# Patient Record
Sex: FEMALE | Race: WHITE | Employment: OTHER
[De-identification: names, ages, dates, MRNs, and addresses within clinical notes are randomized per-mention and may not be internally consistent; named-entity substitution may affect disease eponyms.]

---

## 2017-02-22 ENCOUNTER — OFFICE VISIT (OUTPATIENT)
Dept: UROLOGY | Facility: CLINIC | Age: 62
End: 2017-02-22

## 2017-02-22 VITALS
BODY MASS INDEX: 21.79 KG/M2 | WEIGHT: 123 LBS | HEIGHT: 63 IN | SYSTOLIC BLOOD PRESSURE: 128 MMHG | DIASTOLIC BLOOD PRESSURE: 78 MMHG

## 2017-02-22 DIAGNOSIS — K59.00 CONSTIPATION, UNSPECIFIED CONSTIPATION TYPE: ICD-10-CM

## 2017-02-22 DIAGNOSIS — N20.0 RENAL CALCULUS: Primary | ICD-10-CM

## 2017-02-22 PROCEDURE — 99213 OFFICE O/P EST LOW 20 MIN: CPT | Performed by: UROLOGY

## 2017-02-22 ASSESSMENT — ENCOUNTER SYMPTOMS
EYE PAIN: 0
SHORTNESS OF BREATH: 0
WHEEZING: 0
ABDOMINAL PAIN: 0
COLOR CHANGE: 0
COUGH: 0
NAUSEA: 0
BACK PAIN: 0
VOMITING: 0
EYE REDNESS: 0

## 2017-04-08 ENCOUNTER — EMPLOYEE WELLNESS (OUTPATIENT)
Dept: OTHER | Age: 62
End: 2017-04-08

## 2017-04-08 LAB
CHOLESTEROL/HDL RATIO: 3
CHOLESTEROL: 213 MG/DL
GLUCOSE BLD-MCNC: 102 MG/DL (ref 70–99)
HDLC SERPL-MCNC: 71 MG/DL
LDL CHOLESTEROL: 125 MG/DL (ref 0–130)
PATIENT FASTING?: YES
TRIGL SERPL-MCNC: 85 MG/DL
VLDLC SERPL CALC-MCNC: ABNORMAL MG/DL (ref 1–30)

## 2017-05-03 ENCOUNTER — HOSPITAL ENCOUNTER (OUTPATIENT)
Age: 62
Setting detail: SPECIMEN
Discharge: HOME | End: 2017-05-03
Payer: COMMERCIAL

## 2017-05-03 DIAGNOSIS — E03.9 HYPOTHYROIDISM: ICD-10-CM

## 2017-05-03 LAB
T3 FREE: 2.46 PG/ML (ref 2.02–4.43)
THYROXINE, FREE: 1.23 NG/DL (ref 0.93–1.7)
TSH SERPL DL<=0.05 MIU/L-ACNC: 3.93 MIU/L (ref 0.3–5)

## 2017-05-03 PROCEDURE — 36415 COLL VENOUS BLD VENIPUNCTURE: CPT

## 2017-05-03 PROCEDURE — 84443 ASSAY THYROID STIM HORMONE: CPT

## 2017-05-03 PROCEDURE — 84439 ASSAY OF FREE THYROXINE: CPT

## 2017-05-03 PROCEDURE — 84481 FREE ASSAY (FT-3): CPT

## 2017-05-18 ENCOUNTER — HOSPITAL ENCOUNTER (OUTPATIENT)
Age: 62
Setting detail: SPECIMEN
Discharge: HOME | End: 2017-05-18
Payer: COMMERCIAL

## 2017-05-18 LAB
-: ABNORMAL
AMORPHOUS: ABNORMAL
BACTERIA: ABNORMAL
BILIRUBIN URINE: NEGATIVE
CASTS UA: ABNORMAL /LPF
COLOR: YELLOW
COMMENT UA: ABNORMAL
CRYSTALS, UA: ABNORMAL /HPF
EPITHELIAL CELLS UA: ABNORMAL /HPF (ref 0–25)
GLUCOSE URINE: NEGATIVE
KETONES, URINE: NEGATIVE
LEUKOCYTE ESTERASE, URINE: ABNORMAL
MUCUS: ABNORMAL
NITRITE, URINE: POSITIVE
OTHER OBSERVATIONS UA: ABNORMAL
PH UA: 6 (ref 5–9)
PROTEIN UA: NEGATIVE
RBC UA: ABNORMAL /HPF (ref 0–2)
RENAL EPITHELIAL, UA: ABNORMAL /HPF
SPECIFIC GRAVITY UA: 1.02 (ref 1.01–1.02)
TRICHOMONAS: ABNORMAL
TURBIDITY: ABNORMAL
URINE HGB: ABNORMAL
UROBILINOGEN, URINE: NORMAL
WBC UA: ABNORMAL /HPF (ref 0–5)
YEAST: ABNORMAL

## 2017-05-18 PROCEDURE — 87186 SC STD MICRODIL/AGAR DIL: CPT

## 2017-05-18 PROCEDURE — 87088 URINE BACTERIA CULTURE: CPT

## 2017-05-18 PROCEDURE — 81001 URINALYSIS AUTO W/SCOPE: CPT

## 2017-05-18 PROCEDURE — 87086 URINE CULTURE/COLONY COUNT: CPT

## 2017-05-20 LAB
CULTURE: ABNORMAL
CULTURE: ABNORMAL
Lab: ABNORMAL
ORGANISM: ABNORMAL
SPECIMEN DESCRIPTION: ABNORMAL
SPECIMEN DESCRIPTION: ABNORMAL
STATUS: ABNORMAL

## 2017-06-07 ENCOUNTER — OFFICE VISIT (OUTPATIENT)
Dept: OBGYN | Age: 62
End: 2017-06-07
Payer: COMMERCIAL

## 2017-06-07 ENCOUNTER — HOSPITAL ENCOUNTER (OUTPATIENT)
Age: 62
Setting detail: SPECIMEN
Discharge: HOME | End: 2017-06-07
Payer: COMMERCIAL

## 2017-06-07 VITALS
WEIGHT: 120 LBS | BODY MASS INDEX: 20.49 KG/M2 | HEIGHT: 64 IN | SYSTOLIC BLOOD PRESSURE: 118 MMHG | DIASTOLIC BLOOD PRESSURE: 82 MMHG

## 2017-06-07 DIAGNOSIS — Z12.39 SCREENING FOR BREAST CANCER: ICD-10-CM

## 2017-06-07 DIAGNOSIS — Z01.419 ENCOUNTER FOR GYNECOLOGICAL EXAMINATION WITHOUT ABNORMAL FINDING: ICD-10-CM

## 2017-06-07 DIAGNOSIS — Z01.419 ENCOUNTER FOR GYNECOLOGICAL EXAMINATION WITHOUT ABNORMAL FINDING: Primary | ICD-10-CM

## 2017-06-07 PROCEDURE — 99396 PREV VISIT EST AGE 40-64: CPT | Performed by: ADVANCED PRACTICE MIDWIFE

## 2017-06-07 PROCEDURE — G0145 SCR C/V CYTO,THINLAYER,RESCR: HCPCS

## 2017-06-07 PROCEDURE — 87624 HPV HI-RISK TYP POOLED RSLT: CPT

## 2017-06-08 LAB
HPV SAMPLE: NORMAL
HPV SOURCE: NORMAL
HPV, GENOTYPE 16: NOT DETECTED
HPV, GENOTYPE 18: NOT DETECTED
HPV, HIGH RISK OTHER: NOT DETECTED
HPV, INTERPRETATION: NORMAL

## 2017-06-09 LAB — CYTOLOGY REPORT: NORMAL

## 2017-09-20 ENCOUNTER — HOSPITAL ENCOUNTER (OUTPATIENT)
Dept: WOMENS IMAGING | Age: 62
Discharge: HOME | End: 2017-09-20
Payer: COMMERCIAL

## 2017-09-20 DIAGNOSIS — Z12.39 SCREENING FOR BREAST CANCER: ICD-10-CM

## 2017-09-20 PROCEDURE — G0202 SCR MAMMO BI INCL CAD: HCPCS

## 2017-09-27 DIAGNOSIS — M85.80 OSTEOPENIA, UNSPECIFIED LOCATION: Primary | ICD-10-CM

## 2017-09-27 RX ORDER — ALENDRONATE SODIUM 70 MG/1
70 TABLET ORAL
Qty: 12 TABLET | Refills: 3 | Status: SHIPPED | OUTPATIENT
Start: 2017-09-27 | End: 2018-10-08 | Stop reason: SDUPTHER

## 2017-11-17 ENCOUNTER — HOSPITAL ENCOUNTER (OUTPATIENT)
Dept: GENERAL RADIOLOGY | Age: 62
Discharge: HOME | End: 2017-11-17
Payer: COMMERCIAL

## 2017-11-17 ENCOUNTER — HOSPITAL ENCOUNTER (OUTPATIENT)
Age: 62
Discharge: HOME | End: 2017-11-17
Payer: COMMERCIAL

## 2017-11-17 DIAGNOSIS — T14.8XXA SPRAIN: ICD-10-CM

## 2017-11-17 PROCEDURE — 72110 X-RAY EXAM L-2 SPINE 4/>VWS: CPT

## 2017-12-20 ENCOUNTER — TELEPHONE (OUTPATIENT)
Dept: UROLOGY | Age: 62
End: 2017-12-20

## 2017-12-20 ENCOUNTER — HOSPITAL ENCOUNTER (OUTPATIENT)
Age: 62
Discharge: HOME | End: 2017-12-20
Payer: COMMERCIAL

## 2017-12-20 DIAGNOSIS — R31.0 GROSS HEMATURIA: Primary | ICD-10-CM

## 2017-12-20 DIAGNOSIS — N20.0 RENAL STONES: ICD-10-CM

## 2017-12-20 DIAGNOSIS — R31.0 GROSS HEMATURIA: ICD-10-CM

## 2017-12-20 LAB
-: ABNORMAL
AMORPHOUS: ABNORMAL
BACTERIA: ABNORMAL
BILIRUBIN URINE: NEGATIVE
CASTS UA: ABNORMAL /LPF
COLOR: ABNORMAL
COMMENT UA: ABNORMAL
CRYSTALS, UA: ABNORMAL /HPF
EPITHELIAL CELLS UA: ABNORMAL /HPF (ref 0–25)
GLUCOSE URINE: NEGATIVE
KETONES, URINE: NEGATIVE
LEUKOCYTE ESTERASE, URINE: ABNORMAL
MUCUS: ABNORMAL
NITRITE, URINE: NEGATIVE
OTHER OBSERVATIONS UA: ABNORMAL
PH UA: 6.5 (ref 5–9)
PROTEIN UA: ABNORMAL
RBC UA: ABNORMAL /HPF (ref 0–2)
RENAL EPITHELIAL, UA: ABNORMAL /HPF
SPECIFIC GRAVITY UA: 1.01 (ref 1.01–1.02)
TRICHOMONAS: ABNORMAL
TURBIDITY: CLEAR
URINE HGB: ABNORMAL
UROBILINOGEN, URINE: NORMAL
WBC UA: ABNORMAL /HPF (ref 0–5)
YEAST: ABNORMAL

## 2017-12-20 PROCEDURE — 81001 URINALYSIS AUTO W/SCOPE: CPT

## 2017-12-20 PROCEDURE — 87086 URINE CULTURE/COLONY COUNT: CPT

## 2017-12-20 NOTE — TELEPHONE ENCOUNTER
Patient called reporting that she is having gross hematuria that started over the weekend. She reports having a hx of kidney stones. Has mild pain (<1) in her back. Denies fevers/chills. She is requesting to have a UAC/S ordered.

## 2017-12-20 NOTE — TELEPHONE ENCOUNTER
For clarification, patient wanted to speak to her supervisor regarding her work schedule for availability to complete procedures.

## 2017-12-20 NOTE — TELEPHONE ENCOUNTER
Patient informed of response and will drop off urine sample. Patient did not want to schedule CT urogram or cystoscopy while on the phone with writer. Patient education given regarding procedures ordered. She states she wants to think about it and then speak to her supervisor. Says she will give our office a call back.

## 2017-12-21 ENCOUNTER — HOSPITAL ENCOUNTER (OUTPATIENT)
Dept: CT IMAGING | Age: 62
Discharge: HOME | End: 2017-12-21
Payer: COMMERCIAL

## 2017-12-21 ENCOUNTER — HOSPITAL ENCOUNTER (OUTPATIENT)
Dept: LAB | Age: 62
Discharge: HOME | End: 2017-12-21
Payer: COMMERCIAL

## 2017-12-21 DIAGNOSIS — N20.0 RENAL STONES: ICD-10-CM

## 2017-12-21 DIAGNOSIS — R31.0 GROSS HEMATURIA: ICD-10-CM

## 2017-12-21 LAB
ANION GAP SERPL CALCULATED.3IONS-SCNC: 10 MMOL/L (ref 9–17)
BUN BLDV-MCNC: 17 MG/DL (ref 8–23)
BUN/CREAT BLD: 22 (ref 9–20)
CALCIUM SERPL-MCNC: 9.4 MG/DL (ref 8.6–10.4)
CHLORIDE BLD-SCNC: 102 MMOL/L (ref 98–107)
CO2: 29 MMOL/L (ref 20–31)
CREAT SERPL-MCNC: 0.78 MG/DL (ref 0.5–0.9)
CULTURE: NO GROWTH
CULTURE: NORMAL
GFR AFRICAN AMERICAN: >60 ML/MIN
GFR NON-AFRICAN AMERICAN: >60 ML/MIN
GFR SERPL CREATININE-BSD FRML MDRD: ABNORMAL ML/MIN/{1.73_M2}
GFR SERPL CREATININE-BSD FRML MDRD: ABNORMAL ML/MIN/{1.73_M2}
GLUCOSE BLD-MCNC: 96 MG/DL (ref 70–99)
Lab: NORMAL
Lab: NORMAL
POTASSIUM SERPL-SCNC: 4.3 MMOL/L (ref 3.7–5.3)
SODIUM BLD-SCNC: 141 MMOL/L (ref 135–144)
SPECIMEN DESCRIPTION: NORMAL
SPECIMEN DESCRIPTION: NORMAL
STATUS: NORMAL

## 2017-12-21 PROCEDURE — 36415 COLL VENOUS BLD VENIPUNCTURE: CPT

## 2017-12-21 PROCEDURE — 6360000004 HC RX CONTRAST MEDICATION: Performed by: NURSE PRACTITIONER

## 2017-12-21 PROCEDURE — 74178 CT ABD&PLV WO CNTR FLWD CNTR: CPT

## 2017-12-21 PROCEDURE — 80048 BASIC METABOLIC PNL TOTAL CA: CPT

## 2017-12-21 RX ADMIN — IOPAMIDOL 100 ML: 612 INJECTION, SOLUTION INTRAVENOUS at 16:01

## 2017-12-27 ENCOUNTER — HOSPITAL ENCOUNTER (OUTPATIENT)
Dept: GENERAL RADIOLOGY | Age: 62
Discharge: HOME | End: 2017-12-27
Payer: COMMERCIAL

## 2017-12-27 ENCOUNTER — HOSPITAL ENCOUNTER (OUTPATIENT)
Age: 62
Discharge: HOME | End: 2017-12-27
Payer: COMMERCIAL

## 2017-12-27 ENCOUNTER — OFFICE VISIT (OUTPATIENT)
Dept: UROLOGY | Age: 62
End: 2017-12-27
Payer: COMMERCIAL

## 2017-12-27 VITALS
BODY MASS INDEX: 20.38 KG/M2 | SYSTOLIC BLOOD PRESSURE: 160 MMHG | HEIGHT: 63 IN | DIASTOLIC BLOOD PRESSURE: 80 MMHG | WEIGHT: 115 LBS

## 2017-12-27 DIAGNOSIS — N20.0 RENAL LITHIASIS: Primary | ICD-10-CM

## 2017-12-27 DIAGNOSIS — N20.0 RENAL LITHIASIS: ICD-10-CM

## 2017-12-27 PROCEDURE — 99214 OFFICE O/P EST MOD 30 MIN: CPT | Performed by: UROLOGY

## 2017-12-27 PROCEDURE — 74000 XR ABDOMEN LIMITED (KUB): CPT

## 2017-12-27 RX ORDER — ASPIRIN 81 MG/1
81 TABLET, CHEWABLE ORAL DAILY
COMMUNITY
End: 2019-01-18

## 2017-12-27 ASSESSMENT — ENCOUNTER SYMPTOMS
VOMITING: 0
BACK PAIN: 0
EYE REDNESS: 0
WHEEZING: 0
SHORTNESS OF BREATH: 0
COLOR CHANGE: 0
EYE PAIN: 0
COUGH: 0
NAUSEA: 0
ABDOMINAL PAIN: 0

## 2017-12-27 NOTE — PROGRESS NOTES
Subjective:      Patient ID: Aristides Fleming is a 58 y.o. female. HPI  Patient is a 58 y.o. female in no acute distress. She is alert and oriented to person, place, and time. History  hx renal stones. S/p left HLL Oct 2013 and right ESWL Nov 2013.      S/p Left ESWL on 4/28/15 for 2 renal stones (7 mm, 3 mm). At 3 month f/u, KUB 08/04/2015 shows 2 stones dependent within the bladder. Currently  Patient is here today after having gross hematuria. We did order a CT urogram.  This film was independently reviewed today. Patient did have gross hematuria for approximately 2-3 days. This did resolve on its own. Urine culture was negative. Patient has a minimal flank pain. Patient's review of her CT scan does show a 7 mm stone in the left kidney. No pain today. Past Medical History:   Diagnosis Date    History of sepsis 2009    following lithotripsy    Hypothyroidism 2007    secondary to radioactive iodine 2007    Kidney stone     Osteoporosis      Past Surgical History:   Procedure Laterality Date    CHOLECYSTECTOMY, LAPAROSCOPIC  2009    COLONOSCOPY  07/17/2014    Dr. Kristine Hoffman - tubular adenomatous polyp removed    CYSTOSCOPY      stent removal and reinsert     [address] AND CURETTAGE OF UTERUS  1984    LITHOTRIPSY      x 4 - Dr. Karon Mooney did 2 and Dr. Shaila Hall has done 2 so far    LITHOTRIPSY  4/28/2015    left    THYROIDECTOMY  2007    radioactive iodine procedure     Family History   Problem Relation Age of Onset    Stroke Father     Breast Cancer Maternal Grandmother      Current Outpatient Prescriptions on File Prior to Visit   Medication Sig Dispense Refill    alendronate (FOSAMAX) 70 MG tablet Take 1 tablet by mouth every 7 days 12 tablet 3    levothyroxine (SYNTHROID) 75 MCG tablet Take 1 tablet by mouth every other day / alternate dose with 50mcg.  45 tablet 3    levothyroxine (LEVOTHROID) 50 MCG tablet Take 1 tablet by mouth every other day / alternate dose with 75mcg. 45 tablet 3    Polyethylene Glycol 3350 (MIRALAX PO) Take by mouth      Glucosamine-Chondroitin (GLUCOSAMINE CHONDR COMPLEX PO) Take by mouth      calcium carbonate (OSCAL) 500 MG TABS tablet Take 500 mg by mouth daily.  vitamin D (ERGOCALCIFEROL) 400 UNITS CAPS Take 400 Units by mouth daily.  ibuprofen (ADVIL;MOTRIN) 400 MG tablet Take 400 mg by mouth every 6 hours as needed for Pain. No current facility-administered medications on file prior to visit. Outpatient Encounter Prescriptions as of 12/27/2017   Medication Sig Dispense Refill    aspirin 81 MG chewable tablet Take 81 mg by mouth daily      alendronate (FOSAMAX) 70 MG tablet Take 1 tablet by mouth every 7 days 12 tablet 3    levothyroxine (SYNTHROID) 75 MCG tablet Take 1 tablet by mouth every other day / alternate dose with 50mcg. 45 tablet 3    levothyroxine (LEVOTHROID) 50 MCG tablet Take 1 tablet by mouth every other day / alternate dose with 75mcg. 45 tablet 3    Polyethylene Glycol 3350 (MIRALAX PO) Take by mouth      Glucosamine-Chondroitin (GLUCOSAMINE CHONDR COMPLEX PO) Take by mouth      calcium carbonate (OSCAL) 500 MG TABS tablet Take 500 mg by mouth daily.  vitamin D (ERGOCALCIFEROL) 400 UNITS CAPS Take 400 Units by mouth daily.  ibuprofen (ADVIL;MOTRIN) 400 MG tablet Take 400 mg by mouth every 6 hours as needed for Pain. No facility-administered encounter medications on file as of 12/27/2017. Review of patient's allergies indicates no known allergies. History   Smoking Status    Current Some Day Smoker    Packs/day: 0.25    Years: 30.00    Types: Cigarettes    Last attempt to quit: 9/21/2013   Smokeless Tobacco    Never Used     History   Alcohol Use    0.0 oz/week     Comment: socially       Vitals:    12/27/17 0832   BP: (!) 160/80     PHYSICAL EXAM:  Constitutional: Patient resting comfortably, in no acute distress.    Neuro: Alert and oriented to person place and

## 2018-01-03 ENCOUNTER — TELEPHONE (OUTPATIENT)
Dept: UROLOGY | Age: 63
End: 2018-01-03

## 2018-01-03 DIAGNOSIS — N20.0 RENAL STONE: Primary | ICD-10-CM

## 2018-01-03 DIAGNOSIS — Z01.818 PRE-OP TESTING: ICD-10-CM

## 2018-01-03 NOTE — TELEPHONE ENCOUNTER
Called patient and informed her of ESWL/cysto scheduled for 1/30/18 and PAT on 1/23/18 - Need PAT orders

## 2018-01-24 ENCOUNTER — HOSPITAL ENCOUNTER (OUTPATIENT)
Dept: PREADMISSION TESTING | Age: 63
Discharge: HOME | End: 2018-01-24
Attending: UROLOGY
Payer: COMMERCIAL

## 2018-01-24 VITALS
RESPIRATION RATE: 16 BRPM | DIASTOLIC BLOOD PRESSURE: 77 MMHG | TEMPERATURE: 96.6 F | HEART RATE: 69 BPM | SYSTOLIC BLOOD PRESSURE: 142 MMHG | WEIGHT: 115.3 LBS | OXYGEN SATURATION: 98 % | BODY MASS INDEX: 20.43 KG/M2 | HEIGHT: 63 IN

## 2018-01-24 DIAGNOSIS — Z01.818 PRE-OP TESTING: ICD-10-CM

## 2018-01-24 DIAGNOSIS — N20.0 RENAL STONE: ICD-10-CM

## 2018-01-24 LAB
ABSOLUTE EOS #: 0.2 K/UL (ref 0–0.4)
ABSOLUTE IMMATURE GRANULOCYTE: ABNORMAL K/UL (ref 0–0.3)
ABSOLUTE LYMPH #: 1.9 K/UL (ref 1–4.8)
ABSOLUTE MONO #: 0.6 K/UL (ref 0–1)
BASOPHILS # BLD: 0 % (ref 0–2)
BASOPHILS ABSOLUTE: 0 K/UL (ref 0–0.2)
DIFFERENTIAL TYPE: ABNORMAL
EKG ATRIAL RATE: 55 BPM
EKG P AXIS: 59 DEGREES
EKG P-R INTERVAL: 146 MS
EKG Q-T INTERVAL: 442 MS
EKG QRS DURATION: 74 MS
EKG QTC CALCULATION (BAZETT): 422 MS
EKG R AXIS: -12 DEGREES
EKG T AXIS: 51 DEGREES
EKG VENTRICULAR RATE: 55 BPM
EOSINOPHILS RELATIVE PERCENT: 3 % (ref 0–8)
HCT VFR BLD CALC: 41.6 % (ref 36–46)
HEMOGLOBIN: 13.7 G/DL (ref 12–16)
IMMATURE GRANULOCYTES: ABNORMAL %
LYMPHOCYTES # BLD: 23 % (ref 24–44)
MCH RBC QN AUTO: 32.5 PG (ref 26–34)
MCHC RBC AUTO-ENTMCNC: 33 G/DL (ref 31–37)
MCV RBC AUTO: 98.5 FL (ref 80–100)
MONOCYTES # BLD: 7 % (ref 0–12)
NRBC AUTOMATED: ABNORMAL PER 100 WBC
PDW BLD-RTO: 13.6 % (ref 12.1–15.2)
PLATELET # BLD: 286 K/UL (ref 140–450)
PLATELET ESTIMATE: ABNORMAL
PMV BLD AUTO: 8.3 FL (ref 6–12)
RBC # BLD: 4.22 M/UL (ref 4–5.2)
RBC # BLD: ABNORMAL 10*6/UL
SEG NEUTROPHILS: 67 % (ref 36–66)
SEGMENTED NEUTROPHILS ABSOLUTE COUNT: 5.4 K/UL (ref 1.8–7.7)
WBC # BLD: 8.1 K/UL (ref 3.5–11)
WBC # BLD: ABNORMAL 10*3/UL

## 2018-01-24 PROCEDURE — 93005 ELECTROCARDIOGRAM TRACING: CPT

## 2018-01-24 PROCEDURE — 85025 COMPLETE CBC W/AUTO DIFF WBC: CPT

## 2018-01-24 PROCEDURE — 36415 COLL VENOUS BLD VENIPUNCTURE: CPT

## 2018-01-24 NOTE — PROGRESS NOTES
ENGLISH  Medical History Reviewed: Yes  NPO Status Reinforced: Yes  Ride and Caregiver Arranged: Yes  Ride Caregiver Provider: MATT  Patient Knows to Bring Current Medications: Yes    Pre-AdmissionTesting Checklist  Patient has been to this health system before?: Yes  Does patient refuse blood?: No  Healthcare Directive: Yes, patient has an advance directive for healthcare treatment   needed: No  Patient can read and write?: Yes  History given by: Patient  Providing self care at home?: Yes  Discharge transport (for same day patients): Family    Patient instructed on the pre-operative, intra-operative, and post-operative process? Yes  Medication instructions reviewed with patient? Yes  Pre operative instruction sheet reviewed and given to patient in PAT?   Yes

## 2018-01-30 ENCOUNTER — HOSPITAL ENCOUNTER (OUTPATIENT)
Age: 63
Setting detail: OUTPATIENT SURGERY
Discharge: HOME | End: 2018-01-30
Attending: UROLOGY | Admitting: UROLOGY
Payer: COMMERCIAL

## 2018-01-30 ENCOUNTER — ANESTHESIA EVENT (OUTPATIENT)
Dept: OPERATING ROOM | Age: 63
End: 2018-01-30
Payer: COMMERCIAL

## 2018-01-30 ENCOUNTER — ANESTHESIA (OUTPATIENT)
Dept: OPERATING ROOM | Age: 63
End: 2018-01-30
Payer: COMMERCIAL

## 2018-01-30 VITALS
TEMPERATURE: 96.2 F | DIASTOLIC BLOOD PRESSURE: 70 MMHG | RESPIRATION RATE: 22 BRPM | SYSTOLIC BLOOD PRESSURE: 109 MMHG | OXYGEN SATURATION: 100 %

## 2018-01-30 VITALS
RESPIRATION RATE: 18 BRPM | DIASTOLIC BLOOD PRESSURE: 88 MMHG | BODY MASS INDEX: 20.38 KG/M2 | SYSTOLIC BLOOD PRESSURE: 136 MMHG | TEMPERATURE: 98 F | WEIGHT: 115 LBS | HEART RATE: 71 BPM | OXYGEN SATURATION: 98 % | HEIGHT: 63 IN

## 2018-01-30 PROCEDURE — 6360000002 HC RX W HCPCS: Performed by: NURSE ANESTHETIST, CERTIFIED REGISTERED

## 2018-01-30 PROCEDURE — 3700000000 HC ANESTHESIA ATTENDED CARE: Performed by: UROLOGY

## 2018-01-30 PROCEDURE — 7100000000 HC PACU RECOVERY - FIRST 15 MIN: Performed by: UROLOGY

## 2018-01-30 PROCEDURE — 7100000011 HC PHASE II RECOVERY - ADDTL 15 MIN: Performed by: UROLOGY

## 2018-01-30 PROCEDURE — 2580000003 HC RX 258: Performed by: UROLOGY

## 2018-01-30 PROCEDURE — 3600000004 HC SURGERY LEVEL 4 BASE: Performed by: UROLOGY

## 2018-01-30 PROCEDURE — 3600000014 HC SURGERY LEVEL 4 ADDTL 15MIN: Performed by: UROLOGY

## 2018-01-30 PROCEDURE — 6360000002 HC RX W HCPCS: Performed by: UROLOGY

## 2018-01-30 PROCEDURE — 7100000010 HC PHASE II RECOVERY - FIRST 15 MIN: Performed by: UROLOGY

## 2018-01-30 PROCEDURE — A6251 ABSORPT DRG <=16 SQ IN W/O B: HCPCS | Performed by: UROLOGY

## 2018-01-30 PROCEDURE — 2500000003 HC RX 250 WO HCPCS: Performed by: NURSE ANESTHETIST, CERTIFIED REGISTERED

## 2018-01-30 PROCEDURE — 3700000001 HC ADD 15 MINUTES (ANESTHESIA): Performed by: UROLOGY

## 2018-01-30 PROCEDURE — 2500000003 HC RX 250 WO HCPCS

## 2018-01-30 RX ORDER — CIPROFLOXACIN 2 MG/ML
400 INJECTION, SOLUTION INTRAVENOUS
Status: COMPLETED | OUTPATIENT
Start: 2018-01-30 | End: 2018-01-30

## 2018-01-30 RX ORDER — KETOROLAC TROMETHAMINE 30 MG/ML
INJECTION, SOLUTION INTRAMUSCULAR; INTRAVENOUS PRN
Status: DISCONTINUED | OUTPATIENT
Start: 2018-01-30 | End: 2018-01-30 | Stop reason: SDUPTHER

## 2018-01-30 RX ORDER — DEXAMETHASONE SODIUM PHOSPHATE 4 MG/ML
INJECTION, SOLUTION INTRA-ARTICULAR; INTRALESIONAL; INTRAMUSCULAR; INTRAVENOUS; SOFT TISSUE PRN
Status: DISCONTINUED | OUTPATIENT
Start: 2018-01-30 | End: 2018-01-30 | Stop reason: SDUPTHER

## 2018-01-30 RX ORDER — OXYCODONE HYDROCHLORIDE 5 MG/1
5 TABLET ORAL
Status: DISCONTINUED | OUTPATIENT
Start: 2018-01-30 | End: 2018-01-30 | Stop reason: HOSPADM

## 2018-01-30 RX ORDER — FENTANYL CITRATE 50 UG/ML
25 INJECTION, SOLUTION INTRAMUSCULAR; INTRAVENOUS EVERY 5 MIN PRN
Status: DISCONTINUED | OUTPATIENT
Start: 2018-01-30 | End: 2018-01-30 | Stop reason: HOSPADM

## 2018-01-30 RX ORDER — LIDOCAINE HYDROCHLORIDE 20 MG/ML
INJECTION, SOLUTION INFILTRATION; PERINEURAL PRN
Status: DISCONTINUED | OUTPATIENT
Start: 2018-01-30 | End: 2018-01-30 | Stop reason: SDUPTHER

## 2018-01-30 RX ORDER — MEPERIDINE HYDROCHLORIDE 50 MG/ML
12.5 INJECTION INTRAMUSCULAR; INTRAVENOUS; SUBCUTANEOUS EVERY 5 MIN PRN
Status: DISCONTINUED | OUTPATIENT
Start: 2018-01-30 | End: 2018-01-30 | Stop reason: HOSPADM

## 2018-01-30 RX ORDER — ONDANSETRON 2 MG/ML
INJECTION INTRAMUSCULAR; INTRAVENOUS PRN
Status: DISCONTINUED | OUTPATIENT
Start: 2018-01-30 | End: 2018-01-30 | Stop reason: SDUPTHER

## 2018-01-30 RX ORDER — FENTANYL CITRATE 50 UG/ML
INJECTION, SOLUTION INTRAMUSCULAR; INTRAVENOUS PRN
Status: DISCONTINUED | OUTPATIENT
Start: 2018-01-30 | End: 2018-01-30 | Stop reason: SDUPTHER

## 2018-01-30 RX ORDER — PROPOFOL 10 MG/ML
INJECTION, EMULSION INTRAVENOUS PRN
Status: DISCONTINUED | OUTPATIENT
Start: 2018-01-30 | End: 2018-01-30 | Stop reason: SDUPTHER

## 2018-01-30 RX ORDER — ONDANSETRON 2 MG/ML
4 INJECTION INTRAMUSCULAR; INTRAVENOUS
Status: DISCONTINUED | OUTPATIENT
Start: 2018-01-30 | End: 2018-01-30 | Stop reason: HOSPADM

## 2018-01-30 RX ORDER — MIDAZOLAM HYDROCHLORIDE 1 MG/ML
INJECTION INTRAMUSCULAR; INTRAVENOUS PRN
Status: DISCONTINUED | OUTPATIENT
Start: 2018-01-30 | End: 2018-01-30 | Stop reason: SDUPTHER

## 2018-01-30 RX ORDER — GLYCOPYRROLATE 0.2 MG/ML
INJECTION INTRAMUSCULAR; INTRAVENOUS PRN
Status: DISCONTINUED | OUTPATIENT
Start: 2018-01-30 | End: 2018-01-30 | Stop reason: SDUPTHER

## 2018-01-30 RX ORDER — SODIUM CHLORIDE, SODIUM LACTATE, POTASSIUM CHLORIDE, CALCIUM CHLORIDE 600; 310; 30; 20 MG/100ML; MG/100ML; MG/100ML; MG/100ML
INJECTION, SOLUTION INTRAVENOUS CONTINUOUS
Status: DISCONTINUED | OUTPATIENT
Start: 2018-01-30 | End: 2018-01-30 | Stop reason: HOSPADM

## 2018-01-30 RX ADMIN — GLYCOPYRROLATE 0.2 MG: 0.2 INJECTION INTRAMUSCULAR; INTRAVENOUS at 09:57

## 2018-01-30 RX ADMIN — SODIUM CHLORIDE, POTASSIUM CHLORIDE, SODIUM LACTATE AND CALCIUM CHLORIDE: 600; 310; 30; 20 INJECTION, SOLUTION INTRAVENOUS at 08:30

## 2018-01-30 RX ADMIN — CIPROFLOXACIN 400 MG: 2 INJECTION, SOLUTION INTRAVENOUS at 09:36

## 2018-01-30 RX ADMIN — DEXAMETHASONE SODIUM PHOSPHATE 4 MG: 4 INJECTION, SOLUTION INTRAMUSCULAR; INTRAVENOUS at 09:49

## 2018-01-30 RX ADMIN — FENTANYL CITRATE 50 MCG: 50 INJECTION INTRAMUSCULAR; INTRAVENOUS at 09:41

## 2018-01-30 RX ADMIN — KETOROLAC TROMETHAMINE 30 MG: 30 INJECTION, SOLUTION INTRAMUSCULAR; INTRAVENOUS at 10:15

## 2018-01-30 RX ADMIN — MIDAZOLAM HYDROCHLORIDE 2 MG: 1 INJECTION, SOLUTION INTRAMUSCULAR; INTRAVENOUS at 09:40

## 2018-01-30 RX ADMIN — PROPOFOL 200 MG: 10 INJECTION, EMULSION INTRAVENOUS at 09:42

## 2018-01-30 RX ADMIN — SODIUM CHLORIDE, POTASSIUM CHLORIDE, SODIUM LACTATE AND CALCIUM CHLORIDE: 600; 310; 30; 20 INJECTION, SOLUTION INTRAVENOUS at 10:20

## 2018-01-30 RX ADMIN — ONDANSETRON 4 MG: 2 INJECTION INTRAMUSCULAR; INTRAVENOUS at 09:51

## 2018-01-30 RX ADMIN — LIDOCAINE HYDROCHLORIDE 100 MG: 20 INJECTION, SOLUTION INFILTRATION; PERINEURAL at 09:42

## 2018-01-30 ASSESSMENT — PULMONARY FUNCTION TESTS
PIF_VALUE: 12
PIF_VALUE: 24
PIF_VALUE: 14
PIF_VALUE: 10
PIF_VALUE: 1
PIF_VALUE: 10
PIF_VALUE: 10
PIF_VALUE: 11
PIF_VALUE: 12
PIF_VALUE: 1
PIF_VALUE: 8
PIF_VALUE: 12
PIF_VALUE: 12
PIF_VALUE: 10
PIF_VALUE: 8
PIF_VALUE: 12
PIF_VALUE: 10
PIF_VALUE: 12
PIF_VALUE: 11
PIF_VALUE: 12
PIF_VALUE: 12
PIF_VALUE: 10
PIF_VALUE: 12
PIF_VALUE: 10
PIF_VALUE: 12
PIF_VALUE: 12
PIF_VALUE: 1
PIF_VALUE: 3
PIF_VALUE: 12
PIF_VALUE: 10
PIF_VALUE: 12
PIF_VALUE: 10
PIF_VALUE: 12
PIF_VALUE: 6
PIF_VALUE: 12

## 2018-01-30 ASSESSMENT — PAIN SCALES - GENERAL
PAINLEVEL_OUTOF10: 0

## 2018-01-30 ASSESSMENT — PAIN - FUNCTIONAL ASSESSMENT: PAIN_FUNCTIONAL_ASSESSMENT: 0-10

## 2018-01-30 ASSESSMENT — LIFESTYLE VARIABLES: SMOKING_STATUS: 1

## 2018-01-30 NOTE — PROGRESS NOTES
Discharge Criteria    Inpatients must meet Criteria 1 through 7. All other patients are either YES or N/A. If a NO is chosen then Anesthesia or Surgeon must be notified. 1.  Minimum 30 minutes after last dose of sedative medication, minimum 120 minutes after last dose of reversal agent. Yes      2. Systolic BP stable within 20 mmHg for 30 minutes & systolic BP between 90 & 291 or within 10 mmHg of baseline. Yes      3. Pulse between 60 and 100 or within 10 bpm of baseline. Yes      4. Spontaneous respiratory rate >/= 10 per minute. Yes      5. SaO2 >/= 95 or  >/= baseline. Yes      6. Able to cough and swallow or return to baseline function. Yes      7. Alert and oriented or return to baseline mental status. Yes      8. Demonstrates controlled, coordinated movements, ambulates with steady gait, or return to baseline activity function. Yes      9. Minimal or no pain or nausea, or at a level tolerable and acceptable to patient. Yes      10. Takes and retains oral fluids as allowed. Yes      11. Procedural / perioperative site stable. Minimal or no bleeding. Yes          12. If GI endoscopy procedure, minimal or no abdominal distention or passing flatus. N/A      13. Written discharge instructions and emergency telephone number provided. Yes      14. Accompanied by a responsible adult. Yes      Adult patient discharged from facility without responsible person meets above criteria plus the following:   a) remains awake without stimulus for 30 minutes     b) oriented appropriate for age      c) all vital signs stable   d) no significant risk of losing protective reflexes      e) able to maintain pre-procedure mobility without assistance   f) no nausea or dizziness      g) transportation arrangements that do not require patient to operate motor Vehicle.      N/A

## 2018-01-30 NOTE — ANESTHESIA PRE PROCEDURE
01/24/2018    MCV 98.5 01/24/2018    RDW 13.6 01/24/2018     01/24/2018     05/08/2012       CMP:   Lab Results   Component Value Date     12/21/2017    K 4.3 12/21/2017     12/21/2017    CO2 29 12/21/2017    BUN 17 12/21/2017    CREATININE 0.78 12/21/2017    GFRAA >60 12/21/2017    LABGLOM >60 12/21/2017    GLUCOSE 96 12/21/2017    GLUCOSE 102 05/08/2012    CALCIUM 9.4 12/21/2017       POC Tests: No results for input(s): POCGLU, POCNA, POCK, POCCL, POCBUN, POCHEMO, POCHCT in the last 72 hours. Coags: No results found for: PROTIME, INR, APTT    HCG (If Applicable): No results found for: PREGTESTUR, PREGSERUM, HCG, HCGQUANT     ABGs: No results found for: PHART, PO2ART, IJA1YRL, LDG6AJO, BEART, Z9GDKDJM     Type & Screen (If Applicable):  No results found for: LABABO, [address]    Anesthesia Evaluation  Patient summary reviewed and Nursing notes reviewed no history of anesthetic complications:   Airway: Mallampati: II  TM distance: >3 FB   Neck ROM: full  Mouth opening: > = 3 FB Dental:          Pulmonary:Negative Pulmonary ROS and normal exam    (+) current smoker          Patient did not smoke on day of surgery. ROS comment: smokes 6 - 8 cigs/day, none today   Cardiovascular:Negative CV ROS  Exercise tolerance: good (>4 METS),           Rhythm: regular  Rate: normal                    Neuro/Psych:   Negative Neuro/Psych ROS              GI/Hepatic/Renal:   (+) renal disease: kidney stones,           Endo/Other:    (+) hypothyroidism::., .                 Abdominal:           Vascular: negative vascular ROS. Anesthesia Plan      general     ASA 2       Induction: intravenous. Anesthetic plan and risks discussed with patient and spouse.                       Sharmila Iyer CRNA   1/30/2018

## 2018-01-31 NOTE — OP NOTE
[address] [address]                                 OPERATIVE REPORT    PATIENT NAME: Pool Jurado                       :        1955  MED REC NO:   [id number]                              ROOM:  ACCOUNT NO:   [de-identified]                           ADMIT DATE: 2018  PROVIDER:     Aleta Hameed    DATE OF PROCEDURE:  2018    PREOPERATIVE DIAGNOSES:  1. Left renal calculus. 2.  Gross hematuria. POSTOPERATIVE DIAGNOSES:  1. Left renal calculus. 2.  Gross hematuria. PROCEDURES PERFORMED:  1. Left extracorporeal shockwave lithotripsy. 2.  Cystoscopy. SURGEON:  Aleta Hameed MD    ASSISTANT:  None. ANESTHESIA:  General.    COMPLICATIONS:  None. EBL:  Minimal.    PROSTHESIS:  None. DISPOSITION:  Stable. FINDINGS:  A 6-mm left renal calculus. INDICATION:  This patient is a 55-year-old female with an extensive stone  history, here now after CT scan. She did have a CT urogram for gross  hematuria. She is also here for definitive therapy of her stones as well  as a bladder evaluation. OPERATIVE PROCEDURE:  The patient was taken back to operating room after  informed consent including all risks, benefits, and alternatives were  obtained. The patient was transferred from the [facility] on to the operating  table, where she was induced under general anesthesia, given IV Cipro for  preoperative antibiotic prophylaxis. To begin the case, she was prepped  and draped in normal sterile fashion, placed in dorsal lithotomy. She had  a 22-Polish sheath with a 30-degree lens passed through the urethra into  the bladder. Once in the bladder, bladder was examined in a systematic  fashion and there was no gross bladder tumors, areas of inflammation,  trabeculation, or gross bladder stones. Both of the ureteral orifices were  identified, appeared patent in proper anatomic position.   At this

## 2018-03-15 ENCOUNTER — EMPLOYEE WELLNESS (OUTPATIENT)
Dept: OTHER | Age: 63
End: 2018-03-15

## 2018-03-20 VITALS — BODY MASS INDEX: 21.26 KG/M2 | WEIGHT: 120 LBS

## 2018-03-22 LAB
CHOLESTEROL/HDL RATIO: 3.3
CHOLESTEROL: 204 MG/DL
GLUCOSE BLD-MCNC: 96 MG/DL (ref 70–99)
HDLC SERPL-MCNC: 61 MG/DL
LDL CHOLESTEROL: 109 MG/DL (ref 0–130)
PATIENT FASTING?: YES
TRIGL SERPL-MCNC: 168 MG/DL
VLDLC SERPL CALC-MCNC: ABNORMAL MG/DL (ref 1–30)

## 2018-04-02 VITALS — WEIGHT: 115 LBS | BODY MASS INDEX: 20.37 KG/M2

## 2018-04-11 ENCOUNTER — HOSPITAL ENCOUNTER (OUTPATIENT)
Age: 63
Discharge: HOME | End: 2018-04-11
Payer: COMMERCIAL

## 2018-04-11 DIAGNOSIS — E03.9 HYPOTHYROIDISM, UNSPECIFIED TYPE: ICD-10-CM

## 2018-04-11 LAB
T3 FREE: 2.57 PG/ML (ref 2.02–4.43)
THYROXINE, FREE: 1.23 NG/DL (ref 0.93–1.7)
TSH SERPL DL<=0.05 MIU/L-ACNC: 4.6 MIU/L (ref 0.3–5)

## 2018-04-11 PROCEDURE — 84481 FREE ASSAY (FT-3): CPT

## 2018-04-11 PROCEDURE — 36415 COLL VENOUS BLD VENIPUNCTURE: CPT

## 2018-04-11 PROCEDURE — 84439 ASSAY OF FREE THYROXINE: CPT

## 2018-04-11 PROCEDURE — 84443 ASSAY THYROID STIM HORMONE: CPT

## 2018-05-01 ENCOUNTER — HOSPITAL ENCOUNTER (OUTPATIENT)
Age: 63
Discharge: HOME | End: 2018-05-03
Payer: COMMERCIAL

## 2018-05-01 ENCOUNTER — HOSPITAL ENCOUNTER (OUTPATIENT)
Dept: GENERAL RADIOLOGY | Age: 63
Discharge: HOME | End: 2018-05-03
Payer: COMMERCIAL

## 2018-05-01 DIAGNOSIS — N20.0 RENAL CALCULUS: ICD-10-CM

## 2018-05-01 PROCEDURE — 74018 RADEX ABDOMEN 1 VIEW: CPT

## 2018-05-02 ENCOUNTER — OFFICE VISIT (OUTPATIENT)
Dept: UROLOGY | Age: 63
End: 2018-05-02
Payer: COMMERCIAL

## 2018-05-02 VITALS
DIASTOLIC BLOOD PRESSURE: 86 MMHG | HEIGHT: 63 IN | BODY MASS INDEX: 20.91 KG/M2 | WEIGHT: 118 LBS | SYSTOLIC BLOOD PRESSURE: 140 MMHG

## 2018-05-02 DIAGNOSIS — N20.0 RENAL LITHIASIS: Primary | ICD-10-CM

## 2018-05-02 PROCEDURE — 99213 OFFICE O/P EST LOW 20 MIN: CPT | Performed by: UROLOGY

## 2018-05-02 ASSESSMENT — ENCOUNTER SYMPTOMS
BACK PAIN: 0
WHEEZING: 0
COLOR CHANGE: 0
SHORTNESS OF BREATH: 0
ABDOMINAL PAIN: 0
EYE PAIN: 0
COUGH: 0
VOMITING: 0
EYE REDNESS: 0
NAUSEA: 0

## 2018-06-13 ENCOUNTER — OFFICE VISIT (OUTPATIENT)
Dept: OBGYN | Age: 63
End: 2018-06-13
Payer: COMMERCIAL

## 2018-06-13 VITALS
BODY MASS INDEX: 20.59 KG/M2 | WEIGHT: 116.2 LBS | SYSTOLIC BLOOD PRESSURE: 136 MMHG | HEIGHT: 63 IN | DIASTOLIC BLOOD PRESSURE: 82 MMHG

## 2018-06-13 DIAGNOSIS — Z12.39 SCREENING FOR BREAST CANCER: ICD-10-CM

## 2018-06-13 DIAGNOSIS — Z01.419 ENCOUNTER FOR ANNUAL ROUTINE GYNECOLOGICAL EXAMINATION: Primary | ICD-10-CM

## 2018-06-13 PROCEDURE — 99396 PREV VISIT EST AGE 40-64: CPT | Performed by: ADVANCED PRACTICE MIDWIFE

## 2018-06-13 ASSESSMENT — PATIENT HEALTH QUESTIONNAIRE - PHQ9
SUM OF ALL RESPONSES TO PHQ QUESTIONS 1-9: 0
1. LITTLE INTEREST OR PLEASURE IN DOING THINGS: 0
2. FEELING DOWN, DEPRESSED OR HOPELESS: 0
SUM OF ALL RESPONSES TO PHQ9 QUESTIONS 1 & 2: 0

## 2018-07-08 PROBLEM — Z86.010 HISTORY OF COLON POLYPS: Status: ACTIVE | Noted: 2018-07-08

## 2018-07-08 PROBLEM — Z86.0100 HISTORY OF COLON POLYPS: Status: ACTIVE | Noted: 2018-07-08

## 2018-08-01 ENCOUNTER — ANESTHESIA EVENT (OUTPATIENT)
Dept: OPERATING ROOM | Age: 63
End: 2018-08-01
Payer: COMMERCIAL

## 2018-08-01 ENCOUNTER — ANESTHESIA (OUTPATIENT)
Dept: OPERATING ROOM | Age: 63
End: 2018-08-01
Payer: COMMERCIAL

## 2018-08-01 ENCOUNTER — HOSPITAL ENCOUNTER (OUTPATIENT)
Age: 63
Setting detail: OUTPATIENT SURGERY
Discharge: HOME | End: 2018-08-01
Attending: INTERNAL MEDICINE | Admitting: INTERNAL MEDICINE
Payer: COMMERCIAL

## 2018-08-01 VITALS
OXYGEN SATURATION: 99 % | HEIGHT: 63 IN | WEIGHT: 117 LBS | HEART RATE: 56 BPM | RESPIRATION RATE: 16 BRPM | BODY MASS INDEX: 20.73 KG/M2 | SYSTOLIC BLOOD PRESSURE: 119 MMHG | TEMPERATURE: 97.6 F | DIASTOLIC BLOOD PRESSURE: 73 MMHG

## 2018-08-01 VITALS
RESPIRATION RATE: 16 BRPM | DIASTOLIC BLOOD PRESSURE: 57 MMHG | OXYGEN SATURATION: 100 % | SYSTOLIC BLOOD PRESSURE: 104 MMHG

## 2018-08-01 PROCEDURE — 45385 COLONOSCOPY W/LESION REMOVAL: CPT | Performed by: INTERNAL MEDICINE

## 2018-08-01 PROCEDURE — 2500000003 HC RX 250 WO HCPCS: Performed by: NURSE ANESTHETIST, CERTIFIED REGISTERED

## 2018-08-01 PROCEDURE — 3700000000 HC ANESTHESIA ATTENDED CARE: Performed by: INTERNAL MEDICINE

## 2018-08-01 PROCEDURE — 7100000011 HC PHASE II RECOVERY - ADDTL 15 MIN: Performed by: INTERNAL MEDICINE

## 2018-08-01 PROCEDURE — 7100000010 HC PHASE II RECOVERY - FIRST 15 MIN: Performed by: INTERNAL MEDICINE

## 2018-08-01 PROCEDURE — C1773 RET DEV, INSERTABLE: HCPCS | Performed by: INTERNAL MEDICINE

## 2018-08-01 PROCEDURE — 2709999900 HC NON-CHARGEABLE SUPPLY: Performed by: INTERNAL MEDICINE

## 2018-08-01 PROCEDURE — 6360000002 HC RX W HCPCS: Performed by: NURSE ANESTHETIST, CERTIFIED REGISTERED

## 2018-08-01 PROCEDURE — 3700000001 HC ADD 15 MINUTES (ANESTHESIA): Performed by: INTERNAL MEDICINE

## 2018-08-01 PROCEDURE — 88305 TISSUE EXAM BY PATHOLOGIST: CPT

## 2018-08-01 PROCEDURE — 2580000003 HC RX 258: Performed by: INTERNAL MEDICINE

## 2018-08-01 PROCEDURE — 3609010600 HC COLONOSCOPY POLYPECTOMY SNARE/COLD BIOPSY: Performed by: INTERNAL MEDICINE

## 2018-08-01 RX ORDER — PROPOFOL 10 MG/ML
INJECTION, EMULSION INTRAVENOUS CONTINUOUS PRN
Status: DISCONTINUED | OUTPATIENT
Start: 2018-08-01 | End: 2018-08-01 | Stop reason: SDUPTHER

## 2018-08-01 RX ORDER — SODIUM CHLORIDE, SODIUM LACTATE, POTASSIUM CHLORIDE, CALCIUM CHLORIDE 600; 310; 30; 20 MG/100ML; MG/100ML; MG/100ML; MG/100ML
INJECTION, SOLUTION INTRAVENOUS CONTINUOUS
Status: DISCONTINUED | OUTPATIENT
Start: 2018-08-01 | End: 2018-08-01 | Stop reason: HOSPADM

## 2018-08-01 RX ORDER — LIDOCAINE HYDROCHLORIDE 20 MG/ML
INJECTION, SOLUTION EPIDURAL; INFILTRATION; INTRACAUDAL; PERINEURAL PRN
Status: DISCONTINUED | OUTPATIENT
Start: 2018-08-01 | End: 2018-08-01 | Stop reason: SDUPTHER

## 2018-08-01 RX ORDER — PROPOFOL 10 MG/ML
INJECTION, EMULSION INTRAVENOUS PRN
Status: DISCONTINUED | OUTPATIENT
Start: 2018-08-01 | End: 2018-08-01 | Stop reason: SDUPTHER

## 2018-08-01 RX ADMIN — SODIUM CHLORIDE, POTASSIUM CHLORIDE, SODIUM LACTATE AND CALCIUM CHLORIDE: 600; 310; 30; 20 INJECTION, SOLUTION INTRAVENOUS at 08:52

## 2018-08-01 RX ADMIN — LIDOCAINE HYDROCHLORIDE 80 MG: 20 INJECTION, SOLUTION EPIDURAL; INFILTRATION; INTRACAUDAL; PERINEURAL at 09:40

## 2018-08-01 RX ADMIN — PROPOFOL 120 MG: 10 INJECTION, EMULSION INTRAVENOUS at 09:40

## 2018-08-01 RX ADMIN — PROPOFOL 150 MCG/KG/MIN: 10 INJECTION, EMULSION INTRAVENOUS at 09:42

## 2018-08-01 ASSESSMENT — LIFESTYLE VARIABLES: SMOKING_STATUS: 1

## 2018-08-01 ASSESSMENT — PAIN SCALES - GENERAL: PAINLEVEL_OUTOF10: 0

## 2018-08-01 ASSESSMENT — PAIN - FUNCTIONAL ASSESSMENT: PAIN_FUNCTIONAL_ASSESSMENT: 0-10

## 2018-08-01 NOTE — ANESTHESIA POSTPROCEDURE EVALUATION
Department of Anesthesiology  Postprocedure Note    Patient: Shari Al  MRN: [id number]  YOB: 1955  Date of evaluation: 8/1/2018  Time:  10:25 AM     Procedure Summary     Date:  08/01/18 Room / Location:  [facility] AT [place] [address] / [facility] AT [facility]    Anesthesia Start:  0935 Anesthesia Stop:  9480    Procedure:  COLONOSCOPY POLYPECTOMY SNARE/COLD BIOPSY (N/A ) Diagnosis:  (SCREENING)    Surgeon:  Georgina Grande MD Responsible Provider:  SAL Ramirez CRNA    Anesthesia Type:  general, TIVA ASA Status:  2          Anesthesia Type: general, TIVA    Hannah Phase I:      Hannah Phase II:      Last vitals: Reviewed and per EMR flowsheets.        Anesthesia Post Evaluation    Patient location during evaluation: PACU  Patient participation: complete - patient participated  Level of consciousness: awake  Pain score: 0  Airway patency: patent  Nausea & Vomiting: no nausea and no vomiting  Complications: no  Cardiovascular status: blood pressure returned to baseline  Respiratory status: acceptable and room air  Hydration status: stable

## 2018-08-01 NOTE — PROGRESS NOTES
Discharge instructions given to patient and spouse, verbalizes understanding. Denies questions at this time.

## 2018-08-01 NOTE — OP NOTE
PROCEDURE NOTE    DATE OF PROCEDURE: 8/1/2018    ENDOSCOPIST: Karen North. Ayana Rawls MD, [facility]    ASSISTANT: None    PREOPERATIVE DIAGNOSIS: previous adenomatous polyp  screening for colon cancer    POSTOPERATIVE DIAGNOSIS: polyp(s) #1, 2-3 mm in size, located in the rectum removed by cold snare and retrieved for pathology    OPERATION: Colonoscopy with polypectomy (cold snare)    ANESTHESIA: MAC     ESTIMATED BLOOD LOSS: No    COMPLICATIONS: None. SPECIMENS: were obtained    HISTORY: The patient is a 58y.o. year old female with history of above preop diagnosis. Colonoscopy with possible biopsy or polypectomy has been recommended and I explained the risk, benefits, expected outcome, and alternatives to the procedure. Risks include but are not limited to bleeding, infection, respiratory distress, hypotension, and perforation of the colon. The patient understands and is in agreement. PROCEDURE: The patient was given monitored anesthesia care. The patient was given oxygen by nasal cannula. The colonoscope was inserted per rectum and advanced under direct vision to the cecum with difficulty due to tortuosity. Findings:  Cecum/Ascending colon: normal    Transverse colon: normal    Descending/Sigmoid colon: normal    Rectum/Anus: examined in normal and retroflexed positions and was abnormal: Multiple hyperplastic-appearing proximal rectal polyps removed with cold snare and recovered for histology    The colon was decompressed and the scope was removed. The patient tolerated the procedure well.        Electronically signed by Matthew Barber MD  on 8/1/2018 at 10:25 AM

## 2018-08-02 LAB — SURGICAL PATHOLOGY REPORT: NORMAL

## 2018-10-03 ENCOUNTER — HOSPITAL ENCOUNTER (OUTPATIENT)
Dept: WOMENS IMAGING | Age: 63
Discharge: HOME | End: 2018-10-05
Payer: COMMERCIAL

## 2018-10-03 DIAGNOSIS — Z12.39 SCREENING FOR BREAST CANCER: ICD-10-CM

## 2018-10-03 PROCEDURE — 77067 SCR MAMMO BI INCL CAD: CPT

## 2018-10-08 DIAGNOSIS — M85.80 OSTEOPENIA, UNSPECIFIED LOCATION: ICD-10-CM

## 2018-10-09 RX ORDER — ALENDRONATE SODIUM 70 MG/1
TABLET ORAL
Qty: 12 TABLET | Refills: 3 | Status: SHIPPED | OUTPATIENT
Start: 2018-10-09 | End: 2019-10-24 | Stop reason: SDUPTHER

## 2019-01-18 ENCOUNTER — HOSPITAL ENCOUNTER (OUTPATIENT)
Dept: GENERAL RADIOLOGY | Age: 64
Discharge: HOME | End: 2019-01-20
Payer: COMMERCIAL

## 2019-01-18 ENCOUNTER — HOSPITAL ENCOUNTER (OUTPATIENT)
Age: 64
Discharge: HOME | End: 2019-01-20
Payer: COMMERCIAL

## 2019-01-18 DIAGNOSIS — M25.551 RIGHT HIP PAIN: ICD-10-CM

## 2019-01-18 PROCEDURE — 73502 X-RAY EXAM HIP UNI 2-3 VIEWS: CPT

## 2019-02-05 ENCOUNTER — HOSPITAL ENCOUNTER (OUTPATIENT)
Dept: GENERAL RADIOLOGY | Age: 64
Discharge: HOME | End: 2019-02-07
Payer: COMMERCIAL

## 2019-02-05 VITALS
OXYGEN SATURATION: 98 % | SYSTOLIC BLOOD PRESSURE: 171 MMHG | DIASTOLIC BLOOD PRESSURE: 105 MMHG | RESPIRATION RATE: 12 BRPM | HEART RATE: 71 BPM

## 2019-02-05 DIAGNOSIS — M16.11 PRIMARY OSTEOARTHRITIS OF RIGHT HIP: ICD-10-CM

## 2019-02-05 PROCEDURE — 6360000004 HC RX CONTRAST MEDICATION: Performed by: ORTHOPAEDIC SURGERY

## 2019-02-05 PROCEDURE — 2500000003 HC RX 250 WO HCPCS: Performed by: RADIOLOGY

## 2019-02-05 PROCEDURE — 6360000002 HC RX W HCPCS: Performed by: ORTHOPAEDIC SURGERY

## 2019-02-05 PROCEDURE — 2500000003 HC RX 250 WO HCPCS: Performed by: ORTHOPAEDIC SURGERY

## 2019-02-05 PROCEDURE — 77002 NEEDLE LOCALIZATION BY XRAY: CPT

## 2019-02-05 RX ORDER — BUPIVACAINE HYDROCHLORIDE 5 MG/ML
4 INJECTION, SOLUTION EPIDURAL; INTRACAUDAL ONCE
Status: COMPLETED | OUTPATIENT
Start: 2019-02-05 | End: 2019-02-05

## 2019-02-05 RX ORDER — METHYLPREDNISOLONE ACETATE 40 MG/ML
80 INJECTION, SUSPENSION INTRA-ARTICULAR; INTRALESIONAL; INTRAMUSCULAR; SOFT TISSUE ONCE
Status: COMPLETED | OUTPATIENT
Start: 2019-02-05 | End: 2019-02-05

## 2019-02-05 RX ORDER — LIDOCAINE HYDROCHLORIDE 10 MG/ML
INJECTION, SOLUTION EPIDURAL; INFILTRATION; INTRACAUDAL; PERINEURAL
Status: COMPLETED | OUTPATIENT
Start: 2019-02-05 | End: 2019-02-05

## 2019-02-05 RX ADMIN — IOPAMIDOL 2 ML: 755 INJECTION, SOLUTION INTRAVENOUS at 15:53

## 2019-02-05 RX ADMIN — BUPIVACAINE HYDROCHLORIDE 4 ML: 5 INJECTION, SOLUTION EPIDURAL; INTRACAUDAL at 15:44

## 2019-02-05 RX ADMIN — METHYLPREDNISOLONE ACETATE 80 MG: 40 INJECTION, SUSPENSION INTRA-ARTICULAR; INTRALESIONAL; INTRAMUSCULAR; SOFT TISSUE at 15:45

## 2019-02-05 RX ADMIN — LIDOCAINE HYDROCHLORIDE 2 ML: 10 INJECTION, SOLUTION EPIDURAL; INFILTRATION; INTRACAUDAL; PERINEURAL at 15:40

## 2019-02-18 ENCOUNTER — HOSPITAL ENCOUNTER (OUTPATIENT)
Dept: PREADMISSION TESTING | Age: 64
Discharge: HOME | End: 2019-02-22
Attending: ORTHOPAEDIC SURGERY
Payer: COMMERCIAL

## 2019-02-18 ENCOUNTER — HOSPITAL ENCOUNTER (OUTPATIENT)
Age: 64
Discharge: HOME | End: 2019-02-18
Attending: ORTHOPAEDIC SURGERY
Payer: COMMERCIAL

## 2019-02-18 VITALS
HEIGHT: 63 IN | RESPIRATION RATE: 16 BRPM | DIASTOLIC BLOOD PRESSURE: 74 MMHG | TEMPERATURE: 97 F | OXYGEN SATURATION: 99 % | HEART RATE: 61 BPM | WEIGHT: 119 LBS | BODY MASS INDEX: 21.09 KG/M2 | SYSTOLIC BLOOD PRESSURE: 165 MMHG

## 2019-02-18 DIAGNOSIS — E03.9 HYPOTHYROIDISM, UNSPECIFIED TYPE: ICD-10-CM

## 2019-02-18 LAB
ABSOLUTE EOS #: 0.1 K/UL (ref 0–0.44)
ABSOLUTE IMMATURE GRANULOCYTE: 0.03 K/UL (ref 0–0.3)
ABSOLUTE LYMPH #: 2.16 K/UL (ref 1.1–3.7)
ABSOLUTE MONO #: 0.59 K/UL (ref 0.1–1.2)
ANION GAP SERPL CALCULATED.3IONS-SCNC: 11 MMOL/L (ref 9–17)
BASOPHILS # BLD: 1 % (ref 0–2)
BASOPHILS ABSOLUTE: 0.06 K/UL (ref 0–0.2)
BUN BLDV-MCNC: 15 MG/DL (ref 8–23)
BUN/CREAT BLD: 19 (ref 9–20)
CALCIUM SERPL-MCNC: 9.6 MG/DL (ref 8.6–10.4)
CHLORIDE BLD-SCNC: 104 MMOL/L (ref 98–107)
CO2: 26 MMOL/L (ref 20–31)
CREAT SERPL-MCNC: 0.81 MG/DL (ref 0.5–0.9)
DIFFERENTIAL TYPE: ABNORMAL
EKG ATRIAL RATE: 51 BPM
EKG P AXIS: 65 DEGREES
EKG P-R INTERVAL: 146 MS
EKG Q-T INTERVAL: 450 MS
EKG QRS DURATION: 82 MS
EKG QTC CALCULATION (BAZETT): 414 MS
EKG R AXIS: -5 DEGREES
EKG T AXIS: 50 DEGREES
EKG VENTRICULAR RATE: 51 BPM
EOSINOPHILS RELATIVE PERCENT: 1 % (ref 1–4)
GFR AFRICAN AMERICAN: >60 ML/MIN
GFR NON-AFRICAN AMERICAN: >60 ML/MIN
GFR SERPL CREATININE-BSD FRML MDRD: NORMAL ML/MIN/{1.73_M2}
GFR SERPL CREATININE-BSD FRML MDRD: NORMAL ML/MIN/{1.73_M2}
GLUCOSE BLD-MCNC: 86 MG/DL (ref 70–99)
HCT VFR BLD CALC: 41.5 % (ref 36.3–47.1)
HEMOGLOBIN: 13.2 G/DL (ref 11.9–15.1)
IMMATURE GRANULOCYTES: 0 %
LYMPHOCYTES # BLD: 25 % (ref 24–43)
MCH RBC QN AUTO: 32.8 PG (ref 25.2–33.5)
MCHC RBC AUTO-ENTMCNC: 31.8 G/DL (ref 28.4–34.8)
MCV RBC AUTO: 103.2 FL (ref 82.6–102.9)
MONOCYTES # BLD: 7 % (ref 3–12)
NRBC AUTOMATED: 0 PER 100 WBC
PDW BLD-RTO: 13.1 % (ref 11.8–14.4)
PLATELET # BLD: 259 K/UL (ref 138–453)
PLATELET ESTIMATE: ABNORMAL
PMV BLD AUTO: 10 FL (ref 8.1–13.5)
POTASSIUM SERPL-SCNC: 4.4 MMOL/L (ref 3.7–5.3)
RBC # BLD: 4.02 M/UL (ref 3.95–5.11)
RBC # BLD: ABNORMAL 10*6/UL
SEG NEUTROPHILS: 66 % (ref 36–65)
SEGMENTED NEUTROPHILS ABSOLUTE COUNT: 5.79 K/UL (ref 1.5–8.1)
SODIUM BLD-SCNC: 141 MMOL/L (ref 135–144)
T3 FREE: 1.98 PG/ML (ref 2.02–4.43)
THYROXINE, FREE: 1.02 NG/DL (ref 0.93–1.7)
TSH SERPL DL<=0.05 MIU/L-ACNC: 15.55 MIU/L (ref 0.3–5)
WBC # BLD: 8.7 K/UL (ref 3.5–11.3)
WBC # BLD: ABNORMAL 10*3/UL

## 2019-02-18 PROCEDURE — 36415 COLL VENOUS BLD VENIPUNCTURE: CPT

## 2019-02-18 PROCEDURE — 84481 FREE ASSAY (FT-3): CPT

## 2019-02-18 PROCEDURE — 84439 ASSAY OF FREE THYROXINE: CPT

## 2019-02-18 PROCEDURE — 85025 COMPLETE CBC W/AUTO DIFF WBC: CPT

## 2019-02-18 PROCEDURE — 87641 MR-STAPH DNA AMP PROBE: CPT

## 2019-02-18 PROCEDURE — 84443 ASSAY THYROID STIM HORMONE: CPT

## 2019-02-18 PROCEDURE — 80048 BASIC METABOLIC PNL TOTAL CA: CPT

## 2019-02-18 PROCEDURE — 93005 ELECTROCARDIOGRAM TRACING: CPT

## 2019-02-18 RX ORDER — ACETAMINOPHEN 325 MG/1
650 TABLET ORAL ONCE
Status: CANCELLED | OUTPATIENT
Start: 2019-02-18 | End: 2019-02-18

## 2019-02-18 RX ORDER — CEFAZOLIN SODIUM 1 G/50ML
1 SOLUTION INTRAVENOUS ONCE
Status: CANCELLED | OUTPATIENT
Start: 2019-02-18 | End: 2019-02-18

## 2019-02-18 RX ORDER — TRANEXAMIC ACID 650 1/1
1300 TABLET ORAL ONCE
Status: CANCELLED | OUTPATIENT
Start: 2019-02-18 | End: 2019-02-18

## 2019-02-18 RX ORDER — ACETAMINOPHEN 500 MG
500 TABLET ORAL EVERY 6 HOURS PRN
COMMUNITY

## 2019-02-18 RX ORDER — CELECOXIB 200 MG/1
400 CAPSULE ORAL ONCE
Status: CANCELLED | OUTPATIENT
Start: 2019-02-18 | End: 2019-02-18

## 2019-02-18 ASSESSMENT — PAIN SCALES - GENERAL: PAINLEVEL_OUTOF10: 3

## 2019-02-18 ASSESSMENT — PAIN DESCRIPTION - ORIENTATION: ORIENTATION: RIGHT

## 2019-02-18 ASSESSMENT — PAIN DESCRIPTION - LOCATION: LOCATION: HIP

## 2019-02-18 ASSESSMENT — PAIN DESCRIPTION - PAIN TYPE: TYPE: CHRONIC PAIN

## 2019-02-19 LAB
MRSA, DNA, NASAL: NORMAL
SPECIMEN DESCRIPTION: NORMAL

## 2019-02-28 ENCOUNTER — HOSPITAL ENCOUNTER (OUTPATIENT)
Age: 64
Setting detail: SPECIMEN
Discharge: HOME | End: 2019-02-28
Payer: COMMERCIAL

## 2019-02-28 DIAGNOSIS — R82.90 FOUL SMELLING URINE: ICD-10-CM

## 2019-02-28 LAB
-: ABNORMAL
AMORPHOUS: ABNORMAL
BACTERIA: ABNORMAL
BILIRUBIN URINE: NEGATIVE
CASTS UA: ABNORMAL /LPF
COLOR: YELLOW
COMMENT UA: ABNORMAL
CRYSTALS, UA: ABNORMAL /HPF
CRYSTALS, UA: ABNORMAL /HPF
EPITHELIAL CELLS UA: ABNORMAL /HPF (ref 0–25)
GLUCOSE URINE: NEGATIVE
KETONES, URINE: NEGATIVE
LEUKOCYTE ESTERASE, URINE: NEGATIVE
MUCUS: ABNORMAL
NITRITE, URINE: NEGATIVE
OTHER OBSERVATIONS UA: ABNORMAL
PH UA: 6 (ref 5–9)
PROTEIN UA: NEGATIVE
RBC UA: ABNORMAL /HPF (ref 0–2)
RENAL EPITHELIAL, UA: ABNORMAL /HPF
SPECIFIC GRAVITY UA: 1.02 (ref 1.01–1.02)
TRICHOMONAS: ABNORMAL
TURBIDITY: CLEAR
URINE HGB: ABNORMAL
UROBILINOGEN, URINE: NORMAL
WBC UA: ABNORMAL /HPF (ref 0–5)
YEAST: ABNORMAL

## 2019-02-28 PROCEDURE — 81001 URINALYSIS AUTO W/SCOPE: CPT

## 2019-03-06 ENCOUNTER — EMPLOYEE WELLNESS (OUTPATIENT)
Dept: OTHER | Age: 64
End: 2019-03-06

## 2019-03-06 LAB
CHOLESTEROL/HDL RATIO: 3.1
CHOLESTEROL: 233 MG/DL
GLUCOSE BLD-MCNC: 97 MG/DL (ref 70–99)
HDLC SERPL-MCNC: 76 MG/DL
LDL CHOLESTEROL: 129 MG/DL (ref 0–130)
PATIENT FASTING?: YES
TRIGL SERPL-MCNC: 141 MG/DL
VLDLC SERPL CALC-MCNC: ABNORMAL MG/DL (ref 1–30)

## 2019-03-14 ENCOUNTER — HOSPITAL ENCOUNTER (OUTPATIENT)
Dept: PHYSICAL THERAPY | Age: 64
Setting detail: THERAPIES SERIES
Discharge: HOME | End: 2019-03-14
Payer: COMMERCIAL

## 2019-03-14 ENCOUNTER — HOSPITAL ENCOUNTER (OUTPATIENT)
Dept: LAB | Age: 64
Discharge: HOME | End: 2019-03-14
Payer: COMMERCIAL

## 2019-03-14 LAB
ABO/RH: NORMAL
ANTIBODY SCREEN: NEGATIVE
ARM BAND NUMBER: NORMAL
EXPIRATION DATE: NORMAL

## 2019-03-14 PROCEDURE — 97110 THERAPEUTIC EXERCISES: CPT

## 2019-03-14 PROCEDURE — 97116 GAIT TRAINING THERAPY: CPT

## 2019-03-14 PROCEDURE — 36415 COLL VENOUS BLD VENIPUNCTURE: CPT

## 2019-03-14 PROCEDURE — 86901 BLOOD TYPING SEROLOGIC RH(D): CPT

## 2019-03-14 PROCEDURE — 86900 BLOOD TYPING SEROLOGIC ABO: CPT

## 2019-03-14 PROCEDURE — 86850 RBC ANTIBODY SCREEN: CPT

## 2019-03-15 ENCOUNTER — ANESTHESIA EVENT (OUTPATIENT)
Dept: OPERATING ROOM | Age: 64
DRG: 470 | End: 2019-03-15
Payer: COMMERCIAL

## 2019-03-18 ENCOUNTER — HOSPITAL ENCOUNTER (INPATIENT)
Age: 64
LOS: 1 days | Discharge: HOME | DRG: 470 | End: 2019-03-19
Attending: ORTHOPAEDIC SURGERY | Admitting: ORTHOPAEDIC SURGERY
Payer: COMMERCIAL

## 2019-03-18 ENCOUNTER — ANESTHESIA (OUTPATIENT)
Dept: OPERATING ROOM | Age: 64
DRG: 470 | End: 2019-03-18
Payer: COMMERCIAL

## 2019-03-18 ENCOUNTER — APPOINTMENT (OUTPATIENT)
Dept: GENERAL RADIOLOGY | Age: 64
DRG: 470 | End: 2019-03-18
Attending: ORTHOPAEDIC SURGERY
Payer: COMMERCIAL

## 2019-03-18 VITALS
TEMPERATURE: 96.5 F | DIASTOLIC BLOOD PRESSURE: 54 MMHG | OXYGEN SATURATION: 99 % | RESPIRATION RATE: 3 BRPM | SYSTOLIC BLOOD PRESSURE: 98 MMHG

## 2019-03-18 DIAGNOSIS — Z96.641 H/O TOTAL HIP ARTHROPLASTY, RIGHT: Primary | ICD-10-CM

## 2019-03-18 PROBLEM — M16.11 PRIMARY LOCALIZED OSTEOARTHRITIS OF RIGHT HIP: Status: ACTIVE | Noted: 2019-03-18

## 2019-03-18 PROCEDURE — 73501 X-RAY EXAM HIP UNI 1 VIEW: CPT

## 2019-03-18 PROCEDURE — 97116 GAIT TRAINING THERAPY: CPT

## 2019-03-18 PROCEDURE — 6370000000 HC RX 637 (ALT 250 FOR IP): Performed by: ORTHOPAEDIC SURGERY

## 2019-03-18 PROCEDURE — 2580000003 HC RX 258: Performed by: NURSE ANESTHETIST, CERTIFIED REGISTERED

## 2019-03-18 PROCEDURE — 3700000000 HC ANESTHESIA ATTENDED CARE: Performed by: ORTHOPAEDIC SURGERY

## 2019-03-18 PROCEDURE — 97535 SELF CARE MNGMENT TRAINING: CPT

## 2019-03-18 PROCEDURE — 76942 ECHO GUIDE FOR BIOPSY: CPT | Performed by: NURSE ANESTHETIST, CERTIFIED REGISTERED

## 2019-03-18 PROCEDURE — 7100000001 HC PACU RECOVERY - ADDTL 15 MIN: Performed by: ORTHOPAEDIC SURGERY

## 2019-03-18 PROCEDURE — 97166 OT EVAL MOD COMPLEX 45 MIN: CPT

## 2019-03-18 PROCEDURE — 6360000002 HC RX W HCPCS: Performed by: NURSE ANESTHETIST, CERTIFIED REGISTERED

## 2019-03-18 PROCEDURE — 36415 COLL VENOUS BLD VENIPUNCTURE: CPT

## 2019-03-18 PROCEDURE — 0SR90JA REPLACEMENT OF RIGHT HIP JOINT WITH SYNTHETIC SUBSTITUTE, UNCEMENTED, OPEN APPROACH: ICD-10-PCS | Performed by: ORTHOPAEDIC SURGERY

## 2019-03-18 PROCEDURE — 6360000002 HC RX W HCPCS: Performed by: ORTHOPAEDIC SURGERY

## 2019-03-18 PROCEDURE — 3700000001 HC ADD 15 MINUTES (ANESTHESIA): Performed by: ORTHOPAEDIC SURGERY

## 2019-03-18 PROCEDURE — C1776 JOINT DEVICE (IMPLANTABLE): HCPCS | Performed by: ORTHOPAEDIC SURGERY

## 2019-03-18 PROCEDURE — 3600000015 HC SURGERY LEVEL 5 ADDTL 15MIN: Performed by: ORTHOPAEDIC SURGERY

## 2019-03-18 PROCEDURE — 2709999900 HC NON-CHARGEABLE SUPPLY: Performed by: ORTHOPAEDIC SURGERY

## 2019-03-18 PROCEDURE — 2580000003 HC RX 258: Performed by: ORTHOPAEDIC SURGERY

## 2019-03-18 PROCEDURE — 2500000003 HC RX 250 WO HCPCS: Performed by: NURSE ANESTHETIST, CERTIFIED REGISTERED

## 2019-03-18 PROCEDURE — 3600000005 HC SURGERY LEVEL 5 BASE: Performed by: ORTHOPAEDIC SURGERY

## 2019-03-18 PROCEDURE — 1200000000 HC SEMI PRIVATE

## 2019-03-18 PROCEDURE — 3E0T3BZ INTRODUCTION OF ANESTHETIC AGENT INTO PERIPHERAL NERVES AND PLEXI, PERCUTANEOUS APPROACH: ICD-10-PCS | Performed by: NURSE ANESTHETIST, CERTIFIED REGISTERED

## 2019-03-18 PROCEDURE — 97161 PT EVAL LOW COMPLEX 20 MIN: CPT

## 2019-03-18 PROCEDURE — 7100000000 HC PACU RECOVERY - FIRST 15 MIN: Performed by: ORTHOPAEDIC SURGERY

## 2019-03-18 PROCEDURE — 2500000003 HC RX 250 WO HCPCS: Performed by: ORTHOPAEDIC SURGERY

## 2019-03-18 DEVICE — BONE SCREW 6.5X30 SELF-TAP: Type: IMPLANTABLE DEVICE | Site: HIP | Status: FUNCTIONAL

## 2019-03-18 DEVICE — HEAD FEM DIA36MM NK L+0MM 12/14 TAPR ACET HIP BIOLOX DELT: Type: IMPLANTABLE DEVICE | Site: HIP | Status: FUNCTIONAL

## 2019-03-18 DEVICE — IMPL HIP SHLL CONTINUUM 52: Type: IMPLANTABLE DEVICE | Site: HIP | Status: FUNCTIONAL

## 2019-03-18 DEVICE — Z DUP USE 2202539 BONE SCREW 6.5X20 SELF-TAP: Type: IMPLANTABLE DEVICE | Site: HIP | Status: FUNCTIONAL

## 2019-03-18 DEVICE — LINER ACET SZ II OD52MM ID36MM HIP VIVACIT-E NEUT FOR: Type: IMPLANTABLE DEVICE | Site: HIP | Status: FUNCTIONAL

## 2019-03-18 DEVICE — FITMORE HIP STEM B, SIZE 4
Type: IMPLANTABLE DEVICE | Site: HIP | Status: FUNCTIONAL
Brand: FITMORE® HIP STEM

## 2019-03-18 RX ORDER — SODIUM CHLORIDE 0.9 % (FLUSH) 0.9 %
10 SYRINGE (ML) INJECTION PRN
Status: DISCONTINUED | OUTPATIENT
Start: 2019-03-18 | End: 2019-03-19 | Stop reason: HOSPADM

## 2019-03-18 RX ORDER — SODIUM CHLORIDE, SODIUM LACTATE, POTASSIUM CHLORIDE, CALCIUM CHLORIDE 600; 310; 30; 20 MG/100ML; MG/100ML; MG/100ML; MG/100ML
INJECTION, SOLUTION INTRAVENOUS CONTINUOUS
Status: DISCONTINUED | OUTPATIENT
Start: 2019-03-18 | End: 2019-03-19 | Stop reason: HOSPADM

## 2019-03-18 RX ORDER — LIDOCAINE HYDROCHLORIDE 10 MG/ML
INJECTION, SOLUTION INFILTRATION; PERINEURAL PRN
Status: DISCONTINUED | OUTPATIENT
Start: 2019-03-18 | End: 2019-03-18 | Stop reason: SDUPTHER

## 2019-03-18 RX ORDER — SODIUM CHLORIDE 0.9 % (FLUSH) 0.9 %
10 SYRINGE (ML) INJECTION EVERY 12 HOURS SCHEDULED
Status: CANCELLED | OUTPATIENT
Start: 2019-03-18

## 2019-03-18 RX ORDER — SODIUM CHLORIDE 0.9 % (FLUSH) 0.9 %
SYRINGE (ML) INJECTION PRN
Status: DISCONTINUED | OUTPATIENT
Start: 2019-03-18 | End: 2019-03-18 | Stop reason: SDUPTHER

## 2019-03-18 RX ORDER — MELOXICAM 15 MG/1
15 TABLET ORAL DAILY
Status: DISCONTINUED | OUTPATIENT
Start: 2019-03-19 | End: 2019-03-19 | Stop reason: HOSPADM

## 2019-03-18 RX ORDER — CEFAZOLIN SODIUM 1 G/50ML
1 SOLUTION INTRAVENOUS ONCE
Status: DISCONTINUED | OUTPATIENT
Start: 2019-03-18 | End: 2019-03-18 | Stop reason: HOSPADM

## 2019-03-18 RX ORDER — DEXAMETHASONE SODIUM PHOSPHATE 10 MG/ML
INJECTION, SOLUTION INTRAMUSCULAR; INTRAVENOUS PRN
Status: DISCONTINUED | OUTPATIENT
Start: 2019-03-18 | End: 2019-03-18 | Stop reason: SDUPTHER

## 2019-03-18 RX ORDER — METOCLOPRAMIDE HYDROCHLORIDE 5 MG/ML
10 INJECTION INTRAMUSCULAR; INTRAVENOUS
Status: DISCONTINUED | OUTPATIENT
Start: 2019-03-18 | End: 2019-03-18 | Stop reason: HOSPADM

## 2019-03-18 RX ORDER — ONDANSETRON 2 MG/ML
4 INJECTION INTRAMUSCULAR; INTRAVENOUS EVERY 6 HOURS PRN
Status: DISCONTINUED | OUTPATIENT
Start: 2019-03-18 | End: 2019-03-19 | Stop reason: HOSPADM

## 2019-03-18 RX ORDER — CELECOXIB 200 MG/1
400 CAPSULE ORAL ONCE
Status: COMPLETED | OUTPATIENT
Start: 2019-03-18 | End: 2019-03-18

## 2019-03-18 RX ORDER — FENTANYL CITRATE 50 UG/ML
25 INJECTION, SOLUTION INTRAMUSCULAR; INTRAVENOUS EVERY 5 MIN PRN
Status: DISCONTINUED | OUTPATIENT
Start: 2019-03-18 | End: 2019-03-18 | Stop reason: HOSPADM

## 2019-03-18 RX ORDER — ACETAMINOPHEN 325 MG/1
650 TABLET ORAL ONCE
Status: COMPLETED | OUTPATIENT
Start: 2019-03-18 | End: 2019-03-18

## 2019-03-18 RX ORDER — PROPOFOL 10 MG/ML
INJECTION, EMULSION INTRAVENOUS PRN
Status: DISCONTINUED | OUTPATIENT
Start: 2019-03-18 | End: 2019-03-18 | Stop reason: SDUPTHER

## 2019-03-18 RX ORDER — FENTANYL CITRATE 50 UG/ML
50 INJECTION, SOLUTION INTRAMUSCULAR; INTRAVENOUS EVERY 5 MIN PRN
Status: DISCONTINUED | OUTPATIENT
Start: 2019-03-18 | End: 2019-03-18 | Stop reason: HOSPADM

## 2019-03-18 RX ORDER — ACETAMINOPHEN 500 MG
500 TABLET ORAL EVERY 6 HOURS PRN
Status: DISCONTINUED | OUTPATIENT
Start: 2019-03-18 | End: 2019-03-19 | Stop reason: HOSPADM

## 2019-03-18 RX ORDER — SODIUM CHLORIDE 0.9 % (FLUSH) 0.9 %
10 SYRINGE (ML) INJECTION EVERY 12 HOURS SCHEDULED
Status: DISCONTINUED | OUTPATIENT
Start: 2019-03-18 | End: 2019-03-19 | Stop reason: HOSPADM

## 2019-03-18 RX ORDER — SODIUM CHLORIDE, SODIUM LACTATE, POTASSIUM CHLORIDE, CALCIUM CHLORIDE 600; 310; 30; 20 MG/100ML; MG/100ML; MG/100ML; MG/100ML
INJECTION, SOLUTION INTRAVENOUS CONTINUOUS
Status: DISCONTINUED | OUTPATIENT
Start: 2019-03-18 | End: 2019-03-18

## 2019-03-18 RX ORDER — LEVOTHYROXINE SODIUM 0.07 MG/1
75 TABLET ORAL DAILY
Status: DISCONTINUED | OUTPATIENT
Start: 2019-03-19 | End: 2019-03-19 | Stop reason: HOSPADM

## 2019-03-18 RX ORDER — HYDROMORPHONE HCL 110MG/55ML
0.5 PATIENT CONTROLLED ANALGESIA SYRINGE INTRAVENOUS
Status: DISCONTINUED | OUTPATIENT
Start: 2019-03-18 | End: 2019-03-19 | Stop reason: HOSPADM

## 2019-03-18 RX ORDER — POLYETHYLENE GLYCOL 3350 17 G/17G
17 POWDER, FOR SOLUTION ORAL EVERY OTHER DAY
Status: DISCONTINUED | OUTPATIENT
Start: 2019-03-19 | End: 2019-03-19 | Stop reason: HOSPADM

## 2019-03-18 RX ORDER — DOCUSATE SODIUM 100 MG/1
100 CAPSULE, LIQUID FILLED ORAL 2 TIMES DAILY
Status: DISCONTINUED | OUTPATIENT
Start: 2019-03-18 | End: 2019-03-19 | Stop reason: HOSPADM

## 2019-03-18 RX ORDER — TRANEXAMIC ACID 650 1/1
1300 TABLET ORAL ONCE
Status: COMPLETED | OUTPATIENT
Start: 2019-03-18 | End: 2019-03-18

## 2019-03-18 RX ORDER — SODIUM CHLORIDE 0.9 % (FLUSH) 0.9 %
10 SYRINGE (ML) INJECTION PRN
Status: CANCELLED | OUTPATIENT
Start: 2019-03-18

## 2019-03-18 RX ORDER — MIDAZOLAM HYDROCHLORIDE 1 MG/ML
INJECTION INTRAMUSCULAR; INTRAVENOUS PRN
Status: DISCONTINUED | OUTPATIENT
Start: 2019-03-18 | End: 2019-03-18 | Stop reason: SDUPTHER

## 2019-03-18 RX ORDER — ONDANSETRON 2 MG/ML
4 INJECTION INTRAMUSCULAR; INTRAVENOUS
Status: DISCONTINUED | OUTPATIENT
Start: 2019-03-18 | End: 2019-03-18 | Stop reason: HOSPADM

## 2019-03-18 RX ORDER — ROPIVACAINE HYDROCHLORIDE 5 MG/ML
INJECTION, SOLUTION EPIDURAL; INFILTRATION; PERINEURAL PRN
Status: DISCONTINUED | OUTPATIENT
Start: 2019-03-18 | End: 2019-03-18 | Stop reason: SDUPTHER

## 2019-03-18 RX ORDER — DIPHENHYDRAMINE HCL 25 MG
25 CAPSULE ORAL NIGHTLY PRN
Status: DISCONTINUED | OUTPATIENT
Start: 2019-03-18 | End: 2019-03-19 | Stop reason: HOSPADM

## 2019-03-18 RX ORDER — GENTAMICIN SULFATE 40 MG/ML
INJECTION, SOLUTION INTRAMUSCULAR; INTRAVENOUS
Status: DISCONTINUED
Start: 2019-03-18 | End: 2019-03-18

## 2019-03-18 RX ORDER — DIMENHYDRINATE 50 MG/1
50 TABLET ORAL ONCE
Status: COMPLETED | OUTPATIENT
Start: 2019-03-18 | End: 2019-03-18

## 2019-03-18 RX ADMIN — SODIUM CHLORIDE, POTASSIUM CHLORIDE, SODIUM LACTATE AND CALCIUM CHLORIDE: 600; 310; 30; 20 INJECTION, SOLUTION INTRAVENOUS at 12:41

## 2019-03-18 RX ADMIN — DEXTROSE MONOHYDRATE 2 G: 50 INJECTION, SOLUTION INTRAVENOUS at 22:32

## 2019-03-18 RX ADMIN — ACETAMINOPHEN 500 MG: 500 TABLET, FILM COATED ORAL at 20:05

## 2019-03-18 RX ADMIN — HYDROMORPHONE HYDROCHLORIDE 1 MG: 1 INJECTION, SOLUTION INTRAMUSCULAR; INTRAVENOUS; SUBCUTANEOUS at 12:23

## 2019-03-18 RX ADMIN — DEXAMETHASONE SODIUM PHOSPHATE 10 MG: 10 INJECTION, SOLUTION INTRAMUSCULAR; INTRAVENOUS at 11:13

## 2019-03-18 RX ADMIN — MIDAZOLAM HYDROCHLORIDE 2 MG: 1 INJECTION, SOLUTION INTRAMUSCULAR; INTRAVENOUS at 11:08

## 2019-03-18 RX ADMIN — DOCUSATE SODIUM 100 MG: 100 CAPSULE, LIQUID FILLED ORAL at 22:33

## 2019-03-18 RX ADMIN — ROPIVACAINE HYDROCHLORIDE 15 ML: 5 INJECTION, SOLUTION EPIDURAL; INFILTRATION; PERINEURAL at 11:13

## 2019-03-18 RX ADMIN — SODIUM CHLORIDE, PRESERVATIVE FREE 25 ML: 5 INJECTION INTRAVENOUS at 11:13

## 2019-03-18 RX ADMIN — TRANEXAMIC ACID 1300 MG: 650 TABLET ORAL at 10:42

## 2019-03-18 RX ADMIN — DIMENHYDRINATE 50 MG: 50 TABLET ORAL at 10:42

## 2019-03-18 RX ADMIN — ACETAMINOPHEN 650 MG: 325 TABLET, FILM COATED ORAL at 10:42

## 2019-03-18 RX ADMIN — DEXTROSE MONOHYDRATE 2 G: 50 INJECTION, SOLUTION INTRAVENOUS at 11:30

## 2019-03-18 RX ADMIN — PROPOFOL 160 MG: 10 INJECTION, EMULSION INTRAVENOUS at 11:37

## 2019-03-18 RX ADMIN — CELECOXIB 400 MG: 200 CAPSULE ORAL at 10:42

## 2019-03-18 RX ADMIN — LIDOCAINE HYDROCHLORIDE 60 MG: 10 INJECTION, SOLUTION INFILTRATION; PERINEURAL at 11:37

## 2019-03-18 RX ADMIN — SODIUM CHLORIDE, POTASSIUM CHLORIDE, SODIUM LACTATE AND CALCIUM CHLORIDE: 600; 310; 30; 20 INJECTION, SOLUTION INTRAVENOUS at 10:50

## 2019-03-18 RX ADMIN — Medication 1 G: at 13:30

## 2019-03-18 ASSESSMENT — PULMONARY FUNCTION TESTS
PIF_VALUE: 2
PIF_VALUE: 3
PIF_VALUE: 3
PIF_VALUE: 2
PIF_VALUE: 2
PIF_VALUE: 3
PIF_VALUE: 3
PIF_VALUE: 2
PIF_VALUE: 3
PIF_VALUE: 2
PIF_VALUE: 2
PIF_VALUE: 3
PIF_VALUE: 20
PIF_VALUE: 2
PIF_VALUE: 2
PIF_VALUE: 3
PIF_VALUE: 2
PIF_VALUE: 3
PIF_VALUE: 3
PIF_VALUE: 2
PIF_VALUE: 2
PIF_VALUE: 3
PIF_VALUE: 3
PIF_VALUE: 4
PIF_VALUE: 2
PIF_VALUE: 2
PIF_VALUE: 5
PIF_VALUE: 2
PIF_VALUE: 10
PIF_VALUE: 3
PIF_VALUE: 3
PIF_VALUE: 2
PIF_VALUE: 2
PIF_VALUE: 3
PIF_VALUE: 2
PIF_VALUE: 4
PIF_VALUE: 2
PIF_VALUE: 3
PIF_VALUE: 2
PIF_VALUE: 3
PIF_VALUE: 2
PIF_VALUE: 3
PIF_VALUE: 3
PIF_VALUE: 2
PIF_VALUE: 3
PIF_VALUE: 3
PIF_VALUE: 20
PIF_VALUE: 3
PIF_VALUE: 2
PIF_VALUE: 3
PIF_VALUE: 2
PIF_VALUE: 3
PIF_VALUE: 2
PIF_VALUE: 3
PIF_VALUE: 3
PIF_VALUE: 0
PIF_VALUE: 3
PIF_VALUE: 3
PIF_VALUE: 2
PIF_VALUE: 3
PIF_VALUE: 3
PIF_VALUE: 0
PIF_VALUE: 3
PIF_VALUE: 3
PIF_VALUE: 2
PIF_VALUE: 1
PIF_VALUE: 2
PIF_VALUE: 3
PIF_VALUE: 2
PIF_VALUE: 2
PIF_VALUE: 0
PIF_VALUE: 3
PIF_VALUE: 2
PIF_VALUE: 3
PIF_VALUE: 3
PIF_VALUE: 1
PIF_VALUE: 2
PIF_VALUE: 3
PIF_VALUE: 4
PIF_VALUE: 2
PIF_VALUE: 2
PIF_VALUE: 3
PIF_VALUE: 2
PIF_VALUE: 5
PIF_VALUE: 3
PIF_VALUE: 3
PIF_VALUE: 0
PIF_VALUE: 3
PIF_VALUE: 2
PIF_VALUE: 3
PIF_VALUE: 3
PIF_VALUE: 2
PIF_VALUE: 3
PIF_VALUE: 3
PIF_VALUE: 2
PIF_VALUE: 2
PIF_VALUE: 3
PIF_VALUE: 2
PIF_VALUE: 3
PIF_VALUE: 2
PIF_VALUE: 2
PIF_VALUE: 3
PIF_VALUE: 2
PIF_VALUE: 3
PIF_VALUE: 3

## 2019-03-18 ASSESSMENT — PAIN SCALES - GENERAL
PAINLEVEL_OUTOF10: 3
PAINLEVEL_OUTOF10: 0
PAINLEVEL_OUTOF10: 0
PAINLEVEL_OUTOF10: 2
PAINLEVEL_OUTOF10: 0
PAINLEVEL_OUTOF10: 3
PAINLEVEL_OUTOF10: 0
PAINLEVEL_OUTOF10: 2
PAINLEVEL_OUTOF10: 0
PAINLEVEL_OUTOF10: 0

## 2019-03-18 ASSESSMENT — PAIN DESCRIPTION - DESCRIPTORS: DESCRIPTORS: CONSTANT;ACHING

## 2019-03-18 ASSESSMENT — PAIN - FUNCTIONAL ASSESSMENT: PAIN_FUNCTIONAL_ASSESSMENT: 0-10

## 2019-03-18 ASSESSMENT — LIFESTYLE VARIABLES: SMOKING_STATUS: 1

## 2019-03-19 ENCOUNTER — APPOINTMENT (OUTPATIENT)
Dept: GENERAL RADIOLOGY | Age: 64
DRG: 470 | End: 2019-03-19
Attending: ORTHOPAEDIC SURGERY
Payer: COMMERCIAL

## 2019-03-19 VITALS
HEART RATE: 64 BPM | SYSTOLIC BLOOD PRESSURE: 126 MMHG | HEIGHT: 62 IN | DIASTOLIC BLOOD PRESSURE: 79 MMHG | TEMPERATURE: 98.5 F | BODY MASS INDEX: 22.86 KG/M2 | RESPIRATION RATE: 16 BRPM | WEIGHT: 124.2 LBS | OXYGEN SATURATION: 98 %
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
## 2019-03-19 LAB
HCT VFR BLD CALC: 33.1 % (ref 36.3–47.1)
HEMOGLOBIN: 10.9 G/DL (ref 11.9–15.1)

## 2019-03-19 PROCEDURE — 73560 X-RAY EXAM OF KNEE 1 OR 2: CPT

## 2019-03-19 PROCEDURE — 85018 HEMOGLOBIN: CPT

## 2019-03-19 PROCEDURE — 97116 GAIT TRAINING THERAPY: CPT

## 2019-03-19 PROCEDURE — 97110 THERAPEUTIC EXERCISES: CPT

## 2019-03-19 PROCEDURE — 85014 HEMATOCRIT: CPT

## 2019-03-19 PROCEDURE — 6370000000 HC RX 637 (ALT 250 FOR IP): Performed by: ORTHOPAEDIC SURGERY

## 2019-03-19 PROCEDURE — 73502 X-RAY EXAM HIP UNI 2-3 VIEWS: CPT

## 2019-03-19 PROCEDURE — 97535 SELF CARE MNGMENT TRAINING: CPT

## 2019-03-19 PROCEDURE — 6360000002 HC RX W HCPCS: Performed by: ORTHOPAEDIC SURGERY

## 2019-03-19 PROCEDURE — 2580000003 HC RX 258: Performed by: ORTHOPAEDIC SURGERY

## 2019-03-19 PROCEDURE — 36415 COLL VENOUS BLD VENIPUNCTURE: CPT

## 2019-03-19 RX ORDER — HYDROCODONE BITARTRATE AND ACETAMINOPHEN 5; 325 MG/1; MG/1
1-2 TABLET ORAL EVERY 6 HOURS PRN
Qty: 40 TABLET | Refills: 0 | Status: SHIPPED | OUTPATIENT
Start: 2019-03-19 | End: 2019-03-26

## 2019-03-19 RX ADMIN — Medication 10 ML: at 08:30

## 2019-03-19 RX ADMIN — DEXTROSE MONOHYDRATE 2 G: 50 INJECTION, SOLUTION INTRAVENOUS at 06:31

## 2019-03-19 RX ADMIN — MELOXICAM 15 MG: 15 TABLET ORAL at 08:30

## 2019-03-19 RX ADMIN — ACETAMINOPHEN 500 MG: 500 TABLET, FILM COATED ORAL at 02:39

## 2019-03-19 RX ADMIN — DOCUSATE SODIUM 100 MG: 100 CAPSULE, LIQUID FILLED ORAL at 08:29

## 2019-03-19 RX ADMIN — RIVAROXABAN 10 MG: 10 TABLET, FILM COATED ORAL at 08:29

## 2019-03-19 RX ADMIN — LEVOTHYROXINE SODIUM 75 MCG: 75 TABLET ORAL at 07:37

## 2019-03-19 RX ADMIN — ACETAMINOPHEN 500 MG: 500 TABLET, FILM COATED ORAL at 08:29

## 2019-03-19 ASSESSMENT — PAIN SCALES - GENERAL
PAINLEVEL_OUTOF10: 2

## 2019-03-19 ASSESSMENT — PAIN DESCRIPTION - ORIENTATION: ORIENTATION: RIGHT

## 2019-03-19 ASSESSMENT — PAIN DESCRIPTION - PAIN TYPE: TYPE: SURGICAL PAIN

## 2019-03-19 ASSESSMENT — PAIN DESCRIPTION - LOCATION: LOCATION: HIP

## 2019-03-21 ENCOUNTER — HOSPITAL ENCOUNTER (OUTPATIENT)
Dept: PHYSICAL THERAPY | Age: 64
Setting detail: THERAPIES SERIES
Discharge: HOME | End: 2019-03-21
Payer: COMMERCIAL

## 2019-03-21 PROCEDURE — G0283 ELEC STIM OTHER THAN WOUND: HCPCS

## 2019-03-21 PROCEDURE — 97161 PT EVAL LOW COMPLEX 20 MIN: CPT

## 2019-03-21 PROCEDURE — 97110 THERAPEUTIC EXERCISES: CPT

## 2019-03-25 ENCOUNTER — HOSPITAL ENCOUNTER (OUTPATIENT)
Dept: PHYSICAL THERAPY | Age: 64
Setting detail: THERAPIES SERIES
Discharge: HOME | End: 2019-03-25
Payer: COMMERCIAL

## 2019-03-25 PROCEDURE — G0283 ELEC STIM OTHER THAN WOUND: HCPCS

## 2019-03-25 PROCEDURE — 97110 THERAPEUTIC EXERCISES: CPT

## 2019-03-28 ENCOUNTER — HOSPITAL ENCOUNTER (OUTPATIENT)
Dept: PHYSICAL THERAPY | Age: 64
Setting detail: THERAPIES SERIES
Discharge: HOME | End: 2019-03-28
Payer: COMMERCIAL

## 2019-03-28 PROCEDURE — 97110 THERAPEUTIC EXERCISES: CPT

## 2019-03-28 PROCEDURE — G0283 ELEC STIM OTHER THAN WOUND: HCPCS

## 2019-04-01 ENCOUNTER — HOSPITAL ENCOUNTER (OUTPATIENT)
Dept: PHYSICAL THERAPY | Age: 64
Setting detail: THERAPIES SERIES
Discharge: HOME | End: 2019-04-01
Payer: COMMERCIAL

## 2019-04-01 PROCEDURE — 97110 THERAPEUTIC EXERCISES: CPT

## 2019-04-01 PROCEDURE — G0283 ELEC STIM OTHER THAN WOUND: HCPCS

## 2019-04-01 NOTE — PROGRESS NOTES
Phone: [facility]           Fax: [phone]                           Outpatient Physical Therapy                                                                            Daily Note    Patient: Hakan Arreguin :   CSN #: [id number]   Referring Practitioner:  Dr. Pam Berkowitz MD    Referral Date : 19     Date: 2019    Diagnosis: Z96.641, Status post total hip replacement, right  Treatment Diagnosis: R TONYA; Onset Date: 19  PT Insurance Information: Medical [place]  Total # of Visits Approved: 12 Per Physician Order  Total # of Visits to Date: 4  No Show: 0  Canceled Appointment: 0      Pre-Treatment Pain:  0-1/10  Subjective: Feeling better. Pain is minimal. Doing exercises at home. Pain today 0-1/10. Exercises:  Exercise 2: bike - hill level 2.0 x 10 mins  Exercise 3: Open chain R LE x 15 1#, L LE heel raises and SL squats x 15  Exercise 5: bridges - 2x15  Exercise 8: gait - big loop RW x3  Exercise 11: Sit to stands x 15  Exercise 12: total gym squats and heel raise - level 7 - 2x20      Modalities:  Cryotherapy (Minutes\Location): CP with IFC x15 min in supine to decrease pain and edema  E-stim (parameters): IFC x15 minutes to decrease pain in R hip and post exercise soreness       Assessment  Assessment: Progressed exercises this date. Pt able to complete sit to stand transfers without UE assistance. Verbal cues to increase WB on right LE during gait and transfers. Will continue to progress as pt tolerates. Patient Education  Patient Education: additions to exercise program  Pt verbalized/demonstrated good understanding:     [x] Yes         [] No, pt required further clarification.     Post Treatment Pain:  2/10      Plan  Times per week: 2  Plan weeks: 4      Goals  (Total # of Visits to Date: 4)   Short Term Goals - Time Frame for Short term goals: 3 weeks     Short term goal 1: Pt. to intiate HEP for improved hip strengthening - met

## 2019-04-04 ENCOUNTER — HOSPITAL ENCOUNTER (OUTPATIENT)
Dept: PHYSICAL THERAPY | Age: 64
Setting detail: THERAPIES SERIES
Discharge: HOME | End: 2019-04-04
Payer: COMMERCIAL

## 2019-04-04 PROCEDURE — 97110 THERAPEUTIC EXERCISES: CPT

## 2019-04-04 PROCEDURE — G0283 ELEC STIM OTHER THAN WOUND: HCPCS

## 2019-04-04 NOTE — PROGRESS NOTES
Phone: [facility]           Fax: [phone]                           Outpatient Physical Therapy                                                                            Daily Note    Patient: Love Duane :   CSN #: [id number]   Referring Practitioner:  Dr. Juan Graham MD    Referral Date : 19     Date: 2019    Diagnosis: Z96.641, Status post total hip replacement, right  Treatment Diagnosis: R TONYA; Onset Date: 19  PT Insurance Information: Medical [place]  Total # of Visits Approved: 12 Per Physician Order  Total # of Visits to Date: 5  No Show: 0  Canceled Appointment: 0      Pre-Treatment Pain:  0/10  Subjective: Pt. reports achiness but not any pain today and reports she returned to the doctor yesterday who told her she could put as much weight on the hip as she could tolerate and progress to walking with no AD as tolerated. Exercises:  Exercise 1: HEP: sink exercises moving R LE only; supine SLR's <90*, bridges, quad sets, ankle pumps, gluteal sets  Exercise 2: bike - hill level 2.5 x 10 mins  Exercise 3: Sink exercises B LE's x20 ea  Exercise 5: bridges - 2x15  Exercise 9: FSU/LSU HRx2 leading with R LE x20  Exercise 11: Sit to stands x 20  Exercise 12: total gym R single leg squats and heel raise - level 7 - 2x15    Modalities:  Cryotherapy (Minutes\Location): CP with IFC x15 min in supine to decrease pain and edema  E-stim (parameters): IFC x15 minutes to decrease pain in R hip and post exercise soreness       Assessment  Assessment: Pt. now ambulates throughout clinic with quad cane and WBAT R LE and no hip precautions. Pt. demo's mild antalgic gait pattern with mild decrease in [address] on R LE. Progressed WB'ing exercises on R LE this date with no increase in pain; patient reports muscle soreness as end of session. IFC and CP to decrease post exercise soreness. Will cont. to progress as jimenez.      Patient Education  Exercise progression Pt verbalized/demonstrated good understanding:     [x] Yes         [] No, pt required further clarification. Post Treatment Pain:  0/10      Plan  Times per week: 2  Plan weeks: 4      Goals  (Total # of Visits to Date: 5)   Short Term Goals - Time Frame for Short term goals: 3 weeks     Short term goal 1: Pt. to intiate HEP for improved hip strengthening - met                                        []Met   []Partially met  []Not met   Short term goal 2: Pt. to report decrease in pain to 4/10 at worst for improved QOL-met  []Met   []Partially met  []Not met   Short term goal 3: Pt. to be able to complete 10 sit/stands in </=21 seconds to improve functional mobility. -MET  []Met   []Partially met  []Not met      []Met   []Partially met  []Not met     Long Term Goals - Time Frame for Long term goals : 6 weeks  Long term goal 1: Pt. to be independent with HEP []Met  []Partially met  []Not met   Long term goal 2: Pt. to demo improved R LE strength to >/=4/5 all planes for improved functional strength. []Met  []Partially met  []Not met   Long term goal 3: Pt. to be able to walk community distances with LRAD and equal WB'ing B LE's with no pain to improve functional mobility.  progressing []Met  []Partially met  []Not met     []Met  []Partially met  []Not met     []Met  []Partially met  []Not met       Minutes Tracking:  Time In: 1415  Time Out: [address]  Minutes: Leticia Smith PT, DPT     Date: 4/4/2019

## 2019-04-08 ENCOUNTER — HOSPITAL ENCOUNTER (OUTPATIENT)
Dept: PHYSICAL THERAPY | Age: 64
Setting detail: THERAPIES SERIES
Discharge: HOME | End: 2019-04-08
Payer: COMMERCIAL

## 2019-04-08 PROCEDURE — 97110 THERAPEUTIC EXERCISES: CPT

## 2019-04-08 PROCEDURE — G0283 ELEC STIM OTHER THAN WOUND: HCPCS

## 2019-04-08 NOTE — PROGRESS NOTES
Patient Education  Patient Education: progression of exercises; continued HEP with WB on right LE  Pt verbalized/demonstrated good understanding:     [x] Yes         [] No, pt required further clarification. Post Treatment Pain:  2/10      Plan  Times per week: 2  Plan weeks: 4      Goals  (Total # of Visits to Date: 6)   Short Term Goals - Time Frame for Short term goals: 3 weeks     Short term goal 1: Pt. to intiate HEP for improved hip strengthening - met                                        []Met   []Partially met  []Not met   Short term goal 2: Pt. to report decrease in pain to 4/10 at worst for improved QOL-met  []Met   []Partially met  []Not met   Short term goal 3: Pt. to be able to complete 10 sit/stands in </=21 seconds to improve functional mobility. -MET  []Met   []Partially met  []Not met      []Met   []Partially met  []Not met     Long Term Goals - Time Frame for Long term goals : 6 weeks  Long term goal 1: Pt. to be independent with HEP []Met  []Partially met  []Not met   Long term goal 2: Pt. to demo improved R LE strength to >/=4/5 all planes for improved functional strength. []Met  []Partially met  []Not met   Long term goal 3: Pt. to be able to walk community distances with LRAD and equal WB'ing B LE's with no pain to improve functional mobility.  progressing []Met  []Partially met  []Not met     []Met  []Partially met  []Not met     []Met  []Partially met  []Not met       Minutes Tracking:  Time In: 1130  Time Out: [address] [facility]  Minutes: 70    Grant Vaughn , PT, DPT, CMPT      Date: 4/8/2019

## 2019-04-11 ENCOUNTER — HOSPITAL ENCOUNTER (OUTPATIENT)
Dept: PHYSICAL THERAPY | Age: 64
Setting detail: THERAPIES SERIES
Discharge: HOME | End: 2019-04-11
Payer: COMMERCIAL

## 2019-04-11 PROCEDURE — 97112 NEUROMUSCULAR REEDUCATION: CPT

## 2019-04-11 PROCEDURE — 97110 THERAPEUTIC EXERCISES: CPT

## 2019-04-11 PROCEDURE — G0283 ELEC STIM OTHER THAN WOUND: HCPCS

## 2019-04-11 NOTE — PROGRESS NOTES
Phone: Charles           Fax: [phone]                           Outpatient Physical Therapy                                                                            Daily Note    Patient: Tameka King :   [facility] #: [id number]   Referring Practitioner:  Dr. Simone Marrero MD    Referral Date : 19     Date: 2019    Diagnosis: Z96.641, Status post total hip replacement, right  Treatment Diagnosis: R TONYA; Onset Date: 19  PT Insurance Information: Medical [place]  Total # of Visits Approved: 12 Per Physician Order  Total # of Visits to Date: 7  No Show: 0  Canceled Appointment: 0      Pre-Treatment Pain:  1/10  Subjective: Pt. reports R hip pain /10 and R knee pain worse in the morning that decreases with stretching. Exercises:  Exercise 1: HEP: sink exercises moving R LE only; supine SLR's <90*, bridges, quad sets, ankle pumps, gluteal sets  Exercise 2: bike - hill level 3.0 x 10 mins  Exercise 4: supine ER blue tband - 2x15 - alternating  Exercise 5: bridges - 2x20  Exercise 9: FSU/LSU no handrail leading with R LE x20  Exercise 11: Sit to stands x 20  Exercise 12: total gym R single leg squats and heel raise - level 8 - 2x15  Exercise 13: side stepping at counter - orange band above knees - 5x   Exercise 14: HS curl - single leg - 2x20 - 35#    Modalities:  Cryotherapy (Minutes\Location): CP with IFC x15 min in supine to decrease pain and edema  E-stim (parameters): IFC x15 minutes to decrease pain in R hip and post exercise soreness       Assessment  Assessment: Increased reps of exercises this date with good tolerance from patient. IFC and heat pack at end of session to decrease post exercise soreness. Pt. educated on how to perform scar tissue massage once scar heals a little more in order to improve tissue mobility.      Patient Education  Exercise technique    Pt verbalized/demonstrated good understanding:     [x] Yes         [] No, pt required further clarification. Post Treatment Pain:  0/10      Plan  Times per week: 2  Plan weeks: 4      Goals  (Total # of Visits to Date: 7)   Short Term Goals - Time Frame for Short term goals: 3 weeks     Short term goal 1: Pt. to intiate HEP for improved hip strengthening - met                                        []Met   []Partially met  []Not met   Short term goal 2: Pt. to report decrease in pain to 4/10 at worst for improved QOL-met  []Met   []Partially met  []Not met   Short term goal 3: Pt. to be able to complete 10 sit/stands in </=21 seconds to improve functional mobility. -MET  []Met   []Partially met  []Not met      []Met   []Partially met  []Not met     Long Term Goals - Time Frame for Long term goals : 6 weeks  Long term goal 1: Pt. to be independent with HEP []Met  []Partially met  []Not met   Long term goal 2: Pt. to demo improved R LE strength to >/=4/5 all planes for improved functional strength. []Met  []Partially met  []Not met   Long term goal 3: Pt. to be able to walk community distances with LRAD and equal WB'ing B LE's with no pain to improve functional mobility.  progressing []Met  []Partially met  []Not met     []Met  []Partially met  []Not met     []Met  []Partially met  []Not met       Minutes Tracking:  Time In: 1401  Time Out: [address]  Minutes: [address], PT, DPT     Date: 4/11/2019

## 2019-04-15 ENCOUNTER — HOSPITAL ENCOUNTER (OUTPATIENT)
Dept: PHYSICAL THERAPY | Age: 64
Setting detail: THERAPIES SERIES
Discharge: HOME | End: 2019-04-15
Payer: COMMERCIAL

## 2019-04-15 PROCEDURE — 97110 THERAPEUTIC EXERCISES: CPT

## 2019-04-15 PROCEDURE — G0283 ELEC STIM OTHER THAN WOUND: HCPCS

## 2019-04-15 NOTE — PROGRESS NOTES
strengthening - met                                        []Met   []Partially met  []Not met   Short term goal 2: Pt. to report decrease in pain to 4/10 at worst for improved QOL-met  []Met   []Partially met  []Not met   Short term goal 3: Pt. to be able to complete 10 sit/stands in </=21 seconds to improve functional mobility. -MET  []Met   []Partially met  []Not met      []Met   []Partially met  []Not met     Long Term Goals - Time Frame for Long term goals : 6 weeks  Long term goal 1: Pt. to be independent with HEP []Met  []Partially met  []Not met   Long term goal 2: Pt. to demo improved R LE strength to >/=4/5 all planes for improved functional strength. []Met  []Partially met  []Not met   Long term goal 3: Pt. to be able to walk community distances with LRAD and equal WB'ing B LE's with no pain to improve functional mobility.  progressing []Met  []Partially met  []Not met     []Met  []Partially met  []Not met     []Met  []Partially met  []Not met       Minutes Tracking:  Time In: 1 Christoph Melendez  Time Out: 200  Minutes: Marguerite Dias 97  PTA [id number]     Date: 4/15/2019

## 2019-04-18 ENCOUNTER — HOSPITAL ENCOUNTER (OUTPATIENT)
Age: 64
Discharge: HOME | End: 2019-04-18
Payer: COMMERCIAL

## 2019-04-18 ENCOUNTER — HOSPITAL ENCOUNTER (OUTPATIENT)
Dept: PHYSICAL THERAPY | Age: 64
Setting detail: THERAPIES SERIES
Discharge: HOME | End: 2019-04-18
Payer: COMMERCIAL

## 2019-04-18 DIAGNOSIS — E03.9 HYPOTHYROIDISM, UNSPECIFIED TYPE: ICD-10-CM
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
## 2019-04-18 LAB
T3 FREE: 2.12 PG/ML (ref 2.02–4.43)
THYROXINE, FREE: 1.34 NG/DL (ref 0.93–1.7)
TSH SERPL DL<=0.05 MIU/L-ACNC: 4.08 MIU/L (ref 0.3–5)

## 2019-04-18 PROCEDURE — 36415 COLL VENOUS BLD VENIPUNCTURE: CPT

## 2019-04-18 PROCEDURE — 84443 ASSAY THYROID STIM HORMONE: CPT

## 2019-04-18 PROCEDURE — 97110 THERAPEUTIC EXERCISES: CPT

## 2019-04-18 PROCEDURE — 84481 FREE ASSAY (FT-3): CPT

## 2019-04-18 PROCEDURE — 84439 ASSAY OF FREE THYROXINE: CPT

## 2019-04-18 NOTE — PROGRESS NOTES
Phone: Charles           Fax: [phone]                           Outpatient Physical Therapy                                                                            Daily Note    Patient: Jeison Allen :   CSN #: [id number]   Referring Practitioner:  Dr. Kiran Mcneill MD    Referral Date : 19     Date: 2019    Diagnosis: Z96.641, Status post total hip replacement, right  Treatment Diagnosis: R TONYA; Onset Date: 19  PT Insurance Information: Medical [place]  Total # of Visits Approved: 12 Per Physician Order  Total # of Visits to Date: 9  No Show: 0  Canceled Appointment: 0      Pre-Treatment Pain:  0/10  Subjective: Pt. reports achiness in the hip but no real pain and the knee is feeling better now too. Pt. is ready to be done with therapy since she is back to all of her normal activities. Exercises:  Exercise 1: HEP: sink exercises moving R LE only; supine SLR's <90*, bridges, quad sets, ankle pumps, gluteal sets  Exercise 2: bike - hill level 3.5 x 10 mins  Exercise 5: bridges - 2x20  Exercise 9: FSU/LSU no handrail leading with R LE x20  Exercise 12: total gym R single leg squats and heel raise - level 8 - 2x15  Exercise 14: HS curl - single leg - 2x20 - 35#  Exercise 16: Rebounder B, L, RLE x 10 each with 2# ball    Assessment  Assessment: Pt. completed all B LE exercises with no pain this date and demo's good form and quad control with total gym single leg squats. Pt. demo's improved R hip strength all planes 4/5 equal to L hip strength meeting long term goal and walks with no AD and equal WB'ing. Pt. is independent with HEP and was encouraged to continue HEP and scar tissue massage at home to improve functional mobility and strength. Pt. betzaida's good understanding. Will d/c patient at this time. Patient Education  Exercise technique; cont.  HEP at home    Pt verbalized/demonstrated good understanding:     [x] Yes         [] No, pt required further clarification. Post Treatment Pain:  0/10      Plan  Times per week: 2  Plan weeks: 4      Goals  (Total # of Visits to Date: 5)   Short Term Goals - Time Frame for Short term goals: 3 weeks     Short term goal 1: Pt. to intiate HEP for improved hip strengthening - met                                        []Met   []Partially met  []Not met   Short term goal 2: Pt. to report decrease in pain to 4/10 at worst for improved QOL-met  []Met   []Partially met  []Not met   Short term goal 3: Pt. to be able to complete 10 sit/stands in </=21 seconds to improve functional mobility. -MET  []Met   []Partially met  []Not met      []Met   []Partially met  []Not met     Long Term Goals - Time Frame for Long term goals : 6 weeks  Long term goal 1: Pt. to be independent with HEP-MET []Met  []Partially met  []Not met   Long term goal 2: Pt. to demo improved R LE strength to >/=4/5 all planes for improved functional strength. -MET []Met  []Partially met  []Not met   Long term goal 3: Pt. to be able to walk community distances with LRAD and equal WB'ing B LE's with no pain to improve functional mobility.  -MET []Met  []Partially met  []Not met     []Met  []Partially met  []Not met     []Met  []Partially met  []Not met       Minutes Tracking:  Time In: 6902  Time Out: [address]  Minutes: Johann Davis PT, DPT     Date: 4/18/2019

## 2019-04-18 NOTE — DISCHARGE SUMMARY
Phone: Charles          Fax: [phone]                            Outpatient Physical Therapy                                                                    Discharge Summary    Patient: Whitney Carr  : [age]  [facility] #: [id number]   Referring physician: No admitting provider for patient encounter. Referring Practitioner: Dr. Regulo Mcwilliams MD      Diagnosis: T27.608, Status post total hip replacement, right      Date Treatment Initiated: 3/21/19  Date of Last Treatment: 19      PT Visit Information  Onset Date: 19  PT Insurance Information: Medical [place]  Total # of Visits Approved: 12  Total # of Visits to Date: 9  Plan of Care/Certification Expiration Date: 19  No Show: 0  Canceled Appointment: 0      Frequency/Duration  2 times per week  4 weeks      Treatment Received  [x] HP/CP      [x] Electrical Stim   [x] Therapeutic Exercise      [x] Gait Training  [] Aquatics   [] Ultrasound         [x] Patient Education/HEP   [x] Manual Therapy  [] Traction    [x] Neuro-bia        [] Soft Tissue Mobs            [] Home TENS  [] Iontophoresis    [] Orthotic casting/fitting      [] Dry Needling    Assessment  Assessment: Pt. completed all B LE exercises with no pain this date and betzaida's good form and quad control with total gym single leg squats. Pt. betzaida's improved R hip strength all planes 4/5 equal to L hip strength meeting long term goal and walks with no AD and equal WB'ing. Pt. is independent with HEP and was encouraged to continue HEP and scar tissue massage at home to improve functional mobility and strength. Pt. betzaida's good understanding. Will d/c patient at this time.         Goals  Short term goals  Time Frame for Short term goals: 3 weeks  Short term goal 1: Pt. to intiate HEP for improved hip strengthening - met  Short term goal 2: Pt. to report decrease in pain to 4/10 at worst for improved QOL-met  Short term goal 3: Pt. to be able to complete 10 sit/stands in </=21 seconds to improve functional mobility. -MET    Long term goals  Time Frame for Long term goals : 6 weeks  Long term goal 1: Pt. to be independent with HEP-MET  Long term goal 2: Pt. to demo improved R LE strength to >/=4/5 all planes for improved functional strength. -MET  Long term goal 3: Pt. to be able to walk community distances with LRAD and equal WB'ing B LE's with no pain to improve functional mobility.  -MET      Reason for Discharge  [x] Goals Achieved                        []  Poor Follow Through/Attendance                  [x]  Optimal Function Achieved     []  Patient Discharged Self    []  Hospitalization                         []  Physician discharge      Thank you for this referral      Lanney Claude, PT, DPT               Date: 4/18/2019

## 2019-04-21 NOTE — RESULT ENCOUNTER NOTE
Abnormal results noted. Patient needs scheduled a f/u appointment to discuss results if one is not already scheduled.

## 2019-05-02 ENCOUNTER — TELEPHONE (OUTPATIENT)
Dept: UROLOGY | Age: 64
End: 2019-05-02

## 2019-05-02 DIAGNOSIS — N20.0 RENAL STONES: Primary | ICD-10-CM

## 2019-05-03 ENCOUNTER — HOSPITAL ENCOUNTER (OUTPATIENT)
Dept: GENERAL RADIOLOGY | Age: 64
Discharge: HOME | End: 2019-05-05
Payer: COMMERCIAL

## 2019-05-03 ENCOUNTER — HOSPITAL ENCOUNTER (OUTPATIENT)
Age: 64
Discharge: HOME | End: 2019-05-05
Payer: COMMERCIAL

## 2019-05-03 DIAGNOSIS — N20.0 RENAL STONES: ICD-10-CM

## 2019-05-03 PROCEDURE — 74018 RADEX ABDOMEN 1 VIEW: CPT

## 2019-05-06 ENCOUNTER — OFFICE VISIT (OUTPATIENT)
Dept: UROLOGY | Age: 64
End: 2019-05-06
Payer: COMMERCIAL

## 2019-05-06 VITALS
WEIGHT: 124 LBS | BODY MASS INDEX: 21.97 KG/M2 | DIASTOLIC BLOOD PRESSURE: 80 MMHG | HEIGHT: 63 IN | SYSTOLIC BLOOD PRESSURE: 146 MMHG

## 2019-05-06 VITALS — BODY MASS INDEX: 20.37 KG/M2 | WEIGHT: 115 LBS

## 2019-05-06 DIAGNOSIS — N20.0 RENAL STONES: Primary | ICD-10-CM

## 2019-05-06 PROCEDURE — 99213 OFFICE O/P EST LOW 20 MIN: CPT | Performed by: NURSE PRACTITIONER

## 2019-05-06 ASSESSMENT — ENCOUNTER SYMPTOMS
ABDOMINAL PAIN: 0
BACK PAIN: 0
NAUSEA: 0
EYE PAIN: 0
WHEEZING: 0
COLOR CHANGE: 0
CONSTIPATION: 0
COUGH: 0
EYE REDNESS: 0
VOMITING: 0
SHORTNESS OF BREATH: 0

## 2019-05-06 NOTE — PROGRESS NOTES
(TYLENOL) 500 MG tablet Take 500 mg by mouth every 6 hours as needed for Pain      diphenhydrAMINE (BENADRYL) 25 MG capsule Take 25 mg by mouth nightly as needed for Itching      alendronate (FOSAMAX) 70 MG tablet TAKE 1 TABLET BY MOUTH ONCE WEEKLY BEFORE BREAKFAST WITH A FULL GLASS OF WATER, ON AN EMPTY STOMACH: REMAIN UPRIGHT FOR 30 MINUTES 12 tablet 3    Polyethylene Glycol 3350 (MIRALAX PO) Take by mouth every other day Indications: PRN       Glucosamine-Chondroitin (GLUCOSAMINE CHONDR COMPLEX PO) Take by mouth      calcium carbonate (OSCAL) 500 MG TABS tablet Take 500 mg by mouth daily.  vitamin D (ERGOCALCIFEROL) 400 UNITS CAPS Take 400 Units by mouth daily.  ibuprofen (ADVIL;MOTRIN) 400 MG tablet Take 400 mg by mouth every 6 hours as needed for Pain.  [DISCONTINUED] rivaroxaban (XARELTO) 10 MG TABS tablet Take 1 tablet by mouth daily (with breakfast) 12 tablet 0    meloxicam (MOBIC) 7.5 MG tablet Take 1 tablet by mouth daily (Patient taking differently: Take 15 mg by mouth daily Indications: PRN ) 30 tablet 3     No facility-administered encounter medications on file as of 5/6/2019. Current Outpatient Medications on File Prior to Visit   Medication Sig Dispense Refill    levothyroxine (SYNTHROID) 75 MCG tablet Take 1 tablet by mouth Daily 90 tablet 3    acetaminophen (TYLENOL) 500 MG tablet Take 500 mg by mouth every 6 hours as needed for Pain      diphenhydrAMINE (BENADRYL) 25 MG capsule Take 25 mg by mouth nightly as needed for Itching      alendronate (FOSAMAX) 70 MG tablet TAKE 1 TABLET BY MOUTH ONCE WEEKLY BEFORE BREAKFAST WITH A FULL GLASS OF WATER, ON AN EMPTY STOMACH: REMAIN UPRIGHT FOR 30 MINUTES 12 tablet 3    Polyethylene Glycol 3350 (MIRALAX PO) Take by mouth every other day Indications: PRN       Glucosamine-Chondroitin (GLUCOSAMINE CHONDR COMPLEX PO) Take by mouth      calcium carbonate (OSCAL) 500 MG TABS tablet Take 500 mg by mouth daily.       vitamin D (ERGOCALCIFEROL) 400 UNITS CAPS Take 400 Units by mouth daily.  ibuprofen (ADVIL;MOTRIN) 400 MG tablet Take 400 mg by mouth every 6 hours as needed for Pain.  meloxicam (MOBIC) 7.5 MG tablet Take 1 tablet by mouth daily (Patient taking differently: Take 15 mg by mouth daily Indications: PRN ) 30 tablet 3     No current facility-administered medications on file prior to visit. Patient has no known allergies. Family History   Problem Relation Age of Onset    Stroke Father     Breast Cancer Maternal Grandmother      Social History     Tobacco Use   Smoking Status Current Some Day Smoker    Packs/day: 0.25    Years: 30.00    Pack years: 7.50    Types: Cigarettes   Smokeless Tobacco Never Used       Social History     Substance and Sexual Activity   Alcohol Use Yes    Alcohol/week: 0.0 oz    Comment: socially       Review of Systems   Constitutional: Negative for appetite change, chills and fever. Eyes: Negative for pain, redness and visual disturbance. Respiratory: Negative for cough, shortness of breath and wheezing. Cardiovascular: Negative for chest pain and leg swelling. Gastrointestinal: Negative for abdominal pain, constipation, nausea and vomiting. Genitourinary: Negative for difficulty urinating, dysuria, flank pain, frequency, hematuria, pelvic pain, urgency, vaginal bleeding and vaginal discharge. Musculoskeletal: Negative for back pain, joint swelling and myalgias. Skin: Negative for color change, rash and wound. Neurological: Negative for dizziness, tremors and numbness. Hematological: Negative for adenopathy. Does not bruise/bleed easily. BP (!) 146/80   Ht 5' 3\" (1.6 m)   Wt 124 lb (56.2 kg)   LMP  (LMP Unknown)   Breastfeeding? No   BMI 21.97 kg/m²       PHYSICAL EXAM:  Constitutional: Patient resting comfortably, in no acute distress. Neuro: Alert and oriented to person place and time. Cranial nerves grossly intact.     Psych: Mood and affect normal.  Skin: Warm, dry  HEENT: normocephalic, atraumatic  Lymphatics: No palpable lymphadenopathy  Lungs: Respiratory effort normal, unlabored  Cardiovascular:  Normal peripheral pulses  Abdomen: Soft, non-tender, non-distended with no organomegaly or palpable masses. : No CVA tenderness. Bladder non-tender and not distended. Pelvic: Deferred    Lab Results   Component Value Date    BUN 15 02/18/2019     Lab Results   Component Value Date    CREATININE 0.81 02/18/2019       ASSESSMENT:   Diagnosis Orders   1. Renal stones  XR ABDOMEN (KUB) (SINGLE AP VIEW)            PLAN:  We did discuss definitive stone therapy vs observation. She would like to continue to monitor stones. Reminded of dietary stone prevention:  1) drink around 80 ounces of water per day  2) Avoid/minimize intake of \"bad fluids\" (soda pop, coffee, tea, alcohol, energy drinks)  3) reduce consumption of sodium/salt  4) reduce consumption of fatty animal protein (full-fat cheese/milk/dairy, red meats, pork). Limit protein intake to low-fat/lean options (low-fat dairy, fish, chicken, turkey)    F/U in one year with a KUB prior.

## 2019-09-19 ENCOUNTER — HOSPITAL ENCOUNTER (OUTPATIENT)
Age: 64
Discharge: HOME | End: 2019-09-19
Payer: COMMERCIAL

## 2019-09-19 DIAGNOSIS — Z00.00 WELLNESS EXAMINATION: ICD-10-CM

## 2019-09-19 DIAGNOSIS — E03.9 HYPOTHYROIDISM, UNSPECIFIED TYPE: ICD-10-CM

## 2019-09-19 LAB
T3 FREE: 2.62 PG/ML (ref 2.02–4.43)
THYROXINE, FREE: 1.4 NG/DL (ref 0.93–1.7)

## 2019-09-19 PROCEDURE — 84439 ASSAY OF FREE THYROXINE: CPT

## 2019-09-19 PROCEDURE — 84481 FREE ASSAY (FT-3): CPT

## 2019-09-19 PROCEDURE — 36415 COLL VENOUS BLD VENIPUNCTURE: CPT

## 2019-10-17 ENCOUNTER — OFFICE VISIT (OUTPATIENT)
Dept: OBGYN | Age: 64
End: 2019-10-17
Payer: COMMERCIAL

## 2019-10-17 VITALS
BODY MASS INDEX: 21.97 KG/M2 | DIASTOLIC BLOOD PRESSURE: 88 MMHG | HEIGHT: 63 IN | WEIGHT: 124 LBS | SYSTOLIC BLOOD PRESSURE: 138 MMHG

## 2019-10-17 DIAGNOSIS — Z01.419 ENCOUNTER FOR GYNECOLOGICAL EXAMINATION WITHOUT ABNORMAL FINDING: Primary | ICD-10-CM

## 2019-10-17 DIAGNOSIS — Z12.39 ENCOUNTER FOR SCREENING FOR MALIGNANT NEOPLASM OF BREAST: ICD-10-CM

## 2019-10-17 PROCEDURE — 99396 PREV VISIT EST AGE 40-64: CPT | Performed by: ADVANCED PRACTICE MIDWIFE

## 2019-10-24 PROBLEM — M16.11 PRIMARY LOCALIZED OSTEOARTHRITIS OF RIGHT HIP: Status: RESOLVED | Noted: 2019-03-18 | Resolved: 2019-10-24

## 2019-10-24 PROBLEM — E78.2 MIXED HYPERLIPIDEMIA: Chronic | Status: ACTIVE | Noted: 2019-10-24

## 2019-10-24 PROBLEM — E78.2 MIXED HYPERLIPIDEMIA: Status: ACTIVE | Noted: 2019-10-24

## 2019-10-24 PROBLEM — Z96.641 H/O TOTAL HIP ARTHROPLASTY, RIGHT: Status: RESOLVED | Noted: 2019-03-18 | Resolved: 2019-10-24

## 2019-11-19 ENCOUNTER — HOSPITAL ENCOUNTER (OUTPATIENT)
Dept: WOMENS IMAGING | Age: 64
Discharge: HOME | End: 2019-11-21
Payer: COMMERCIAL

## 2019-11-19 DIAGNOSIS — Z12.39 ENCOUNTER FOR SCREENING FOR MALIGNANT NEOPLASM OF BREAST: ICD-10-CM

## 2019-11-19 PROCEDURE — 77063 BREAST TOMOSYNTHESIS BI: CPT

## 2020-04-15 ENCOUNTER — TELEPHONE (OUTPATIENT)
Dept: UROLOGY | Age: 65
End: 2020-04-15

## 2020-04-15 NOTE — TELEPHONE ENCOUNTER
Patient called and rescheduled her 5/5/2020 appointment until 9/15/2020. She stated she will go on Medicare in August and would prefer to wait until after she is on that to be seen and have KUB.   Need new KUB order

## 2020-09-14 ENCOUNTER — HOSPITAL ENCOUNTER (OUTPATIENT)
Dept: LAB | Age: 65
Discharge: HOME | End: 2020-09-14
Payer: MEDICARE

## 2020-09-14 ENCOUNTER — HOSPITAL ENCOUNTER (OUTPATIENT)
Dept: GENERAL RADIOLOGY | Age: 65
Discharge: HOME | End: 2020-09-16
Payer: MEDICARE

## 2020-09-14 LAB
ALT SERPL-CCNC: 15 U/L (ref 5–33)
ANION GAP SERPL CALCULATED.3IONS-SCNC: 9 MMOL/L (ref 9–17)
AST SERPL-CCNC: 21 U/L
BUN BLDV-MCNC: 13 MG/DL (ref 8–23)
BUN/CREAT BLD: 17 (ref 9–20)
CALCIUM SERPL-MCNC: 10 MG/DL (ref 8.6–10.4)
CHLORIDE BLD-SCNC: 105 MMOL/L (ref 98–107)
CHOLESTEROL/HDL RATIO: 5.9
CHOLESTEROL: 252 MG/DL
CO2: 26 MMOL/L (ref 20–31)
CREAT SERPL-MCNC: 0.78 MG/DL (ref 0.5–0.9)
GFR AFRICAN AMERICAN: >60 ML/MIN
GFR NON-AFRICAN AMERICAN: >60 ML/MIN
GFR SERPL CREATININE-BSD FRML MDRD: ABNORMAL ML/MIN/{1.73_M2}
GFR SERPL CREATININE-BSD FRML MDRD: ABNORMAL ML/MIN/{1.73_M2}
GLUCOSE BLD-MCNC: 101 MG/DL (ref 70–99)
HCT VFR BLD CALC: 39.5 % (ref 36.3–47.1)
HDLC SERPL-MCNC: 43 MG/DL
HEMOGLOBIN: 12.1 G/DL (ref 11.9–15.1)
LDL CHOLESTEROL: 166 MG/DL (ref 0–130)
MCH RBC QN AUTO: 27.3 PG (ref 25.2–33.5)
MCHC RBC AUTO-ENTMCNC: 30.6 G/DL (ref 28.4–34.8)
MCV RBC AUTO: 89.2 FL (ref 82.6–102.9)
NRBC AUTOMATED: 0 PER 100 WBC
PDW BLD-RTO: 17.6 % (ref 11.8–14.4)
PLATELET # BLD: 327 K/UL (ref 138–453)
PMV BLD AUTO: 10 FL (ref 8.1–13.5)
POTASSIUM SERPL-SCNC: 4.5 MMOL/L (ref 3.7–5.3)
RBC # BLD: 4.43 M/UL (ref 3.95–5.11)
SODIUM BLD-SCNC: 140 MMOL/L (ref 135–144)
T3 FREE: 2.21 PG/ML (ref 2.02–4.43)
THYROXINE, FREE: 1.27 NG/DL (ref 0.93–1.7)
TRIGL SERPL-MCNC: 216 MG/DL
TSH SERPL DL<=0.05 MIU/L-ACNC: 3.37 MIU/L (ref 0.3–5)
VLDLC SERPL CALC-MCNC: ABNORMAL MG/DL (ref 1–30)
WBC # BLD: 8.6 K/UL (ref 3.5–11.3)

## 2020-09-14 PROCEDURE — 36415 COLL VENOUS BLD VENIPUNCTURE: CPT

## 2020-09-14 PROCEDURE — 84450 TRANSFERASE (AST) (SGOT): CPT

## 2020-09-14 PROCEDURE — 80048 BASIC METABOLIC PNL TOTAL CA: CPT

## 2020-09-14 PROCEDURE — 84443 ASSAY THYROID STIM HORMONE: CPT

## 2020-09-14 PROCEDURE — 74018 RADEX ABDOMEN 1 VIEW: CPT

## 2020-09-14 PROCEDURE — 85027 COMPLETE CBC AUTOMATED: CPT

## 2020-09-14 PROCEDURE — 80061 LIPID PANEL: CPT

## 2020-09-14 PROCEDURE — 84439 ASSAY OF FREE THYROXINE: CPT

## 2020-09-14 PROCEDURE — 84481 FREE ASSAY (FT-3): CPT

## 2020-09-14 PROCEDURE — 84460 ALANINE AMINO (ALT) (SGPT): CPT

## 2020-09-15 ENCOUNTER — VIRTUAL VISIT (OUTPATIENT)
Dept: UROLOGY | Age: 65
End: 2020-09-15
Payer: MEDICARE

## 2020-09-15 PROCEDURE — 3017F COLORECTAL CA SCREEN DOC REV: CPT | Performed by: NURSE PRACTITIONER

## 2020-09-15 PROCEDURE — G8399 PT W/DXA RESULTS DOCUMENT: HCPCS | Performed by: NURSE PRACTITIONER

## 2020-09-15 PROCEDURE — 1123F ACP DISCUSS/DSCN MKR DOCD: CPT | Performed by: NURSE PRACTITIONER

## 2020-09-15 PROCEDURE — 99213 OFFICE O/P EST LOW 20 MIN: CPT | Performed by: NURSE PRACTITIONER

## 2020-09-15 PROCEDURE — 1090F PRES/ABSN URINE INCON ASSESS: CPT | Performed by: NURSE PRACTITIONER

## 2020-09-15 PROCEDURE — G8427 DOCREV CUR MEDS BY ELIG CLIN: HCPCS | Performed by: NURSE PRACTITIONER

## 2020-09-15 PROCEDURE — 4040F PNEUMOC VAC/ADMIN/RCVD: CPT | Performed by: NURSE PRACTITIONER

## 2020-09-15 ASSESSMENT — ENCOUNTER SYMPTOMS
EYE REDNESS: 0
VOMITING: 0
NAUSEA: 0
COLOR CHANGE: 0
ABDOMINAL PAIN: 0
BACK PAIN: 0
CONSTIPATION: 0
EYE PAIN: 0
WHEEZING: 0
SHORTNESS OF BREATH: 0
COUGH: 0

## 2020-09-15 NOTE — PROGRESS NOTES
9/15/2020    TELEHEALTH EVALUATION -- Audio/Visual (During [facility]-83 public health emergency)    HPI:    Giovana Segovia (:  1955) has requested an audio/video evaluation for the following concern(s):    History  10/2013 Left HLL     2013 Right ESWL      2015 Left ESWL     2018 Left ESWL     Today  Here today for an annual follow-up for renal stones. She denies any episodes of gross hematuria, flank or abdominal pain. She denies any episodes of spontaneous stone passage. She did have a KUB completed prior to today's visit. This film was independently reviewed and shows bilateral renal stones. Review of Systems   Constitutional: Negative for appetite change, chills and fever. Eyes: Negative for pain, redness and visual disturbance. Respiratory: Negative for cough, shortness of breath and wheezing. Cardiovascular: Negative for chest pain and leg swelling. Gastrointestinal: Negative for abdominal pain, constipation, nausea and vomiting. Genitourinary: Negative for difficulty urinating, dysuria, flank pain, frequency, hematuria, pelvic pain, urgency, vaginal bleeding and vaginal discharge. Musculoskeletal: Negative for back pain, joint swelling and myalgias. Skin: Negative for color change, rash and wound. Neurological: Negative for dizziness, tremors and numbness. Hematological: Negative for adenopathy. Does not bruise/bleed easily. No Known Allergies    Prior to Visit Medications    Medication Sig Taking?  Authorizing Provider   meloxicam (MOBIC) 15 MG tablet Take 15 mg by mouth daily  Historical Provider, MD   levothyroxine (SYNTHROID) 75 MCG tablet Take 1 tablet by mouth Daily  Imer Briscoe MD   alendronate (FOSAMAX) 70 MG tablet Take 1 tablet by mouth every 7 days  Imer Briscoe MD   losartan (COZAAR) 50 MG tablet Take 1 tablet by mouth daily  Imer Briscoe MD   acetaminophen (TYLENOL) 500 MG tablet Take 500 mg by mouth every 6 hours as needed for Pain Historical Provider, MD   diphenhydrAMINE (BENADRYL) 25 MG capsule Take 25 mg by mouth nightly as needed for Itching  Historical Provider, MD   Polyethylene Glycol 3350 (MIRALAX PO) Take by mouth every other day Indications: PRN   Historical Provider, MD   Glucosamine-Chondroitin (GLUCOSAMINE CHONDR COMPLEX PO) Take by mouth  Historical Provider, MD   calcium carbonate (OSCAL) 500 MG TABS tablet Take 500 mg by mouth daily. Historical Provider, MD   vitamin D (ERGOCALCIFEROL) 400 UNITS CAPS Take 400 Units by mouth daily. Historical Provider, MD   ibuprofen (ADVIL;MOTRIN) 400 MG tablet Take 400 mg by mouth every 6 hours as needed for Pain. Historical Provider, MD       Social History     Tobacco Use    Smoking status: Current Some Day Smoker     Packs/day: 0.25     Years: 30.00     Pack years: 7.50     Types: Cigarettes    Smokeless tobacco: Never Used   Substance Use Topics    Alcohol use:  Yes     Alcohol/week: 0.0 standard drinks     Comment: socially    Drug use: No        Past Medical History:   Diagnosis Date    Fracture of wrist, unspecified laterality, closed, initial encounter     right    History of sepsis 2009    following lithotripsy    Hyperlipidemia     Hypertension     Hypothyroidism 2007    secondary to radioactive iodine 2007    Kidney stone     Osteoporosis     Primary localized osteoarthritis of right hip 3/18/2019       Past Surgical History:   Procedure Laterality Date    CHOLECYSTECTOMY, LAPAROSCOPIC  2009    COLONOSCOPY  07/17/2014    Dr. Laina Oreilly - tubular adenomatous polyp removed    COLONOSCOPY N/A 8/1/2018    Dr. Rico Guard architectural distortion, suggestive of mucosal prolapse    CYSTOSCOPY  2015    stent removal and reinsert     [address] [address] LITHOTRIPSY  4/28/2015    left    LITHOTRIPSY Left 01/30/2018    cystoscopy- Dr. Kavita Guzman  2007    radioactive iodine consumption of sodium/salt  4) reduce consumption of fatty animal protein (full-fat cheese/milk/dairy, red meats, pork). Limit protein intake to low-fat/lean options (low-fat dairy, fish, chicken, turkey)    F/U in one year with a KUB prior or sooner if needed. Jessica Alvarez is a 72 y.o. female being evaluated by a Virtual Visit (video visit) encounter to address concerns as mentioned above. A caregiver was present when appropriate. Due to this being a TeleHealth encounter (During [facility] public health emergency), evaluation of the following organ systems was limited: Vitals/Constitutional/EENT/Resp/CV/GI//MS/Neuro/Skin/Heme-Lymph-Imm. Pursuant to the emergency declaration under the [address] [place] [address], [address] [place] [address] authority and the [employer] Act, this Virtual Visit was conducted with patient's (and/or legal guardian's) consent, to reduce the patient's risk of exposure to COVID-19 and provide necessary medical care. The patient (and/or legal guardian) has also been advised to contact this office for worsening conditions or problems, and seek emergency medical treatment and/or call 911 if deemed necessary. Services were provided through a video synchronous discussion virtually to substitute for in-person clinic visit. The patient was located in their home. The provider was located in the office. --SAL Washington - CNP on 9/15/2020 at 9:16 AM    An electronic signature was used to authenticate this note.

## 2020-10-20 ENCOUNTER — HOSPITAL ENCOUNTER (OUTPATIENT)
Age: 65
Setting detail: SPECIMEN
Discharge: HOME | End: 2020-10-20
Payer: MEDICARE

## 2020-10-20 ENCOUNTER — OFFICE VISIT (OUTPATIENT)
Dept: OBGYN | Age: 65
End: 2020-10-20
Payer: MEDICARE

## 2020-10-20 VITALS
DIASTOLIC BLOOD PRESSURE: 76 MMHG | BODY MASS INDEX: 22.32 KG/M2 | WEIGHT: 126 LBS | HEIGHT: 63 IN | SYSTOLIC BLOOD PRESSURE: 138 MMHG

## 2020-10-20 PROCEDURE — G0145 SCR C/V CYTO,THINLAYER,RESCR: HCPCS

## 2020-10-20 PROCEDURE — G8420 CALC BMI NORM PARAMETERS: HCPCS | Performed by: ADVANCED PRACTICE MIDWIFE

## 2020-10-20 PROCEDURE — G8427 DOCREV CUR MEDS BY ELIG CLIN: HCPCS | Performed by: ADVANCED PRACTICE MIDWIFE

## 2020-10-20 PROCEDURE — 99211 OFF/OP EST MAY X REQ PHY/QHP: CPT | Performed by: ADVANCED PRACTICE MIDWIFE

## 2020-10-20 PROCEDURE — G0101 CA SCREEN;PELVIC/BREAST EXAM: HCPCS | Performed by: ADVANCED PRACTICE MIDWIFE

## 2020-10-20 NOTE — PROGRESS NOTES
(ADVIL;MOTRIN) 400 MG tablet, Take 400 mg by mouth every 6 hours as needed for Pain., Disp: , Rfl:     vitamin E 1000 units capsule, Take 1,000 Units by mouth daily, Disp: , Rfl:     meloxicam (MOBIC) 15 MG tablet, Take 15 mg by mouth daily, Disp: , Rfl:     acetaminophen (TYLENOL) 500 MG tablet, Take 500 mg by mouth every 6 hours as needed for Pain, Disp: , Rfl:     diphenhydrAMINE (BENADRYL) 25 MG capsule, Take 25 mg by mouth nightly as needed for Itching, Disp: , Rfl:     Polyethylene Glycol 3350 (MIRALAX PO), Take by mouth every other day Indications: PRN , Disp: , Rfl:     Social History     Substance and Sexual Activity   Sexual Activity Yes    Partners: Male       Any bleeding or pain with intercourse: No    Last Yearly:  2019    Last Mammogram: 11/19/19    Last Dexascan pt unsure     Last colorectal screen- type:colonoscopy *  date  About 2 years ago     Do you do self breast exams: Yes    Past Medical History:   Diagnosis Date    Fracture of wrist, unspecified laterality, closed, initial encounter     right    History of sepsis 2009    following lithotripsy    Hyperlipidemia     Hypertension     Hypothyroidism 2007    secondary to radioactive iodine 2007    Kidney stone     Osteoporosis     Primary localized osteoarthritis of right hip 3/18/2019       Past Surgical History:   Procedure Laterality Date    CHOLECYSTECTOMY, LAPAROSCOPIC  2009    COLONOSCOPY  07/17/2014    Dr. Adam Dunham - tubular adenomatous polyp removed    COLONOSCOPY N/A 8/1/2018    Dr. Drew Gloevr architectural distortion, suggestive of mucosal prolapse    CYSTOSCOPY  2015    stent removal and reinsert     [address] [address] LITHOTRIPSY  4/28/2015    left    LITHOTRIPSY Left 01/30/2018    cystoscopy- Dr. Sheyla Hooks  2007    radioactive iodine procedure    [place] CYSTOURETHROSCOPY Left 1/30/2018    CYSTOSCOPY performed by Davina Gross Dami Dumas MD at [facility] 50 ESWL Left 1/30/2018    ESWL [address] LITHOTRIPSY performed by Naina Paris MD at [address] [facility] Right 3/18/2019    Dr. Margie Lennon       Family History   Problem Relation Age of Onset    Stroke Father     Hypertension Father     Breast Cancer Maternal Grandmother     Alzheimer's Disease Mother        Any family history of breast or ovarian cancer: Yes, breast cancer     Any family history of blood clots: No      Chief Complaint   Patient presents with   [facility] Sicard Gynecologic Exam     Medicare yearly. Last pap 06/07/17 HPV neg. last mamm 11/19/19. pt states no issues or concerns         PE:  Vital Signs  Blood pressure 138/76, height 5' 3\" (1.6 m), weight 126 lb (57.2 kg), not currently breastfeeding. Estimated body mass index is 22.32 kg/m² as calculated from the following:    Height as of this encounter: 5' 3\" (1.6 m). Weight as of this encounter: 126 lb (57.2 kg). Labs:    No results found for this visit on 10/20/20. NURSE: REINIER    HPI: Patient here today for routine well woman exam.  Patient states she is doing well. Patient also states that she had a new grandbaby born last week. Review of Systems   All other systems reviewed and are negative. Objective  Lymphatic:   no lymphadenopathy  Heent:   negative   Cor: regular rate and rhythm, no murmurs              Pul:clear to auscultation bilaterally- no wheezes, rales or rhonchi, normal air movement, no respiratory distress      GI: Abdomen soft, non-tender. BS normal. No masses,  No organomegaly           Extremities: normal strength, tone, and muscle mass   Breasts: Breast:normal appearance, no masses or tenderness   Pelvic Exam: GENITAL/URINARY:  External Genitalia:  General appearance; normal, Hair distribution; normal, Lesions absent  Urethra:   Fullness absent, Masses absent  Bladder:  Fullness absent, Masses absent, Tenderness absent, Cystocele absent  Vagina:  General appearance normal, Estrogen effect normal, Discharge absent, Lesions absent, Pelvic support normal  Cervix:  General appearance normal, Lesions absent, Discharge absent, Tenderness absent, Enlargement absent, Nodularity absent  Uterus:  Size normal, Contour normal, Position normal  [place]: Masses absent, Tenderness absent, Enlargement absent                                    Vaginal discharge: no vaginal discharge                              Assessment and Plan          Diagnosis Orders   1. Encounter for routine gynecologic examination in Medicare patient  PAP SMEAR    SC CA SCREEN;PELVIC/BREAST EXAM    SC OBTAINING SCREEN PAP SMEAR   2. Encounter for screening mammogram for malignant neoplasm of breast  MIRNA DIGITAL SCREEN W OR WO CAD BILATERAL    SC CA SCREEN;PELVIC/BREAST EXAM             I am having [address] maintain her vitamin D, ibuprofen, Glucosamine-Chondroitin (GLUCOSAMINE CHONDR COMPLEX PO), Polyethylene Glycol 3350 (MIRALAX PO), diphenhydrAMINE, acetaminophen, meloxicam, vitamin E, CALCIUM PO, atorvastatin, levothyroxine, alendronate, and losartan. Return in about 1 year (around 10/20/2021) for yearly. She was also counseled on her preventative health maintenance recommendations and follow-up. There are no Patient Instructions on file for this visit.     Silvia Sesay,10/20/2020 2:16 PM

## 2020-10-28 LAB — CYTOLOGY REPORT: NORMAL

## 2021-01-21 ENCOUNTER — HOSPITAL ENCOUNTER (OUTPATIENT)
Dept: WOMENS IMAGING | Age: 66
Discharge: HOME | End: 2021-01-23
Payer: MEDICARE

## 2021-01-21 DIAGNOSIS — Z12.31 ENCOUNTER FOR SCREENING MAMMOGRAM FOR MALIGNANT NEOPLASM OF BREAST: ICD-10-CM

## 2021-01-21 PROCEDURE — 77063 BREAST TOMOSYNTHESIS BI: CPT

## 2021-02-03 ENCOUNTER — HOSPITAL ENCOUNTER (OUTPATIENT)
Age: 66
Discharge: HOME | End: 2021-02-03
Payer: MEDICARE

## 2021-02-03 ENCOUNTER — HOSPITAL ENCOUNTER (OUTPATIENT)
Dept: CT IMAGING | Age: 66
Discharge: HOME | End: 2021-02-05
Payer: MEDICARE

## 2021-02-03 ENCOUNTER — TELEPHONE (OUTPATIENT)
Dept: UROLOGY | Age: 66
End: 2021-02-03

## 2021-02-03 ENCOUNTER — OFFICE VISIT (OUTPATIENT)
Dept: UROLOGY | Age: 66
End: 2021-02-03
Payer: MEDICARE

## 2021-02-03 ENCOUNTER — HOSPITAL ENCOUNTER (OUTPATIENT)
Dept: PREADMISSION TESTING | Age: 66
Setting detail: SPECIMEN
Discharge: HOME | End: 2021-02-07
Payer: MEDICARE

## 2021-02-03 VITALS
BODY MASS INDEX: 22.29 KG/M2 | HEIGHT: 63 IN | SYSTOLIC BLOOD PRESSURE: 125 MMHG | TEMPERATURE: 98.7 F | HEART RATE: 99 BPM | WEIGHT: 125.8 LBS | DIASTOLIC BLOOD PRESSURE: 76 MMHG

## 2021-02-03 DIAGNOSIS — N13.2 URETERAL STONE WITH HYDRONEPHROSIS: ICD-10-CM

## 2021-02-03 DIAGNOSIS — Z01.818 PRE-OP TESTING: ICD-10-CM

## 2021-02-03 DIAGNOSIS — N23 RENAL COLIC: ICD-10-CM

## 2021-02-03 DIAGNOSIS — Z01.818 PREOPERATIVE TESTING: Primary | ICD-10-CM

## 2021-02-03 DIAGNOSIS — N23 RENAL COLIC: Primary | ICD-10-CM

## 2021-02-03 DIAGNOSIS — Z01.818 PREOPERATIVE TESTING: ICD-10-CM

## 2021-02-03 DIAGNOSIS — N13.2 URETERAL STONE WITH HYDRONEPHROSIS: Primary | ICD-10-CM

## 2021-02-03 LAB
-: ABNORMAL
ABSOLUTE EOS #: 0.06 K/UL (ref 0–0.44)
ABSOLUTE IMMATURE GRANULOCYTE: 0.09 K/UL (ref 0–0.3)
ABSOLUTE LYMPH #: 1.26 K/UL (ref 1.1–3.7)
ABSOLUTE MONO #: 1.44 K/UL (ref 0.1–1.2)
AMORPHOUS: ABNORMAL
ANION GAP SERPL CALCULATED.3IONS-SCNC: 8 MMOL/L (ref 9–17)
BACTERIA: ABNORMAL
BASOPHILS # BLD: 0 % (ref 0–2)
BASOPHILS ABSOLUTE: 0.04 K/UL (ref 0–0.2)
BILIRUBIN URINE: NEGATIVE
BUN BLDV-MCNC: 19 MG/DL (ref 8–23)
BUN/CREAT BLD: 24 (ref 9–20)
CALCIUM SERPL-MCNC: 10 MG/DL (ref 8.6–10.4)
CASTS UA: ABNORMAL /LPF
CHLORIDE BLD-SCNC: 104 MMOL/L (ref 98–107)
CO2: 27 MMOL/L (ref 20–31)
COLOR: YELLOW
COMMENT UA: ABNORMAL
CREAT SERPL-MCNC: 0.78 MG/DL (ref 0.5–0.9)
CRYSTALS, UA: ABNORMAL /HPF
DIFFERENTIAL TYPE: ABNORMAL
EKG ATRIAL RATE: 87 BPM
EKG P AXIS: 24 DEGREES
EKG P-R INTERVAL: 140 MS
EKG Q-T INTERVAL: 358 MS
EKG QRS DURATION: 80 MS
EKG QTC CALCULATION (BAZETT): 430 MS
EKG R AXIS: -10 DEGREES
EKG T AXIS: 46 DEGREES
EKG VENTRICULAR RATE: 87 BPM
EOSINOPHILS RELATIVE PERCENT: 0 % (ref 1–4)
EPITHELIAL CELLS UA: ABNORMAL /HPF (ref 0–25)
GFR AFRICAN AMERICAN: >60 ML/MIN
GFR NON-AFRICAN AMERICAN: >60 ML/MIN
GFR SERPL CREATININE-BSD FRML MDRD: ABNORMAL ML/MIN/{1.73_M2}
GFR SERPL CREATININE-BSD FRML MDRD: ABNORMAL ML/MIN/{1.73_M2}
GLUCOSE BLD-MCNC: 118 MG/DL (ref 70–99)
GLUCOSE URINE: NEGATIVE
HCT VFR BLD CALC: 43.1 % (ref 36.3–47.1)
HEMOGLOBIN: 13.7 G/DL (ref 11.9–15.1)
IMMATURE GRANULOCYTES: 0 %
KETONES, URINE: ABNORMAL
LEUKOCYTE ESTERASE, URINE: ABNORMAL
LYMPHOCYTES # BLD: 6 % (ref 24–43)
MCH RBC QN AUTO: 30.7 PG (ref 25.2–33.5)
MCHC RBC AUTO-ENTMCNC: 31.8 G/DL (ref 28.4–34.8)
MCV RBC AUTO: 96.6 FL (ref 82.6–102.9)
MONOCYTES # BLD: 7 % (ref 3–12)
MUCUS: ABNORMAL
NITRITE, URINE: POSITIVE
NRBC AUTOMATED: 0 PER 100 WBC
OTHER OBSERVATIONS UA: ABNORMAL
PDW BLD-RTO: 14 % (ref 11.8–14.4)
PH UA: 6 (ref 5–9)
PLATELET # BLD: 280 K/UL (ref 138–453)
PLATELET ESTIMATE: ABNORMAL
PMV BLD AUTO: 9.6 FL (ref 8.1–13.5)
POTASSIUM SERPL-SCNC: 4.8 MMOL/L (ref 3.7–5.3)
PROTEIN UA: ABNORMAL
RBC # BLD: 4.46 M/UL (ref 3.95–5.11)
RBC # BLD: ABNORMAL 10*6/UL
RBC UA: ABNORMAL /HPF (ref 0–2)
RENAL EPITHELIAL, UA: ABNORMAL /HPF
SEG NEUTROPHILS: 87 % (ref 36–65)
SEGMENTED NEUTROPHILS ABSOLUTE COUNT: 17.5 K/UL (ref 1.5–8.1)
SODIUM BLD-SCNC: 139 MMOL/L (ref 135–144)
SPECIFIC GRAVITY UA: 1.02 (ref 1.01–1.02)
TRICHOMONAS: ABNORMAL
TURBIDITY: ABNORMAL
URINE HGB: ABNORMAL
UROBILINOGEN, URINE: NORMAL
WBC # BLD: 20.4 K/UL (ref 3.5–11.3)
WBC # BLD: ABNORMAL 10*3/UL
WBC UA: ABNORMAL /HPF (ref 0–5)
YEAST: ABNORMAL

## 2021-02-03 PROCEDURE — 4040F PNEUMOC VAC/ADMIN/RCVD: CPT | Performed by: NURSE PRACTITIONER

## 2021-02-03 PROCEDURE — 1090F PRES/ABSN URINE INCON ASSESS: CPT | Performed by: NURSE PRACTITIONER

## 2021-02-03 PROCEDURE — 74176 CT ABD & PELVIS W/O CONTRAST: CPT

## 2021-02-03 PROCEDURE — 36415 COLL VENOUS BLD VENIPUNCTURE: CPT

## 2021-02-03 PROCEDURE — 99215 OFFICE O/P EST HI 40 MIN: CPT | Performed by: NURSE PRACTITIONER

## 2021-02-03 PROCEDURE — 4004F PT TOBACCO SCREEN RCVD TLK: CPT | Performed by: NURSE PRACTITIONER

## 2021-02-03 PROCEDURE — 1123F ACP DISCUSS/DSCN MKR DOCD: CPT | Performed by: NURSE PRACTITIONER

## 2021-02-03 PROCEDURE — U0005 INFEC AGEN DETEC AMPLI PROBE: HCPCS

## 2021-02-03 PROCEDURE — 87086 URINE CULTURE/COLONY COUNT: CPT

## 2021-02-03 PROCEDURE — 87186 SC STD MICRODIL/AGAR DIL: CPT

## 2021-02-03 PROCEDURE — 3017F COLORECTAL CA SCREEN DOC REV: CPT | Performed by: NURSE PRACTITIONER

## 2021-02-03 PROCEDURE — G8484 FLU IMMUNIZE NO ADMIN: HCPCS | Performed by: NURSE PRACTITIONER

## 2021-02-03 PROCEDURE — 81001 URINALYSIS AUTO W/SCOPE: CPT

## 2021-02-03 PROCEDURE — C9803 HOPD COVID-19 SPEC COLLECT: HCPCS

## 2021-02-03 PROCEDURE — 93010 ELECTROCARDIOGRAM REPORT: CPT | Performed by: INTERNAL MEDICINE

## 2021-02-03 PROCEDURE — 87088 URINE BACTERIA CULTURE: CPT

## 2021-02-03 PROCEDURE — G8420 CALC BMI NORM PARAMETERS: HCPCS | Performed by: NURSE PRACTITIONER

## 2021-02-03 PROCEDURE — G8427 DOCREV CUR MEDS BY ELIG CLIN: HCPCS | Performed by: NURSE PRACTITIONER

## 2021-02-03 PROCEDURE — U0003 INFECTIOUS AGENT DETECTION BY NUCLEIC ACID (DNA OR RNA); SEVERE ACUTE RESPIRATORY SYNDROME CORONAVIRUS 2 (SARS-COV-2) (CORONAVIRUS DISEASE [COVID-19]), AMPLIFIED PROBE TECHNIQUE, MAKING USE OF HIGH THROUGHPUT TECHNOLOGIES AS DESCRIBED BY CMS-2020-01-R: HCPCS

## 2021-02-03 PROCEDURE — 85025 COMPLETE CBC W/AUTO DIFF WBC: CPT

## 2021-02-03 PROCEDURE — G8399 PT W/DXA RESULTS DOCUMENT: HCPCS | Performed by: NURSE PRACTITIONER

## 2021-02-03 PROCEDURE — 80048 BASIC METABOLIC PNL TOTAL CA: CPT

## 2021-02-03 PROCEDURE — 51798 US URINE CAPACITY MEASURE: CPT

## 2021-02-03 PROCEDURE — 93005 ELECTROCARDIOGRAM TRACING: CPT

## 2021-02-03 RX ORDER — HYDROCODONE BITARTRATE AND ACETAMINOPHEN 5; 325 MG/1; MG/1
1 TABLET ORAL EVERY 4 HOURS PRN
Qty: 18 TABLET | Refills: 0 | Status: SHIPPED | OUTPATIENT
Start: 2021-02-03 | End: 2021-02-06

## 2021-02-03 RX ORDER — CIPROFLOXACIN 500 MG/1
500 TABLET, FILM COATED ORAL 2 TIMES DAILY
Qty: 20 TABLET | Refills: 0 | Status: SHIPPED | OUTPATIENT
Start: 2021-02-03 | End: 2021-02-13

## 2021-02-03 RX ORDER — MULTIVIT WITH MINERALS/LUTEIN
500 TABLET ORAL DAILY
COMMUNITY

## 2021-02-03 RX ORDER — KETOROLAC TROMETHAMINE 10 MG/1
10 TABLET, FILM COATED ORAL EVERY 6 HOURS PRN
Qty: 20 TABLET | Refills: 0 | Status: SHIPPED | OUTPATIENT
Start: 2021-02-03 | End: 2021-04-07 | Stop reason: ALTCHOICE

## 2021-02-03 RX ORDER — TAMSULOSIN HYDROCHLORIDE 0.4 MG/1
0.4 CAPSULE ORAL EVERY EVENING
Qty: 30 CAPSULE | Refills: 0 | Status: SHIPPED | OUTPATIENT
Start: 2021-02-03 | End: 2021-04-07 | Stop reason: ALTCHOICE

## 2021-02-03 RX ORDER — ONDANSETRON 4 MG/1
4 TABLET, FILM COATED ORAL EVERY 6 HOURS PRN
Qty: 30 TABLET | Refills: 0 | Status: SHIPPED | OUTPATIENT
Start: 2021-02-03 | End: 2021-07-23

## 2021-02-03 RX ORDER — PROMETHAZINE HYDROCHLORIDE 25 MG/1
25 TABLET ORAL 3 TIMES DAILY PRN
Qty: 12 TABLET | Refills: 0 | Status: SHIPPED | OUTPATIENT
Start: 2021-02-03 | End: 2021-02-10

## 2021-02-03 RX ORDER — CLINDAMYCIN HYDROCHLORIDE 150 MG/1
150 CAPSULE ORAL PRN
COMMUNITY
Start: 2021-01-25 | End: 2021-07-23

## 2021-02-03 ASSESSMENT — ENCOUNTER SYMPTOMS
NAUSEA: 1
WHEEZING: 0
VOMITING: 0
ABDOMINAL PAIN: 0
EYE REDNESS: 0
BACK PAIN: 0
SHORTNESS OF BREATH: 0
CONSTIPATION: 0
COLOR CHANGE: 0
COUGH: 0

## 2021-02-03 NOTE — PATIENT INSTRUCTIONS
You may experience waves of pain and/or nausea. You may also experience burning with urination, frequency, urgency, bladder spasms, and blood in the urine. 1) take ibuprofen (motrin) 600 mg (3 of the 200mg tabs) every 6 hours WITH FOOD   2) take Flomax daily  3) drink at least 80 oz fluid (water, juice, Gatorade - NOT tea, coffee, soda pop) daily    For pain not controlled by ibuprofen use norco as directed as needed. For nausea use zofran and or phenergan as prescribed    Call our office [phone] or go to ER (if after normal office hours) if you develop fever, intractable vomiting, severe/intolerable pain. SURVEY:    You may be receiving a survey from [employer] regarding your visit today. Please complete the survey to enable us to provide the highest quality of care to you and your family. If you cannot score us a very good on any question, please call the office to discuss how we could have made your experience a very good one. Thank you.

## 2021-02-03 NOTE — PROGRESS NOTES
HPI:        Patient is a 72 y.o. female in no acute distress. She is alert and oriented to person, place, and time. History  10/2013 Left HLL     11/2013 Right ESWL      4/2015 Left ESWL     1/2018 Left ESWL     Today  Here today with complaints of left flank pain that radiates to her left lower quadrant. Her pain started yesterday and has been accompanied by nausea. She denies fevers or vomiting. She has had dysuria for several weeks. She denies gross hematuria. We did send her for stat labs and a stat CT. The CT was independently reviewed and does show a 5 mm obstructing left ureteral stone at the left UPJ with hydronephrosis. There are multiple large nonobstructing stones bilaterally. Renal function is stable: BUN 19, creatinine 0.78, GFR >60. WBC 20.4. She is afebrile in the office.     Past Medical History:   Diagnosis Date    Fracture of wrist, unspecified laterality, closed, initial encounter     right    History of sepsis 2009    following lithotripsy    Hyperlipidemia     Hypertension     Hypothyroidism 2007    secondary to radioactive iodine 2007    Kidney stone     Osteoporosis     Primary localized osteoarthritis of right hip 3/18/2019     Past Surgical History:   Procedure Laterality Date    CHOLECYSTECTOMY, LAPAROSCOPIC  2009    COLONOSCOPY  07/17/2014    Dr. Tracy Reddy - tubular adenomatous polyp removed    COLONOSCOPY N/A 8/1/2018    Dr. Rocio Alexander architectural distortion, suggestive of mucosal prolapse    CYSTOSCOPY  2015    stent removal and reinsert     [address] [facility] [address] LITHOTRIPSY  4/28/2015    left    LITHOTRIPSY Left 01/30/2018    cystoscopy- Dr. Bianca Joe  2007    radioactive iodine procedure    [place] CYSTOURETHROSCOPY Left 1/30/2018    CYSTOSCOPY performed by Jessica Stahl MD at [facility] 50 ESWL Left 1/30/2018    ESWL EXTRACORPEAL SHOCK WAVE LITHOTRIPSY performed by Matthias Medina MD at [address] [facility] Right 3/18/2019    Dr. Maria L Shah Medications as of 2/3/2021   Medication Sig Dispense Refill    Red Yeast Rice Extract (RED YEAST RICE PO) Take by mouth      CINNAMON PO Take by mouth      Ascorbic Acid (VITAMIN C) 1000 MG tablet Take 1,000 mg by mouth daily      ciprofloxacin (CIPRO) 500 MG tablet Take 1 tablet by mouth 2 times daily for 10 days 20 tablet 0    ketorolac (TORADOL) 10 MG tablet Take 1 tablet by mouth every 6 hours as needed for Pain 20 tablet 0    tamsulosin (FLOMAX) 0.4 MG capsule Take 1 capsule by mouth every evening 30 capsule 0    ondansetron (ZOFRAN) 4 MG tablet Take 1 tablet by mouth every 6 hours as needed for Nausea 30 tablet 0    promethazine (PHENERGAN) 25 MG tablet Take 1 tablet by mouth 3 times daily as needed for Nausea 12 tablet 0    HYDROcodone-acetaminophen (NORCO) 5-325 MG per tablet Take 1 tablet by mouth every 4 hours as needed for Pain for up to 3 days. Intended supply: 3 days.  Take lowest dose possible to manage pain 18 tablet 0    atorvastatin (LIPITOR) 20 MG tablet Take 1 tablet by mouth daily 90 tablet 3    levothyroxine (SYNTHROID) 75 MCG tablet Take 1 tablet by mouth Daily 90 tablet 3    alendronate (FOSAMAX) 70 MG tablet Take 1 tablet by mouth every 7 days 12 tablet 3    losartan (COZAAR) 50 MG tablet Take 1 tablet by mouth daily 90 tablet 3    acetaminophen (TYLENOL) 500 MG tablet Take 500 mg by mouth every 6 hours as needed for Pain      diphenhydrAMINE (BENADRYL) 25 MG capsule Take 25 mg by mouth nightly as needed for Itching      Polyethylene Glycol 3350 (MIRALAX PO) Take by mouth every other day Indications: PRN       Glucosamine-Chondroitin (GLUCOSAMINE CHONDR COMPLEX PO) Take by mouth      vitamin D (ERGOCALCIFEROL) 400 UNITS CAPS Take 2,000 Units by mouth daily       ibuprofen (ADVIL;MOTRIN) 400 MG tablet Take 400 mg by mouth every 6 hours as needed for Pain.  clindamycin (CLEOCIN) 150 MG capsule Take 150 mg by mouth as needed for Other For dental work      vitamin E 1000 units capsule Take 1,000 Units by mouth daily      CALCIUM PO Take by mouth      [DISCONTINUED] meloxicam (MOBIC) 15 MG tablet Take 15 mg by mouth daily       No facility-administered encounter medications on file as of 2/3/2021. Current Outpatient Medications on File Prior to Visit   Medication Sig Dispense Refill    Red Yeast Rice Extract (RED YEAST RICE PO) Take by mouth      CINNAMON PO Take by mouth      Ascorbic Acid (VITAMIN C) 1000 MG tablet Take 1,000 mg by mouth daily      atorvastatin (LIPITOR) 20 MG tablet Take 1 tablet by mouth daily 90 tablet 3    levothyroxine (SYNTHROID) 75 MCG tablet Take 1 tablet by mouth Daily 90 tablet 3    alendronate (FOSAMAX) 70 MG tablet Take 1 tablet by mouth every 7 days 12 tablet 3    losartan (COZAAR) 50 MG tablet Take 1 tablet by mouth daily 90 tablet 3    acetaminophen (TYLENOL) 500 MG tablet Take 500 mg by mouth every 6 hours as needed for Pain      diphenhydrAMINE (BENADRYL) 25 MG capsule Take 25 mg by mouth nightly as needed for Itching      Polyethylene Glycol 3350 (MIRALAX PO) Take by mouth every other day Indications: PRN       Glucosamine-Chondroitin (GLUCOSAMINE CHONDR COMPLEX PO) Take by mouth      vitamin D (ERGOCALCIFEROL) 400 UNITS CAPS Take 2,000 Units by mouth daily       ibuprofen (ADVIL;MOTRIN) 400 MG tablet Take 400 mg by mouth every 6 hours as needed for Pain.  clindamycin (CLEOCIN) 150 MG capsule Take 150 mg by mouth as needed for Other For dental work      vitamin E 1000 units capsule Take 1,000 Units by mouth daily      CALCIUM PO Take by mouth       No current facility-administered medications on file prior to visit. Patient has no known allergies.   Family History   Problem Relation Age of Onset    Stroke Father     Hypertension Father     Breast Cancer Maternal Grandmother     Alzheimer's Disease Mother      Social History     Tobacco Use   Smoking Status Current Some Day Smoker    Packs/day: 0.25    Years: 30.00    Pack years: 7.50    Types: Cigarettes   Smokeless Tobacco Never Used       Social History     Substance and Sexual Activity   Alcohol Use Yes    Alcohol/week: 0.0 standard drinks    Comment: socially       Review of Systems   Constitutional: Negative for appetite change, chills and fever. Eyes: Negative for redness and visual disturbance. Respiratory: Negative for cough, shortness of breath and wheezing. Cardiovascular: Negative for chest pain and leg swelling. Gastrointestinal: Positive for nausea. Negative for abdominal pain, constipation and vomiting. Genitourinary: Positive for flank pain. Negative for difficulty urinating, dysuria, frequency, hematuria, pelvic pain, urgency, vaginal bleeding and vaginal discharge. Musculoskeletal: Negative for back pain, joint swelling and myalgias. Skin: Negative for color change, rash and wound. Neurological: Negative for dizziness, tremors and numbness. Hematological: Negative for adenopathy. Does not bruise/bleed easily. /76 (Site: Left Upper Arm, Position: Sitting, Cuff Size: Large Adult)   Pulse 99   Temp 98.7 °F (37.1 °C) (Infrared)   Ht 5' 3\" (1.6 m)   Wt 125 lb 12.8 oz (57.1 kg)   LMP  (LMP Unknown)   BMI 22.28 kg/m²       PHYSICAL EXAM:  Constitutional: Patient resting comfortably, in no acute distress. Neuro: Alert and oriented to person place and time. Cranial nerves grossly intact. Psych: Mood and affect normal.  Skin: Warm, dry  HEENT: normocephalic, atraumatic  Lymphatics: No palpable lymphadenopathy  Lungs: Respiratory effort normal, unlabored  Cardiovascular:  Normal peripheral pulses  Abdomen: Soft, non-tender, non-distended with no organomegaly or palpable masses. : LEFT CVA tenderness. Bladder non-tender and not distended.   Pelvic: Deferred    Lab Results   Component Value Date    BUN 19 02/03/2021     Lab Results   Component Value Date    CREATININE 0.78 02/03/2021       ASSESSMENT:   Diagnosis Orders   1. Ureteral stone with hydronephrosis  Urinalysis with Microscopic    Culture, Urine    HYDROcodone-acetaminophen (NORCO) 5-325 MG per tablet    EKG 12 Lead   2. Pre-op testing  Urinalysis with Microscopic    Culture, Urine    EKG 12 Lead           PLAN:  I offered patient stent placement today due to pain and nausea, but she declined. I offered her surgery tomorrow with holmium laser lithotripsy, but she declined and wants to wait until Friday. Discussed risks and benefits of HLL and stent placement (including anesthesia, bleeding, infection, injury to  tract, need for multiple procedures, and the risk of major complications such as ureteral perforation). We discussed the details of the procedure. We did discuss what to expect post-operatively to include: hematuria for up to two weeks, stent pain, and pain after stent removal. Patient amenable to schedule LEFT HLL. You may experience waves of pain and/or nausea. You may also experience burning with urination, frequency, urgency, bladder spasms, and blood in the urine. 1) take ibuprofen (motrin) 600 mg (3 of the 200mg tabs) every 6 hours WITH FOOD   2) take Flomax daily  3) drink at least 80 oz fluid (water, juice, Gatorade - NOT tea, coffee, soda pop) daily  4) take cipro twice per day    For pain not controlled by ibuprofen use norco as directed as needed. For nausea use zofran and or phenergan as prescribed    Call our office [phone] or go to ER (if after normal office hours) if you develop fever, intractable vomiting, severe/intolerable pain.

## 2021-02-03 NOTE — TELEPHONE ENCOUNTER
Called patient and informed her of response. Patient transferred to central scheduling to be scheduled for STAT CT and lab.  Patient informed to come to office after testing complete

## 2021-02-03 NOTE — TELEPHONE ENCOUNTER
We cannot prescribe pain medication without seeing patient. Patient is also already on daily anti-inflammatory so this limits what we can use. Due to flank pain and nausea please have her come in for a stat CT and labs, then come to the office to review results and evaluate symptoms further.

## 2021-02-03 NOTE — TELEPHONE ENCOUNTER
Patient called reporting that yesterday evening she started having left back pain. Feels she is passing a stone. Said she had nausea last night. Denies fever or hematuria. Asking for pain medication. Last seen in office 9/2020.

## 2021-02-04 ENCOUNTER — ANESTHESIA EVENT (OUTPATIENT)
Dept: OPERATING ROOM | Age: 66
End: 2021-02-04
Payer: MEDICARE

## 2021-02-04 ENCOUNTER — TELEPHONE (OUTPATIENT)
Dept: UROLOGY | Age: 66
End: 2021-02-04

## 2021-02-04 LAB
SARS-COV-2, RAPID: NORMAL
SARS-COV-2: NORMAL
SARS-COV-2: NOT DETECTED
SOURCE: NORMAL

## 2021-02-04 NOTE — TELEPHONE ENCOUNTER
Patient called stating she has not had any pain since 4:00 yesterday. She states she has not passed any stones that she is aware of.   She is asking if they stones could have moved

## 2021-02-04 NOTE — H&P
History and Physical    Patient:  Rosaura Tripp  MRN: [id number]    CHIEF COMPLAINT:  Left flank pain    HISTORY OF PRESENT ILLNESS:   The patient is a 72 y.o. female who presents with left flank pain. History  10/2013 Left HLL      11/2013 Right ESWL      4/2015 Left ESWL      1/2018 Left ESWL      Today  Here today with complaints of left flank pain that radiates to her left lower quadrant. Her pain started yesterday and has been accompanied by nausea. She denies fevers or vomiting. She has had dysuria for several weeks. She denies gross hematuria. We did send her for stat labs and a stat CT. The CT was independently reviewed and does show a 5 mm obstructing left ureteral stone at the left UPJ with hydronephrosis. There are multiple large nonobstructing stones bilaterally. Renal function is stable: BUN 19, creatinine 0.78, GFR >60. WBC 20.4. She is afebrile in the office.     Past Medical History:    Past Medical History:   Diagnosis Date    Fracture of wrist, unspecified laterality, closed, initial encounter     right    History of sepsis 2009    following lithotripsy    Hyperlipidemia     Hypertension     Hypothyroidism 2007    secondary to radioactive iodine 2007    Kidney stone     Osteoporosis     Primary localized osteoarthritis of right hip 3/18/2019       Past Surgical History:    Past Surgical History:   Procedure Laterality Date    CHOLECYSTECTOMY, LAPAROSCOPIC  2009    COLONOSCOPY  07/17/2014    Dr. Brigitte Padilla - tubular adenomatous polyp removed    COLONOSCOPY N/A 8/1/2018    Dr. Julieta Collazo architectural distortion, suggestive of mucosal prolapse    CYSTOSCOPY  2015    stent removal and reinsert     [address] [facility] [address] LITHOTRIPSY  4/28/2015    left    LITHOTRIPSY Left 01/30/2018    cystoscopy- Dr. Dilip Marks  2007    radioactive iodine procedure    OK CYSTOURETHROSCOPY Left 1/30/2018 CYSTOSCOPY performed by Refugio Hussein MD at [facility] 50 ESWL Left 1/30/2018    ESWL EXTRACORPEAL SHOCK WAVE LITHOTRIPSY performed by Refugio Hussein MD at [place] [address] Right 3/18/2019    Dr. Britt Lay       Medications Prior to Admission:    Prior to Admission medications    Medication Sig Start Date End Date Taking? Authorizing Provider   clindamycin (CLEOCIN) 150 MG capsule Take 150 mg by mouth as needed for Other For dental work 1/25/21   Historical Provider, MD   Red Yeast Rice Extract (RED YEAST RICE PO) Take by mouth    Historical Provider, MD   CINNAMON PO Take by mouth    Historical Provider, MD   Ascorbic Acid (VITAMIN C) 1000 MG tablet Take 1,000 mg by mouth daily    Historical Provider, MD   ciprofloxacin (CIPRO) 500 MG tablet Take 1 tablet by mouth 2 times daily for 10 days 2/3/21 2/13/21  Naik Drivers, APRN - CNP   ketorolac (TORADOL) 10 MG tablet Take 1 tablet by mouth every 6 hours as needed for Pain 2/3/21 2/3/22  Naik Drivers, APRN - CNP   tamsulosin (FLOMAX) 0.4 MG capsule Take 1 capsule by mouth every evening 2/3/21   Naik Drivers, APRN - CNP   ondansetron (ZOFRAN) 4 MG tablet Take 1 tablet by mouth every 6 hours as needed for Nausea 2/3/21   Naik Drivers, APRN - CNP   promethazine (PHENERGAN) 25 MG tablet Take 1 tablet by mouth 3 times daily as needed for Nausea 2/3/21 2/10/21  Naik Drivers, APRN - CNP   HYDROcodone-acetaminophen (NORCO) 5-325 MG per tablet Take 1 tablet by mouth every 4 hours as needed for Pain for up to 3 days. Intended supply: 3 days.  Take lowest dose possible to manage pain 2/3/21 2/6/21  Naik Drivers, APRN - CNP   vitamin E 1000 units capsule Take 1,000 Units by mouth daily    Historical Provider, MD   CALCIUM PO Take by mouth    Historical Provider, MD   atorvastatin (LIPITOR) 20 MG tablet Take 1 tablet by mouth daily 9/22/20   Alex Rivera MD   levothyroxine (SYNTHROID) 75 MCG tablet Take 1 tablet by mouth Daily 9/22/20   Haydee Ray MD   alendronate (FOSAMAX) 70 MG tablet Take 1 tablet by mouth every 7 days 9/22/20   Haydee Ray MD   losartan (COZAAR) 50 MG tablet Take 1 tablet by mouth daily 9/22/20   Haydee Ray MD   acetaminophen (TYLENOL) 500 MG tablet Take 500 mg by mouth every 6 hours as needed for Pain    Historical Provider, MD   diphenhydrAMINE (BENADRYL) 25 MG capsule Take 25 mg by mouth nightly as needed for Itching    Historical Provider, MD   Polyethylene Glycol 3350 (MIRALAX PO) Take by mouth every other day Indications: PRN     Historical Provider, MD   Glucosamine-Chondroitin (GLUCOSAMINE CHONDR COMPLEX PO) Take by mouth    Historical Provider, MD   vitamin D (ERGOCALCIFEROL) 400 UNITS CAPS Take 2,000 Units by mouth daily     Historical Provider, MD   ibuprofen (ADVIL;MOTRIN) 400 MG tablet Take 400 mg by mouth every 6 hours as needed for Pain. Historical Provider, MD       Allergies:  Patient has no known allergies. Social History:    Social History     Socioeconomic History    Marital status:      Spouse name: Not on file    Number of children: Not on file    Years of education: Not on file    Highest education level: Not on file   Occupational History    Not on file   Social Needs    Financial resource strain: Not on file    Food insecurity     Worry: Not on file     Inability: Not on file   [employer] needs     Medical: Not on file     Non-medical: Not on file   Tobacco Use    Smoking status: Current Some Day Smoker     Packs/day: 0.25     Years: 30.00     Pack years: 7.50     Types: Cigarettes    Smokeless tobacco: Never Used   Substance and Sexual Activity    Alcohol use:  Yes     Alcohol/week: 0.0 standard drinks     Comment: socially    Drug use: No    Sexual activity: Yes     Partners: Male   Lifestyle    Physical activity     Days per week: Not on file     Minutes per session: Not on file    Stress: Not on file Relationships    Social connections     Talks on phone: Not on file     Gets together: Not on file     Attends Mosque service: Not on file     Active member of club or organization: Not on file     Attends meetings of clubs or organizations: Not on file     Relationship status: Not on file    Intimate partner violence     Fear of current or ex partner: Not on file     Emotionally abused: Not on file     Physically abused: Not on file     Forced sexual activity: Not on file   Other Topics Concern    Not on file   Social History Narrative    Not on file       Family History:    Family History   Problem Relation Age of Onset    Stroke Father     Hypertension Father     Breast Cancer Maternal Grandmother     Alzheimer's Disease Mother        REVIEW OF SYSTEMS:  All systems reviewed and negative except for that already noted in the HPI. Physical Exam:      This a 72 y.o. female   Patient Vitals for the past 24 hrs:   Height Weight   02/04/21 0839 5' 3\" (1.6 m) 127 lb (57.6 kg)     Constitutional: Patient in no acute distress. Neuro: Alert and oriented to person, place and time. Psych: mood and affect normal  HEENT negative  Lungs: Respiratory effort is normal  Cardiovascular: Normal peripheral pulses  Abdomen: Soft, non-tender, non-distended with no CVA, flank pain or hepatosplenomegaly. No hernias. Kidneys normal.  Lymphatics: No palpable lymphadenopathy. Bladder non-tender and not distended.   Pelvic exam:  External genitalia normal  Urethral and urethral meatus normal  Vagina normal with no evidence of pelvic prolapse  Uterus normal  Adnexa normal  Anus and perineum normal  Rectal exam not indicated    LABS:   Recent Labs     02/03/21  1406   WBC 20.4*   HGB 13.7   HCT 43.1   MCV 96.6        Recent Labs     02/03/21  1406      K 4.8      CO2 27   BUN 19   CREATININE 0.78       Additional Lab/culture results:    Urinalysis:   Recent Labs     02/03/21  [facility] 6. 0   WBCUA 50    RBCUA 5 TO 10   MUCUS NOT REPORTED   TRICHOMONAS NOT REPORTED   YEAST NOT REPORTED   BACTERIA 2+*   SPECGRAV 1.020   LEUKOCYTESUR LARGE*   UROBILINOGEN Normal   BILIRUBINUR NEGATIVE        -----------------------------------------------------------------  Imaging Results:      Assessment and Plan   Impression:    Patient Active Problem List   Diagnosis    Renal lithiasis lythotripsy 4/28/15    Hypothyroidism    Osteoporosis    Melanocytic nevus    History of colon polyps    Mixed hyperlipidemia    Hypertension       Plan:   Left HLL    Eduardo Harperise  4:56 PM 2/4/2021

## 2021-02-04 NOTE — TELEPHONE ENCOUNTER
The stone could have become less obstructing, meaning urine is able to pass more effectively, which would improve your pain. However, the stone is too large to pass.

## 2021-02-05 ENCOUNTER — HOSPITAL ENCOUNTER (OUTPATIENT)
Age: 66
Setting detail: OUTPATIENT SURGERY
Discharge: HOME | End: 2021-02-05
Attending: UROLOGY | Admitting: UROLOGY
Payer: MEDICARE

## 2021-02-05 ENCOUNTER — TELEPHONE (OUTPATIENT)
Dept: PRIMARY CARE CLINIC | Age: 66
End: 2021-02-05

## 2021-02-05 ENCOUNTER — APPOINTMENT (OUTPATIENT)
Dept: GENERAL RADIOLOGY | Age: 66
End: 2021-02-05
Attending: UROLOGY
Payer: MEDICARE

## 2021-02-05 ENCOUNTER — ANESTHESIA (OUTPATIENT)
Dept: OPERATING ROOM | Age: 66
End: 2021-02-05
Payer: MEDICARE

## 2021-02-05 VITALS — DIASTOLIC BLOOD PRESSURE: 67 MMHG | SYSTOLIC BLOOD PRESSURE: 131 MMHG | OXYGEN SATURATION: 99 %

## 2021-02-05 VITALS
SYSTOLIC BLOOD PRESSURE: 120 MMHG | BODY MASS INDEX: 22.39 KG/M2 | RESPIRATION RATE: 16 BRPM | HEIGHT: 63 IN | DIASTOLIC BLOOD PRESSURE: 65 MMHG | OXYGEN SATURATION: 97 % | HEART RATE: 61 BPM | WEIGHT: 126.4 LBS | TEMPERATURE: 98.1 F

## 2021-02-05 LAB
CULTURE: ABNORMAL
Lab: ABNORMAL
SPECIMEN DESCRIPTION: ABNORMAL

## 2021-02-05 PROCEDURE — 7100000010 HC PHASE II RECOVERY - FIRST 15 MIN: Performed by: UROLOGY

## 2021-02-05 PROCEDURE — 6370000000 HC RX 637 (ALT 250 FOR IP): Performed by: UROLOGY

## 2021-02-05 PROCEDURE — 3700000000 HC ANESTHESIA ATTENDED CARE: Performed by: UROLOGY

## 2021-02-05 PROCEDURE — 2709999900 HC NON-CHARGEABLE SUPPLY: Performed by: UROLOGY

## 2021-02-05 PROCEDURE — 2500000003 HC RX 250 WO HCPCS: Performed by: NURSE ANESTHETIST, CERTIFIED REGISTERED

## 2021-02-05 PROCEDURE — C2617 STENT, NON-COR, TEM W/O DEL: HCPCS | Performed by: UROLOGY

## 2021-02-05 PROCEDURE — 2580000003 HC RX 258: Performed by: UROLOGY

## 2021-02-05 PROCEDURE — 7100000000 HC PACU RECOVERY - FIRST 15 MIN: Performed by: UROLOGY

## 2021-02-05 PROCEDURE — 7100000011 HC PHASE II RECOVERY - ADDTL 15 MIN: Performed by: UROLOGY

## 2021-02-05 PROCEDURE — 6360000002 HC RX W HCPCS: Performed by: UROLOGY

## 2021-02-05 PROCEDURE — 3700000001 HC ADD 15 MINUTES (ANESTHESIA): Performed by: UROLOGY

## 2021-02-05 PROCEDURE — 6360000002 HC RX W HCPCS: Performed by: NURSE ANESTHETIST, CERTIFIED REGISTERED

## 2021-02-05 PROCEDURE — 3600000014 HC SURGERY LEVEL 4 ADDTL 15MIN: Performed by: UROLOGY

## 2021-02-05 PROCEDURE — 3209999900 FLUORO FOR SURGICAL PROCEDURES

## 2021-02-05 PROCEDURE — C1758 CATHETER, URETERAL: HCPCS | Performed by: UROLOGY

## 2021-02-05 PROCEDURE — C1769 GUIDE WIRE: HCPCS | Performed by: UROLOGY

## 2021-02-05 PROCEDURE — 7100000001 HC PACU RECOVERY - ADDTL 15 MIN: Performed by: UROLOGY

## 2021-02-05 PROCEDURE — 2720000010 HC SURG SUPPLY STERILE: Performed by: UROLOGY

## 2021-02-05 PROCEDURE — 3600000004 HC SURGERY LEVEL 4 BASE: Performed by: UROLOGY

## 2021-02-05 DEVICE — URETERAL STENT
Type: IMPLANTABLE DEVICE | Site: URETER | Status: FUNCTIONAL
Brand: PERCUFLEX™ PLUS

## 2021-02-05 RX ORDER — LIDOCAINE HYDROCHLORIDE 20 MG/ML
JELLY TOPICAL PRN
Status: DISCONTINUED | OUTPATIENT
Start: 2021-02-05 | End: 2021-02-05 | Stop reason: ALTCHOICE

## 2021-02-05 RX ORDER — PROPOFOL 10 MG/ML
INJECTION, EMULSION INTRAVENOUS PRN
Status: DISCONTINUED | OUTPATIENT
Start: 2021-02-05 | End: 2021-02-05 | Stop reason: SDUPTHER

## 2021-02-05 RX ORDER — DEXAMETHASONE SODIUM PHOSPHATE 4 MG/ML
INJECTION, SOLUTION INTRA-ARTICULAR; INTRALESIONAL; INTRAMUSCULAR; INTRAVENOUS; SOFT TISSUE PRN
Status: DISCONTINUED | OUTPATIENT
Start: 2021-02-05 | End: 2021-02-05 | Stop reason: SDUPTHER

## 2021-02-05 RX ORDER — SODIUM CHLORIDE 0.9 % (FLUSH) 0.9 %
10 SYRINGE (ML) INJECTION EVERY 12 HOURS SCHEDULED
Status: DISCONTINUED | OUTPATIENT
Start: 2021-02-05 | End: 2021-02-05 | Stop reason: HOSPADM

## 2021-02-05 RX ORDER — DIMENHYDRINATE 50 MG/1
50 TABLET ORAL ONCE
Status: COMPLETED | OUTPATIENT
Start: 2021-02-05 | End: 2021-02-05

## 2021-02-05 RX ORDER — SODIUM CHLORIDE 0.9 % (FLUSH) 0.9 %
10 SYRINGE (ML) INJECTION PRN
Status: DISCONTINUED | OUTPATIENT
Start: 2021-02-05 | End: 2021-02-05 | Stop reason: HOSPADM

## 2021-02-05 RX ORDER — FENTANYL CITRATE 50 UG/ML
50 INJECTION, SOLUTION INTRAMUSCULAR; INTRAVENOUS EVERY 5 MIN PRN
Status: DISCONTINUED | OUTPATIENT
Start: 2021-02-05 | End: 2021-02-05 | Stop reason: HOSPADM

## 2021-02-05 RX ORDER — SODIUM CHLORIDE, SODIUM LACTATE, POTASSIUM CHLORIDE, CALCIUM CHLORIDE 600; 310; 30; 20 MG/100ML; MG/100ML; MG/100ML; MG/100ML
INJECTION, SOLUTION INTRAVENOUS CONTINUOUS
Status: DISCONTINUED | OUTPATIENT
Start: 2021-02-05 | End: 2021-02-05 | Stop reason: HOSPADM

## 2021-02-05 RX ORDER — OXYCODONE HYDROCHLORIDE 5 MG/1
5 TABLET ORAL
Status: DISCONTINUED | OUTPATIENT
Start: 2021-02-05 | End: 2021-02-05 | Stop reason: HOSPADM

## 2021-02-05 RX ORDER — FENTANYL CITRATE 50 UG/ML
25 INJECTION, SOLUTION INTRAMUSCULAR; INTRAVENOUS EVERY 5 MIN PRN
Status: DISCONTINUED | OUTPATIENT
Start: 2021-02-05 | End: 2021-02-05 | Stop reason: HOSPADM

## 2021-02-05 RX ORDER — ONDANSETRON 2 MG/ML
4 INJECTION INTRAMUSCULAR; INTRAVENOUS
Status: DISCONTINUED | OUTPATIENT
Start: 2021-02-05 | End: 2021-02-05 | Stop reason: HOSPADM

## 2021-02-05 RX ORDER — CIPROFLOXACIN 2 MG/ML
400 INJECTION, SOLUTION INTRAVENOUS
Status: COMPLETED | OUTPATIENT
Start: 2021-02-05 | End: 2021-02-05

## 2021-02-05 RX ORDER — LIDOCAINE HYDROCHLORIDE 20 MG/ML
INJECTION, SOLUTION EPIDURAL; INFILTRATION; INTRACAUDAL; PERINEURAL PRN
Status: DISCONTINUED | OUTPATIENT
Start: 2021-02-05 | End: 2021-02-05 | Stop reason: SDUPTHER

## 2021-02-05 RX ORDER — KETOROLAC TROMETHAMINE 30 MG/ML
INJECTION, SOLUTION INTRAMUSCULAR; INTRAVENOUS PRN
Status: DISCONTINUED | OUTPATIENT
Start: 2021-02-05 | End: 2021-02-05 | Stop reason: SDUPTHER

## 2021-02-05 RX ORDER — ACETAMINOPHEN 325 MG/1
650 TABLET ORAL ONCE
Status: COMPLETED | OUTPATIENT
Start: 2021-02-05 | End: 2021-02-05

## 2021-02-05 RX ORDER — ONDANSETRON 2 MG/ML
INJECTION INTRAMUSCULAR; INTRAVENOUS PRN
Status: DISCONTINUED | OUTPATIENT
Start: 2021-02-05 | End: 2021-02-05 | Stop reason: SDUPTHER

## 2021-02-05 RX ADMIN — ONDANSETRON 4 MG: 2 INJECTION INTRAMUSCULAR; INTRAVENOUS at 08:05

## 2021-02-05 RX ADMIN — PROPOFOL 180 MG: 10 INJECTION, EMULSION INTRAVENOUS at 08:09

## 2021-02-05 RX ADMIN — KETOROLAC TROMETHAMINE 30 MG: 30 INJECTION, SOLUTION INTRAMUSCULAR; INTRAVENOUS at 08:26

## 2021-02-05 RX ADMIN — ACETAMINOPHEN 650 MG: 325 TABLET, FILM COATED ORAL at 06:49

## 2021-02-05 RX ADMIN — SODIUM CHLORIDE, POTASSIUM CHLORIDE, SODIUM LACTATE AND CALCIUM CHLORIDE: 600; 310; 30; 20 INJECTION, SOLUTION INTRAVENOUS at 06:46

## 2021-02-05 RX ADMIN — SODIUM CHLORIDE, POTASSIUM CHLORIDE, SODIUM LACTATE AND CALCIUM CHLORIDE: 600; 310; 30; 20 INJECTION, SOLUTION INTRAVENOUS at 08:00

## 2021-02-05 RX ADMIN — LIDOCAINE HYDROCHLORIDE 100 MG: 20 INJECTION, SOLUTION EPIDURAL; INFILTRATION; INTRACAUDAL; PERINEURAL at 08:07

## 2021-02-05 RX ADMIN — DEXAMETHASONE SODIUM PHOSPHATE 4 MG: 4 INJECTION, SOLUTION INTRAMUSCULAR; INTRAVENOUS at 08:03

## 2021-02-05 RX ADMIN — DIMENHYDRINATE 50 MG: 50 TABLET ORAL at 06:49

## 2021-02-05 RX ADMIN — CIPROFLOXACIN 400 MG: 2 INJECTION, SOLUTION INTRAVENOUS at 07:48

## 2021-02-05 ASSESSMENT — PAIN SCALES - GENERAL: PAINLEVEL_OUTOF10: 0

## 2021-02-05 ASSESSMENT — LIFESTYLE VARIABLES: SMOKING_STATUS: 1

## 2021-02-05 ASSESSMENT — PAIN - FUNCTIONAL ASSESSMENT: PAIN_FUNCTIONAL_ASSESSMENT: 0-10

## 2021-02-05 NOTE — ANESTHESIA POSTPROCEDURE EVALUATION
Department of Anesthesiology  Postprocedure Note    Patient: Reymundo Zambrano  MRN: [id number]  YOB: 1955  Date of evaluation: 2/5/2021  Time:  11:21 AM     Procedure Summary     Date: 02/05/21 Room / Location: [address] [place] [address]    Anesthesia Start: 0800 Anesthesia Stop: 5365    Procedures:       CYSTOSCOPY URETEROSCOPY LASER-HLL (Left )      CYSTOSCOPY RETROGRADE PYELOGRAM STENT INSERTION (Left ) Diagnosis: (LEFT URETERAL CALCULUS)    Surgeons: Ronn Sloan MD Responsible Provider: Meme Hinton. SAL Agee CRNA    Anesthesia Type: general ASA Status: 2          Anesthesia Type: general    Hannah Phase I: Hannah Score: 9    Hannah Phase II: Hannah Score: 10    Last vitals: Reviewed and per EMR flowsheets.        Anesthesia Post Evaluation    Patient location during evaluation: PACU  Patient participation: complete - patient participated  Level of consciousness: awake and alert  Pain score: 0  Airway patency: patent  Nausea & Vomiting: no nausea and no vomiting  Complications: no  Cardiovascular status: blood pressure returned to baseline  Respiratory status: acceptable  Hydration status: euvolemic

## 2021-02-05 NOTE — PROGRESS NOTES
Discharge instructions given to pt and . Discharge Criteria    Inpatients must meet Criteria 1 through 7. All other patients are either YES or N/A. If a NO is chosen then Anesthesia or Surgeon must be notified. 1.  Minimum 30 minutes after last dose of sedative medication, minimum 120 minutes after last dose of reversal agent. Yes      2. Systolic BP stable within 20 mmHg for 30 minutes & systolic BP between 90 & 223 or within 10 mmHg of baseline. Yes      3. Pulse between 60 and 100 or within 10 bpm of baseline. Yes      4. Spontaneous respiratory rate >/= 10 per minute. Yes      5. SaO2 >/= 95 or  >/= baseline. Yes      6. Able to cough and swallow or return to baseline function. Yes      7. Alert and oriented or return to baseline mental status. Yes      8. Demonstrates controlled, coordinated movements, ambulates with steady gait, or return to baseline activity function. Yes      9. Minimal or no pain or nausea, or at a level tolerable and acceptable to patient. Yes      10. Takes and retains oral fluids as allowed. Yes      11. Procedural / perioperative site stable. Minimal or no bleeding. Yes          12. If GI endoscopy procedure, minimal or no abdominal distention or passing flatus. N/A      13. Written discharge instructions and emergency telephone number provided. Yes      14. Accompanied by a responsible adult.     Yes

## 2021-02-05 NOTE — ANESTHESIA PRE PROCEDURE
Department of Anesthesiology  Preprocedure Note       Name:  Tiara Ibarra   Age:  72 y.o.  :  1955                                          MRN:  [id number]         Date:  2021      Surgeon: Russell Conteh):  Elizabeth Herzog MD    Procedure: Procedure(s):  CYSTOSCOPY URETEROSCOPY LASER-HLL  CYSTOSCOPY RETROGRADE PYELOGRAM STENT INSERTION    Medications prior to admission:   Prior to Admission medications    Medication Sig Start Date End Date Taking? Authorizing Provider   clindamycin (CLEOCIN) 150 MG capsule Take 150 mg by mouth as needed for Other For dental work 21  Yes Historical Provider, MD   ciprofloxacin (CIPRO) 500 MG tablet Take 1 tablet by mouth 2 times daily for 10 days 2/3/21 2/13/21 Yes SAL Santana CNP   tamsulosin [facility]) 0.4 MG capsule Take 1 capsule by mouth every evening 2/3/21  Yes SAL Santana CNP   promethazine (PHENERGAN) 25 MG tablet Take 1 tablet by mouth 3 times daily as needed for Nausea 2/3/21 2/10/21 Yes SAL Santana CNP   HYDROcodone-acetaminophen (NORCO) 5-325 MG per tablet Take 1 tablet by mouth every 4 hours as needed for Pain for up to 3 days. Intended supply: 3 days. Take lowest dose possible to manage pain 2/3/21 2/6/21 Yes SAL Santana CNP   atorvastatin (LIPITOR) 20 MG tablet Take 1 tablet by mouth daily 20  Yes Phillip Ahuja MD   levothyroxine (SYNTHROID) 75 MCG tablet Take 1 tablet by mouth Daily 20  Yes Phillip Ahuja MD   losartan (COZAAR) 50 MG tablet Take 1 tablet by mouth daily 20  Yes Phillip Ahuja MD   acetaminophen (TYLENOL) 500 MG tablet Take 500 mg by mouth every 6 hours as needed for Pain   Yes Historical Provider, MD   diphenhydrAMINE (BENADRYL) 25 MG capsule Take 25 mg by mouth nightly as needed for Itching   Yes Historical Provider, MD   ibuprofen (ADVIL;MOTRIN) 400 MG tablet Take 400 mg by mouth every 6 hours as needed for Pain.    Yes Historical Provider, MD Past Medical History:        Diagnosis Date    Fracture of wrist, unspecified laterality, closed, initial encounter     right    History of sepsis 2009    following lithotripsy    Hyperlipidemia     Hypertension     Hypothyroidism 2007    secondary to radioactive iodine 2007    Kidney stone     Osteoporosis     Primary localized osteoarthritis of right hip 3/18/2019       Past Surgical History:        Procedure Laterality Date    CHOLECYSTECTOMY, LAPAROSCOPIC  2009    COLONOSCOPY  07/17/2014    Dr. Liza Mendoza - tubular adenomatous polyp removed    COLONOSCOPY N/A 8/1/2018    Dr. Pradeep Tyler architectural distortion, suggestive of mucosal prolapse    CYSTOSCOPY  2015    stent removal and reinsert     [address] [place] [address] LITHOTRIPSY  4/28/2015    left    LITHOTRIPSY Left 01/30/2018    cystoscopy- Dr. Jose Pickard  2007    radioactive iodine procedure    OR CYSTOURETHROSCOPY Left 1/30/2018    [facility] [address] [facility] Left 1/30/2018    ESWL [address] LITHOTRIPSY performed by Katia Patel MD at [address] [place] [address] 2 Right 3/18/2019    Dr. [facility] History:    Social History     Tobacco Use    Smoking status: Current Some Day Smoker     Packs/day: 0.25     Years: 30.00     Pack years: 7.50     Types: Cigarettes    Smokeless tobacco: Never Used   Substance Use Topics    Alcohol use:  Yes     Alcohol/week: 0.0 standard drinks     Comment: socially                                Ready to quit: Not Answered  Counseling given: Not Answered      Vital Signs (Current):   Vitals:    02/04/21 0839 02/05/21 0617   BP:  111/63   Pulse:  72   Resp:  18   Temp:  36.3 °C (97.3 °F)   TempSrc:  Temporal   SpO2:  95%   Weight: 127 lb (57.6 kg) 126 lb 6.4 oz (57.3 kg)   Height: 5' 3\" (1.6 m) 5' 3\" (1.6 m) BP Readings from Last 3 Encounters:   02/05/21 111/63   02/03/21 125/76   10/20/20 138/76       NPO Status: Time of last liquid consumption: 2300(sips with meds at 0500)                        Time of last solid consumption: 2000                        Date of last liquid consumption: 02/04/21                        Date of last solid food consumption: 02/04/21    BMI:   Wt Readings from Last 3 Encounters:   02/05/21 126 lb 6.4 oz (57.3 kg)   02/03/21 125 lb 12.8 oz (57.1 kg)   10/20/20 126 lb (57.2 kg)     Body mass index is 22.39 kg/m². CBC:   Lab Results   Component Value Date    WBC 20.4 02/03/2021    RBC 4.46 02/03/2021    RBC 4.08 05/08/2012    HGB 13.7 02/03/2021    HCT 43.1 02/03/2021    MCV 96.6 02/03/2021    RDW 14.0 02/03/2021     02/03/2021     05/08/2012       CMP:   Lab Results   Component Value Date     02/03/2021    K 4.8 02/03/2021     02/03/2021    CO2 27 02/03/2021    BUN 19 02/03/2021    CREATININE 0.78 02/03/2021    GFRAA >60 02/03/2021    LABGLOM >60 02/03/2021    GLUCOSE 118 02/03/2021    GLUCOSE 102 05/08/2012    CALCIUM 10.0 02/03/2021    AST 21 09/14/2020    ALT 15 09/14/2020       POC Tests: No results for input(s): POCGLU, POCNA, POCK, POCCL, POCBUN, POCHEMO, POCHCT in the last 72 hours.     Coags: No results found for: PROTIME, INR, APTT    HCG (If Applicable): No results found for: PREGTESTUR, PREGSERUM, HCG, HCGQUANT     ABGs: No results found for: PHART, PO2ART, BFA0YNI, ZFV6LNE, BEART, T1EEVBCM     Type & Screen (If Applicable):  No results found for: LABABO, LABRH    Drug/Infectious Status (If Applicable):  No results found for: HIV, HEPCAB    COVID-19 Screening (If Applicable):   Lab Results   Component Value Date    COVID19 Not Detected 02/03/2021         Anesthesia Evaluation   no history of anesthetic complications:   Airway: Mallampati: I  TM distance: >3 FB   Neck ROM: full  Mouth opening: > = 3 FB Dental:

## 2021-02-06 NOTE — OP NOTE
[facility]                                OPERATIVE REPORT    PATIENT NAME: Azucena Jules                       :        1955  MED REC NO:   [id number]                              ROOM:  ACCOUNT NO:   [de-identified]                           ADMIT DATE: 2021  PROVIDER:     Melchor Penn    DATE OF PROCEDURE:  2021    SURGEON:  Dr. Melchor Penn. ASSISTANT:  None. PREOPERATIVE DIAGNOSES:  1. Left ureteral calculus. 2.  Left renal calculus. POSTOPERATIVE DIAGNOSES:  1. Left ureteral calculus. 2.  Left renal calculus. PROCEDURES PERFORMED:  Cystoscopy, left ureteroscopy, left holmium laser  lithotripsy, left ureteral stent placement. ANESTHESIA:  General.    COMPLICATIONS:  None. ESTIMATED BLOOD LOSS:  Minimal.    SPECIMENS:  None. PROSTHESIS:  A 6-Syriac 24-cm double J ureteral stent. DISPOSITION:  Stable. FINDINGS:  1.  A 5-mm left ureteral stones. 2.  A 5-mm left renal stone. INDICATIONS:  The patient is a 45-year-old female with extensive stone  history, here now for definitive therapy. DESCRIPTION OF PROCEDURE:  The patient was taken back to the operating  room. After informed consent including all risks, benefits, and  alternatives were obtained, the patient was transferred from the [facility]  onto the operating table. She was induced under general anesthesia and  given IV Cipro for preoperative antibiotic prophylaxis. To begin the  case, she was prepped and draped in the normal sterile fashion, placed  in dorsal lithotomy. She had a 22-Syriac sheath with a 30-degree lens  passed through the urethra into the bladder. Once in the bladder, we  identified the left ureteral orifice. A 0.035 inch wire was passed up. Dual lumen catheter was used. We placed additional Glidewire up.   We  were then able to pass the flexible ureteroscope over the [facility] where we identified the stone in the proximal ureter. We were able to  use a 200 micron laser fiber and ablate the stone adequately. We then  went into the kidney where we found additional stone in the lower pole. We were able to use the same 200 micron laser fiber and ablate the stone  adequately. Once this was done, we removed the scope leaving the  Glidewire in place, and we placed a cystoscope over the Glidewire and  placed a 6-Pashto x 24-cm double J ureter stent over the Glidewire up  into the kidney. Glidewire was removed. Proximal curl was confirmed  via fluoroscopy. Distal curl was confirmed by visualization. At this  point in time, the stent string was attached to the right thigh with  Steris and benzoin. She was then awoken from general anesthesia,  transferred to the [facility], and taken to the PACU in satisfactory  condition by Nursing and Anesthesia teams. PLAN:  The patient will be discharged home per PACU criterion, and  follow up with us in early next week for stent removal via string. If  she does pull this by herself, we will see her back in six weeks with a  repeat KUB.         Benjamín Ley    D: 02/05/2021 13:57:40       T: 02/05/2021 14:41:53     [id number]  Job#: [id number]     Doc#: [id number]    CC:

## 2021-02-08 ENCOUNTER — TELEPHONE (OUTPATIENT)
Dept: UROLOGY | Age: 66
End: 2021-02-08

## 2021-02-08 DIAGNOSIS — N20.0 RENAL STONE: Primary | ICD-10-CM

## 2021-02-08 NOTE — TELEPHONE ENCOUNTER
----- Message from [facility], SAL Morley CNP sent at 2/8/2021 10:52 AM EST -----  Urine culture is positive for infection. Continue culture specific Cipro as prescribed.

## 2021-02-08 NOTE — TELEPHONE ENCOUNTER
----- Message from SAL Estevez CNP sent at 2/8/2021 10:52 AM EST -----  Urine culture is positive for infection. Continue culture specific Cipro as prescribed.

## 2021-03-19 ENCOUNTER — HOSPITAL ENCOUNTER (OUTPATIENT)
Age: 66
Discharge: HOME | End: 2021-03-21
Payer: MEDICARE

## 2021-03-19 ENCOUNTER — HOSPITAL ENCOUNTER (OUTPATIENT)
Dept: GENERAL RADIOLOGY | Age: 66
Discharge: HOME | End: 2021-03-21
Payer: MEDICARE

## 2021-03-19 DIAGNOSIS — N20.0 RENAL STONE: ICD-10-CM

## 2021-03-19 PROCEDURE — 74018 RADEX ABDOMEN 1 VIEW: CPT

## 2021-03-22 ENCOUNTER — VIRTUAL VISIT (OUTPATIENT)
Dept: UROLOGY | Age: 66
End: 2021-03-22
Payer: MEDICARE

## 2021-03-22 DIAGNOSIS — N20.0 RENAL STONE: Primary | ICD-10-CM

## 2021-03-22 PROCEDURE — 99442 PR PHYS/QHP TELEPHONE EVALUATION 11-20 MIN: CPT | Performed by: PHYSICIAN ASSISTANT

## 2021-03-22 ASSESSMENT — ENCOUNTER SYMPTOMS
SHORTNESS OF BREATH: 0
COLOR CHANGE: 0
ABDOMINAL PAIN: 0
BACK PAIN: 0
NAUSEA: 0
VOMITING: 0
CONSTIPATION: 0
COUGH: 0
EYE REDNESS: 0
WHEEZING: 0

## 2021-03-22 NOTE — PROGRESS NOTES
3/22/2021    TELEHEALTH EVALUATION -- Audio Only (During TPWBM-08 public health emergency)    Patient could not get the virtual visit video to work. It was an audio only call. HPI:    Matt Lozada (:  1955) has requested an audio/video evaluation for the following concern(s):    History  10/2013 Left HLL     2013 Right ESWL      2015 Left ESWL     2018 Left ESWL     2021 - Left HLL    Today:  Patient is here today for stone follow-up. She is approximately 6 weeks status post left HL L. She has not passed any stone spontaneously since her last visit. Patient did have a KUB prior to visit today which is independently reviewed. This did show multiple renal calculi bilaterally. On the left there is at least 2 stones measuring 4 mm or less. On the right side there is a 8 mm left lower pole stone as well as a 3 mm stone adjacent. Her last visit she denies any spontaneous stone passage. She denies any fever, chills, gross hematuria, flank pain, dysuria. Review of Systems   Constitutional: Negative for appetite change, chills and fever. Eyes: Negative for redness and visual disturbance. Respiratory: Negative for cough, shortness of breath and wheezing. Cardiovascular: Negative for chest pain and leg swelling. Gastrointestinal: Negative for abdominal pain, constipation, nausea and vomiting. Genitourinary: Negative for difficulty urinating, dysuria, flank pain, frequency, hematuria, pelvic pain, urgency, vaginal bleeding and vaginal discharge. Musculoskeletal: Negative for back pain, joint swelling and myalgias. Skin: Negative for color change, rash and wound. Neurological: Negative for dizziness, tremors and numbness. Hematological: Negative for adenopathy. Does not bruise/bleed easily. No Known Allergies    Prior to Visit Medications    Medication Sig Taking?  Authorizing Provider   clindamycin (CLEOCIN) 150 MG capsule Take 150 mg by mouth as needed for Other For dental work  Historical Provider, MD Mustafa Yeast Rice Extract (RED YEAST RICE PO) Take by mouth  Historical Provider, MD   CINNAMON PO Take by mouth  Historical Provider, MD   Ascorbic Acid (VITAMIN C) 1000 MG tablet Take 1,000 mg by mouth daily  Historical Provider, MD   ketorolac (TORADOL) 10 MG tablet Take 1 tablet by mouth every 6 hours as needed for Pain  SAL Freedman CNP   tamsulosin (FLOMAX) 0.4 MG capsule Take 1 capsule by mouth every evening  SAL Panchal CNP   ondansetron (ZOFRAN) 4 MG tablet Take 1 tablet by mouth every 6 hours as needed for Nausea  SAL Panchal CNP   atorvastatin (LIPITOR) 20 MG tablet Take 1 tablet by mouth daily  Davina Carbone MD   levothyroxine (SYNTHROID) 75 MCG tablet Take 1 tablet by mouth Daily  Davina Carbone MD   alendronate (FOSAMAX) 70 MG tablet Take 1 tablet by mouth every 7 days  Davina Carbone MD   losartan (COZAAR) 50 MG tablet Take 1 tablet by mouth daily  Davina Carbone MD   acetaminophen (TYLENOL) 500 MG tablet Take 500 mg by mouth every 6 hours as needed for Pain  Historical Provider, MD   diphenhydrAMINE (BENADRYL) 25 MG capsule Take 25 mg by mouth nightly as needed for Itching  Historical Provider, MD   Polyethylene Glycol 3350 (MIRALAX PO) Take by mouth every other day Indications: PRN   Historical Provider, MD   Glucosamine-Chondroitin (GLUCOSAMINE CHONDR COMPLEX PO) Take by mouth  Historical Provider, MD   vitamin D (ERGOCALCIFEROL) 400 UNITS CAPS Take 2,000 Units by mouth daily   Historical Provider, MD   ibuprofen (ADVIL;MOTRIN) 400 MG tablet Take 400 mg by mouth every 6 hours as needed for Pain. Historical Provider, MD       Social History     Tobacco Use    Smoking status: Current Some Day Smoker     Packs/day: 0.25     Years: 30.00     Pack years: 7.50     Types: Cigarettes    Smokeless tobacco: Never Used   Substance Use Topics    Alcohol use:  Yes     Alcohol/week: 0.0 standard drinks Comment: socially    Drug use: No        Past Medical History:   Diagnosis Date    Fracture of wrist, unspecified laterality, closed, initial encounter     right    History of sepsis 2009    following lithotripsy    Hyperlipidemia     Hypertension     Hypothyroidism 2007    secondary to radioactive iodine 2007    Kidney stone     Osteoporosis     Primary localized osteoarthritis of right hip 3/18/2019       Past Surgical History:   Procedure Laterality Date    CHOLECYSTECTOMY, LAPAROSCOPIC  2009    COLONOSCOPY  07/17/2014    Dr. Liza Mendoza - tubular adenomatous polyp removed    COLONOSCOPY N/A 8/1/2018    Dr. Pradeep Tyler architectural distortion, suggestive of mucosal prolapse    CYSTOSCOPY  2015    stent removal and reinsert     CYSTOSCOPY Left     HLL with stent    CYSTOSCOPY Left 2/5/2021    CYSTOSCOPY URETEROSCOPY LASER-HLL performed by Katia Patel MD at [address] / REMOVAL [place] / STONE Left 2/5/2021    CYSTOSCOPY RETROGRADE PYELOGRAM STENT INSERTION performed by [facility]ia Patel MD at [facility] LITHOTRIPSY  4/28/2015    left    LITHOTRIPSY Left 01/30/2018    cystoscopy- Dr. Jose Pickard  2007    radioactive iodine procedure    [place] CYSTOURETHROSCOPY Left 1/30/2018    CYSTOSCOPY performed by Katia Patel MD at [address] ESWL Left 1/30/2018    ES[facility] [address] LITHOTRIPSY performed by Katia Patel MD at [address] Right 3/18/2019    Dr. Jamesetta Aase       Family History   Problem Relation Age of Onset    Stroke Father     Hypertension Father     Breast Cancer Maternal Grandmother     Alzheimer's Disease Mother        PHYSICAL EXAMINATION:   Unable to do physical exam as it was an audio only call. ASSESSMENT/PLAN:  1. Renal stone    Stone was visible on KUB.  We discussed risks and guardian) has also been advised to contact this office for worsening conditions or problems, and seek emergency medical treatment and/or call 911 if deemed necessary. Services were provided through a video synchronous discussion virtually to substitute for in-person clinic visit. The patient was located in their home. The provider was located in the office.     --Maggy Davis PA-C on 3/22/2021 at 10:31 AM    An electronic signature was used to authenticate this note.

## 2021-03-26 ENCOUNTER — HOSPITAL ENCOUNTER (OUTPATIENT)
Age: 66
Discharge: HOME | End: 2021-03-26
Payer: MEDICARE

## 2021-03-26 DIAGNOSIS — E78.2 MIXED HYPERLIPIDEMIA: ICD-10-CM

## 2021-03-26 DIAGNOSIS — R73.01 IMPAIRED FASTING GLUCOSE: ICD-10-CM

## 2021-03-26 DIAGNOSIS — E03.9 HYPOTHYROIDISM, UNSPECIFIED TYPE: ICD-10-CM

## 2021-03-26 LAB
ALT SERPL-CCNC: 22 U/L (ref 5–33)
ANION GAP SERPL CALCULATED.3IONS-SCNC: 9 MMOL/L (ref 9–17)
AST SERPL-CCNC: 29 U/L
BUN BLDV-MCNC: 14 MG/DL (ref 8–23)
BUN/CREAT BLD: 18 (ref 9–20)
CALCIUM SERPL-MCNC: 10.4 MG/DL (ref 8.6–10.4)
CHLORIDE BLD-SCNC: 102 MMOL/L (ref 98–107)
CHOLESTEROL/HDL RATIO: 2.8
CHOLESTEROL: 146 MG/DL
CO2: 28 MMOL/L (ref 20–31)
CREAT SERPL-MCNC: 0.8 MG/DL (ref 0.5–0.9)
GFR AFRICAN AMERICAN: >60 ML/MIN
GFR NON-AFRICAN AMERICAN: >60 ML/MIN
GFR SERPL CREATININE-BSD FRML MDRD: ABNORMAL ML/MIN/{1.73_M2}
GFR SERPL CREATININE-BSD FRML MDRD: ABNORMAL ML/MIN/{1.73_M2}
GLUCOSE BLD-MCNC: 93 MG/DL (ref 70–99)
HCT VFR BLD CALC: 44.7 % (ref 36.3–47.1)
HDLC SERPL-MCNC: 52 MG/DL
HEMOGLOBIN: 14.3 G/DL (ref 11.9–15.1)
LDL CHOLESTEROL: 66 MG/DL (ref 0–130)
MCH RBC QN AUTO: 30.8 PG (ref 25.2–33.5)
MCHC RBC AUTO-ENTMCNC: 32 G/DL (ref 28.4–34.8)
MCV RBC AUTO: 96.3 FL (ref 82.6–102.9)
NRBC AUTOMATED: 0 PER 100 WBC
PDW BLD-RTO: 13.1 % (ref 11.8–14.4)
PLATELET # BLD: 270 K/UL (ref 138–453)
PMV BLD AUTO: 9.7 FL (ref 8.1–13.5)
POTASSIUM SERPL-SCNC: 5.4 MMOL/L (ref 3.7–5.3)
RBC # BLD: 4.64 M/UL (ref 3.95–5.11)
SODIUM BLD-SCNC: 139 MMOL/L (ref 135–144)
T3 FREE: 2.71 PG/ML (ref 2.02–4.43)
THYROXINE, FREE: 1.5 NG/DL (ref 0.93–1.7)
TRIGL SERPL-MCNC: 139 MG/DL
TSH SERPL DL<=0.05 MIU/L-ACNC: 0.96 MIU/L (ref 0.3–5)
VLDLC SERPL CALC-MCNC: NORMAL MG/DL (ref 1–30)
WBC # BLD: 8.3 K/UL (ref 3.5–11.3)

## 2021-03-26 PROCEDURE — 80061 LIPID PANEL: CPT

## 2021-03-26 PROCEDURE — 84443 ASSAY THYROID STIM HORMONE: CPT

## 2021-03-26 PROCEDURE — 84450 TRANSFERASE (AST) (SGOT): CPT

## 2021-03-26 PROCEDURE — 36415 COLL VENOUS BLD VENIPUNCTURE: CPT

## 2021-03-26 PROCEDURE — 80048 BASIC METABOLIC PNL TOTAL CA: CPT

## 2021-03-26 PROCEDURE — 85027 COMPLETE CBC AUTOMATED: CPT

## 2021-03-26 PROCEDURE — 84481 FREE ASSAY (FT-3): CPT

## 2021-03-26 PROCEDURE — 83036 HEMOGLOBIN GLYCOSYLATED A1C: CPT

## 2021-03-26 PROCEDURE — 84460 ALANINE AMINO (ALT) (SGPT): CPT

## 2021-03-26 PROCEDURE — 84439 ASSAY OF FREE THYROXINE: CPT

## 2021-03-27 LAB
ESTIMATED AVERAGE GLUCOSE: 114 MG/DL
HBA1C MFR BLD: 5.6 % (ref 4–6)

## 2021-04-07 RX ORDER — KETOROLAC TROMETHAMINE 10 MG/1
10 TABLET, FILM COATED ORAL EVERY 6 HOURS PRN
COMMUNITY
End: 2021-07-23

## 2021-04-07 NOTE — PROGRESS NOTES
Patient instructed on the pre-operative, intra-operative, and post-operative process. Patient's surgical procedure and day of surgery confirmed. Patient instructed on NPO status. Medication instructions reviewed with patient. Pre operative instruction sheet reviewed with patient per PAT phone interview. Appointment time and instructions for pre-op COVID testing given to patient. Instructed to stop all OTC vitamins and supplements and all forms of NSAIDS one week prior to surgery; instructed to only take Synthroid with small sip of water the morning of surgery.

## 2021-04-13 ENCOUNTER — HOSPITAL ENCOUNTER (OUTPATIENT)
Dept: PREADMISSION TESTING | Age: 66
Setting detail: SPECIMEN
Discharge: HOME | End: 2021-04-17
Payer: MEDICARE

## 2021-04-13 DIAGNOSIS — Z01.818 PREOPERATIVE TESTING: Primary | ICD-10-CM

## 2021-04-13 DIAGNOSIS — Z01.818 PREOPERATIVE TESTING: ICD-10-CM

## 2021-04-13 PROCEDURE — U0005 INFEC AGEN DETEC AMPLI PROBE: HCPCS

## 2021-04-13 PROCEDURE — C9803 HOPD COVID-19 SPEC COLLECT: HCPCS

## 2021-04-13 PROCEDURE — U0003 INFECTIOUS AGENT DETECTION BY NUCLEIC ACID (DNA OR RNA); SEVERE ACUTE RESPIRATORY SYNDROME CORONAVIRUS 2 (SARS-COV-2) (CORONAVIRUS DISEASE [COVID-19]), AMPLIFIED PROBE TECHNIQUE, MAKING USE OF HIGH THROUGHPUT TECHNOLOGIES AS DESCRIBED BY CMS-2020-01-R: HCPCS

## 2021-04-14 LAB
SARS-COV-2: NORMAL
SARS-COV-2: NOT DETECTED
SOURCE: NORMAL

## 2021-04-15 ENCOUNTER — TELEPHONE (OUTPATIENT)
Dept: PRIMARY CARE CLINIC | Age: 66
End: 2021-04-15

## 2021-04-19 ENCOUNTER — ANESTHESIA EVENT (OUTPATIENT)
Dept: OPERATING ROOM | Age: 66
End: 2021-04-19
Payer: MEDICARE

## 2021-04-20 ENCOUNTER — ANESTHESIA (OUTPATIENT)
Dept: OPERATING ROOM | Age: 66
End: 2021-04-20
Payer: MEDICARE

## 2021-04-20 ENCOUNTER — HOSPITAL ENCOUNTER (OUTPATIENT)
Age: 66
Setting detail: OUTPATIENT SURGERY
Discharge: HOME | End: 2021-04-20
Attending: UROLOGY | Admitting: UROLOGY
Payer: MEDICARE

## 2021-04-20 VITALS — DIASTOLIC BLOOD PRESSURE: 100 MMHG | OXYGEN SATURATION: 100 % | SYSTOLIC BLOOD PRESSURE: 172 MMHG

## 2021-04-20 VITALS
DIASTOLIC BLOOD PRESSURE: 66 MMHG | SYSTOLIC BLOOD PRESSURE: 127 MMHG | WEIGHT: 122.2 LBS | HEART RATE: 52 BPM | RESPIRATION RATE: 16 BRPM | OXYGEN SATURATION: 97 % | BODY MASS INDEX: 21.65 KG/M2 | TEMPERATURE: 97.2 F | HEIGHT: 63 IN

## 2021-04-20 PROCEDURE — 7100000000 HC PACU RECOVERY - FIRST 15 MIN: Performed by: UROLOGY

## 2021-04-20 PROCEDURE — 3700000000 HC ANESTHESIA ATTENDED CARE: Performed by: UROLOGY

## 2021-04-20 PROCEDURE — 2500000003 HC RX 250 WO HCPCS: Performed by: NURSE ANESTHETIST, CERTIFIED REGISTERED

## 2021-04-20 PROCEDURE — 7100000001 HC PACU RECOVERY - ADDTL 15 MIN: Performed by: UROLOGY

## 2021-04-20 PROCEDURE — 2580000003 HC RX 258: Performed by: UROLOGY

## 2021-04-20 PROCEDURE — 6370000000 HC RX 637 (ALT 250 FOR IP): Performed by: UROLOGY

## 2021-04-20 PROCEDURE — 3600000013 HC SURGERY LEVEL 3 ADDTL 15MIN: Performed by: UROLOGY

## 2021-04-20 PROCEDURE — 2709999900 HC NON-CHARGEABLE SUPPLY: Performed by: UROLOGY

## 2021-04-20 PROCEDURE — 3600000003 HC SURGERY LEVEL 3 BASE: Performed by: UROLOGY

## 2021-04-20 PROCEDURE — 7100000010 HC PHASE II RECOVERY - FIRST 15 MIN: Performed by: UROLOGY

## 2021-04-20 PROCEDURE — 7100000011 HC PHASE II RECOVERY - ADDTL 15 MIN: Performed by: UROLOGY

## 2021-04-20 PROCEDURE — 3700000001 HC ADD 15 MINUTES (ANESTHESIA): Performed by: UROLOGY

## 2021-04-20 PROCEDURE — 6360000002 HC RX W HCPCS: Performed by: UROLOGY

## 2021-04-20 PROCEDURE — 6360000002 HC RX W HCPCS: Performed by: NURSE ANESTHETIST, CERTIFIED REGISTERED

## 2021-04-20 RX ORDER — DIMENHYDRINATE 50 MG/1
50 TABLET ORAL ONCE
Status: COMPLETED | OUTPATIENT
Start: 2021-04-20 | End: 2021-04-20

## 2021-04-20 RX ORDER — CIPROFLOXACIN 2 MG/ML
400 INJECTION, SOLUTION INTRAVENOUS
Status: COMPLETED | OUTPATIENT
Start: 2021-04-20 | End: 2021-04-20

## 2021-04-20 RX ORDER — ACETAMINOPHEN 325 MG/1
650 TABLET ORAL ONCE
Status: COMPLETED | OUTPATIENT
Start: 2021-04-20 | End: 2021-04-20

## 2021-04-20 RX ORDER — LIDOCAINE HYDROCHLORIDE 20 MG/ML
INJECTION, SOLUTION EPIDURAL; INFILTRATION; INTRACAUDAL; PERINEURAL PRN
Status: DISCONTINUED | OUTPATIENT
Start: 2021-04-20 | End: 2021-04-20 | Stop reason: SDUPTHER

## 2021-04-20 RX ORDER — DEXAMETHASONE SODIUM PHOSPHATE 4 MG/ML
INJECTION, SOLUTION INTRA-ARTICULAR; INTRALESIONAL; INTRAMUSCULAR; INTRAVENOUS; SOFT TISSUE PRN
Status: DISCONTINUED | OUTPATIENT
Start: 2021-04-20 | End: 2021-04-20 | Stop reason: SDUPTHER

## 2021-04-20 RX ORDER — SODIUM CHLORIDE 0.9 % (FLUSH) 0.9 %
5-40 SYRINGE (ML) INJECTION EVERY 12 HOURS SCHEDULED
Status: DISCONTINUED | OUTPATIENT
Start: 2021-04-20 | End: 2021-04-20 | Stop reason: HOSPADM

## 2021-04-20 RX ORDER — SODIUM CHLORIDE, SODIUM LACTATE, POTASSIUM CHLORIDE, CALCIUM CHLORIDE 600; 310; 30; 20 MG/100ML; MG/100ML; MG/100ML; MG/100ML
INJECTION, SOLUTION INTRAVENOUS CONTINUOUS
Status: DISCONTINUED | OUTPATIENT
Start: 2021-04-20 | End: 2021-04-20 | Stop reason: HOSPADM

## 2021-04-20 RX ORDER — KETOROLAC TROMETHAMINE 30 MG/ML
INJECTION, SOLUTION INTRAMUSCULAR; INTRAVENOUS PRN
Status: DISCONTINUED | OUTPATIENT
Start: 2021-04-20 | End: 2021-04-20 | Stop reason: SDUPTHER

## 2021-04-20 RX ORDER — PROPOFOL 10 MG/ML
INJECTION, EMULSION INTRAVENOUS PRN
Status: DISCONTINUED | OUTPATIENT
Start: 2021-04-20 | End: 2021-04-20 | Stop reason: SDUPTHER

## 2021-04-20 RX ORDER — ONDANSETRON 2 MG/ML
INJECTION INTRAMUSCULAR; INTRAVENOUS PRN
Status: DISCONTINUED | OUTPATIENT
Start: 2021-04-20 | End: 2021-04-20 | Stop reason: SDUPTHER

## 2021-04-20 RX ORDER — SODIUM CHLORIDE 9 MG/ML
25 INJECTION, SOLUTION INTRAVENOUS PRN
Status: DISCONTINUED | OUTPATIENT
Start: 2021-04-20 | End: 2021-04-20 | Stop reason: HOSPADM

## 2021-04-20 RX ORDER — PHENYLEPHRINE HYDROCHLORIDE 10 MG/ML
INJECTION INTRAVENOUS PRN
Status: DISCONTINUED | OUTPATIENT
Start: 2021-04-20 | End: 2021-04-20 | Stop reason: SDUPTHER

## 2021-04-20 RX ORDER — SODIUM CHLORIDE 0.9 % (FLUSH) 0.9 %
5-40 SYRINGE (ML) INJECTION PRN
Status: DISCONTINUED | OUTPATIENT
Start: 2021-04-20 | End: 2021-04-20 | Stop reason: HOSPADM

## 2021-04-20 RX ADMIN — DEXAMETHASONE SODIUM PHOSPHATE 4 MG: 4 INJECTION, SOLUTION INTRAMUSCULAR; INTRAVENOUS at 07:45

## 2021-04-20 RX ADMIN — ACETAMINOPHEN 650 MG: 325 TABLET ORAL at 06:55

## 2021-04-20 RX ADMIN — PHENYLEPHRINE HYDROCHLORIDE 50 MCG: 10 INJECTION INTRAVENOUS at 08:18

## 2021-04-20 RX ADMIN — LIDOCAINE HYDROCHLORIDE 80 MG: 20 INJECTION, SOLUTION EPIDURAL; INFILTRATION; INTRACAUDAL; PERINEURAL at 07:40

## 2021-04-20 RX ADMIN — ONDANSETRON 4 MG: 2 INJECTION INTRAMUSCULAR; INTRAVENOUS at 07:40

## 2021-04-20 RX ADMIN — DIMENHYDRINATE 50 MG: 50 TABLET ORAL at 06:55

## 2021-04-20 RX ADMIN — SODIUM CHLORIDE, POTASSIUM CHLORIDE, SODIUM LACTATE AND CALCIUM CHLORIDE: 600; 310; 30; 20 INJECTION, SOLUTION INTRAVENOUS at 07:35

## 2021-04-20 RX ADMIN — KETOROLAC TROMETHAMINE 30 MG: 30 INJECTION, SOLUTION INTRAMUSCULAR; INTRAVENOUS at 08:09

## 2021-04-20 RX ADMIN — PROPOFOL 130 MG: 10 INJECTION, EMULSION INTRAVENOUS at 07:42

## 2021-04-20 RX ADMIN — PHENYLEPHRINE HYDROCHLORIDE 100 MCG: 10 INJECTION INTRAVENOUS at 08:06

## 2021-04-20 RX ADMIN — CIPROFLOXACIN 400 MG: 2 INJECTION, SOLUTION INTRAVENOUS at 07:32

## 2021-04-20 RX ADMIN — SODIUM CHLORIDE, POTASSIUM CHLORIDE, SODIUM LACTATE AND CALCIUM CHLORIDE: 600; 310; 30; 20 INJECTION, SOLUTION INTRAVENOUS at 07:03

## 2021-04-20 ASSESSMENT — PAIN SCALES - GENERAL: PAINLEVEL_OUTOF10: 0

## 2021-04-20 ASSESSMENT — LIFESTYLE VARIABLES: SMOKING_STATUS: 1

## 2021-04-20 NOTE — PROGRESS NOTES
Patient verbalizes readiness for discharge. All criteria met. Discharge Criteria    Inpatients must meet Criteria 1 through 7. All other patients are either YES or N/A. If a NO is chosen then Anesthesia or Surgeon must be notified. 1.  Minimum 30 minutes after last dose of sedative medication, minimum 120 minutes after last dose of reversal agent. Yes      2. Systolic BP stable within 20 mmHg for 30 minutes & systolic BP between 90 & 781 or within 10 mmHg of baseline. Yes      3. Pulse between 60 and 100 or within 10 bpm of baseline. Yes      4. Spontaneous respiratory rate >/= 10 per minute. Yes      5. SaO2 >/= 95 or  >/= baseline. Yes      6. Able to cough and swallow or return to baseline function. Yes      7. Alert and oriented or return to baseline mental status. Yes      8. Demonstrates controlled, coordinated movements, ambulates with steady gait, or return to baseline activity function. Yes      9. Minimal or no pain or nausea, or at a level tolerable and acceptable to patient. Yes      10. Takes and retains oral fluids as allowed. Yes      11. Procedural / perioperative site stable. Minimal or no bleeding. Yes          12. If GI endoscopy procedure, minimal or no abdominal distention or passing flatus. N/A      13. Written discharge instructions and emergency telephone number provided. Yes      14. Accompanied by a responsible adult.     Yes

## 2021-04-20 NOTE — OP NOTE
follow  up with us in three months with a repeat KUB.         Isa Watkins    D: 04/20/2021 12:32:07       T: 04/20/2021 13:33:07     [email or web address]  Job#: [id number]     Doc#: [id number]    CC:

## 2021-04-20 NOTE — BRIEF OP NOTE
Brief Postoperative Note      Patient: Camilla Mcdonough  YOB: 1955  MRN: [id number]    Date of Procedure: 4/20/2021    Pre-Op Diagnosis: RIGHT RENAL CALCULUS    Post-Op Diagnosis: Same       Procedure(s):  ESWL EXTRACORPOREAL SHOCK WAVE LITHOTRIPSY    Surgeon(s):  Aaron Aranda MD    Assistant:  * No surgical staff found *    Anesthesia: General    Estimated Blood Loss (mL): Minimal    Complications: None    Specimens:   * No specimens in log *    Implants:  * No implants in log *      Drains: * No LDAs found *    Findings: 8mm/ 3mm right renal calculi    Electronically signed by Aaron Aranda MD on 4/20/2021 at 9:03 AM

## 2021-04-20 NOTE — ANESTHESIA PRE PROCEDURE
Department of Anesthesiology  Preprocedure Note       Name:  Manjeet Ludwig   Age:  72 y.o.  :  1955                                          MRN:  [id number]         Date:  2021      Surgeon: Clarita Alex):  Laquita Granado MD    Procedure: Procedure(s):  ESWL EXTRACORPOREAL SHOCK WAVE LITHOTRIPSY    Medications prior to admission:   Prior to Admission medications    Medication Sig Start Date End Date Taking? Authorizing Provider   B Complex-Biotin-FA (VITAMIN B50 COMPLEX PO) Take 50 mg by mouth daily   Yes Historical Provider, MD   Zinc Sulfate (ZINC 15 PO) Take by mouth   Yes Historical Provider, MD   Red Yeast Rice Extract (RED YEAST RICE PO) Take by mouth   Yes Historical Provider, MD   CINNAMON PO Take by mouth   Yes Historical Provider, MD   Ascorbic Acid (VITAMIN C) 1000 MG tablet Take 1,000 mg by mouth daily   Yes Historical Provider, MD   atorvastatin (LIPITOR) 20 MG tablet Take 1 tablet by mouth daily 20  Yes Desiree Le MD   levothyroxine (SYNTHROID) 75 MCG tablet Take 1 tablet by mouth Daily 20  Yes Desiree Le MD   losartan (COZAAR) 50 MG tablet Take 1 tablet by mouth daily 20  Yes Desiree Le MD   acetaminophen (TYLENOL) 500 MG tablet Take 500 mg by mouth every 6 hours as needed for Pain   Yes Historical Provider, MD   diphenhydrAMINE (BENADRYL) 25 MG capsule Take 25 mg by mouth nightly as needed for Itching   Yes Historical Provider, MD   Polyethylene Glycol 3350 (MIRALAX PO) Take by mouth every other day Indications: PRN    Yes Historical Provider, MD   Glucosamine-Chondroitin (GLUCOSAMINE CHONDR COMPLEX PO) Take by mouth   Yes Historical Provider, MD   vitamin D (ERGOCALCIFEROL) 400 UNITS CAPS Take 2,000 Units by mouth daily    Yes Historical Provider, MD   ibuprofen (ADVIL;MOTRIN) 400 MG tablet Take 400 mg by mouth every 6 hours as needed for Pain.    Yes Historical Provider, MD   ketorolac (TORADOL) 10 MG tablet Take 10 mg by mouth every 6 hours as needed for Pain    Historical Provider, MD   clindamycin (CLEOCIN) 150 MG capsule Take 150 mg by mouth as needed for Other For dental work 1/25/21   Historical Provider, MD   ondansetron (ZOFRAN) 4 MG tablet Take 1 tablet by mouth every 6 hours as needed for Nausea 2/3/21   SAL Soni - CNP   alendronate (FOSAMAX) 70 MG tablet Take 1 tablet by mouth every 7 days 9/22/20   Kerwin Carson MD       Current medications:    Current Facility-Administered Medications   Medication Dose Route Frequency Provider Last Rate Last Admin    lactated ringers infusion   Intravenous Continuous Tosha Rowley MD        lactated ringers infusion   Intravenous Continuous Tosha Rowley  mL/hr at 04/20/21 0703 New Bag at 04/20/21 0703    sodium chloride flush 0.9 % injection 5-40 mL  5-40 mL Intravenous 2 times per day Tosha Rowley MD        sodium chloride flush 0.9 % injection 5-40 mL  5-40 mL Intravenous PRN Tosha Rowley MD        0.9 % sodium chloride infusion  25 mL Intravenous PRN Tosha Rowley MD        ciprofloxacin (CIPRO) IVPB 400 mg  400 mg Intravenous On Call to Phuc Ayala MD           Allergies:  No Known Allergies    Problem List:    Patient Active Problem List   Diagnosis Code    Left ureteral calculus N20.1    Renal lithiasis lythotripsy 4/28/15 N20.0    Renal calculus N20.0    Hypothyroidism E03.9    Osteoporosis M81.0    Melanocytic nevus D22.9    History of colon polyps Z86.010    Mixed hyperlipidemia E78.2    Hypertension I10       Past Medical History:        Diagnosis Date    Fracture of wrist, unspecified laterality, closed, initial encounter     right    History of sepsis 2009    following lithotripsy    Hyperlipidemia     Hypertension     Hypothyroidism 2007    secondary to radioactive iodine 2007    Kidney stone     Osteoporosis     Primary localized osteoarthritis of right hip 3/18/2019       Past Surgical History:        Procedure Laterality Date    CHOLECYSTECTOMY, LAPAROSCOPIC  2009    COLONOSCOPY  07/17/2014    Dr. Mark Guevara - tubular adenomatous polyp removed    COLONOSCOPY N/A 8/1/2018    Dr. Ottoniel Singh architectural distortion, suggestive of mucosal prolapse    CYSTOSCOPY  2015    stent removal and reinsert     CYSTOSCOPY Left     HLL with stent    CYSTOSCOPY Left 2/5/2021    CYSTOSCOPY URETEROSCOPY LASER-HLL performed by Aaron Aranda MD at [address] [place] [address] / [facility] [place] Left 2/5/2021    CYSTOSCOPY [facility]    LITHOTRIPSY Left 01/30/2018    cystoscopy- Dr. Mike Mcbride  2007    radioactive iodine procedure    [place] CYSTOURETHROSCOPY Left 1/30/2018    CYSTOSCOPY performed by Aaron [facility]anda MD at [facility] ESWL Left 1/30/2018    ESWL [address] LITHOTRIPSY performed by Aaron Aranda MD at [place] [address] Right 3/18/2019    Dr. [facility] History:    Social History     Tobacco Use    Smoking status: Current Some Day Smoker     Packs/day: 0.25     Years: 30.00     Pack years: 7.50     Types: Cigarettes    Smokeless tobacco: Never Used   Substance Use Topics    Alcohol use:  Yes     Alcohol/week: 0.0 standard drinks     Comment: socially                                Ready to quit: Not Answered  Counseling given: Not Answered      Vital Signs (Current):   Vitals:    04/07/21 1200 04/20/21 0648   BP:  112/70   Pulse:  56   Resp:  16   Temp:  36.7 °C (98 °F)   TempSrc:  Temporal   SpO2:  97%   Weight: 126 lb (57.2 kg) 122 lb 3.2 oz (55.4 kg)   Height: 5' 3\" (1.6 m) 5' 3\" (1.6 m)                                              BP Readings from Last 3 Encounters:   04/20/21 112/70   04/08/21 131/84   02/05/21 131/67 NPO Status: Time of last liquid consumption: 2200                        Time of last solid consumption: 1800                        Date of last liquid consumption: 04/19/21                        Date of last solid food consumption: 04/19/21    BMI:   Wt Readings from Last 3 Encounters:   04/20/21 122 lb 3.2 oz (55.4 kg)   04/08/21 122 lb (55.3 kg)   02/05/21 126 lb 6.4 oz (57.3 kg)     Body mass index is 21.65 kg/m². CBC:   Lab Results   Component Value Date    WBC 8.3 03/26/2021    RBC 4.64 03/26/2021    RBC 4.08 05/08/2012    HGB 14.3 03/26/2021    HCT 44.7 03/26/2021    MCV 96.3 03/26/2021    RDW 13.1 03/26/2021     03/26/2021     05/08/2012       CMP:   Lab Results   Component Value Date     03/26/2021    K 5.4 03/26/2021     03/26/2021    CO2 28 03/26/2021    BUN 14 03/26/2021    CREATININE 0.80 03/26/2021    GFRAA >60 03/26/2021    LABGLOM >60 03/26/2021    GLUCOSE 93 03/26/2021    GLUCOSE 102 05/08/2012    CALCIUM 10.4 03/26/2021    AST 29 03/26/2021    ALT 22 03/26/2021       POC Tests: No results for input(s): POCGLU, POCNA, POCK, POCCL, POCBUN, POCHEMO, POCHCT in the last 72 hours.     Coags: No results found for: PROTIME, INR, APTT    HCG (If Applicable): No results found for: PREGTESTUR, PREGSERUM, HCG, HCGQUANT     ABGs: No results found for: PHART, PO2ART, VSO8TBV, DWQ5VNI, BEART, H7AARDHK     Type & Screen (If Applicable):  No results found for: LABABO, LABRH    Drug/Infectious Status (If Applicable):  No results found for: HIV, HEPCAB    COVID-19 Screening (If Applicable):   Lab Results   Component Value Date    COVID19 Not Detected 04/13/2021           Anesthesia Evaluation  Patient summary reviewed and Nursing notes reviewed no history of anesthetic complications:   Airway: Mallampati: II  TM distance: >3 FB   Neck ROM: full  Mouth opening: > = 3 FB Dental:      Comment: Pt has crowns, denies anything loose     Pulmonary: breath sounds clear to auscultation  (+) current smoker          Patient did not smoke on day of surgery. Cardiovascular:    (+) hypertension:, murmur, hyperlipidemia        Rhythm: regular  Rate: normal                    Neuro/Psych:   (+) headaches (occassional ):,             GI/Hepatic/Renal:   (+) renal disease: kidney stones,           Endo/Other:    (+) hypothyroidism::., .                 Abdominal:       Abdomen: soft. Vascular:                                        Anesthesia Plan      general     ASA 2       Induction: intravenous. Anesthetic plan and risks discussed with patient. Plan discussed with CRNA.                   Eve Webb   4/20/2021

## 2021-04-20 NOTE — ANESTHESIA POSTPROCEDURE EVALUATION
Department of Anesthesiology  Postprocedure Note    Patient: Crow Gee  MRN: [id number]  YOB: 1955  Date of evaluation: 4/20/2021  Time:  11:14 AM     Procedure Summary     Date: 04/20/21 Room / Location: [address]    Anesthesia Start: 6115 Anesthesia Stop: 9275    Procedure: ESWL EXTRACORPOREAL SHOCK WAVE LITHOTRIPSY (Right ) Diagnosis: (RIGHT RENAL CALCULUS)    Surgeons: Keri Rodriguez MD Responsible Provider: Mee Serrano    Anesthesia Type: general ASA Status: 2          Anesthesia Type: general    Hannah Phase I: Hannah Score: 10    Hannah Phase II: Hannah Score: 10    Last vitals: Reviewed and per EMR flowsheets.        Anesthesia Post Evaluation    Patient location during evaluation: PACU  Patient participation: complete - patient participated  Level of consciousness: awake  Pain score: 0  Airway patency: patent  Nausea & Vomiting: no nausea and no vomiting  Complications: no  Cardiovascular status: blood pressure returned to baseline  Respiratory status: acceptable  Hydration status: euvolemic

## 2021-04-22 ENCOUNTER — TELEPHONE (OUTPATIENT)
Dept: UROLOGY | Age: 66
End: 2021-04-22

## 2021-04-22 DIAGNOSIS — N20.0 RENAL STONE: Primary | ICD-10-CM

## 2021-04-29 ENCOUNTER — TELEPHONE (OUTPATIENT)
Dept: UROLOGY | Age: 66
End: 2021-04-29

## 2021-05-11 ENCOUNTER — HOSPITAL ENCOUNTER (OUTPATIENT)
Age: 66
Setting detail: SPECIMEN
Discharge: HOME | End: 2021-05-11
Payer: MEDICARE

## 2021-05-11 DIAGNOSIS — R19.7 DIARRHEA, UNSPECIFIED TYPE: ICD-10-CM

## 2021-05-11 PROCEDURE — 87015 SPECIMEN INFECT AGNT CONCNTJ: CPT

## 2021-05-11 PROCEDURE — 87210 SMEAR WET MOUNT SALINE/INK: CPT

## 2021-05-11 PROCEDURE — 87506 IADNA-DNA/RNA PROBE TQ 6-11: CPT

## 2021-05-11 PROCEDURE — 87209 SMEAR COMPLEX STAIN: CPT

## 2021-05-12 LAB
CAMPYLOBACTER PCR: NORMAL
E COLI ENTEROTOXIGENIC PCR: NORMAL
PLESIOMONAS SHIGELLOIDES PCR: NORMAL
SALMONELLA PCR: NORMAL
SHIGATOXIN GENE PCR: NORMAL
SHIGELLA SP PCR: NORMAL
SPECIMEN DESCRIPTION: NORMAL
VIBRIO PCR: NORMAL
YERSINIA ENTEROCOLITICA PCR: NORMAL

## 2021-05-13 LAB
Lab: NORMAL
MICRO OVA & PARASITES: NORMAL
MICRO OVA & PARASITES: NORMAL
SPECIMEN DESCRIPTION: NORMAL

## 2021-07-20 ENCOUNTER — HOSPITAL ENCOUNTER (OUTPATIENT)
Dept: GENERAL RADIOLOGY | Age: 66
Discharge: HOME | End: 2021-07-22
Payer: MEDICARE

## 2021-07-20 ENCOUNTER — HOSPITAL ENCOUNTER (OUTPATIENT)
Age: 66
Discharge: HOME | End: 2021-07-22
Payer: MEDICARE

## 2021-07-20 DIAGNOSIS — N20.0 RENAL STONE: ICD-10-CM

## 2021-07-20 PROCEDURE — 74018 RADEX ABDOMEN 1 VIEW: CPT

## 2021-07-23 ENCOUNTER — OFFICE VISIT (OUTPATIENT)
Dept: UROLOGY | Age: 66
End: 2021-07-23
Payer: MEDICARE

## 2021-07-23 VITALS
SYSTOLIC BLOOD PRESSURE: 124 MMHG | DIASTOLIC BLOOD PRESSURE: 78 MMHG | WEIGHT: 124 LBS | BODY MASS INDEX: 21.97 KG/M2 | TEMPERATURE: 97.5 F

## 2021-07-23 DIAGNOSIS — N20.0 RENAL STONE: Primary | ICD-10-CM

## 2021-07-23 PROCEDURE — 99213 OFFICE O/P EST LOW 20 MIN: CPT | Performed by: NURSE PRACTITIONER

## 2021-07-23 PROCEDURE — 3017F COLORECTAL CA SCREEN DOC REV: CPT | Performed by: NURSE PRACTITIONER

## 2021-07-23 PROCEDURE — G8420 CALC BMI NORM PARAMETERS: HCPCS | Performed by: NURSE PRACTITIONER

## 2021-07-23 PROCEDURE — 4040F PNEUMOC VAC/ADMIN/RCVD: CPT | Performed by: NURSE PRACTITIONER

## 2021-07-23 PROCEDURE — 1123F ACP DISCUSS/DSCN MKR DOCD: CPT | Performed by: NURSE PRACTITIONER

## 2021-07-23 PROCEDURE — G8399 PT W/DXA RESULTS DOCUMENT: HCPCS | Performed by: NURSE PRACTITIONER

## 2021-07-23 PROCEDURE — G8427 DOCREV CUR MEDS BY ELIG CLIN: HCPCS | Performed by: NURSE PRACTITIONER

## 2021-07-23 PROCEDURE — 4004F PT TOBACCO SCREEN RCVD TLK: CPT | Performed by: NURSE PRACTITIONER

## 2021-07-23 PROCEDURE — 99211 OFF/OP EST MAY X REQ PHY/QHP: CPT

## 2021-07-23 PROCEDURE — 1090F PRES/ABSN URINE INCON ASSESS: CPT | Performed by: NURSE PRACTITIONER

## 2021-07-23 RX ORDER — TAMSULOSIN HYDROCHLORIDE 0.4 MG/1
0.4 CAPSULE ORAL DAILY
Qty: 30 CAPSULE | Refills: 0 | Status: SHIPPED | OUTPATIENT
Start: 2021-07-23

## 2021-07-23 ASSESSMENT — ENCOUNTER SYMPTOMS
NAUSEA: 0
CONSTIPATION: 0
COUGH: 0
APNEA: 0
SHORTNESS OF BREATH: 0
ABDOMINAL PAIN: 0
BACK PAIN: 0
WHEEZING: 0
EYE REDNESS: 0
VOMITING: 0
COLOR CHANGE: 0

## 2021-07-23 NOTE — PROGRESS NOTES
HPI:        Patient is a 72 y.o. female in no acute distress. She is alert and oriented to person, place, and time. History  10/2013 Left HLL     11/2013 Right ESWL      4/2015 Left ESWL     1/2018 Left ESWL     4/2021 Right ESWL    Today  Here today to follow-up status post right ESWL. She denies any flank or abdominal pain, dysuria or gross hematuria. She denies any episodes of spontaneous stone passage. She did have a KUB completed prior to today's visit. This film was independently reviewed and shows bilateral nonobstructing renal stones. Improved stone burden on the right compared to prior.     Past Medical History:   Diagnosis Date    Fracture of wrist, unspecified laterality, closed, initial encounter     right    History of sepsis 2009    following lithotripsy    Hyperlipidemia     Hypertension     Hypothyroidism 2007    secondary to radioactive iodine 2007    Kidney stone     Osteoporosis     Primary localized osteoarthritis of right hip 3/18/2019     Past Surgical History:   Procedure Laterality Date    CHOLECYSTECTOMY, LAPAROSCOPIC  2009    COLONOSCOPY  07/17/2014    Dr. [facility]io [facility] [place] - tubular adenomatous polyp removed    COLONOSCOPY N/A 8/1/2018    Dr. Luna Hall architectural distortion, suggestive of mucosal prolapse    CYSTOSCOPY  2015    stent removal and reinsert     CYSTOSCOPY Left     HLL with stent    CYSTOSCOPY Left 2/5/2021    CYSTOSCOPY URETEROSCOPY LASER-HLL performed by Kayleigh Harvey MD at [address] / REMOVAL Juan Mon Radha / STONE Left 2/5/2021    CYSTOSCOPY RETROGRADE PYELOGRAM STENT INSERTION performed by Kayleigh Harvey MD at [facility] LITHOTRIPSY  4/28/2015    left    LITHOTRIPSY Left 01/30/2018    cystoscopy- Dr. Johnson Mccoy LITHOTRIPSY Right 04/20/2021    LITHOTRIPSY Right 4/20/2021    [facility] [address] LITHOTRIPSY performed by Hang Elkins MD at [facility]  2007    radioactive iodine procedure    [place] CYSTOURETHROSCOPY Left 1/30/2018    CYSTOSCOPY performed by Hang Elkins MD at [address] ESWL Left 1/30/2018    [facility] EXTRACORPEAL SHOCK WAVE LITHOTRIPSY performed by Hang Elkins MD at [address] Right 3/18/2019    Dr. Paulina Salehbs Medications as of 7/23/2021   Medication Sig Dispense Refill    CALCIUM PO Take by mouth 3 times a week      Zinc Sulfate (ZINC 15 PO) Take by mouth      Ascorbic Acid (VITAMIN C) 1000 MG tablet Take 1,000 mg by mouth daily      atorvastatin (LIPITOR) 20 MG tablet Take 1 tablet by mouth daily 90 tablet 3    levothyroxine (SYNTHROID) 75 MCG tablet Take 1 tablet by mouth Daily 90 tablet 3    alendronate (FOSAMAX) 70 MG tablet Take 1 tablet by mouth every 7 days 12 tablet 3    losartan (COZAAR) 50 MG tablet Take 1 tablet by mouth daily 90 tablet 3    acetaminophen (TYLENOL) 500 MG tablet Take 500 mg by mouth every 6 hours as needed for Pain      Glucosamine-Chondroitin (GLUCOSAMINE CHONDR COMPLEX PO) Take by mouth      vitamin D (ERGOCALCIFEROL) 400 UNITS CAPS Take 2,000 Units by mouth daily       ibuprofen (ADVIL;MOTRIN) 400 MG tablet Take 400 mg by mouth every 6 hours as needed for Pain.       [DISCONTINUED] B Complex-Biotin-FA (VITAMIN B50 COMPLEX PO) Take 50 mg by mouth daily (Patient not taking: Reported on 7/23/2021)      [DISCONTINUED] ketorolac (TORADOL) 10 MG tablet Take 10 mg by mouth every 6 hours as needed for Pain (Patient not taking: Reported on 7/23/2021)      [DISCONTINUED] clindamycin (CLEOCIN) 150 MG capsule Take 150 mg by mouth as needed for Other For dental work (Patient not taking: Reported on 7/23/2021)      [DISCONTINUED] Red Yeast Rice Extract (RED YEAST RICE PO) Take by mouth (Patient not taking: Reported on 7/23/2021)      [DISCONTINUED] CINNAMON PO Take by mouth (Patient not taking: Reported on 7/23/2021)      [DISCONTINUED] ondansetron (ZOFRAN) 4 MG tablet Take 1 tablet by mouth every 6 hours as needed for Nausea (Patient not taking: Reported on 7/23/2021) 30 tablet 0    [DISCONTINUED] diphenhydrAMINE (BENADRYL) 25 MG capsule Take 25 mg by mouth nightly as needed for Itching (Patient not taking: Reported on 7/23/2021)      [DISCONTINUED] Polyethylene Glycol 3350 (MIRALAX PO) Take by mouth every other day Indications: PRN  (Patient not taking: Reported on 7/23/2021)       No facility-administered encounter medications on file as of 7/23/2021. Current Outpatient Medications on File Prior to Visit   Medication Sig Dispense Refill    CALCIUM PO Take by mouth 3 times a week      Zinc Sulfate (ZINC 15 PO) Take by mouth      Ascorbic Acid (VITAMIN C) 1000 MG tablet Take 1,000 mg by mouth daily      atorvastatin (LIPITOR) 20 MG tablet Take 1 tablet by mouth daily 90 tablet 3    levothyroxine (SYNTHROID) 75 MCG tablet Take 1 tablet by mouth Daily 90 tablet 3    alendronate (FOSAMAX) 70 MG tablet Take 1 tablet by mouth every 7 days 12 tablet 3    losartan (COZAAR) 50 MG tablet Take 1 tablet by mouth daily 90 tablet 3    acetaminophen (TYLENOL) 500 MG tablet Take 500 mg by mouth every 6 hours as needed for Pain      Glucosamine-Chondroitin (GLUCOSAMINE CHONDR COMPLEX PO) Take by mouth      vitamin D (ERGOCALCIFEROL) 400 UNITS CAPS Take 2,000 Units by mouth daily       ibuprofen (ADVIL;MOTRIN) 400 MG tablet Take 400 mg by mouth every 6 hours as needed for Pain. No current facility-administered medications on file prior to visit. Patient has no known allergies.   Family History   Problem Relation Age of Onset    Stroke Father     Hypertension Father     Breast Cancer Maternal Grandmother     Alzheimer's Disease Mother      Social History     Tobacco Use   Smoking Status Current Some Day Smoker    Packs/day: 0.25    Years: 30.00    Pack years: 7.50    Types: Cigarettes   Smokeless Tobacco Never Used       Social History     Substance and Sexual Activity   Alcohol Use Yes    Alcohol/week: 0.0 standard drinks    Comment: socially       Review of Systems   Constitutional: Negative for appetite change, chills and fever. Eyes: Negative for redness and visual disturbance. Respiratory: Negative for apnea, cough, shortness of breath and wheezing. Cardiovascular: Negative for chest pain and leg swelling. Gastrointestinal: Negative for abdominal pain, constipation, nausea and vomiting. Genitourinary: Negative for difficulty urinating, dyspareunia, dysuria, enuresis, flank pain, frequency, hematuria, pelvic pain, urgency, vaginal bleeding and vaginal discharge. Musculoskeletal: Negative for back pain, joint swelling and myalgias. Skin: Negative for color change, rash and wound. Neurological: Negative for dizziness, tremors and numbness. Hematological: Negative for adenopathy. Does not bruise/bleed easily. Psychiatric/Behavioral: Negative for sleep disturbance. /78 (Site: Right Upper Arm, Position: Sitting, Cuff Size: Medium Adult) Comment: manual  Temp 97.5 °F (36.4 °C) (Temporal)   Wt 124 lb (56.2 kg)   LMP  (LMP Unknown)   BMI 21.97 kg/m²       PHYSICAL EXAM:  Constitutional: Patient resting comfortably, in no acute distress. Neuro: Alert and oriented to person place and time. Cranial nerves grossly intact. Psych: Mood and affect normal.  Skin: Warm, dry  HEENT: normocephalic, atraumatic  Lymphatics: No palpable lymphadenopathy  Lungs: Respiratory effort normal, unlabored  Cardiovascular:  Normal peripheral pulses  Abdomen: Soft, non-tender, non-distended with no organomegaly or palpable masses. : No CVA tenderness. Bladder non-tender and not distended.   Pelvic: Deferred    Lab Results   Component Value Date    BUN 14 03/26/2021     Lab Results   Component Value Date    CREATININE 0.80 03/26/2021       ASSESSMENT:   Diagnosis Orders   1. Renal stone  XR ABDOMEN (KUB) (SINGLE AP VIEW)           PLAN:  We discussed further stone therapy versus continued observation. Patient would like to continue to observe her stones. To prevent future stone formation:  1) drink around 80 ounces of water per day  2) Avoid/minimize intake of \"bad fluids\" (soda pop, coffee, tea, alcohol, energy drinks)  3) reduce consumption of sodium/salt  4) reduce consumption of fatty animal protein (full-fat cheese/milk/dairy, red meats, pork). Limit protein intake to low-fat/lean options (low-fat dairy, fish, chicken, turkey)    We will see her in 1 year with a KUB prior or sooner if needed for new or worsening symptoms.

## 2021-07-23 NOTE — PATIENT INSTRUCTIONS
To prevent future stone formation:  1) drink around 80 ounces of water per day  2) Avoid/minimize intake of \"bad fluids\" (soda pop, coffee, tea, alcohol, energy drinks)  3) reduce consumption of sodium/salt  4) reduce consumption of fatty animal protein (full-fat cheese/milk/dairy, red meats, pork). Limit protein intake to low-fat/lean options (low-fat dairy, fish, chicken, turkey)      SURVEY:    You may be receiving a survey from [employer] regarding your visit today. Please complete the survey to enable us to provide the highest quality of care to you and your family. If you cannot score us a very good on any question, please call the office to discuss how we could have made your experience a very good one. Thank you.     Your MA today: Malcolm Arora

## 2021-09-21 ENCOUNTER — HOSPITAL ENCOUNTER (OUTPATIENT)
Age: 66
Discharge: HOME | End: 2021-09-21
Payer: MEDICARE

## 2021-09-21 DIAGNOSIS — R73.01 IMPAIRED FASTING GLUCOSE: ICD-10-CM

## 2021-09-21 DIAGNOSIS — E78.2 MIXED HYPERLIPIDEMIA: ICD-10-CM

## 2021-09-21 DIAGNOSIS — I10 ESSENTIAL HYPERTENSION, BENIGN: ICD-10-CM

## 2021-09-21 DIAGNOSIS — E03.9 HYPOTHYROIDISM, UNSPECIFIED TYPE: ICD-10-CM

## 2021-09-21 LAB
ALT SERPL-CCNC: 19 U/L (ref 5–33)
ANION GAP SERPL CALCULATED.3IONS-SCNC: 12 MMOL/L (ref 9–17)
AST SERPL-CCNC: 21 U/L
BUN BLDV-MCNC: 12 MG/DL (ref 8–23)
BUN/CREAT BLD: 16 (ref 9–20)
CALCIUM SERPL-MCNC: 9.7 MG/DL (ref 8.6–10.4)
CHLORIDE BLD-SCNC: 100 MMOL/L (ref 98–107)
CHOLESTEROL/HDL RATIO: 2.6
CHOLESTEROL: 121 MG/DL
CO2: 26 MMOL/L (ref 20–31)
CREAT SERPL-MCNC: 0.74 MG/DL (ref 0.5–0.9)
ESTIMATED AVERAGE GLUCOSE: 94 MG/DL
GFR AFRICAN AMERICAN: >60 ML/MIN
GFR NON-AFRICAN AMERICAN: >60 ML/MIN
GFR SERPL CREATININE-BSD FRML MDRD: NORMAL ML/MIN/{1.73_M2}
GFR SERPL CREATININE-BSD FRML MDRD: NORMAL ML/MIN/{1.73_M2}
GLUCOSE BLD-MCNC: 90 MG/DL (ref 70–99)
HBA1C MFR BLD: 4.9 % (ref 4–6)
HCT VFR BLD CALC: 43.4 % (ref 36.3–47.1)
HDLC SERPL-MCNC: 46 MG/DL
HEMOGLOBIN: 13.9 G/DL (ref 11.9–15.1)
LDL CHOLESTEROL: 54 MG/DL (ref 0–130)
MCH RBC QN AUTO: 32.3 PG (ref 25.2–33.5)
MCHC RBC AUTO-ENTMCNC: 32 G/DL (ref 28.4–34.8)
MCV RBC AUTO: 100.7 FL (ref 82.6–102.9)
NRBC AUTOMATED: 0 PER 100 WBC
PDW BLD-RTO: 12.7 % (ref 11.8–14.4)
PLATELET # BLD: 287 K/UL (ref 138–453)
PMV BLD AUTO: 10.1 FL (ref 8.1–13.5)
POTASSIUM SERPL-SCNC: 4.1 MMOL/L (ref 3.7–5.3)
RBC # BLD: 4.31 M/UL (ref 3.95–5.11)
SODIUM BLD-SCNC: 138 MMOL/L (ref 135–144)
T3 FREE: 2.73 PG/ML (ref 2.02–4.43)
THYROXINE, FREE: 1.49 NG/DL (ref 0.93–1.7)
TRIGL SERPL-MCNC: 107 MG/DL
TSH SERPL DL<=0.05 MIU/L-ACNC: 0.54 MIU/L (ref 0.3–5)
VLDLC SERPL CALC-MCNC: NORMAL MG/DL (ref 1–30)
WBC # BLD: 8.4 K/UL (ref 3.5–11.3)

## 2021-09-21 PROCEDURE — 84443 ASSAY THYROID STIM HORMONE: CPT

## 2021-09-21 PROCEDURE — 83036 HEMOGLOBIN GLYCOSYLATED A1C: CPT

## 2021-09-21 PROCEDURE — 84439 ASSAY OF FREE THYROXINE: CPT

## 2021-09-21 PROCEDURE — 85027 COMPLETE CBC AUTOMATED: CPT

## 2021-09-21 PROCEDURE — 36415 COLL VENOUS BLD VENIPUNCTURE: CPT

## 2021-09-21 PROCEDURE — 84460 ALANINE AMINO (ALT) (SGPT): CPT

## 2021-09-21 PROCEDURE — 80061 LIPID PANEL: CPT

## 2021-09-21 PROCEDURE — 84481 FREE ASSAY (FT-3): CPT

## 2021-09-21 PROCEDURE — 84450 TRANSFERASE (AST) (SGOT): CPT

## 2021-09-21 PROCEDURE — 80048 BASIC METABOLIC PNL TOTAL CA: CPT

## 2022-04-01 ENCOUNTER — HOSPITAL ENCOUNTER (OUTPATIENT)
Age: 67
Discharge: HOME | End: 2022-04-01
Payer: MEDICARE

## 2022-04-01 DIAGNOSIS — E03.9 HYPOTHYROIDISM, UNSPECIFIED TYPE: ICD-10-CM

## 2022-04-01 DIAGNOSIS — R73.01 IMPAIRED FASTING GLUCOSE: ICD-10-CM

## 2022-04-01 DIAGNOSIS — I10 ESSENTIAL HYPERTENSION, BENIGN: ICD-10-CM

## 2022-04-01 DIAGNOSIS — E78.2 MIXED HYPERLIPIDEMIA: ICD-10-CM

## 2022-04-01 LAB
ALT SERPL-CCNC: 16 U/L (ref 5–33)
ANION GAP SERPL CALCULATED.3IONS-SCNC: 6 MMOL/L (ref 9–17)
AST SERPL-CCNC: 19 U/L
BUN BLDV-MCNC: 18 MG/DL (ref 8–23)
BUN/CREAT BLD: 23 (ref 9–20)
CALCIUM SERPL-MCNC: 9.8 MG/DL (ref 8.6–10.4)
CHLORIDE BLD-SCNC: 104 MMOL/L (ref 98–107)
CHOLESTEROL/HDL RATIO: 2.5
CHOLESTEROL: 144 MG/DL
CO2: 28 MMOL/L (ref 20–31)
CREAT SERPL-MCNC: 0.78 MG/DL (ref 0.5–0.9)
ESTIMATED AVERAGE GLUCOSE: 117 MG/DL
GFR AFRICAN AMERICAN: >60 ML/MIN
GFR NON-AFRICAN AMERICAN: >60 ML/MIN
GFR SERPL CREATININE-BSD FRML MDRD: ABNORMAL ML/MIN/{1.73_M2}
GFR SERPL CREATININE-BSD FRML MDRD: ABNORMAL ML/MIN/{1.73_M2}
GLUCOSE BLD-MCNC: 92 MG/DL (ref 70–99)
HBA1C MFR BLD: 5.7 % (ref 4–6)
HCT VFR BLD CALC: 40.6 % (ref 36.3–47.1)
HDLC SERPL-MCNC: 58 MG/DL
HEMOGLOBIN: 12.7 G/DL (ref 11.9–15.1)
LDL CHOLESTEROL: 68 MG/DL (ref 0–130)
MCH RBC QN AUTO: 31.3 PG (ref 25.2–33.5)
MCHC RBC AUTO-ENTMCNC: 31.3 G/DL (ref 28.4–34.8)
MCV RBC AUTO: 100 FL (ref 82.6–102.9)
NRBC AUTOMATED: 0 PER 100 WBC
PDW BLD-RTO: 13 % (ref 11.8–14.4)
PLATELET # BLD: 273 K/UL (ref 138–453)
PMV BLD AUTO: 9.8 FL (ref 8.1–13.5)
POTASSIUM SERPL-SCNC: 4.1 MMOL/L (ref 3.7–5.3)
RBC # BLD: 4.06 M/UL (ref 3.95–5.11)
SODIUM BLD-SCNC: 138 MMOL/L (ref 135–144)
THYROXINE, FREE: 1.43 NG/DL (ref 0.93–1.7)
TRIGL SERPL-MCNC: 89 MG/DL
TSH SERPL DL<=0.05 MIU/L-ACNC: 0.41 UIU/ML (ref 0.3–5)
WBC # BLD: 8.3 K/UL (ref 3.5–11.3)

## 2022-04-01 PROCEDURE — 84460 ALANINE AMINO (ALT) (SGPT): CPT

## 2022-04-01 PROCEDURE — 85027 COMPLETE CBC AUTOMATED: CPT

## 2022-04-01 PROCEDURE — 84439 ASSAY OF FREE THYROXINE: CPT

## 2022-04-01 PROCEDURE — 80048 BASIC METABOLIC PNL TOTAL CA: CPT

## 2022-04-01 PROCEDURE — 36415 COLL VENOUS BLD VENIPUNCTURE: CPT

## 2022-04-01 PROCEDURE — 83036 HEMOGLOBIN GLYCOSYLATED A1C: CPT

## 2022-04-01 PROCEDURE — 84481 FREE ASSAY (FT-3): CPT

## 2022-04-01 PROCEDURE — 84443 ASSAY THYROID STIM HORMONE: CPT

## 2022-04-01 PROCEDURE — 80061 LIPID PANEL: CPT

## 2022-04-01 PROCEDURE — 84450 TRANSFERASE (AST) (SGOT): CPT

## 2022-04-02 LAB — T3 FREE: 2.93 PG/ML (ref 2.02–4.43)

## 2022-07-19 ENCOUNTER — HOSPITAL ENCOUNTER (OUTPATIENT)
Dept: GENERAL RADIOLOGY | Age: 67
Discharge: HOME | End: 2022-07-21
Payer: MEDICARE

## 2022-07-19 ENCOUNTER — HOSPITAL ENCOUNTER (OUTPATIENT)
Age: 67
Discharge: HOME | End: 2022-07-21
Payer: MEDICARE

## 2022-07-19 DIAGNOSIS — N20.0 RENAL STONE: ICD-10-CM

## 2022-07-19 PROCEDURE — 74018 RADEX ABDOMEN 1 VIEW: CPT

## 2022-07-21 ENCOUNTER — OFFICE VISIT (OUTPATIENT)
Dept: UROLOGY | Age: 67
End: 2022-07-21
Payer: MEDICARE

## 2022-07-21 VITALS
WEIGHT: 117 LBS | DIASTOLIC BLOOD PRESSURE: 78 MMHG | SYSTOLIC BLOOD PRESSURE: 130 MMHG | HEIGHT: 63 IN | BODY MASS INDEX: 20.73 KG/M2 | TEMPERATURE: 98 F

## 2022-07-21 DIAGNOSIS — N20.0 RENAL CALCULUS: Primary | ICD-10-CM

## 2022-07-21 PROCEDURE — 3017F COLORECTAL CA SCREEN DOC REV: CPT | Performed by: NURSE PRACTITIONER

## 2022-07-21 PROCEDURE — G8399 PT W/DXA RESULTS DOCUMENT: HCPCS | Performed by: NURSE PRACTITIONER

## 2022-07-21 PROCEDURE — 4004F PT TOBACCO SCREEN RCVD TLK: CPT | Performed by: NURSE PRACTITIONER

## 2022-07-21 PROCEDURE — 1123F ACP DISCUSS/DSCN MKR DOCD: CPT | Performed by: NURSE PRACTITIONER

## 2022-07-21 PROCEDURE — G8420 CALC BMI NORM PARAMETERS: HCPCS | Performed by: NURSE PRACTITIONER

## 2022-07-21 PROCEDURE — 99213 OFFICE O/P EST LOW 20 MIN: CPT | Performed by: NURSE PRACTITIONER

## 2022-07-21 PROCEDURE — G8427 DOCREV CUR MEDS BY ELIG CLIN: HCPCS | Performed by: NURSE PRACTITIONER

## 2022-07-21 PROCEDURE — 99211 OFF/OP EST MAY X REQ PHY/QHP: CPT | Performed by: NURSE PRACTITIONER

## 2022-07-21 PROCEDURE — 1090F PRES/ABSN URINE INCON ASSESS: CPT | Performed by: NURSE PRACTITIONER

## 2022-07-21 ASSESSMENT — ENCOUNTER SYMPTOMS
SHORTNESS OF BREATH: 0
COUGH: 0
BACK PAIN: 0
WHEEZING: 0
COLOR CHANGE: 0
CONSTIPATION: 0
VOMITING: 0
NAUSEA: 0
APNEA: 0
EYE REDNESS: 0
ABDOMINAL PAIN: 0

## 2022-07-21 NOTE — PROGRESS NOTES
HPI:        Patient is a 77 y.o. female in no acute distress. She is alert and oriented to person, place, and time. History  10/2013 Left HLL     11/2013 Right ESWL      4/2015 Left ESWL     1/2018 Left ESWL     4/2021 Right ESWL    Today  Here today to follow-up for stones. She denies any flank or abdominal pain, dysuria or gross hematuria. She denies any episodes of spontaneous stone passage. She did have a KUB completed prior to today's visit. This film was independently reviewed and shows bilateral nonobstructing renal stones.       Past Medical History:   Diagnosis Date    Fracture of wrist, unspecified laterality, closed, initial encounter     right    History of sepsis 2009    following lithotripsy    Hyperlipidemia     Hypertension     Hypothyroidism 2007    secondary to radioactive iodine 2007    Kidney stone     Osteoporosis     Primary localized osteoarthritis of right hip 3/18/2019     Past Surgical History:   Procedure Laterality Date    CHOLECYSTECTOMY, LAPAROSCOPIC  2009    COLONOSCOPY  07/17/2014    Dr. Rocio Taylor - tubular adenomatous polyp removed    COLONOSCOPY N/A 8/1/2018    Dr. Evan Aguilar architectural distortion, suggestive of mucosal prolapse    CYSTOSCOPY  2015    stent removal and reinsert     CYSTOSCOPY Left     HLL with stent    CYSTOSCOPY Left 2/5/2021    CYSTOSCOPY URETEROSCOPY LASER-HLL performed by Maximilian Glez MD at [address] [place] [address] / REMOVAL Yousif Tracie / Morgan Collier Left 2/5/2021    CYSTOSCOPY RETROGRADE PYELOGRAM STENT INSERTION performed by Maximilian Glez MD at [address] [facility]    LITHOTRIPSY  4/28/2015    left    LITHOTRIPSY Left 01/30/2018    cystoscopy- Dr. Estuardo Au     LITHOTRIPSY Right 04/20/2021    LITHOTRIPSY Right 4/20/2021    [facility] [address] LITHOTRIPSY performed by Maximilian Glez MD at [facility]  2007    radioactive iodine procedure    [place] CYSTOURETHROSCOPY Left 1/30/2018    CYSTOSCOPY performed by Duane Cruz MD at [address] [facility] ESWL Left 1/30/2018    [facility] [address]  LITHOTRIPSY performed by Duane Cruz MD at [address] [facility] Right 3/18/2019    Dr. Calos Rosa Encounter Medications as of 7/21/2022   Medication Sig Dispense Refill    alendronate (FOSAMAX) 70 MG tablet Take 1 tablet by mouth every 7 days 12 tablet 3    atorvastatin (LIPITOR) 20 MG tablet Take 1 tablet by mouth daily 90 tablet 3    levothyroxine (SYNTHROID) 75 MCG tablet Take 1 tablet by mouth Daily 90 tablet 3    losartan (COZAAR) 50 MG tablet Take 1 tablet by mouth daily 90 tablet 3    CALCIUM PO Take by mouth 3 times a week      Zinc Sulfate (ZINC 15 PO) Take 30 mg by mouth daily       Ascorbic Acid (VITAMIN C) 1000 MG tablet Take 500 mg by mouth daily       Glucosamine-Chondroitin (GLUCOSAMINE CHONDR COMPLEX PO) Take by mouth      vitamin D (ERGOCALCIFEROL) 400 UNITS CAPS Take 2,000 Units by mouth daily       ibuprofen (ADVIL;MOTRIN) 400 MG tablet Take 400 mg by mouth every 6 hours as needed for Pain.      tamsulosin (FLOMAX) 0.4 MG capsule Take 1 capsule by mouth daily (Patient not taking: Reported on 7/21/2022) 30 capsule 0    acetaminophen (TYLENOL) 500 MG tablet Take 500 mg by mouth every 6 hours as needed for Pain (Patient not taking: Reported on 7/21/2022)       No facility-administered encounter medications on file as of 7/21/2022.       Current Outpatient Medications on File Prior to Visit   Medication Sig Dispense Refill    alendronate (FOSAMAX) 70 MG tablet Take 1 tablet by mouth every 7 days 12 tablet 3    atorvastatin (LIPITOR) 20 MG tablet Take 1 tablet by mouth daily 90 tablet 3    levothyroxine (SYNTHROID) 75 MCG tablet Take 1 tablet by mouth Daily 90 tablet 3    losartan (COZAAR) 50 MG tablet Take 1 tablet by mouth daily 90 tablet 3    CALCIUM PO Take by mouth 3 times a week Zinc Sulfate (ZINC 15 PO) Take 30 mg by mouth daily       Ascorbic Acid (VITAMIN C) 1000 MG tablet Take 500 mg by mouth daily       Glucosamine-Chondroitin (GLUCOSAMINE CHONDR COMPLEX PO) Take by mouth      vitamin D (ERGOCALCIFEROL) 400 UNITS CAPS Take 2,000 Units by mouth daily       ibuprofen (ADVIL;MOTRIN) 400 MG tablet Take 400 mg by mouth every 6 hours as needed for Pain.      tamsulosin (FLOMAX) 0.4 MG capsule Take 1 capsule by mouth daily (Patient not taking: Reported on 7/21/2022) 30 capsule 0    acetaminophen (TYLENOL) 500 MG tablet Take 500 mg by mouth every 6 hours as needed for Pain (Patient not taking: Reported on 7/21/2022)       No current facility-administered medications on file prior to visit. Patient has no known allergies. Family History   Problem Relation Age of Onset    Stroke Father     Hypertension Father     Breast Cancer Maternal Grandmother     Alzheimer's Disease Mother      Social History     Tobacco Use   Smoking Status Some Days    Packs/day: 0.25    Years: 30.00    Pack years: 7.50    Types: Cigarettes   Smokeless Tobacco Never       Social History     Substance and Sexual Activity   Alcohol Use Yes    Alcohol/week: 0.0 standard drinks    Comment: socially       Review of Systems   Constitutional:  Negative for appetite change, chills and fever. Eyes:  Negative for redness and visual disturbance. Respiratory:  Negative for apnea, cough, shortness of breath and wheezing. Cardiovascular:  Negative for chest pain and leg swelling. Gastrointestinal:  Negative for abdominal pain, constipation, nausea and vomiting. Genitourinary:  Negative for difficulty urinating, dyspareunia, dysuria, enuresis, flank pain, frequency, hematuria, pelvic pain, urgency, vaginal bleeding and vaginal discharge. Musculoskeletal:  Negative for back pain, joint swelling and myalgias. Skin:  Negative for color change, rash and wound.    Neurological:  Negative for dizziness, tremors and numbness. Hematological:  Negative for adenopathy. Does not bruise/bleed easily. Psychiatric/Behavioral:  Negative for sleep disturbance. /78 (Site: Left Upper Arm, Position: Sitting, Cuff Size: Large Adult)   Temp 98 °F (36.7 °C) (Temporal)   Ht 5' 3\" (1.6 m)   Wt 117 lb (53.1 kg)   LMP  (LMP Unknown)   BMI 20.73 kg/m²       PHYSICAL EXAM:  Constitutional: Patient resting comfortably, in no acute distress. Neuro: Alert and oriented to person place and time. Cranial nerves grossly intact. Psych: Mood and affect normal.  Skin: Warm, dry  HEENT: normocephalic, atraumatic  Lymphatics: No palpable lymphadenopathy  Lungs: Respiratory effort normal, unlabored  Cardiovascular:  Normal peripheral pulses  Abdomen: Soft, non-tender, non-distended with no organomegaly or palpable masses. : No CVA tenderness. Bladder non-tender and not distended. Pelvic: Deferred    Lab Results   Component Value Date    BUN 18 04/01/2022     Lab Results   Component Value Date    CREATININE 0.78 04/01/2022       ASSESSMENT:   Diagnosis Orders   1. Renal calculus  XR ABDOMEN (KUB) (SINGLE AP VIEW)              PLAN:  To prevent future stone formation:  1) drink around 80 ounces of water per day  2) Avoid/minimize intake of \"bad fluids\" (soda pop, coffee, tea, alcohol, energy drinks)  3) reduce consumption of sodium/salt  4) reduce consumption of fatty animal protein (full-fat cheese/milk/dairy, red meats, pork).  Limit protein intake to low-fat/lean options (low-fat dairy, fish, chicken, turkey)    F/U in one year with a KUB prior or sooner if needed

## 2022-10-04 ENCOUNTER — HOSPITAL ENCOUNTER (OUTPATIENT)
Age: 67
Discharge: HOME | End: 2022-10-04
Payer: MEDICARE

## 2022-10-04 DIAGNOSIS — I10 ESSENTIAL HYPERTENSION, BENIGN: ICD-10-CM

## 2022-10-04 DIAGNOSIS — R73.01 IMPAIRED FASTING GLUCOSE: ICD-10-CM

## 2022-10-04 DIAGNOSIS — E03.9 HYPOTHYROIDISM, UNSPECIFIED TYPE: ICD-10-CM

## 2022-10-04 DIAGNOSIS — E78.2 MIXED HYPERLIPIDEMIA: ICD-10-CM

## 2022-10-04 LAB
ALT SERPL-CCNC: 20 U/L (ref 5–33)
ANION GAP SERPL CALCULATED.3IONS-SCNC: 8 MMOL/L (ref 9–17)
AST SERPL-CCNC: 21 U/L
BUN BLDV-MCNC: 17 MG/DL (ref 8–23)
BUN/CREAT BLD: 26 (ref 9–20)
CALCIUM SERPL-MCNC: 10.2 MG/DL (ref 8.6–10.4)
CHLORIDE BLD-SCNC: 103 MMOL/L (ref 98–107)
CO2: 28 MMOL/L (ref 20–31)
CREAT SERPL-MCNC: 0.65 MG/DL (ref 0.5–0.9)
GFR SERPL CREATININE-BSD FRML MDRD: >60 ML/MIN/1.73M2
GLUCOSE BLD-MCNC: 96 MG/DL (ref 70–99)
HCT VFR BLD CALC: 39.2 % (ref 36.3–47.1)
HEMOGLOBIN: 11.9 G/DL (ref 11.9–15.1)
MCH RBC QN AUTO: 27.4 PG (ref 25.2–33.5)
MCHC RBC AUTO-ENTMCNC: 30.4 G/DL (ref 28.4–34.8)
MCV RBC AUTO: 90.3 FL (ref 82.6–102.9)
NRBC AUTOMATED: 0 PER 100 WBC
PDW BLD-RTO: 14.6 % (ref 11.8–14.4)
PLATELET # BLD: 343 K/UL (ref 138–453)
PMV BLD AUTO: 9.6 FL (ref 8.1–13.5)
POTASSIUM SERPL-SCNC: 4.1 MMOL/L (ref 3.7–5.3)
RBC # BLD: 4.34 M/UL (ref 3.95–5.11)
SODIUM BLD-SCNC: 139 MMOL/L (ref 135–144)
T3 FREE: 2.55 PG/ML (ref 2.02–4.43)
THYROXINE, FREE: 1.3 NG/DL (ref 0.93–1.7)
TSH SERPL DL<=0.05 MIU/L-ACNC: 1.72 UIU/ML (ref 0.3–5)
WBC # BLD: 8.5 K/UL (ref 3.5–11.3)

## 2022-10-04 PROCEDURE — 84450 TRANSFERASE (AST) (SGOT): CPT

## 2022-10-04 PROCEDURE — 84460 ALANINE AMINO (ALT) (SGPT): CPT

## 2022-10-04 PROCEDURE — 84481 FREE ASSAY (FT-3): CPT

## 2022-10-04 PROCEDURE — 36415 COLL VENOUS BLD VENIPUNCTURE: CPT

## 2022-10-04 PROCEDURE — 85027 COMPLETE CBC AUTOMATED: CPT

## 2022-10-04 PROCEDURE — 84439 ASSAY OF FREE THYROXINE: CPT

## 2022-10-04 PROCEDURE — 83036 HEMOGLOBIN GLYCOSYLATED A1C: CPT

## 2022-10-04 PROCEDURE — 80048 BASIC METABOLIC PNL TOTAL CA: CPT

## 2022-10-04 PROCEDURE — 84443 ASSAY THYROID STIM HORMONE: CPT

## 2022-10-06 LAB
ESTIMATED AVERAGE GLUCOSE: 123 MG/DL
HBA1C MFR BLD: 5.9 % (ref 4–6)

## 2023-02-02 ENCOUNTER — HOSPITAL ENCOUNTER (OUTPATIENT)
Age: 68
Setting detail: SPECIMEN
Discharge: HOME | End: 2023-02-02
Payer: MEDICARE

## 2023-02-02 ENCOUNTER — OFFICE VISIT (OUTPATIENT)
Dept: OBGYN | Age: 68
End: 2023-02-02
Payer: MEDICARE

## 2023-02-02 VITALS
HEIGHT: 63 IN | BODY MASS INDEX: 20.02 KG/M2 | WEIGHT: 113 LBS | DIASTOLIC BLOOD PRESSURE: 80 MMHG | SYSTOLIC BLOOD PRESSURE: 134 MMHG

## 2023-02-02 DIAGNOSIS — Z11.51 SCREENING FOR HPV (HUMAN PAPILLOMAVIRUS): ICD-10-CM

## 2023-02-02 DIAGNOSIS — Z01.419 ENCOUNTER FOR ROUTINE GYNECOLOGIC EXAMINATION IN MEDICARE PATIENT: ICD-10-CM

## 2023-02-02 DIAGNOSIS — Z01.419 ENCOUNTER FOR ROUTINE GYNECOLOGIC EXAMINATION IN MEDICARE PATIENT: Primary | ICD-10-CM

## 2023-02-02 DIAGNOSIS — Z12.31 ENCOUNTER FOR SCREENING MAMMOGRAM FOR MALIGNANT NEOPLASM OF BREAST: ICD-10-CM

## 2023-02-02 PROCEDURE — 87624 HPV HI-RISK TYP POOLED RSLT: CPT

## 2023-02-02 PROCEDURE — 3075F SYST BP GE 130 - 139MM HG: CPT | Performed by: ADVANCED PRACTICE MIDWIFE

## 2023-02-02 PROCEDURE — 99397 PER PM REEVAL EST PAT 65+ YR: CPT | Performed by: ADVANCED PRACTICE MIDWIFE

## 2023-02-02 PROCEDURE — G0145 SCR C/V CYTO,THINLAYER,RESCR: HCPCS

## 2023-02-02 PROCEDURE — 3079F DIAST BP 80-89 MM HG: CPT | Performed by: ADVANCED PRACTICE MIDWIFE

## 2023-02-02 RX ORDER — CLINDAMYCIN HYDROCHLORIDE 150 MG/1
CAPSULE ORAL
COMMUNITY
Start: 2023-01-12

## 2023-02-02 ASSESSMENT — PATIENT HEALTH QUESTIONNAIRE - PHQ9
SUM OF ALL RESPONSES TO PHQ QUESTIONS 1-9: 0
SUM OF ALL RESPONSES TO PHQ QUESTIONS 1-9: 0
2. FEELING DOWN, DEPRESSED OR HOPELESS: 0
SUM OF ALL RESPONSES TO PHQ QUESTIONS 1-9: 0
SUM OF ALL RESPONSES TO PHQ QUESTIONS 1-9: 0
SUM OF ALL RESPONSES TO PHQ9 QUESTIONS 1 & 2: 0
1. LITTLE INTEREST OR PLEASURE IN DOING THINGS: 0

## 2023-02-02 NOTE — PROGRESS NOTES
MEDICARE PHYSICAL    Date of service: 2023    Grace Cody  Is a 79 y.o.  female    PT's PCP is: Pam Robertson MD     : 1955     HIGH RISK ASSESSMENT    Maternal AARON Exposure (in utero): No    Sexual activity < 16 yrs of age: No    Greater than 5 sexual partners: No    3 abnormal paps in the last 7 years: No    History of any STD (including HIV): No                                        Subjective:       No LMP recorded (lmp unknown). Patient is postmenopausal.     Are your menses regular: not applicable    OB History    Para Term  AB Living   6 4     2 4   SAB IAB Ectopic Molar Multiple Live Births             4      # Outcome Date GA Lbr Venkat/2nd Weight Sex Delivery Anes PTL Lv   6 Para     F Vag-Spont   BRENNEN   5 Para     M Vag-Spont   BRENNEN   4 Para     M Vag-Spont   BRENNEN   3 AB            2 Para     F Vag-Spont   BRENNEN   1 AB                 Social History     Tobacco Use   Smoking Status Some Days    Packs/day: 0.25    Years: 30.00    Pack years: 7.50    Types: Cigarettes   Smokeless Tobacco Never        Social History     Substance and Sexual Activity   Alcohol Use Yes    Alcohol/week: 0.0 standard drinks    Comment: socially       Allergies: Patient has no known allergies.       Current Outpatient Medications:     clindamycin (CLEOCIN) 150 MG capsule, , Disp: , Rfl:     levothyroxine (SYNTHROID) 75 MCG tablet, Take 1 tablet by mouth Daily, Disp: 90 tablet, Rfl: 3    atorvastatin (LIPITOR) 20 MG tablet, Take 1 tablet by mouth daily, Disp: 90 tablet, Rfl: 3    alendronate (FOSAMAX) 70 MG tablet, Take 1 tablet by mouth every 7 days, Disp: 12 tablet, Rfl: 3    losartan (COZAAR) 50 MG tablet, Take 1 tablet by mouth daily, Disp: 90 tablet, Rfl: 3    CALCIUM PO, Take by mouth 3 times a week, Disp: , Rfl:     Zinc Sulfate (ZINC 15 PO), Take 30 mg by mouth daily , Disp: , Rfl:     Ascorbic Acid (VITAMIN C) 1000 MG tablet, Take 500 mg by mouth daily , Disp: , Rfl:     acetaminophen (TYLENOL) 500 MG tablet, Take 500 mg by mouth every 6 hours as needed for Pain, Disp: , Rfl:     Glucosamine-Chondroitin (GLUCOSAMINE CHONDR COMPLEX PO), Take by mouth, Disp: , Rfl:     vitamin D (ERGOCALCIFEROL) 400 UNITS CAPS, Take 2,000 Units by mouth daily , Disp: , Rfl:     ibuprofen (ADVIL;MOTRIN) 400 MG tablet, Take 400 mg by mouth every 6 hours as needed for Pain., Disp: , Rfl:     tamsulosin (FLOMAX) 0.4 MG capsule, Take 1 capsule by mouth daily (Patient not taking: No sig reported), Disp: 30 capsule, Rfl: 0    Social History     Substance and Sexual Activity   Sexual Activity Yes    Partners: Male    Comment: postmeno       Any bleeding or pain with intercourse: No    Last Yearly date:  2020    Last pap date and results: 10/20/20 UNSAT     Last HPV date and results: 06/07/17 neg     Last Mammogram date and results 01/21/21    Last Dexa scan date and results 05/26/15    Last colorectal screen- type:colonoscopy date  08/01/18       Do you do self breast exams: Yes    Past Medical History:   Diagnosis Date    Fracture of wrist, unspecified laterality, closed, initial encounter     right    History of sepsis 2009    following lithotripsy    Hyperlipidemia     Hypertension     Hypothyroidism 2007    secondary to radioactive iodine 2007    Kidney stone     Osteoporosis     Primary localized osteoarthritis of right hip 3/18/2019       Past Surgical History:   Procedure Laterality Date    CHOLECYSTECTOMY, LAPAROSCOPIC  2009    COLONOSCOPY  07/17/2014    Dr. Shira Read - tubular adenomatous polyp removed    COLONOSCOPY N/A 8/1/2018    Dr. To Like architectural distortion, suggestive of mucosal prolapse    CYSTOSCOPY  2015    stent removal and reinsert     CYSTOSCOPY Left     HLL with stent    CYSTOSCOPY Left 2/5/2021    CYSTOSCOPY URETEROSCOPY LASER-HLL performed by Geovanna Leone MD at [facility] / Kasandra Marshall / Wes Hurtado Left 2/5/2021    CYSTOSCOPY RETROGRADE PYELOGRAM STENT INSERTION performed by Debbie Rider MD at [address] [facility]    LITHOTRIPSY  4/28/2015    left    LITHOTRIPSY Left 01/30/2018    cystoscopy- Dr. Pietro Anthony     LITHOTRIPSY Right 04/20/2021    LITHOTRIPSY Right 4/20/2021    ESWL EXTRACORPOREAL SHOCK WAVE LITHOTRIPSY performed by Debbie Rider MD at [address]  2007    radioactive iodine procedure    [place] CYSTOURETHROSCOPY Left 1/30/2018    CYSTOSCOPY performed by Debbie Rider MD at [address] Left 1/30/2018    [facility] [address] LITHOTRIPSY performed by Debbie Rider MD at [address] Right 3/18/2019    Dr. Vishal Dennison       Family History   Problem Relation Age of Onset    Breast Cancer Maternal Grandmother     Stroke Father     Hypertension Father     Alzheimer's Disease Mother        Any family history of breast or ovarian cancer: Yes, breast cancer     Any family history of blood clots: No      Chief Complaint   Patient presents with    Gynecologic Exam     Medicare Yearly-PAP, last pap 10/20/20 UNSAT, last HPV 06/07/17 neg. Last mamm 01/21/21. Pt states no issues or concerns         PE:  Vital Signs  Blood pressure 134/80, height 5' 3\" (1.6 m), weight 113 lb (51.3 kg), not currently breastfeeding. Estimated body mass index is 20.02 kg/m² as calculated from the following:    Height as of this encounter: 5' 3\" (1.6 m). Weight as of this encounter: 113 lb (51.3 kg). Labs:    No results found for this visit on 02/02/23. PHQ-9 Total Score: 0 (2/2/2023  9:00 AM)        NURSE: REINIER    HPI: Patient here today for routine well woman exam.  Patient denies needs or concerns at this time. We did review with her Pap and HPV that if everything is negative she does not need another Pap    Review of Systems   All other systems reviewed and are negative.       Objective  Lymphatic: no lymphadenopathy  Heent:   negative   Cor: regular rate and rhythm, no murmurs              Pul:clear to auscultation bilaterally- no wheezes, rales or rhonchi, normal air movement, no respiratory distress      GI: Abdomen soft, non-tender. BS normal. No masses,  No organomegaly           Extremities: normal strength, tone, and muscle mass   Breasts: Breast:normal appearance, no masses or tenderness   Pelvic Exam: GENITAL/URINARY:  External Genitalia:  General appearance; normal, Hair distribution; normal, Lesions absent  Urethra: Fullness absent, Masses absent  Bladder:  Fullness absent, Masses absent, Tenderness absent, Cystocele absent  Vagina:  General appearance normal, Estrogen effect normal, Discharge absent, Lesions absent, Pelvic support normal  Cervix:  General appearance normal, Lesions absent, Discharge absent, Tenderness absent, Enlargement absent, Nodularity absent  Uterus:  Size normal, Contour normal  [place]: Masses absent                                    Vaginal discharge: no vaginal discharge                               Assessment and Plan          Diagnosis Orders   1. Encounter for routine gynecologic examination in Medicare patient  PAP SMEAR      2. Encounter for screening mammogram for malignant neoplasm of breast  MIRNA LENNIE DIGITAL SCREEN BILATERAL      3. Screening for HPV (human papillomavirus)  PAP SMEAR                I am having Liza Street maintain her vitamin D, ibuprofen, Glucosamine-Chondroitin (GLUCOSAMINE CHONDR COMPLEX PO), acetaminophen, vitamin C, Zinc Sulfate (ZINC 15 PO), CALCIUM PO, tamsulosin, levothyroxine, atorvastatin, alendronate, losartan, and clindamycin. Return in about 1 year (around 2/2/2024) for yearly, set up routine mammo. She was also counseled on her preventative health maintenance recommendations and follow-up. There are no Patient Instructions on file for this visit.     SAL Mojica CNM,2/2/2023 9:29 AM

## 2023-02-03 LAB
HPV SAMPLE: NORMAL
HPV, GENOTYPE 16: NOT DETECTED
HPV, GENOTYPE 18: NOT DETECTED
HPV, HIGH RISK OTHER: NOT DETECTED
HPV, INTERPRETATION: NORMAL
SPECIMEN DESCRIPTION: NORMAL

## 2023-02-13 ENCOUNTER — HOSPITAL ENCOUNTER (OUTPATIENT)
Age: 68
Setting detail: SPECIMEN
Discharge: HOME | End: 2023-02-13
Attending: INTERNAL MEDICINE
Payer: MEDICARE

## 2023-02-13 ENCOUNTER — ANESTHESIA (OUTPATIENT)
Dept: OPERATING ROOM | Age: 68
End: 2023-02-13
Payer: MEDICARE

## 2023-02-13 ENCOUNTER — HOSPITAL ENCOUNTER (OUTPATIENT)
Age: 68
Discharge: HOME | End: 2023-02-13
Payer: MEDICARE

## 2023-02-13 ENCOUNTER — HOSPITAL ENCOUNTER (OUTPATIENT)
Dept: CT IMAGING | Age: 68
Discharge: HOME | End: 2023-02-15
Payer: MEDICARE

## 2023-02-13 ENCOUNTER — OFFICE VISIT (OUTPATIENT)
Dept: UROLOGY | Age: 68
End: 2023-02-13
Payer: MEDICARE

## 2023-02-13 ENCOUNTER — TELEPHONE (OUTPATIENT)
Dept: UROLOGY | Age: 68
End: 2023-02-13

## 2023-02-13 ENCOUNTER — HOSPITAL ENCOUNTER (INPATIENT)
Age: 68
LOS: 10 days | Discharge: HOME | DRG: 853 | End: 2023-02-23
Attending: INTERNAL MEDICINE | Admitting: UROLOGY
Payer: MEDICARE

## 2023-02-13 ENCOUNTER — APPOINTMENT (OUTPATIENT)
Dept: GENERAL RADIOLOGY | Age: 68
DRG: 853 | End: 2023-02-13
Attending: INTERNAL MEDICINE
Payer: MEDICARE

## 2023-02-13 ENCOUNTER — ANESTHESIA EVENT (OUTPATIENT)
Dept: OPERATING ROOM | Age: 68
End: 2023-02-13
Payer: MEDICARE

## 2023-02-13 VITALS
HEIGHT: 63 IN | TEMPERATURE: 98.4 F | DIASTOLIC BLOOD PRESSURE: 48 MMHG | BODY MASS INDEX: 19.84 KG/M2 | SYSTOLIC BLOOD PRESSURE: 82 MMHG | WEIGHT: 112 LBS

## 2023-02-13 DIAGNOSIS — R30.0 DYSURIA: ICD-10-CM

## 2023-02-13 DIAGNOSIS — N23 RENAL COLIC: ICD-10-CM

## 2023-02-13 DIAGNOSIS — N23 RENAL COLIC: Primary | ICD-10-CM

## 2023-02-13 DIAGNOSIS — N20.1 URETERAL CALCULUS: Primary | ICD-10-CM

## 2023-02-13 DIAGNOSIS — R78.81 BACTEREMIA DUE TO ESCHERICHIA COLI: ICD-10-CM

## 2023-02-13 DIAGNOSIS — B96.20 BACTEREMIA DUE TO ESCHERICHIA COLI: ICD-10-CM

## 2023-02-13 DIAGNOSIS — N23 RENAL COLIC ON RIGHT SIDE: ICD-10-CM

## 2023-02-13 PROBLEM — R65.21 SEPTIC SHOCK DUE TO URINARY TRACT INFECTION (HCC): Status: ACTIVE | Noted: 2023-02-13

## 2023-02-13 PROBLEM — N39.0 COMPLICATED UTI (URINARY TRACT INFECTION): Status: ACTIVE | Noted: 2023-02-13

## 2023-02-13 PROBLEM — R65.20 SEVERE SEPSIS (HCC): Status: ACTIVE | Noted: 2023-02-13

## 2023-02-13 PROBLEM — A41.9 SEVERE SEPSIS (HCC): Status: ACTIVE | Noted: 2023-02-13

## 2023-02-13 PROBLEM — N39.0 SEPTIC SHOCK DUE TO URINARY TRACT INFECTION (HCC): Status: ACTIVE | Noted: 2023-02-13

## 2023-02-13 LAB
ABSOLUTE EOS #: 0 K/UL (ref 0–0.44)
ABSOLUTE EOS #: 0 K/UL (ref 0–0.44)
ABSOLUTE IMMATURE GRANULOCYTE: 0.73 K/UL (ref 0–0.3)
ABSOLUTE IMMATURE GRANULOCYTE: 3.77 K/UL (ref 0–0.3)
ABSOLUTE LYMPH #: 0.73 K/UL (ref 1.1–3.7)
ABSOLUTE LYMPH #: 2.4 K/UL (ref 1.1–3.7)
ABSOLUTE MONO #: 0.69 K/UL (ref 0.1–1.2)
ABSOLUTE MONO #: 0.92 K/UL (ref 0.1–1.2)
ALBUMIN SERPL-MCNC: 2.6 G/DL (ref 3.5–5.2)
ALBUMIN SERPL-MCNC: 3.4 G/DL (ref 3.5–5.2)
ALBUMIN/GLOBULIN RATIO: 1 (ref 1–2.5)
ALBUMIN/GLOBULIN RATIO: 1 (ref 1–2.5)
ALP SERPL-CCNC: 158 U/L (ref 35–104)
ALP SERPL-CCNC: 183 U/L (ref 35–104)
ALT SERPL-CCNC: 136 U/L (ref 5–33)
ALT SERPL-CCNC: 98 U/L (ref 5–33)
ANION GAP SERPL CALCULATED.3IONS-SCNC: 13 MMOL/L (ref 9–17)
ANION GAP SERPL CALCULATED.3IONS-SCNC: 14 MMOL/L (ref 9–17)
ANION GAP SERPL CALCULATED.3IONS-SCNC: 16 MMOL/L (ref 9–17)
AST SERPL-CCNC: 120 U/L
AST SERPL-CCNC: 78 U/L
BACTERIA: ABNORMAL
BASOPHILS # BLD: 0 % (ref 0–2)
BASOPHILS # BLD: 0 % (ref 0–2)
BASOPHILS ABSOLUTE: 0 K/UL (ref 0–0.2)
BASOPHILS ABSOLUTE: 0 K/UL (ref 0–0.2)
BILIRUB SERPL-MCNC: 0.5 MG/DL (ref 0.3–1.2)
BILIRUB SERPL-MCNC: 0.6 MG/DL (ref 0.3–1.2)
BILIRUBIN URINE: NEGATIVE
BUN SERPL-MCNC: 45 MG/DL (ref 8–23)
BUN SERPL-MCNC: 46 MG/DL (ref 8–23)
BUN SERPL-MCNC: 49 MG/DL (ref 8–23)
BUN/CREAT BLD: 12 (ref 9–20)
BUN/CREAT BLD: 13 (ref 9–20)
BUN/CREAT BLD: 15 (ref 9–20)
CALCIUM SERPL-MCNC: 7.3 MG/DL (ref 8.6–10.4)
CALCIUM SERPL-MCNC: 8.1 MG/DL (ref 8.6–10.4)
CALCIUM SERPL-MCNC: 9.5 MG/DL (ref 8.6–10.4)
CHLORIDE SERPL-SCNC: 104 MMOL/L (ref 98–107)
CHLORIDE SERPL-SCNC: 107 MMOL/L (ref 98–107)
CHLORIDE SERPL-SCNC: 99 MMOL/L (ref 98–107)
CO2 SERPL-SCNC: 15 MMOL/L (ref 20–31)
CO2 SERPL-SCNC: 19 MMOL/L (ref 20–31)
CO2 SERPL-SCNC: 22 MMOL/L (ref 20–31)
COLOR: YELLOW
CREAT SERPL-MCNC: 3.29 MG/DL (ref 0.5–0.9)
CREAT SERPL-MCNC: 3.44 MG/DL (ref 0.5–0.9)
CREAT SERPL-MCNC: 3.83 MG/DL (ref 0.5–0.9)
EKG ATRIAL RATE: 96 BPM
EKG P AXIS: 47 DEGREES
EKG P-R INTERVAL: 132 MS
EKG Q-T INTERVAL: 352 MS
EKG QRS DURATION: 84 MS
EKG QTC CALCULATION (BAZETT): 444 MS
EKG R AXIS: 15 DEGREES
EKG T AXIS: 43 DEGREES
EKG VENTRICULAR RATE: 96 BPM
EOSINOPHILS RELATIVE PERCENT: 0 % (ref 1–4)
EOSINOPHILS RELATIVE PERCENT: 0 % (ref 1–4)
EPITHELIAL CELLS UA: ABNORMAL /HPF (ref 0–25)
GFR SERPL CREATININE-BSD FRML MDRD: 12 ML/MIN/1.73M2
GFR SERPL CREATININE-BSD FRML MDRD: 14 ML/MIN/1.73M2
GFR SERPL CREATININE-BSD FRML MDRD: 15 ML/MIN/1.73M2
GLUCOSE SERPL-MCNC: 103 MG/DL (ref 70–99)
GLUCOSE SERPL-MCNC: 66 MG/DL (ref 70–99)
GLUCOSE SERPL-MCNC: 76 MG/DL (ref 70–99)
GLUCOSE UR STRIP.AUTO-MCNC: NEGATIVE MG/DL
HCT VFR BLD AUTO: 29.2 % (ref 36.3–47.1)
HCT VFR BLD AUTO: 35.2 % (ref 36.3–47.1)
HGB BLD-MCNC: 11.5 G/DL (ref 11.9–15.1)
HGB BLD-MCNC: 9.7 G/DL (ref 11.9–15.1)
IMMATURE GRANULOCYTES: 11 %
IMMATURE GRANULOCYTES: 4 %
INR PPP: 1.8
KETONES UR STRIP.AUTO-MCNC: ABNORMAL MG/DL
LACTATE PLASV-SCNC: 3.1 MMOL/L (ref 0.5–2.2)
LACTATE PLASV-SCNC: 3.5 MMOL/L (ref 0.5–2.2)
LACTIC ACID, SEPSIS: 3.8 MMOL/L (ref 0.5–1.9)
LEUKOCYTE ESTERASE UR QL STRIP.AUTO: ABNORMAL
LYMPHOCYTES # BLD: 4 % (ref 24–43)
LYMPHOCYTES # BLD: 7 % (ref 24–43)
MCH RBC QN AUTO: 31.3 PG (ref 25.2–33.5)
MCH RBC QN AUTO: 31.7 PG (ref 25.2–33.5)
MCHC RBC AUTO-ENTMCNC: 32.7 G/DL (ref 28.4–34.8)
MCHC RBC AUTO-ENTMCNC: 33.2 G/DL (ref 28.4–34.8)
MCV RBC AUTO: 95.4 FL (ref 82.6–102.9)
MCV RBC AUTO: 95.9 FL (ref 82.6–102.9)
MONOCYTES # BLD: 2 % (ref 3–12)
MONOCYTES # BLD: 5 % (ref 3–12)
MORPHOLOGY: ABNORMAL
MORPHOLOGY: ABNORMAL
MORPHOLOGY: NORMAL
MUCUS: ABNORMAL
NITRITE UR QL STRIP.AUTO: NEGATIVE
NRBC AUTOMATED: 0 PER 100 WBC
NRBC AUTOMATED: 0 PER 100 WBC
NUCLEATED RED BLOOD CELLS: 1 PER 100 WBC (ref 0–5)
PARTIAL THROMBOPLASTIN TIME: 47.5 SEC (ref 26.8–34.8)
PDW BLD-RTO: 13.6 % (ref 11.8–14.4)
PDW BLD-RTO: 13.8 % (ref 11.8–14.4)
PLATELET # BLD AUTO: ABNORMAL K/UL (ref 138–453)
PLATELET # BLD AUTO: ABNORMAL K/UL (ref 138–453)
PLATELET, FLUORESCENCE: 142 K/UL (ref 138–453)
PLATELET, FLUORESCENCE: 95 K/UL (ref 138–453)
PLATELET, IMMATURE FRACTION: 5.4 % (ref 1.1–10.3)
PLATELET, IMMATURE FRACTION: 5.4 % (ref 1.1–10.3)
POTASSIUM SERPL-SCNC: 4.6 MMOL/L (ref 3.7–5.3)
POTASSIUM SERPL-SCNC: 4.7 MMOL/L (ref 3.7–5.3)
POTASSIUM SERPL-SCNC: 4.8 MMOL/L (ref 3.7–5.3)
PROT SERPL-MCNC: 5.1 G/DL (ref 6.4–8.3)
PROT SERPL-MCNC: 6.7 G/DL (ref 6.4–8.3)
PROT UR STRIP.AUTO-MCNC: 5.5 MG/DL (ref 5–9)
PROT UR STRIP.AUTO-MCNC: ABNORMAL MG/DL
PROTHROMBIN TIME: 20.5 SEC (ref 11.5–14.2)
RBC # BLD: 3.06 M/UL (ref 3.95–5.11)
RBC # BLD: 3.67 M/UL (ref 3.95–5.11)
RBC CLUMPS #/AREA URNS AUTO: ABNORMAL /HPF (ref 0–2)
RENAL EPITHELIAL, UA: ABNORMAL /HPF
SEG NEUTROPHILS: 80 % (ref 36–65)
SEG NEUTROPHILS: 87 % (ref 36–65)
SEGMENTED NEUTROPHILS ABSOLUTE COUNT: 15.92 K/UL (ref 1.5–8.1)
SEGMENTED NEUTROPHILS ABSOLUTE COUNT: 27.4 K/UL (ref 1.5–8.1)
SODIUM SERPL-SCNC: 136 MMOL/L (ref 135–144)
SODIUM SERPL-SCNC: 136 MMOL/L (ref 135–144)
SODIUM SERPL-SCNC: 137 MMOL/L (ref 135–144)
SPECIFIC GRAVITY UA: 1.02 (ref 1.01–1.02)
TROPONIN I SERPL DL<=0.01 NG/ML-MCNC: 27 NG/L (ref 0–14)
TROPONIN I SERPL DL<=0.01 NG/ML-MCNC: 31 NG/L (ref 0–14)
TURBIDITY: ABNORMAL
URINE HGB: ABNORMAL
UROBILINOGEN, URINE: NORMAL
WBC # BLD AUTO: 18.3 K/UL (ref 3.5–11.3)
WBC # BLD AUTO: 34.3 K/UL (ref 3.5–11.3)
WBC UA: ABNORMAL /HPF (ref 0–5)

## 2023-02-13 PROCEDURE — 87077 CULTURE AEROBIC IDENTIFY: CPT

## 2023-02-13 PROCEDURE — 87040 BLOOD CULTURE FOR BACTERIA: CPT

## 2023-02-13 PROCEDURE — 6370000000 HC RX 637 (ALT 250 FOR IP): Performed by: INTERNAL MEDICINE

## 2023-02-13 PROCEDURE — 6370000000 HC RX 637 (ALT 250 FOR IP): Performed by: UROLOGY

## 2023-02-13 PROCEDURE — 3209999900 FLUORO FOR SURGICAL PROCEDURES

## 2023-02-13 PROCEDURE — 36415 COLL VENOUS BLD VENIPUNCTURE: CPT

## 2023-02-13 PROCEDURE — 74176 CT ABD & PELVIS W/O CONTRAST: CPT

## 2023-02-13 PROCEDURE — 87186 SC STD MICRODIL/AGAR DIL: CPT

## 2023-02-13 PROCEDURE — 2580000003 HC RX 258: Performed by: NURSE ANESTHETIST, CERTIFIED REGISTERED

## 2023-02-13 PROCEDURE — 3600000013 HC SURGERY LEVEL 3 ADDTL 15MIN: Performed by: UROLOGY

## 2023-02-13 PROCEDURE — 1123F ACP DISCUSS/DSCN MKR DOCD: CPT | Performed by: UROLOGY

## 2023-02-13 PROCEDURE — 6360000002 HC RX W HCPCS: Performed by: UROLOGY

## 2023-02-13 PROCEDURE — 0T768DZ DILATION OF RIGHT URETER WITH INTRALUMINAL DEVICE, VIA NATURAL OR ARTIFICIAL OPENING ENDOSCOPIC: ICD-10-PCS | Performed by: UROLOGY

## 2023-02-13 PROCEDURE — 2500000003 HC RX 250 WO HCPCS: Performed by: NURSE ANESTHETIST, CERTIFIED REGISTERED

## 2023-02-13 PROCEDURE — 2580000003 HC RX 258: Performed by: NURSE PRACTITIONER

## 2023-02-13 PROCEDURE — 93005 ELECTROCARDIOGRAM TRACING: CPT | Performed by: NURSE PRACTITIONER

## 2023-02-13 PROCEDURE — 99222 1ST HOSP IP/OBS MODERATE 55: CPT | Performed by: INTERNAL MEDICINE

## 2023-02-13 PROCEDURE — C1769 GUIDE WIRE: HCPCS | Performed by: UROLOGY

## 2023-02-13 PROCEDURE — 93010 ELECTROCARDIOGRAM REPORT: CPT | Performed by: INTERNAL MEDICINE

## 2023-02-13 PROCEDURE — 94761 N-INVAS EAR/PLS OXIMETRY MLT: CPT

## 2023-02-13 PROCEDURE — 71045 X-RAY EXAM CHEST 1 VIEW: CPT

## 2023-02-13 PROCEDURE — 3700000001 HC ADD 15 MINUTES (ANESTHESIA): Performed by: UROLOGY

## 2023-02-13 PROCEDURE — 3078F DIAST BP <80 MM HG: CPT | Performed by: UROLOGY

## 2023-02-13 PROCEDURE — 84484 ASSAY OF TROPONIN QUANT: CPT

## 2023-02-13 PROCEDURE — 80048 BASIC METABOLIC PNL TOTAL CA: CPT

## 2023-02-13 PROCEDURE — 99215 OFFICE O/P EST HI 40 MIN: CPT | Performed by: UROLOGY

## 2023-02-13 PROCEDURE — 7100000000 HC PACU RECOVERY - FIRST 15 MIN: Performed by: UROLOGY

## 2023-02-13 PROCEDURE — 7100000001 HC PACU RECOVERY - ADDTL 15 MIN: Performed by: UROLOGY

## 2023-02-13 PROCEDURE — 6360000002 HC RX W HCPCS: Performed by: NURSE ANESTHETIST, CERTIFIED REGISTERED

## 2023-02-13 PROCEDURE — 2580000003 HC RX 258: Performed by: UROLOGY

## 2023-02-13 PROCEDURE — 80053 COMPREHEN METABOLIC PANEL: CPT

## 2023-02-13 PROCEDURE — 85730 THROMBOPLASTIN TIME PARTIAL: CPT

## 2023-02-13 PROCEDURE — 85025 COMPLETE CBC W/AUTO DIFF WBC: CPT

## 2023-02-13 PROCEDURE — 83605 ASSAY OF LACTIC ACID: CPT

## 2023-02-13 PROCEDURE — 87086 URINE CULTURE/COLONY COUNT: CPT

## 2023-02-13 PROCEDURE — 2709999900 HC NON-CHARGEABLE SUPPLY: Performed by: UROLOGY

## 2023-02-13 PROCEDURE — C2617 STENT, NON-COR, TEM W/O DEL: HCPCS | Performed by: UROLOGY

## 2023-02-13 PROCEDURE — 85610 PROTHROMBIN TIME: CPT

## 2023-02-13 PROCEDURE — 87088 URINE BACTERIA CULTURE: CPT

## 2023-02-13 PROCEDURE — 2000000000 HC ICU R&B

## 2023-02-13 PROCEDURE — 81001 URINALYSIS AUTO W/SCOPE: CPT

## 2023-02-13 PROCEDURE — 85055 RETICULATED PLATELET ASSAY: CPT

## 2023-02-13 PROCEDURE — 3074F SYST BP LT 130 MM HG: CPT | Performed by: UROLOGY

## 2023-02-13 PROCEDURE — 05HY33Z INSERTION OF INFUSION DEVICE INTO UPPER VEIN, PERCUTANEOUS APPROACH: ICD-10-PCS | Performed by: UROLOGY

## 2023-02-13 PROCEDURE — 3600000003 HC SURGERY LEVEL 3 BASE: Performed by: UROLOGY

## 2023-02-13 PROCEDURE — 87205 SMEAR GRAM STAIN: CPT

## 2023-02-13 PROCEDURE — 3700000000 HC ANESTHESIA ATTENDED CARE: Performed by: UROLOGY

## 2023-02-13 DEVICE — URETERAL STENT WITH SIDE HOLES 6FX24CM
Type: IMPLANTABLE DEVICE | Site: URETER | Status: FUNCTIONAL
Brand: TRIA™ FIRM

## 2023-02-13 RX ORDER — SODIUM CHLORIDE 9 MG/ML
INJECTION, SOLUTION INTRAVENOUS CONTINUOUS
Status: DISCONTINUED | OUTPATIENT
Start: 2023-02-13 | End: 2023-02-13

## 2023-02-13 RX ORDER — MORPHINE SULFATE 2 MG/ML
2 INJECTION, SOLUTION INTRAMUSCULAR; INTRAVENOUS
Status: DISCONTINUED | OUTPATIENT
Start: 2023-02-13 | End: 2023-02-15

## 2023-02-13 RX ORDER — ACETAMINOPHEN 325 MG/1
650 TABLET ORAL EVERY 4 HOURS PRN
Status: DISCONTINUED | OUTPATIENT
Start: 2023-02-13 | End: 2023-02-23 | Stop reason: HOSPADM

## 2023-02-13 RX ORDER — POLYETHYLENE GLYCOL 3350 17 G/17G
17 POWDER, FOR SOLUTION ORAL DAILY PRN
Status: DISCONTINUED | OUTPATIENT
Start: 2023-02-13 | End: 2023-02-23 | Stop reason: HOSPADM

## 2023-02-13 RX ORDER — HEPARIN SODIUM 5000 [USP'U]/ML
5000 INJECTION, SOLUTION INTRAVENOUS; SUBCUTANEOUS 2 TIMES DAILY
Status: DISCONTINUED | OUTPATIENT
Start: 2023-02-13 | End: 2023-02-13

## 2023-02-13 RX ORDER — ONDANSETRON 4 MG/1
4 TABLET, ORALLY DISINTEGRATING ORAL EVERY 8 HOURS PRN
Status: DISCONTINUED | OUTPATIENT
Start: 2023-02-13 | End: 2023-02-23 | Stop reason: HOSPADM

## 2023-02-13 RX ORDER — POLYETHYLENE GLYCOL 3350 17 G/17G
17 POWDER, FOR SOLUTION ORAL DAILY PRN
Status: DISCONTINUED | OUTPATIENT
Start: 2023-02-13 | End: 2023-02-13

## 2023-02-13 RX ORDER — SODIUM CHLORIDE 9 MG/ML
INJECTION, SOLUTION INTRAVENOUS PRN
Status: DISCONTINUED | OUTPATIENT
Start: 2023-02-13 | End: 2023-02-23 | Stop reason: HOSPADM

## 2023-02-13 RX ORDER — SODIUM CHLORIDE 9 MG/ML
INJECTION, SOLUTION INTRAVENOUS PRN
Status: DISCONTINUED | OUTPATIENT
Start: 2023-02-13 | End: 2023-02-13

## 2023-02-13 RX ORDER — ONDANSETRON 2 MG/ML
INJECTION INTRAMUSCULAR; INTRAVENOUS PRN
Status: DISCONTINUED | OUTPATIENT
Start: 2023-02-13 | End: 2023-02-13 | Stop reason: SDUPTHER

## 2023-02-13 RX ORDER — 0.9 % SODIUM CHLORIDE 0.9 %
1000 INTRAVENOUS SOLUTION INTRAVENOUS ONCE
Status: COMPLETED | OUTPATIENT
Start: 2023-02-13 | End: 2023-02-13

## 2023-02-13 RX ORDER — LEVOFLOXACIN 5 MG/ML
500 INJECTION, SOLUTION INTRAVENOUS ONCE
Status: DISCONTINUED | OUTPATIENT
Start: 2023-02-13 | End: 2023-02-14

## 2023-02-13 RX ORDER — SODIUM CHLORIDE 0.9 % (FLUSH) 0.9 %
5-40 SYRINGE (ML) INJECTION EVERY 12 HOURS SCHEDULED
Status: DISCONTINUED | OUTPATIENT
Start: 2023-02-13 | End: 2023-02-23 | Stop reason: HOSPADM

## 2023-02-13 RX ORDER — SODIUM CHLORIDE 0.9 % (FLUSH) 0.9 %
5-40 SYRINGE (ML) INJECTION EVERY 12 HOURS SCHEDULED
Status: DISCONTINUED | OUTPATIENT
Start: 2023-02-13 | End: 2023-02-13

## 2023-02-13 RX ORDER — KETAMINE HCL 50MG/ML(1)
SYRINGE (ML) INTRAVENOUS PRN
Status: DISCONTINUED | OUTPATIENT
Start: 2023-02-13 | End: 2023-02-13 | Stop reason: SDUPTHER

## 2023-02-13 RX ORDER — MORPHINE SULFATE 4 MG/ML
4 INJECTION, SOLUTION INTRAMUSCULAR; INTRAVENOUS
Status: DISCONTINUED | OUTPATIENT
Start: 2023-02-13 | End: 2023-02-15

## 2023-02-13 RX ORDER — SODIUM CHLORIDE 0.9 % (FLUSH) 0.9 %
5-40 SYRINGE (ML) INJECTION PRN
Status: DISCONTINUED | OUTPATIENT
Start: 2023-02-13 | End: 2023-02-23 | Stop reason: HOSPADM

## 2023-02-13 RX ORDER — LEVOFLOXACIN 5 MG/ML
250 INJECTION, SOLUTION INTRAVENOUS EVERY 24 HOURS
Status: DISCONTINUED | OUTPATIENT
Start: 2023-02-14 | End: 2023-02-14

## 2023-02-13 RX ORDER — HEPARIN SODIUM 5000 [USP'U]/ML
5000 INJECTION, SOLUTION INTRAVENOUS; SUBCUTANEOUS 2 TIMES DAILY
Status: DISCONTINUED | OUTPATIENT
Start: 2023-02-13 | End: 2023-02-15

## 2023-02-13 RX ORDER — MIDAZOLAM HYDROCHLORIDE 1 MG/ML
INJECTION INTRAMUSCULAR; INTRAVENOUS PRN
Status: DISCONTINUED | OUTPATIENT
Start: 2023-02-13 | End: 2023-02-13 | Stop reason: SDUPTHER

## 2023-02-13 RX ORDER — SODIUM CHLORIDE, SODIUM LACTATE, POTASSIUM CHLORIDE, CALCIUM CHLORIDE 600; 310; 30; 20 MG/100ML; MG/100ML; MG/100ML; MG/100ML
INJECTION, SOLUTION INTRAVENOUS CONTINUOUS PRN
Status: DISCONTINUED | OUTPATIENT
Start: 2023-02-13 | End: 2023-02-13 | Stop reason: SDUPTHER

## 2023-02-13 RX ORDER — SODIUM CHLORIDE 0.9 % (FLUSH) 0.9 %
5-40 SYRINGE (ML) INJECTION EVERY 12 HOURS SCHEDULED
Status: DISCONTINUED | OUTPATIENT
Start: 2023-02-13 | End: 2023-02-13 | Stop reason: HOSPADM

## 2023-02-13 RX ORDER — SODIUM CHLORIDE 0.9 % (FLUSH) 0.9 %
5-40 SYRINGE (ML) INJECTION PRN
Status: DISCONTINUED | OUTPATIENT
Start: 2023-02-13 | End: 2023-02-13

## 2023-02-13 RX ORDER — SODIUM CHLORIDE 9 MG/ML
INJECTION, SOLUTION INTRAVENOUS PRN
Status: DISCONTINUED | OUTPATIENT
Start: 2023-02-13 | End: 2023-02-13 | Stop reason: HOSPADM

## 2023-02-13 RX ORDER — SODIUM CHLORIDE 9 MG/ML
25 INJECTION, SOLUTION INTRAVENOUS PRN
Status: DISCONTINUED | OUTPATIENT
Start: 2023-02-13 | End: 2023-02-23 | Stop reason: HOSPADM

## 2023-02-13 RX ORDER — ONDANSETRON 2 MG/ML
4 INJECTION INTRAMUSCULAR; INTRAVENOUS EVERY 6 HOURS PRN
Status: DISCONTINUED | OUTPATIENT
Start: 2023-02-13 | End: 2023-02-23 | Stop reason: HOSPADM

## 2023-02-13 RX ORDER — 0.9 % SODIUM CHLORIDE 0.9 %
30 INTRAVENOUS SOLUTION INTRAVENOUS ONCE
Status: DISCONTINUED | OUTPATIENT
Start: 2023-02-13 | End: 2023-02-16

## 2023-02-13 RX ORDER — LIDOCAINE HYDROCHLORIDE 20 MG/ML
INJECTION, SOLUTION EPIDURAL; INFILTRATION; INTRACAUDAL; PERINEURAL PRN
Status: DISCONTINUED | OUTPATIENT
Start: 2023-02-13 | End: 2023-02-13 | Stop reason: SDUPTHER

## 2023-02-13 RX ORDER — LEVOFLOXACIN 5 MG/ML
500 INJECTION, SOLUTION INTRAVENOUS ONCE
Status: COMPLETED | OUTPATIENT
Start: 2023-02-13 | End: 2023-02-13

## 2023-02-13 RX ORDER — SODIUM CHLORIDE 9 MG/ML
INJECTION, SOLUTION INTRAVENOUS CONTINUOUS
Status: ACTIVE | OUTPATIENT
Start: 2023-02-13 | End: 2023-02-14

## 2023-02-13 RX ORDER — LEVOFLOXACIN 5 MG/ML
500 INJECTION, SOLUTION INTRAVENOUS EVERY 24 HOURS
Status: DISCONTINUED | OUTPATIENT
Start: 2023-02-13 | End: 2023-02-13 | Stop reason: DRUGHIGH

## 2023-02-13 RX ORDER — LIDOCAINE HYDROCHLORIDE 10 MG/ML
5 INJECTION, SOLUTION INFILTRATION; PERINEURAL ONCE
Status: DISCONTINUED | OUTPATIENT
Start: 2023-02-13 | End: 2023-02-23 | Stop reason: HOSPADM

## 2023-02-13 RX ORDER — LIDOCAINE HYDROCHLORIDE 20 MG/ML
JELLY TOPICAL PRN
Status: DISCONTINUED | OUTPATIENT
Start: 2023-02-13 | End: 2023-02-13 | Stop reason: ALTCHOICE

## 2023-02-13 RX ORDER — SODIUM CHLORIDE 9 MG/ML
INJECTION, SOLUTION INTRAVENOUS CONTINUOUS
Status: DISCONTINUED | OUTPATIENT
Start: 2023-02-13 | End: 2023-02-15

## 2023-02-13 RX ORDER — SODIUM CHLORIDE 0.9 % (FLUSH) 0.9 %
5-40 SYRINGE (ML) INJECTION PRN
Status: DISCONTINUED | OUTPATIENT
Start: 2023-02-13 | End: 2023-02-13 | Stop reason: HOSPADM

## 2023-02-13 RX ADMIN — Medication 25 MG: at 12:52

## 2023-02-13 RX ADMIN — SODIUM CHLORIDE: 9 INJECTION, SOLUTION INTRAVENOUS at 21:59

## 2023-02-13 RX ADMIN — Medication 25 MG: at 12:50

## 2023-02-13 RX ADMIN — SODIUM CHLORIDE: 9 INJECTION, SOLUTION INTRAVENOUS at 15:39

## 2023-02-13 RX ADMIN — SODIUM CHLORIDE, POTASSIUM CHLORIDE, SODIUM LACTATE AND CALCIUM CHLORIDE: 600; 310; 30; 20 INJECTION, SOLUTION INTRAVENOUS at 12:45

## 2023-02-13 RX ADMIN — ONDANSETRON 4 MG: 2 INJECTION INTRAMUSCULAR; INTRAVENOUS at 21:43

## 2023-02-13 RX ADMIN — MIDAZOLAM 2 MG: 1 INJECTION INTRAMUSCULAR; INTRAVENOUS at 12:49

## 2023-02-13 RX ADMIN — PHENYLEPHRINE HYDROCHLORIDE 125 MCG/MIN: 10 INJECTION INTRAVENOUS at 21:30

## 2023-02-13 RX ADMIN — LIDOCAINE HYDROCHLORIDE 5 ML: 20 INJECTION, SOLUTION EPIDURAL; INFILTRATION; INTRACAUDAL; PERINEURAL at 12:56

## 2023-02-13 RX ADMIN — MORPHINE SULFATE 2 MG: 2 INJECTION, SOLUTION INTRAMUSCULAR; INTRAVENOUS at 15:24

## 2023-02-13 RX ADMIN — ONDANSETRON 4 MG: 2 INJECTION INTRAMUSCULAR; INTRAVENOUS at 12:49

## 2023-02-13 RX ADMIN — ACETAMINOPHEN 650 MG: 325 TABLET ORAL at 22:10

## 2023-02-13 RX ADMIN — SODIUM CHLORIDE 1000 ML: 9 INJECTION, SOLUTION INTRAVENOUS at 16:55

## 2023-02-13 RX ADMIN — SODIUM CHLORIDE, PRESERVATIVE FREE 10 ML: 5 INJECTION INTRAVENOUS at 21:52

## 2023-02-13 RX ADMIN — SODIUM CHLORIDE, PRESERVATIVE FREE 10 ML: 5 INJECTION INTRAVENOUS at 21:51

## 2023-02-13 RX ADMIN — LEVOFLOXACIN 500 MG: 5 INJECTION, SOLUTION INTRAVENOUS at 12:41

## 2023-02-13 RX ADMIN — SODIUM CHLORIDE, POTASSIUM CHLORIDE, SODIUM LACTATE AND CALCIUM CHLORIDE: 600; 310; 30; 20 INJECTION, SOLUTION INTRAVENOUS at 12:51

## 2023-02-13 RX ADMIN — HEPARIN SODIUM 5000 UNITS: 5000 INJECTION INTRAVENOUS; SUBCUTANEOUS at 21:44

## 2023-02-13 RX ADMIN — SODIUM CHLORIDE, POTASSIUM CHLORIDE, SODIUM LACTATE AND CALCIUM CHLORIDE: 600; 310; 30; 20 INJECTION, SOLUTION INTRAVENOUS at 13:09

## 2023-02-13 RX ADMIN — PHENYLEPHRINE HYDROCHLORIDE 40 MCG/MIN: 10 INJECTION INTRAVENOUS at 12:48

## 2023-02-13 ASSESSMENT — ENCOUNTER SYMPTOMS
ABDOMINAL PAIN: 0
COLOR CHANGE: 0
CONSTIPATION: 0
SHORTNESS OF BREATH: 0
EYE REDNESS: 0
NAUSEA: 0
COUGH: 0
WHEEZING: 0
APNEA: 0
VOMITING: 0
BACK PAIN: 0

## 2023-02-13 ASSESSMENT — PAIN DESCRIPTION - PAIN TYPE
TYPE: ACUTE PAIN

## 2023-02-13 ASSESSMENT — PAIN DESCRIPTION - ORIENTATION
ORIENTATION: LEFT
ORIENTATION: LOWER

## 2023-02-13 ASSESSMENT — PAIN DESCRIPTION - DESCRIPTORS
DESCRIPTORS: ACHING
DESCRIPTORS: ACHING
DESCRIPTORS: DULL;SHARP
DESCRIPTORS: ACHING

## 2023-02-13 ASSESSMENT — PAIN SCALES - GENERAL
PAINLEVEL_OUTOF10: 2
PAINLEVEL_OUTOF10: 4
PAINLEVEL_OUTOF10: 4
PAINLEVEL_OUTOF10: 2
PAINLEVEL_OUTOF10: 3
PAINLEVEL_OUTOF10: 0
PAINLEVEL_OUTOF10: 5

## 2023-02-13 ASSESSMENT — PAIN SCALES - WONG BAKER
WONGBAKER_NUMERICALRESPONSE: 0
WONGBAKER_NUMERICALRESPONSE: 0

## 2023-02-13 ASSESSMENT — PAIN DESCRIPTION - ONSET
ONSET: GRADUAL
ONSET: PROGRESSIVE
ONSET: GRADUAL

## 2023-02-13 ASSESSMENT — PAIN DESCRIPTION - LOCATION
LOCATION: ABDOMEN
LOCATION: HEAD

## 2023-02-13 ASSESSMENT — PAIN - FUNCTIONAL ASSESSMENT
PAIN_FUNCTIONAL_ASSESSMENT: ACTIVITIES ARE NOT PREVENTED
PAIN_FUNCTIONAL_ASSESSMENT: 0-10
PAIN_FUNCTIONAL_ASSESSMENT: ACTIVITIES ARE NOT PREVENTED

## 2023-02-13 ASSESSMENT — LIFESTYLE VARIABLES: SMOKING_STATUS: 1

## 2023-02-13 ASSESSMENT — PAIN DESCRIPTION - FREQUENCY
FREQUENCY: INTERMITTENT

## 2023-02-13 NOTE — PROGRESS NOTES
Pt arrived to ICU from surgery, patient alert and orient x4 continues on Trace drip at 10mcg/min, b/p map at 65 at this time, patient denies any pain at this time, childress in place draining maxim, hazy urine, no distress noted, oriented to room and call light, call light within reach.

## 2023-02-13 NOTE — PROGRESS NOTES
Spoke to MISSY Joiner regarding pt's last BPs and requests that oscar drip be increased to 40mcg/min and recheck x2. Will continue to monitor.

## 2023-02-13 NOTE — BRIEF OP NOTE
Brief Postoperative Note      Patient: Anisa Hernandez  YOB: 1955  MRN: [id number]    Date of Procedure: 2/13/2023    Pre-Op Diagnosis: OBSTRUCTING CALCULI DISTAL RIGHT URETER    Post-Op Diagnosis: Same       Procedure(s):  CYSTOSCOPY URETERAL STENT INSERTION    Surgeon(s):  London Johnson MD    Assistant:  * No surgical staff found *    Anesthesia: General    Estimated Blood Loss (mL): Minimal    Complications: None    Specimens:   * No specimens in log *    Implants:  Implant Name Type Inv.  Item Serial No.  Lot No. LRB No. Used Action   STENT URET 6 FRX24 CM FIRM [employer] - [id number]  STENT URET 6 FRX24 CM [employer]  1-4 All [facility] UROLOGY- [id number] Right 1 Implanted         Drains:   Urinary Catheter 02/13/23 [place] (Active)       Findings: right ureteral obstruction    Electronically signed by London Johnson MD on 2/13/2023 at 1:06 PM

## 2023-02-13 NOTE — ANESTHESIA PRE PROCEDURE
Department of Anesthesiology  Preprocedure Note       Name:  Meena Nunez   Age:  79 y.o.  :  1955                                          MRN:  [id number]         Date:  2023      Surgeon: Joao Hawkins):  Salvador Martinez MD    Procedure: Procedure(s):  CYSTOSCOPY URETERAL STENT INSERTION    Medications prior to admission:   Prior to Admission medications    Medication Sig Start Date End Date Taking? Authorizing Provider   clindamycin (CLEOCIN) 150 MG capsule Only when having dental work done 23   Historical Provider, MD   levothyroxine (SYNTHROID) 75 MCG tablet Take 1 tablet by mouth Daily 22   Brooke Davis MD   atorvastatin (LIPITOR) 20 MG tablet Take 1 tablet by mouth daily 22   Brooke Davis MD   alendronate (FOSAMAX) 70 MG tablet Take 1 tablet by mouth every 7 days  Patient taking differently: Take 70 mg by mouth every 7 days On sundays 12/27/22   Brooke Davis MD   losartan (COZAAR) 50 MG tablet Take 1 tablet by mouth daily 22   Brooke Davis MD   CALCIUM PO Take by mouth 3 times a week    Historical Provider, MD   tamsulosin (FLOMAX) 0.4 MG capsule Take 1 capsule by mouth daily  Patient not taking: No sig reported 21   Wilver Sour, APRN - CNP   Zinc Sulfate (ZINC 15 PO) Take 30 mg by mouth daily     Historical Provider, MD   Ascorbic Acid (VITAMIN C) 1000 MG tablet Take 500 mg by mouth daily     Historical Provider, MD   acetaminophen (TYLENOL) 500 MG tablet Take 500 mg by mouth every 6 hours as needed for Pain    Historical Provider, MD   Glucosamine-Chondroitin (GLUCOSAMINE CHONDR COMPLEX PO) Take by mouth daily    Historical Provider, MD   vitamin D (ERGOCALCIFEROL) 400 UNITS CAPS Take 2,000 Units by mouth daily     Historical Provider, MD   ibuprofen (ADVIL;MOTRIN) 400 MG tablet Take 400 mg by mouth every 6 hours as needed for Pain.     Historical Provider, MD       Current medications:    No current facility-administered medications for this visit. No current outpatient medications on file.      Facility-Administered Medications Ordered in Other Visits   Medication Dose Route Frequency Provider Last Rate Last Admin    [MAR Hold] 0.9 % sodium chloride infusion   IntraVENous Continuous Columba Rocha MD        [facility] Hold] sodium chloride flush 0.9 % injection 5-40 mL  5-40 mL IntraVENous 2 times per day Columba Rocha MD        [facility] Hold] sodium chloride flush 0.9 % injection 5-40 mL  5-40 mL IntraVENous PRN Columba Rocha MD        [facility] Hold] 0.9 % sodium chloride infusion   IntraVENous PRN Columba Rocha MD        [facility] Hold] heparin (porcine) injection 5,000 Units  5,000 Units SubCUTAneous BID Columba Rocha MD        [facility] Hold] ondansetron (ZOFRAN-ODT) disintegrating tablet 4 mg  4 mg Oral Q8H PRN Columba Rocha MD        Or    [facility] Hold] ondansetron [facility]) injection 4 mg  4 mg IntraVENous Q6H PRN Columba Rocha MD        [facility] Hold] polyethylene glycol (GLYCOLAX) packet 17 g  17 g Oral Daily PRN Columba Rocha MD        [facility] Hold] morphine (PF) injection 2 mg  2 mg IntraVENous Q2H PRN Columba Rocha MD        Or   Genesis Goss [facility] Hold] morphine injection 4 mg  4 mg IntraVENous Q2H PRN Columba Rocha MD        [facility] Hold] levoFLOXacin (LEVAQUIN) 500 MG/100ML infusion 500 mg  500 mg IntraVENous Once Columba Rocha MD        Followed by   Ambar Johnson Hold] levoFLOXacin (LEVAQUIN) 250 MG/50ML infusion 250 mg  250 mg IntraVENous Q24H Columba Rocha MD        levoFLOXacin (LEVAQUIN) 500 MG/100ML infusion 500 mg  500 mg IntraVENous Once Columba Rocha MD        0.9 % sodium chloride bolus  30 mL/kg IntraVENous Once Gabo Huang, APRN - CNP           Allergies:  No Known Allergies    Problem List:    Patient Active Problem List   Diagnosis Code    Ureteral calculus N20.1    Renal lithiasis lythotripsy 4/28/15 N20.0    Hypothyroidism E03.9    Osteoporosis M81.0    Melanocytic nevus D22.9    History of colon polyps Z86.010    Mixed hyperlipidemia E78.2    Hypertension X35    Renal colic on right side M73    Ureterolithiasis N20.1       Past Medical History:        Diagnosis Date    Fracture of wrist, unspecified laterality, closed, initial encounter     right    History of sepsis 2009    following lithotripsy    Hyperlipidemia     Hypertension     Hypothyroidism 2007    secondary to radioactive iodine 2007    Kidney stone     Osteoporosis     Primary localized osteoarthritis of right hip 3/18/2019       Past Surgical History:        Procedure Laterality Date    CHOLECYSTECTOMY, LAPAROSCOPIC  2009    COLONOSCOPY  07/17/2014    Dr. Donte England - tubular adenomatous polyp removed    COLONOSCOPY N/A 8/1/2018    Dr. Jerry Powers architectural distortion, suggestive of mucosal prolapse    CYSTOSCOPY  2015    stent removal and reinsert     CYSTOSCOPY Left     HLL with stent    CYSTOSCOPY Left 2/5/2021    CYSTOSCOPY URETEROSCOPY LASER-HLL performed by Adolfo Padgett MD at [address] / REMOVAL Clance Gaunt / STONE Left 2/5/2021    CYSTOSCOPY RETROGRADE PYELOGRAM STENT INSERTION performed by Adolfo Padgett MD at [facility] LITHOTRIPSY  4/28/2015    left    LITHOTRIPSY Left 01/30/2018    cystoscopy- Dr. Nolan Rodrigues LITHOTRIPSY Right 04/20/2021    LITHOTRIPSY Right 4/20/2021    ESWL EXTRACORPOREAL SHOCK WAVE LITHOTRIPSY performed by Adolfo Padgett MD at [address]  [facility]  2007    radioactive iodine procedure    ID CYSTOURETHROSCOPY Left 1/30/2018    CYSTOSCOPY performed by Adolfo Padgett MD at [address] Left 1/30/2018    ESWL [address] LITHOTRIPSY performed by Adolfo Padgett MD at [address] [address] Right 3/18/2019    Dr. Josephine Hull History:    Social History     Tobacco Use    Smoking status: Some Days     Packs/day: 0.25     Years: 30.00     Pack years: 7.50     Types: Cigarettes    Smokeless tobacco: Never   Substance Use Topics    Alcohol use: Yes     Alcohol/week: 1.0 standard drink     Types: 1 Shots of liquor per week     Comment: socially                                Ready to quit: Not Answered  Counseling given: Not Answered      Vital Signs (Current): There were no vitals filed for this visit. BP Readings from Last 3 Encounters:   02/13/23 (!) 83/54   02/13/23 (!) 82/48   02/02/23 134/80       NPO Status:                                                                                 BMI:   Wt Readings from Last 3 Encounters:   02/13/23 111 lb (50.3 kg)   02/13/23 112 lb (50.8 kg)   02/02/23 113 lb (51.3 kg)     There is no height or weight on file to calculate BMI.    CBC:   Lab Results   Component Value Date/Time    WBC 34.3 02/13/2023 09:30 AM    RBC 3.67 02/13/2023 09:30 AM    RBC 4.08 05/08/2012 08:10 AM    HGB 11.5 02/13/2023 09:30 AM    HCT 35.2 02/13/2023 09:30 AM    MCV 95.9 02/13/2023 09:30 AM    RDW 13.6 02/13/2023 09:30 AM    PLT See Reflexed IPF Result 02/13/2023 09:30 AM     05/08/2012 08:10 AM       CMP:   Lab Results   Component Value Date/Time     02/13/2023 09:30 AM    K 4.8 02/13/2023 09:30 AM    CL 99 02/13/2023 09:30 AM    CO2 22 02/13/2023 09:30 AM    BUN 45 02/13/2023 09:30 AM    CREATININE 3.83 02/13/2023 09:30 AM    GFRAA >60 04/01/2022 08:05 AM    LABGLOM 12 02/13/2023 09:30 AM    GLUCOSE 103 02/13/2023 09:30 AM    GLUCOSE 102 05/08/2012 08:10 AM    CALCIUM 9.5 02/13/2023 09:30 AM    AST 21 10/04/2022 07:31 AM    ALT 20 10/04/2022 07:31 AM       POC Tests: No results for input(s): POCGLU, POCNA, POCK, POCCL, POCBUN, POCHEMO, POCHCT in the last 72 hours.     Coags: No results found for: PROTIME, INR, APTT    HCG (If Applicable): No results found for: PREGTESTUR, PREGSERUM, HCG, HCGQUANT     ABGs: No results found for: PHART, PO2ART, IHZ3FIP, XXI5VNR, BEART, Z5UNHYRF     Type & Screen (If Applicable):  No results found for: LABABO, LABRH    Drug/Infectious Status (If Applicable):  No results found for: HIV, HEPCAB    COVID-19 Screening (If Applicable):   Lab Results   Component Value Date/Time    COVID19 Not Detected 04/13/2021 08:24 AM           Anesthesia Evaluation  Patient summary reviewed and Nursing notes reviewed no history of anesthetic complications:   Airway: Mallampati: II  TM distance: >3 FB   Neck ROM: full  Mouth opening: > = 3 FB   Dental:      Comment: Pt has crowns, denies anything loose     Pulmonary: breath sounds clear to auscultation  (+) current smoker          Patient did not smoke on day of surgery. Cardiovascular:  Exercise tolerance: good (>4 METS),   (+) hypertension:, murmur, hyperlipidemia    (-) CAD and CABG/stent    ECG reviewed  Rhythm: regular  Rate: normal           Beta Blocker:  Not on Beta Blocker         Neuro/Psych:   (+) headaches (occassional ):,             GI/Hepatic/Renal:   (+) renal disease: kidney stones,           Endo/Other:    (+) hypothyroidism::., .                  ROS comment: Uro-sepsis workup. Hypotensive. Will initiate fluid resusitation. Abdominal:       Abdomen: soft. Vascular: negative vascular ROS. Other Findings:             Anesthesia Plan      general     ASA 4 - emergent       Induction: intravenous. Anesthetic plan and risks discussed with patient and spouse. Plan discussed with CRNA.                     SAL Doty - CRNA   2/13/2023

## 2023-02-13 NOTE — CONSULTS
[facility]    [facility]  2/13/2023 4:32 PM    Pt Name:    Tom Rizzo  MRN:     [id number]   [de-identified]  YOB: 1955  Admit Date:    2/13/2023 10:56 AM  Primary Care Physician:  Shabnam Verduzco MD    [facility] Number:   [id number]    Reason for Consult: DINA  Requesting provider:  Hospitalist  TELEHEALTH EVALUATION -- Audio/Visual (During VTNCU-22 public health emergency)     Patient's Location: [address] [place] [address] (myself) location:  [place] [address] office ([place] [address]), [facility]  Patient's RN: Present    History:   The patient is a 79 y.o. female with history of kidney stones, hypertension who was admitted to the hospital with complaints of right-sided flank pain associated with some nausea and vomiting. Reports symptoms started about 2 to 3 days ago. Over time symptoms have progressed. Patient reports that she was having 7-8 out of 10 pain in the right flank area. Pain subsequently radiated to the front of the abdomen. No alleviating or aggravating factors. On further evaluation patient was noted to have a white count of 34,000 with elevated creatinine. She was noted to have some bandemia. She was hypotensive and tachycardic. CT scan showed multiple kidney stones in the right ureter with significant hydroureteronephrosis. She was taken to the operating room for cystoscopy and stent placement. Subsequently she required IV fluid bolus and pressor support for hypotension. Currently she is admitted to ICU. Nephrology has been consulted for management of DINA. Blood pressure was in systolic 98C.   Now improved to 90s with Trace-Synephrine    Past Medical History:  Past Medical History:   Diagnosis Date    Complicated UTI (urinary tract infection) 2/13/2023    Fracture of wrist, unspecified laterality, closed, initial encounter     right    History of sepsis 2009    following lithotripsy    Hyperlipidemia     Hypertension     Hypothyroidism 2007    secondary to radioactive iodine 2007    Kidney stone     Osteoporosis     Primary localized osteoarthritis of right hip 3/18/2019    Septic shock due to urinary tract infection ([facility] [address] 2/13/2023    Severe sepsis ([facility] [address] 2/13/2023       Past Surgical History:  Past Surgical History:   Procedure Laterality Date    CHOLECYSTECTOMY, LAPAROSCOPIC  2009    COLONOSCOPY  07/17/2014    Dr. Uziel Matthews - tubular adenomatous polyp removed    COLONOSCOPY N/A 08/01/2018    Dr. Ramana Rueda architectural distortion, suggestive of mucosal prolapse    CYSTOSCOPY  2015    stent removal and reinsert     CYSTOSCOPY Left     HLL with stent    CYSTOSCOPY Left 02/05/2021    CYSTOSCOPY URETEROSCOPY LASER-HLL performed by Stephenie Patrick MD at [address] [facility] Right 2/13/2023    CYSTOSCOPY URETERAL STENT INSERTION performed by Stephenie Patrick MD at [facility] / STONE Left 02/05/2021    CYSTOSCOPY RETROGRADE PYELOGRAM STENT INSERTION performed by Stephenie Patrick MD at [address] [place] [address] Right 02/13/2023    Dr. Idalia Garzon    LITHOTRIPSY  04/28/2015    left    LITHOTRIPSY Left 01/30/2018    cystoscopy- Dr. Chetan Stokes     LITHOTRIPSY Right 04/20/2021    LITHOTRIPSY Right 04/20/2021    ESWL EXTRACORPOREAL SHOCK WAVE [facility]RIPSY [facility]  [facility]    radioactive iodine procedure    [place] CYSTOURETHROSCOPY Left 01/30/2018    CYSTOSCOPY performed by Stephenie Patrick MD at [address] [facility] Left 01/30/2018    ESWL [address] LITHOTRIPSY performed by Stephenie Patrick MD at [address] [facility] Right 03/18/2019    Dr. Tyler Bernal       Family History:  Family History   Problem Relation Age of Onset    Breast Cancer Maternal Grandmother     Stroke Father     Hypertension Father     Alzheimer's Disease Mother Social History:  Social History     Socioeconomic History    Marital status:      Spouse name: Not on file    Number of children: Not on file    Years of education: Not on file    Highest education level: Not on file   Occupational History    Not on file   Tobacco Use    Smoking status: Some Days     Packs/day: 0.25     Years: 30.00     Pack years: 7.50     Types: Cigarettes    Smokeless tobacco: Never   Vaping Use    Vaping Use: Never used   Substance and Sexual Activity    Alcohol use: Yes     Alcohol/week: 1.0 standard drink     Types: 1 Shots of liquor per week     Comment: socially    Drug use: No    Sexual activity: Yes     Partners: Male     Comment: postmeno   Other Topics Concern    Not on file   Social History Narrative    Not on file     Social Determinants of Health     Financial Resource Strain: Low Risk     Difficulty of Paying Living Expenses: Not hard at all   Food Insecurity: No Food Insecurity    Worried About Running Out of Food in the Last Year: Never true    Ran Out of Food in the Last Year: Never true   Transportation Needs: Not on file   Physical Activity: Insufficiently Active    Days of Exercise per Week: 2 days    Minutes of Exercise per Session: 10 min   Stress: Not on file   Social Connections: Not on file   Intimate Partner Violence: Not on file   Housing Stability: Not on file       Home Meds:  Prior to Admission medications    Medication Sig Start Date End Date Taking?  Authorizing Provider   clindamycin (CLEOCIN) 150 MG capsule Only when having dental work done 1/12/23   Historical Provider, MD   levothyroxine (SYNTHROID) 75 MCG tablet Take 1 tablet by mouth Daily 12/27/22   Eboni Knight MD   atorvastatin (LIPITOR) 20 MG tablet Take 1 tablet by mouth daily 12/27/22   Eboni Knight MD   alendronate (FOSAMAX) 70 MG tablet Take 1 tablet by mouth every 7 days  Patient taking differently: Take 70 mg by mouth every 7 days On sundays 12/27/22   [facility] Cheri Singh MD   losartan (COZAAR) 50 MG tablet Take 1 tablet by mouth daily 12/27/22   Unknown MD Dax   CALCIUM PO Take by mouth 3 times a week    Historical Provider, MD   tamsulosin (FLOMAX) 0.4 MG capsule Take 1 capsule by mouth daily  Patient not taking: No sig reported 7/23/21   Epifanio Daniels, APRN - CNP   Zinc Sulfate (ZINC 15 PO) Take 30 mg by mouth daily     Historical Provider, MD   Ascorbic Acid (VITAMIN C) 1000 MG tablet Take 500 mg by mouth daily     Historical Provider, MD   acetaminophen (TYLENOL) 500 MG tablet Take 500 mg by mouth every 6 hours as needed for Pain    Historical Provider, MD   Glucosamine-Chondroitin (GLUCOSAMINE CHONDR COMPLEX PO) Take by mouth daily    Historical Provider, MD   vitamin D (ERGOCALCIFEROL) 400 UNITS CAPS Take 2,000 Units by mouth daily     Historical Provider, MD   ibuprofen (ADVIL;MOTRIN) 400 MG tablet Take 400 mg by mouth every 6 hours as needed for Pain. Historical Provider, MD       Review of Systems:  Constitutional: Positive for weakness, fatigue, abdominal pain, flank pain  Head: Negative for headaches  Eyes: Negative for blurry vision or discharge  Ears: Negative for ear pain or hearing changes  Nose: Negative for runny nose or epistaxis  Respiratory: Negative for shortness of breath. Negative for cough or sputum production. Negative for hemoptysis  Cardiovascular: Negative for chest pain  GI: Positive for nausea.   Negative for hematochezia and melena  : Negative for hematuria  Skin: Negative for rash  Musculoskeletal: Negative for joint pain, moves all ext  Neuro: Negative for numbness or tingling, negative for slurred speech  Psychiatric: Reports stable mood, negative for depression or insomnia    All other review of systems were reviewed and negative    Current Meds:  Infusion:    sodium chloride 150 mL/hr at 02/13/23 1539    sodium chloride      phenylephrine (YOLANDA-SYNEPHRINE) 50 mg/250 mL infusion 100 mcg/min (02/13/23 1609)    sodium chloride Meds:    levofloxacin  500 mg IntraVENous Once    Followed by    [START ON 2/14/2023] levofloxacin  250 mg IntraVENous Q24H    sodium chloride  30 mL/kg IntraVENous Once    heparin (porcine)  5,000 Units SubCUTAneous BID    sodium chloride flush  5-40 mL IntraVENous 2 times per day    lidocaine 1 % injection  5 mL IntraDERmal Once    sodium chloride flush  5-40 mL IntraVENous 2 times per day    sodium chloride  1,000 mL IntraVENous Once     Meds prn: ondansetron **OR** ondansetron, morphine **OR** morphine, sodium chloride, polyethylene glycol, sodium chloride flush, sodium chloride flush, sodium chloride     Allergies/Intolerances: ALLERGIES: Patient has no known allergies. 24HR INTAKE/OUTPUT:    Intake/Output Summary (Last 24 hours) at 2/13/2023 1632  Last data filed at 2/13/2023 1615  Gross per 24 hour   Intake 3000 ml   Output 250 ml   Net 2750 ml     No intake/output data recorded. I/O this shift:  In: 3000 [I.V.:3000]  Out: 250 [Urine:250]  Admission weight: 111 lb (50.3 kg)  Wt Readings from Last 3 Encounters:   02/13/23 111 lb (50.3 kg)   02/13/23 112 lb (50.8 kg)   02/02/23 113 lb (51.3 kg)     Body mass index is 19.66 kg/m². Physical Examination:  VITALS:   Vitals:    02/13/23 1545 02/13/23 1600 02/13/23 1610 02/13/23 1615   BP: (!) 87/53 (!) 81/51 (!) 80/52 (!) 92/49   Pulse: 93 91 91 88   Resp: 17 19 20 15   Temp:       TempSrc:       SpO2: 95% 95% 94% 94%   Weight:       Height:         Weight:   Wt Readings from Last 3 Encounters:   02/13/23 111 lb (50.3 kg)   02/13/23 112 lb (50.8 kg)   02/02/23 113 lb (51.3 kg)     Constitutional and General Appearance: alert and cooperative, appears comfortable, no apparent distress, not diaphoretic, Appears well-developed and well-nourished.    Lungs: no use of accessory muscles or labored breathing noted, Respiratory effort observed to be normal.  Patient is on room air  Extremities: No visible swelling  Skin: no rash seen on exposed extremities, No discoloration noted on facial skin  Musculo: moves all extremities  Neuro: no slurred speech, no facial drooping  Psychiatric: Normal mood and affect, Not agitated  Mental status: Alert and awake, Oriented to person/place/time, Able to follow commands      Due to this being a TeleHealth encounter, evaluation of the following organ systems is limited: EENT/Resp/CV/GI//MS/Neuro/Skin/Lymph      Lab Data  CBC:   Recent Labs     02/13/23  0930 02/13/23  1433   WBC 34.3* 18.3*   HGB 11.5* 9.7*   HCT 35.2* 29.2*   PLT See Reflexed IPF Result See Reflexed IPF Result     BMP:  Recent Labs     02/13/23  0930 02/13/23  1433    136   K 4.8 4.6   CL 99 104   CO2 22 19*   BUN 45* 46*   CREATININE 3.83* 3.44*   GLUCOSE 103* 76   CALCIUM 9.5 8.1*     Hepatic:   Recent Labs     02/13/23  0930 02/13/23  1433   LABALBU 3.4* 2.6*   * 78*   * 98*   BILITOT 0.6 0.5   ALKPHOS 183* 158*       Additional Labs: UA positive for nitrite and leukocyte esterase    Old tests reviewed. Labs: Creatinine 0.6 back in October 2022      Impression and Plan:  DINA in setting of septic shock and moderate hydroureteronephrosis. Patient with bilateral kidney stones. Urgently went to the operating room and underwent cystoscopy with stent placement. Currently requiring high doses of Trace-Synephrine drip. Continue with aggressive IV fluid resuscitation. No acute need for renal replacement therapy. Will monitor renal function and urine output. Septic shock likely secondary to UTI. Currently on IV Trace-Synephrine, IV antibiotics. May need to broaden coverage. Will defer IV antibiotic management to ICU  Right hydroureteronephrosis  History of kidney stones: At some point will need full metabolic evaluation once acute issues have resolved  Elevated liver enzymes  Mild metabolic acidosis  Significant leukocytosis secondary to UTI  Discussed with patient and staff    Thank you for the consult.  Please feel free to call me if you have any questions. Jose Enrique Collazo MD  [facility]      Patient was evaluated through a synchronous (real-time) audio-video encounter. The patient (or guardian if applicable) is aware that this is a billable service, which includes applicable co-pays. This virtual visit was conducted with patient's (and/or legal guardian's consent). The visit was conducted pursuant to the emergency declaration under the [address] [place] [address] and the [employer] Act. Patient identification was verified, and a caregiver was present when appropriate. The patient was located at home in a state where the provider was licensed to provide care    Virtual visit was done to address concerns as mentioned above. Due to this being a TeleHealth encounter (During VIOAU-06 public health emergency), evaluation of the following organ systems was limited: EENT/Resp/CV/GI//MS/Neuro/Skin/Lymph     Services were provided through a video synchronous discussion virtually to substitute for in-person visit.

## 2023-02-13 NOTE — PROGRESS NOTES
Spoke to MISSY Joiner regarding the need to transport to ICU with anesthesia, To [place] states she is almost done in [place] and will be out shortly to assist.

## 2023-02-13 NOTE — PROGRESS NOTES
[facility] at [facility]. Pt was a direct admit. Pt independently transferred to bed at this time and pt changed into gown. Vital signs and admission assessment completed at this time. Pt A&O x4. Pt complaining of mild abdominal pain and tenderness. Lung sounds clear throughout. BP low 81/48, vital signs otherwise WNL. Maryan Six starting IV at this time, and starting IV fluids. Binu Joe RN completing admission navigator at this time. Pt denies further needs at this time, care ongoing.

## 2023-02-13 NOTE — PROGRESS NOTES
HPI:        Patient is a 79 y.o. female in no acute distress. She is alert and oriented to person, place, and time. History   10/2013 Left HLL      11/2013 Right ESWL      4/2015 Left ESWL      1/2018 Left ESWL      4/2021 Right ESWL      Currently  Patient is here today with complaints of right-sided flank pain. She is also experiencing nausea and vomiting. We did have the patient go down for lab work. Patient does have an increased white blood cell count up to 34.3. Patient's creatinine is 3.83. This is up from baseline of 0.65. She does not have a fever here in the office. She does state that this pain began about 2 days ago. Patient did have a stat CT scan performed. This film was independently reviewed. This does show multiple stones in the right ureter. This is causing significant hydroureteronephrosis on the side. Patient does have other nonobstructing stones in the kidneys bilateral as well. Patient's pain is moderate today. 6 out of 10. Patient is also experiencing nausea and vomiting.     Past Medical History:   Diagnosis Date    Fracture of wrist, unspecified laterality, closed, initial encounter     right    History of sepsis 2009    following lithotripsy    Hyperlipidemia     Hypertension     Hypothyroidism 2007    secondary to radioactive iodine 2007    Kidney stone     Osteoporosis     Primary localized osteoarthritis of right hip 3/18/2019     Past Surgical History:   Procedure Laterality Date    CHOLECYSTECTOMY, LAPAROSCOPIC  2009    COLONOSCOPY  07/17/2014    Dr. Bueno Limbo - tubular adenomatous polyp removed    COLONOSCOPY N/A 8/1/2018    Dr. Elizabeth Reeder architectural distortion, suggestive of mucosal prolapse    CYSTOSCOPY  2015    stent removal and reinsert     CYSTOSCOPY Left     HLL with stent    CYSTOSCOPY Left 2/5/2021    CYSTOSCOPY URETEROSCOPY LASER-HLL performed by Bertha Carrasco MD at [place] / [facility] / [place] Left 2/5/2021    CYSTOSCOPY RETROGRADE PYELOGRAM STENT INSERTION performed by Veronica Ayoub MD at [address]    LITHOTRIPSY  4/28/2015    left    LITHOTRIPSY Left 01/30/2018    cystoscopy- Dr. Taylor Florentino     LITHOTRIPSY Right 04/20/2021    LITHOTRIPSY Right 4/20/2021    [facility] [address] LITHOTRIPSY performed by Veronica Ayoub MD at [address] [address]  2007    radioactive iodine procedure    [facility] Left 1/30/2018    CYSTOSCOPY performed by Veronica Ayoub MD at [address] Left 1/30/2018    [facility] [address] LITHOTRIPSY performed by Veronica Ayoub MD at [address] Right 3/18/2019    Dr. Marsha Pickard Encounter Medications as of 2/13/2023   Medication Sig Dispense Refill    clindamycin (CLEOCIN) 150 MG capsule       levothyroxine (SYNTHROID) 75 MCG tablet Take 1 tablet by mouth Daily 90 tablet 3    atorvastatin (LIPITOR) 20 MG tablet Take 1 tablet by mouth daily 90 tablet 3    alendronate (FOSAMAX) 70 MG tablet Take 1 tablet by mouth every 7 days 12 tablet 3    losartan (COZAAR) 50 MG tablet Take 1 tablet by mouth daily 90 tablet 3    CALCIUM PO Take by mouth 3 times a week      Zinc Sulfate (ZINC 15 PO) Take 30 mg by mouth daily       Ascorbic Acid (VITAMIN C) 1000 MG tablet Take 500 mg by mouth daily       acetaminophen (TYLENOL) 500 MG tablet Take 500 mg by mouth every 6 hours as needed for Pain      Glucosamine-Chondroitin (GLUCOSAMINE CHONDR COMPLEX PO) Take by mouth      vitamin D (ERGOCALCIFEROL) 400 UNITS CAPS Take 2,000 Units by mouth daily       ibuprofen (ADVIL;MOTRIN) 400 MG tablet Take 400 mg by mouth every 6 hours as needed for Pain.      tamsulosin (FLOMAX) 0.4 MG capsule Take 1 capsule by mouth daily (Patient not taking: Reported on 2/13/2023) 30 capsule 0     No facility-administered encounter medications on file as of 2/13/2023. Current Outpatient Medications on File Prior to Visit   Medication Sig Dispense Refill    clindamycin (CLEOCIN) 150 MG capsule       levothyroxine (SYNTHROID) 75 MCG tablet Take 1 tablet by mouth Daily 90 tablet 3    atorvastatin (LIPITOR) 20 MG tablet Take 1 tablet by mouth daily 90 tablet 3    alendronate (FOSAMAX) 70 MG tablet Take 1 tablet by mouth every 7 days 12 tablet 3    losartan (COZAAR) 50 MG tablet Take 1 tablet by mouth daily 90 tablet 3    CALCIUM PO Take by mouth 3 times a week      Zinc Sulfate (ZINC 15 PO) Take 30 mg by mouth daily       Ascorbic Acid (VITAMIN C) 1000 MG tablet Take 500 mg by mouth daily       acetaminophen (TYLENOL) 500 MG tablet Take 500 mg by mouth every 6 hours as needed for Pain      Glucosamine-Chondroitin (GLUCOSAMINE CHONDR COMPLEX PO) Take by mouth      vitamin D (ERGOCALCIFEROL) 400 UNITS CAPS Take 2,000 Units by mouth daily       ibuprofen (ADVIL;MOTRIN) 400 MG tablet Take 400 mg by mouth every 6 hours as needed for Pain.      tamsulosin (FLOMAX) 0.4 MG capsule Take 1 capsule by mouth daily (Patient not taking: Reported on 2/13/2023) 30 capsule 0     No current facility-administered medications on file prior to visit. Patient has no known allergies. Family History   Problem Relation Age of Onset    Breast Cancer Maternal Grandmother     Stroke Father     Hypertension Father     Alzheimer's Disease Mother      Social History     Tobacco Use   Smoking Status Some Days    Packs/day: 0.25    Years: 30.00    Pack years: 7.50    Types: Cigarettes   Smokeless Tobacco Never       Social History     Substance and Sexual Activity   Alcohol Use Yes    Alcohol/week: 0.0 standard drinks    Comment: socially       Review of Systems   Constitutional:  Negative for appetite change, chills and fever. Eyes:  Negative for redness and visual disturbance. Respiratory:  Negative for apnea, cough, shortness of breath and wheezing.     Cardiovascular:  Negative for chest pain and leg swelling. Gastrointestinal:  Negative for abdominal pain, constipation, nausea and vomiting. Genitourinary:  Negative for difficulty urinating, dyspareunia, dysuria, enuresis, flank pain, frequency, hematuria, pelvic pain, urgency, vaginal bleeding and vaginal discharge. Musculoskeletal:  Negative for back pain, joint swelling and myalgias. Skin:  Negative for color change, rash and wound. Neurological:  Negative for dizziness, tremors and numbness. Hematological:  Negative for adenopathy. Does not bruise/bleed easily. Psychiatric/Behavioral:  Negative for sleep disturbance. BP (!) 82/48 (Site: Right Upper Arm, Position: Sitting, Cuff Size: Large Adult)   Temp 98.4 °F (36.9 °C) (Temporal)   Ht 5' 3\" (1.6 m)   Wt 112 lb (50.8 kg)   LMP  (LMP Unknown)   BMI 19.84 kg/m²       PHYSICAL EXAM:  Constitutional: Patient resting comfortably, in no acute distress. Neuro: Alert and oriented to person place and time. Cranial nerves grossly intact. Psych: Mood and affect normal.  Skin: Warm, dry  HEENT: normocephalic, atraumatic  Lymphatics: No palpable lymphadenopathy  Lungs: Respiratory effort normal, unlabored  Cardiovascular:  Normal peripheral pulses  Abdomen: Soft, non-tender, non-distended with no organomegaly or palpable masses. : No CVA tenderness. Bladder non-tender and not distended. Pelvic: deferred    Lab Results   Component Value Date    BUN 45 (H) 02/13/2023     Lab Results   Component Value Date    CREATININE 3.83 (H) 02/13/2023       ASSESSMENT:  This is a 79 y.o. female with the following diagnoses:   Diagnosis Orders   1. Ureteral calculus        2. Renal colic on right side               PLAN:  Patient will be sent down for emergent right-sided ureteral stenting. We will then admit the patient afterwards. We will involve primary care. We will continue to monitor her blood work.

## 2023-02-13 NOTE — CONSULTS
Initial consultation:    Patient was not seen prior to going to OR as they were taken down 20 minutes after admission    Upon nursing notifying us of consultation surgery had come and taken the patient down for cystoscopy with urology. During review of the chart this is what I found:    Vital signs-systolic blood pressure 91-69, pulse 90, afebrile no hypoxia    Lab review:    BUN 45-baseline 12-17  Creatinine 3.83-baseline 0.65-0.74  Manger electrolytes normal no LFTs ordered    CBC-34.3  Hemoglobin 11.5  Hematocrit 35.2  Segs absolute-27.40  Platelet count 600    No urinalysis obtained  No blood cultures obtained    Current Orders per Urology:    CBC, BMP  Saline 150 mL/h  IV Levaquin  N.p.o.-plan OR    Plan:    Stat EKG  Stat portable x-ray  Stat blood cultures x2  Stat urine culture  Static lactic acid per sepsis protocol-I presume this will be elevated based off review of labs and vital signs  Normal saline fluid bolus per sepsis protocol at 30 mL/kg now  Stat hepatic function  Transfer to ICU postop    I did review this with Dr. Catia Palma as well    I notified CRNA of labs and testing that I have ordered due to concern of septic shock as patient is already tachycardic and hypotensive.   I did request chest x-ray baseline EKG, cultures and fluid bolus prior to OR sedation    SAL Valle - CNP

## 2023-02-13 NOTE — H&P
History and Physical    Patient:  Jodie Johnson  MRN: [id number]    CHIEF COMPLAINT:  right flank pain    HISTORY OF PRESENT ILLNESS:   The patient is a 79 y.o. female who presents with right flank pain. Patient is here today with complaints of right-sided flank pain. She is also experiencing nausea and vomiting. We did have the patient go down for lab work. Patient does have an increased white blood cell count up to 34.3. Patient's creatinine is 3.83. This is up from baseline of 0.65. She does not have a fever here in the office. She does state that this pain began about 2 days ago. Patient did have a stat CT scan performed. This film was independently reviewed. This does show multiple stones in the right ureter. This is causing significant hydroureteronephrosis on the side. Patient does have other nonobstructing stones in the kidneys bilateral as well. Patient's pain is moderate today. 6 out of 10. Patient is also experiencing nausea and vomiting.     Past Medical History:    Past Medical History:   Diagnosis Date    Fracture of wrist, unspecified laterality, closed, initial encounter     right    History of sepsis 2009    following lithotripsy    Hyperlipidemia     Hypertension     Hypothyroidism 2007    secondary to radioactive iodine 2007    Kidney stone     Osteoporosis     Primary localized osteoarthritis of right hip 3/18/2019       Past Surgical History:    Past Surgical History:   Procedure Laterality Date    CHOLECYSTECTOMY, LAPAROSCOPIC  2009    COLONOSCOPY  07/17/2014    Dr. Adrián Joseph - tubular adenomatous polyp removed    COLONOSCOPY N/A 8/1/2018    Dr. Richardson [place] architectural distortion, suggestive of mucosal prolapse    CYSTOSCOPY  2015    stent removal and reinsert     CYSTOSCOPY Left     HLL with stent    CYSTOSCOPY Left 2/5/2021    CYSTOSCOPY URETEROSCOPY LASER-HLL performed by Juan Hand MD at [facility] Left 2/5/2021 CYSTOSCOPY RETROGRADE PYELOGRAM STENT INSERTION performed by Maribeth Jacob MD at [address]    LITHOTRIPSY  4/28/2015    left    LITHOTRIPSY Left 01/30/2018    cystoscopy- Dr. Cunha Males     LITHOTRIPSY Right 04/20/2021    LITHOTRIPSY Right 4/20/2021    ES[facility] EXTRACORPOREAL SHOCK WAVE LITHOTRIPSY performed by Maribeth Jacob MD at [address] [facility]  2007    radioactive iodine procedure    [place] CYSTOURETHROSCOPY Left 1/30/2018    CYSTOSCOPY performed by Maribeth Jacob MD at [address] Left 1/30/2018    [facility] [address] LITHOTRIPSY performed by Maribeth Jacob MD at [address] [facility] [address] [address] [address] Right 3/18/2019    Dr. Jeanette Matthews       Medications Prior to Admission:    Prior to Admission medications    Medication Sig Start Date End Date Taking?  Authorizing Provider   clindamycin (CLEOCIN) 150 MG capsule Only when having dental work done 1/12/23   Historical Provider, MD   levothyroxine (SYNTHROID) 75 MCG tablet Take 1 tablet by mouth Daily 12/27/22   Romana Cheney MD   atorvastatin (LIPITOR) 20 MG tablet Take 1 tablet by mouth daily 12/27/22   Romana Cheney MD   alendronate (FOSAMAX) 70 MG tablet Take 1 tablet by mouth every 7 days  Patient taking differently: Take 70 mg by mouth every 7 days On sundays 12/27/22   Romana Cheney MD   losartan (COZAAR) 50 MG tablet Take 1 tablet by mouth daily 12/27/22   Romana Cheney MD   CALCIUM PO Take by mouth 3 times a week    Historical Provider, MD   tamsulosin (FLOMAX) 0.4 MG capsule Take 1 capsule by mouth daily  Patient not taking: No sig reported 7/23/21   Paula You APRN - CNP   Zinc Sulfate (ZINC 15 PO) Take 30 mg by mouth daily     Historical Provider, MD   Ascorbic Acid (VITAMIN C) 1000 MG tablet Take 500 mg by mouth daily     Historical Provider, MD   acetaminophen (TYLENOL) 500 MG tablet Take 500 mg by mouth every 6 hours as needed for Pain    Historical Provider, MD   Glucosamine-Chondroitin (GLUCOSAMINE CHONDR COMPLEX PO) Take by mouth daily    Historical Provider, MD   vitamin D (ERGOCALCIFEROL) 400 UNITS CAPS Take 2,000 Units by mouth daily     Historical Provider, MD   ibuprofen (ADVIL;MOTRIN) 400 MG tablet Take 400 mg by mouth every 6 hours as needed for Pain. Historical Provider, MD       Allergies:  Patient has no known allergies. Social History:    Social History     Socioeconomic History    Marital status:      Spouse name: Not on file    Number of children: Not on file    Years of education: Not on file    Highest education level: Not on file   Occupational History    Not on file   Tobacco Use    Smoking status: Some Days     Packs/day: 0.25     Years: 30.00     Pack years: 7.50     Types: Cigarettes    Smokeless tobacco: Never   Vaping Use    Vaping Use: Never used   Substance and Sexual Activity    Alcohol use:  Yes     Alcohol/week: 1.0 standard drink     Types: 1 Shots of liquor per week     Comment: socially    Drug use: No    Sexual activity: Yes     Partners: Male     Comment: postmeno   Other Topics Concern    Not on file   Social History Narrative    Not on file     Social Determinants of Health     Financial Resource Strain: Low Risk     Difficulty of Paying Living Expenses: Not hard at all   Food Insecurity: No Food Insecurity    Worried About Running Out of Food in the Last Year: Never true    Ran Out of Food in the Last Year: Never true   Transportation Needs: Not on file   Physical Activity: Insufficiently Active    Days of Exercise per Week: 2 days    Minutes of Exercise per Session: 10 min   Stress: Not on file   Social Connections: Not on file   Intimate Partner Violence: Not on file   Housing Stability: Not on file       Family History:    Family History   Problem Relation Age of Onset    Breast Cancer Maternal Grandmother     Stroke Father     Hypertension Father Alzheimer's Disease Mother        REVIEW OF SYSTEMS:  All systems reviewed and negative except for that already noted in the HPI. Physical Exam:      This a 79 y.o. female   Patient Vitals for the past 24 hrs:   BP Temp Temp src Pulse Resp SpO2 Height Weight   02/13/23 1134 (!) 82/52 -- -- -- -- -- -- --   02/13/23 1115 -- -- -- -- -- -- 5' 3\" (1.6 m) 111 lb (50.3 kg)   02/13/23 1101 (!) 81/48 98.1 °F (36.7 °C) Temporal 86 18 98 % -- --     Constitutional: Patient in no acute distress. Neuro: Alert and oriented to person, place and time. Psych: mood and affect normal  HEENT negative  Lungs: Respiratory effort is normal  Cardiovascular: Normal peripheral pulses  Abdomen: Soft, non-tender, non-distended with no CVA, flank pain or hepatosplenomegaly. No hernias. Kidneys normal.  Lymphatics: No palpable lymphadenopathy. Bladder non-tender and not distended. Pelvic exam:  External genitalia normal  Urethral and urethral meatus normal  Vagina normal with no evidence of pelvic prolapse  Uterus normal  Adnexa normal  Anus and perineum normal  Rectal exam not indicated    LABS:   Recent Labs     02/13/23  0930   WBC 34.3*   HGB 11.5*   HCT 35.2*   MCV 95.9   PLT See Reflexed IPF Result     Recent Labs     02/13/23  0930      K 4.8   CL 99   CO2 22   BUN 45*   CREATININE 3.83*       Additional Lab/culture results:    Urinalysis: No results for input(s): COLORU, PHUR, LABCAST, WBCUA, RBCUA, MUCUS, TRICHOMONAS, YEAST, BACTERIA, CLARITYU, SPECGRAV, LEUKOCYTESUR, UROBILINOGEN, Kin Garb in the last 72 hours.     Invalid input(s): NITRATE, GLUCOSEUKETONESUAMORPHOUS     -----------------------------------------------------------------  Imaging Results:      Assessment and Plan   Impression:    Patient Active Problem List   Diagnosis    Ureteral calculus    Renal lithiasis lythotripsy 4/28/15    Hypothyroidism    Osteoporosis    Melanocytic nevus    History of colon polyps    Mixed hyperlipidemia Hypertension    Renal colic on right side    Ureterolithiasis       Plan:   N.p.o.  Plan for cystoscopy with right ureteral stent placement  Antibiotics  IV hydratation    Geovanna Leone MD  11:43 AM 2/13/2023

## 2023-02-13 NOTE — ANESTHESIA POSTPROCEDURE EVALUATION
Department of Anesthesiology  Postprocedure Note    Patient: Enio Ramos  MRN: [id number]  YOB: 1955  Date of evaluation: 2/13/2023      Procedure Summary     Date: 02/13/23 Room / Location: [facility] [address]    Anesthesia Start: 1335 Anesthesia Stop: 1242    Procedure: CYSTOSCOPY URETERAL STENT INSERTION (Right) Diagnosis:       Multiple calculi of biliary tract with obstruction      (OBSTRUCTING CALCULI DISTAL RIGHT URETER)    Surgeons: Awilda Casillas MD Responsible Provider: SAL Mendoza CRNA    Anesthesia Type: general ASA Status: 4 - Emergent          Anesthesia Type: No value filed.     Hannah Phase I: Hannah Score: 9    Hannah Phase II:        Anesthesia Post Evaluation    Patient location during evaluation: PACU  Patient participation: complete - patient participated  Level of consciousness: awake  Airway patency: patent  Nausea & Vomiting: no vomiting and no nausea  Complications: no  Cardiovascular status: hemodynamically stable  Respiratory status: acceptable and spontaneous ventilation  Hydration status: stable  Multimodal analgesia pain management approach

## 2023-02-13 NOTE — H&P (VIEW-ONLY)
History and Physical    Patient:  Lory Cardenas  MRN: [id number]    CHIEF COMPLAINT:  right flank pain    HISTORY OF PRESENT ILLNESS:   The patient is a 79 y.o. female who presents with right flank pain. Patient is here today with complaints of right-sided flank pain. She is also experiencing nausea and vomiting. We did have the patient go down for lab work. Patient does have an increased white blood cell count up to 34.3. Patient's creatinine is 3.83. This is up from baseline of 0.65. She does not have a fever here in the office. She does state that this pain began about 2 days ago. Patient did have a stat CT scan performed. This film was independently reviewed. This does show multiple stones in the right ureter. This is causing significant hydroureteronephrosis on the side. Patient does have other nonobstructing stones in the kidneys bilateral as well. Patient's pain is moderate today. 6 out of 10. Patient is also experiencing nausea and vomiting.     Past Medical History:    Past Medical History:   Diagnosis Date    Fracture of wrist, unspecified laterality, closed, initial encounter     right    History of sepsis 2009    following lithotripsy    Hyperlipidemia     Hypertension     Hypothyroidism 2007    secondary to radioactive iodine 2007    Kidney stone     Osteoporosis     Primary localized osteoarthritis of right hip 3/18/2019       Past Surgical History:    Past Surgical History:   Procedure Laterality Date    CHOLECYSTECTOMY, LAPAROSCOPIC  2009    COLONOSCOPY  07/17/2014    Dr. Zita Casillas - tubular adenomatous polyp removed    COLONOSCOPY N/A 8/1/2018    Dr. Sadie Morgan architectural distortion, suggestive of mucosal prolapse    CYSTOSCOPY  2015    stent removal and reinsert     CYSTOSCOPY Left     HLL with stent    CYSTOSCOPY Left 2/5/2021    CYSTOSCOPY URETEROSCOPY LASER-HLL performed by Sade Wright MD at [facility] / Aleksandra Qureshi / Luciana Brady Left 2/5/2021 CYSTOSCOPY RETROGRADE PYELOGRAM STENT INSERTION performed by Carlton Lyman MD at [address] [facility]    LITHOTRIPSY  4/28/2015    left    LITHOTRIPSY Left 01/30/2018    cystoscopy- Dr. Binu Waters     LITHOTRIPSY Right 04/20/2021    LITHOTRIPSY Right 4/20/2021    ESWL EXTRACORPOREAL SHOCK WAVE LITHOTRIPSY performed by Carlton Lyman MD at [address] [address]    radioactive iodine procedure    DC CYSTOURETHROSCOPY Left 1/30/2018    CYSTOSCOPY performed by Carlton Lyman MD at [address] Left 1/30/2018    ESWL [address] LITHOTRIPSY performed by Carlton Lyman MD at [address] Right 3/18/2019    Dr. Ivan Nguyen       Medications Prior to Admission:    Prior to Admission medications    Medication Sig Start Date End Date Taking?  Authorizing Provider   clindamycin (CLEOCIN) 150 MG capsule Only when having dental work done 1/12/23   Historical Provider, MD   levothyroxine (SYNTHROID) 75 MCG tablet Take 1 tablet by mouth Daily 12/27/22   Nathaly Alcazar MD   atorvastatin (LIPITOR) 20 MG tablet Take 1 tablet by mouth daily 12/27/22   Nathaly Alcazar MD   alendronate (FOSAMAX) 70 MG tablet Take 1 tablet by mouth every 7 days  Patient taking differently: Take 70 mg by mouth every 7 days On sundays 12/27/22   Nathaly Alcazar MD   losartan (COZAAR) 50 MG tablet Take 1 tablet by mouth daily 12/27/22   Nathaly Alcazar MD   CALCIUM PO Take by mouth 3 times a week    Historical Provider, MD   tamsulosin (FLOMAX) 0.4 MG capsule Take 1 capsule by mouth daily  Patient not taking: No sig reported 7/23/21   Serge Brennan APRN - CNP   Zinc Sulfate (ZINC 15 PO) Take 30 mg by mouth daily     Historical Provider, MD   Ascorbic Acid (VITAMIN C) 1000 MG tablet Take 500 mg by mouth daily     Historical Provider, MD   acetaminophen (TYLENOL) 500 MG tablet Take 500 mg by mouth every 6 hours as needed for Pain    Historical Provider, MD   Glucosamine-Chondroitin (GLUCOSAMINE CHONDR COMPLEX PO) Take by mouth daily    Historical Provider, MD   vitamin D (ERGOCALCIFEROL) 400 UNITS CAPS Take 2,000 Units by mouth daily     Historical Provider, MD   ibuprofen (ADVIL;MOTRIN) 400 MG tablet Take 400 mg by mouth every 6 hours as needed for Pain. Historical Provider, MD       Allergies:  Patient has no known allergies. Social History:    Social History     Socioeconomic History    Marital status:      Spouse name: Not on file    Number of children: Not on file    Years of education: Not on file    Highest education level: Not on file   Occupational History    Not on file   Tobacco Use    Smoking status: Some Days     Packs/day: 0.25     Years: 30.00     Pack years: 7.50     Types: Cigarettes    Smokeless tobacco: Never   Vaping Use    Vaping Use: Never used   Substance and Sexual Activity    Alcohol use:  Yes     Alcohol/week: 1.0 standard drink     Types: 1 Shots of liquor per week     Comment: socially    Drug use: No    Sexual activity: Yes     Partners: Male     Comment: postmeno   Other Topics Concern    Not on file   Social History Narrative    Not on file     Social Determinants of Health     Financial Resource Strain: Low Risk     Difficulty of Paying Living Expenses: Not hard at all   Food Insecurity: No Food Insecurity    Worried About Running Out of Food in the Last Year: Never true    Ran Out of Food in the Last Year: Never true   Transportation Needs: Not on file   Physical Activity: Insufficiently Active    Days of Exercise per Week: 2 days    Minutes of Exercise per Session: 10 min   Stress: Not on file   Social Connections: Not on file   Intimate Partner Violence: Not on file   Housing Stability: Not on file       Family History:    Family History   Problem Relation Age of Onset    Breast Cancer Maternal Grandmother     Stroke Father     Hypertension Father Alzheimer's Disease Mother        REVIEW OF SYSTEMS:  All systems reviewed and negative except for that already noted in the HPI. Physical Exam:      This a 79 y.o. female   Patient Vitals for the past 24 hrs:   BP Temp Temp src Pulse Resp SpO2 Height Weight   02/13/23 1134 (!) 82/52 -- -- -- -- -- -- --   02/13/23 1115 -- -- -- -- -- -- 5' 3\" (1.6 m) 111 lb (50.3 kg)   02/13/23 1101 (!) 81/48 98.1 °F (36.7 °C) Temporal 86 18 98 % -- --     Constitutional: Patient in no acute distress. Neuro: Alert and oriented to person, place and time. Psych: mood and affect normal  HEENT negative  Lungs: Respiratory effort is normal  Cardiovascular: Normal peripheral pulses  Abdomen: Soft, non-tender, non-distended with no CVA, flank pain or hepatosplenomegaly. No hernias. Kidneys normal.  Lymphatics: No palpable lymphadenopathy. Bladder non-tender and not distended. Pelvic exam:  External genitalia normal  Urethral and urethral meatus normal  Vagina normal with no evidence of pelvic prolapse  Uterus normal  Adnexa normal  Anus and perineum normal  Rectal exam not indicated    LABS:   Recent Labs     02/13/23  0930   WBC 34.3*   HGB 11.5*   HCT 35.2*   MCV 95.9   PLT See Reflexed IPF Result     Recent Labs     02/13/23  0930      K 4.8   CL 99   CO2 22   BUN 45*   CREATININE 3.83*       Additional Lab/culture results:    Urinalysis: No results for input(s): COLORU, PHUR, LABCAST, WBCUA, RBCUA, MUCUS, TRICHOMONAS, YEAST, BACTERIA, CLARITYU, SPECGRAV, LEUKOCYTESUR, UROBILINOGEN, Kin [place] in the last 72 hours.     Invalid input(s): NITRATE, GLUCOSEUKETONESUAMORPHOUS     -----------------------------------------------------------------  Imaging Results:      Assessment and Plan   Impression:    Patient Active Problem List   Diagnosis    Ureteral calculus    Renal lithiasis lythotripsy 4/28/15    Hypothyroidism    Osteoporosis    Melanocytic nevus    History of colon polyps    Mixed hyperlipidemia Hypertension    Renal colic on right side    Ureterolithiasis       Plan:   N.p.o.  Plan for cystoscopy with right ureteral stent placement  Antibiotics  IV hydratation    Maribeth Jacob MD  11:43 AM 2/13/2023

## 2023-02-13 NOTE — PROGRESS NOTES
Raquel Ha CRNA attempted labs x1 and unsuccessful. Spoke to someone in lab who states they will be up to draw pt.

## 2023-02-13 NOTE — ANESTHESIA PRE-OP
Spoke with Dane Peña regarding patient. She was consulted on patient and is working her up for urosepsis, has not yet had ekg or labs. Will obtain these labs and initiate fluid bolus. Pt made aware of this. Will pass on to Dr Alexis Hawkins that miriam cassidy would like patient to go to ICU post operatively.

## 2023-02-13 NOTE — PROGRESS NOTES
Writer called urology office and spoke with [facility] CNP at this time regarding patients blood pressures and not having any orders for the patient. Per [facility], she is aware of the blood pressure and states she will put orders in shortly. Patient to leave for surgery shortly.

## 2023-02-13 NOTE — PROGRESS NOTES
Transported pt to  by bed with [place] [facility], report given to Garrett Becerra RN. Discharge Criteria    Inpatients must meet Criteria 1 through 7. All other patients are either YES or N/A. If a NO is chosen then Anesthesia or Surgeon must be notified. 1.  Minimum 30 minutes after last dose of sedative medication, minimum 120 minutes after last dose of reversal agent. Yes      2. Systolic BP stable within 20 mmHg for 30 minutes & systolic BP between 90 & 277 or within 10 mmHg of baseline. Yes, on Trace drip, ok to take to ICU per Mallory Raines CRNA      3. Pulse between 60 and 100 or within 10 bpm of baseline. Yes      4. Spontaneous respiratory rate >/= 10 per minute. Yes      5. SaO2 >/= 95 or  >/= baseline. Yes, on 2L NC.      6.  Able to cough and swallow or return to baseline function. Yes      7. Alert and oriented or return to baseline mental status.     Yes

## 2023-02-13 NOTE — PATIENT INSTRUCTIONS
SURVEY:    You may be receiving a survey from [facility] regarding your visit today. Please complete the survey to enable us to provide the highest quality of care to you and your family. If you cannot score us a very good on any question, please call the office to discuss how we could of made your experience a very good one. Thank you.

## 2023-02-13 NOTE — CONSULTS
ICU Consult - Admission [facility] Medicine  History and Physical    Patient:  Cynthia Phalen  MRN: [id number]    Chief Complaint: Abdominal pain and back pain    History Obtained From:  patient, electronic medical record    PCP: Lydia Almodovar MD    History of Present Illness: The patient is a 79 y.o. female who presented as a direct admission from Dr. Ezequiel Paulson office. Patient stated on Saturday late evening into early Sunday she developed right lower quadrant abdominal pain and right CVA tenderness. Patient denied fever but complained of chills. She complained of associated nausea and vomiting. She stated she had decreased urination. She does states she has history of kidney stones in the past with last 1 in April 2022. During patient's initial lab work she was found to have significant elevation in her CBC with a WBC of 34.3 with a left shift of 27.40. Hemoglobin was stable at 11.5 with a platelet count of 119. She was found to have acute renal failure with a BUN of 45 with a baseline of 12-17. Her initial creatinine was 3.83 with a baseline of 0.65-0.74. Upon arrival to the floor her systolic blood pressure was 81. Patient was also tachycardic with a pulse of 90. CT scan was completed and reviewed by urology which showed multiple stones in the right ureter causing significant hydroureteronephrosis. She also had nonobstructing stones in the kidney as well. She complained of her pain is 6 out of 10. Patient was taken immediately to surgery for cystoscopy and stent placement. Upon return to the floor despite fluid bolus and Trace-Synephrine patient remains hypotensive.         Past Medical History:        Diagnosis Date    Complicated UTI (urinary tract infection) 2/13/2023    Fracture of wrist, unspecified laterality, closed, initial encounter     right    History of sepsis 2009    following lithotripsy    Hyperlipidemia     Hypertension     Hypothyroidism 2007    secondary to radioactive iodine 2007    Kidney stone     Osteoporosis     Primary localized osteoarthritis of right hip 3/18/2019    Septic shock due to urinary tract infection ([facility] [address] 2/13/2023    Severe sepsis ([facility] [address] 2/13/2023       Past Surgical History:        Procedure Laterality Date    CHOLECYSTECTOMY, LAPAROSCOPIC  2009    COLONOSCOPY  07/17/2014    Dr. Aurora Barrios - tubular adenomatous polyp removed    COLONOSCOPY N/A 08/01/2018    Dr. Fannei Guillermo architectural distortion, suggestive of mucosal prolapse    CYSTOSCOPY  2015    stent removal and reinsert     CYSTOSCOPY Left     HLL with stent    CYSTOSCOPY Left 02/05/2021    CYSTOSCOPY URETEROSCOPY LASER-HLL performed by Billie Najjar, MD at [place] [address] / [employer] / STONE Left 02/05/2021    CYSTOSCOPY RETROGRADE PYELOGRAM STENT INSERTION performed by Billie Najjar, MD at [address] [place] [address] Right 02/13/2023    Dr. Arabella Sánchez    LITHOTRIPSY  04/28/2015    left    LITHOTRIPSY Left 01/30/2018    cystoscopy- Dr. Michael Wade     LITHOTRIPSY Right 04/20/2021    LITHOTRIPSY Right 04/20/2021    ESWL EXTRACORPOREAL SHOCK WAVE LITHOTRIPSY performed by Billie Najjar, MD at [facility]  2007    radioactive iodine procedure    [place] CYSTOURETHROSCOPY Left 01/30/2018    CYSTOSCOPY performed by Billie Najjar, MD at [address] Left 01/30/2018    ESWL [address] LITHOTRIPSY performed by Billie Najjar, MD at [address] [facility] Right 03/18/2019    Dr. Mendoza Oh       Medications Prior to Admission:    Prior to Admission medications    Medication Sig Start Date End Date Taking?  Authorizing Provider   clindamycin (CLEOCIN) 150 MG capsule Only when having dental work done 1/12/23   Historical Provider, MD   levothyroxine (SYNTHROID) 75 MCG tablet Take 1 tablet by mouth Daily 12/27/22 Brock Leonard MD   atorvastatin (LIPITOR) 20 MG tablet Take 1 tablet by mouth daily 12/27/22   Brock Leonard MD   alendronate (FOSAMAX) 70 MG tablet Take 1 tablet by mouth every 7 days  Patient taking differently: Take 70 mg by mouth every 7 days On sundays 12/27/22   Brock Leonard MD   losartan (COZAAR) 50 MG tablet Take 1 tablet by mouth daily 12/27/22   Brock Leonard MD   CALCIUM PO Take by mouth 3 times a week    Historical Provider, MD   tamsulosin (FLOMAX) 0.4 MG capsule Take 1 capsule by mouth daily  Patient not taking: No sig reported 7/23/21   SAL Moya - CNP   Zinc Sulfate (ZINC 15 PO) Take 30 mg by mouth daily     Historical Provider, MD   Ascorbic Acid (VITAMIN C) 1000 MG tablet Take 500 mg by mouth daily     Historical Provider, MD   acetaminophen (TYLENOL) 500 MG tablet Take 500 mg by mouth every 6 hours as needed for Pain    Historical Provider, MD   Glucosamine-Chondroitin (GLUCOSAMINE CHONDR COMPLEX PO) Take by mouth daily    Historical Provider, MD   vitamin D (ERGOCALCIFEROL) 400 UNITS CAPS Take 2,000 Units by mouth daily     Historical Provider, MD   ibuprofen (ADVIL;MOTRIN) 400 MG tablet Take 400 mg by mouth every 6 hours as needed for Pain. Historical Provider, MD       Allergies:  Patient has no known allergies. Social History:   TOBACCO:   reports that she has been smoking cigarettes. She has a 7.50 pack-year smoking history. She has never used smokeless tobacco.  ETOH:   reports current alcohol use of about 1.0 standard drink per week. Family History:       Problem Relation Age of Onset    Breast Cancer Maternal Grandmother     Stroke Father     Hypertension Father     Alzheimer's Disease Mother        Allergies:  Patient has no known allergies. Medications Prior to Admission:    Prior to Admission medications    Medication Sig Start Date End Date Taking?  Authorizing Provider   clindamycin (CLEOCIN) 150 MG capsule Only when having dental work done 1/12/23   Historical Provider, MD   levothyroxine (SYNTHROID) 75 MCG tablet Take 1 tablet by mouth Daily 12/27/22   Nathaly Alcazar MD   atorvastatin (LIPITOR) 20 MG tablet Take 1 tablet by mouth daily 12/27/22   Nathaly Alcazar MD   alendronate (FOSAMAX) 70 MG tablet Take 1 tablet by mouth every 7 days  Patient taking differently: Take 70 mg by mouth every 7 days On sundays 12/27/22   Nathaly Alcazar MD   losartan (COZAAR) 50 MG tablet Take 1 tablet by mouth daily 12/27/22   Nathaly Alcazar MD   CALCIUM PO Take by mouth 3 times a week    Historical Provider, MD   tamsulosin (FLOMAX) 0.4 MG capsule Take 1 capsule by mouth daily  Patient not taking: No sig reported 7/23/21   SAL Cloud - CNP   Zinc Sulfate (ZINC 15 PO) Take 30 mg by mouth daily     Historical Provider, MD   Ascorbic Acid (VITAMIN C) 1000 MG tablet Take 500 mg by mouth daily     Historical Provider, MD   acetaminophen (TYLENOL) 500 MG tablet Take 500 mg by mouth every 6 hours as needed for Pain    Historical Provider, MD   Glucosamine-Chondroitin (GLUCOSAMINE CHONDR COMPLEX PO) Take by mouth daily    Historical Provider, MD   vitamin D (ERGOCALCIFEROL) 400 UNITS CAPS Take 2,000 Units by mouth daily     Historical Provider, MD   ibuprofen (ADVIL;MOTRIN) 400 MG tablet Take 400 mg by mouth every 6 hours as needed for Pain. Historical Provider, MD       Review of Systems:  Constitutional:positive  for fevers, and positive for chills.   Eyes: negative for visual disturbance   ENT: negative for sore throat, negative nasal congestion, and negative for earache  Respiratory: negative for shortness of breath, negative for cough, and negative for wheezing  Cardiovascular: negative for chest pain, negative for palpitations, and negative for syncope  Gastrointestinal: positive for abdominal pain, positive for nausea,positive for vomiting, negative for diarrhea, negative for constipation, and negative for hematochezia or melena  Genitourinary: negative for dysuria, negative for urinary urgency, negative for urinary frequency, and negative for hematuria  Skin: negative for skin rash, and negative for skin lesions  Neurological: negative for unilateral weakness, numbness or tingling. Physical Exam:    Vitals:    Temp: 100.3 °F (37.9 °C)  BP: (!) 80/57  Resp: 23  Heart Rate: (!) 103  SpO2: 94 % on supplemental O2  SpO2 range:   SpO2  Av.7 %  Min: 92 %  Max: 100 %    Exam:  GEN:    Awake, alert and oriented x3. EYES:  EOMI, pupils equal   NECK: Supple. No lymphadenopathy. No carotid bruit  CVS:    regular but tachycardic, no audible murmur  PULM:  Clear to auscultation without any wheezes, rales or rhonchi, no acute respiratory distress   ABD:    Bowels sounds normal.  Abdomen is soft. No distention. RLQ tenderness to palpation. EXT:   no edema bilaterally . No calf tenderness. NEURO: Moves all extremities. Motor and sensory are grossly intact  SKIN:  No rashes.   No skin lesions.    -----------------------------------------------------------------  Diagnostic Data:     CBC:   Lab Results   Component Value Date    WBC 34.3 (H) 2023    RBC 3.67 (L) 2023    HGB 11.5 (L) 2023    HCT 35.2 (L) 2023    MCV 95.9 2023    PLT See Reflexed IPF Result 2023      Lab Results   Component Value Date    SEGS 80 (H) 2023    NEUTROABS 27.40 (H) 2023    LYMPHOPCT 7 (L) 2023    LYMPHSABS 2.40 2023    MONOPCT 2 (L) 2023    EOSRELPCT 0 (L) 2023    BASOPCT 0 2023    IMMGRAN 11 (H) 2023     CMP:   Lab Results   Component Value Date    GLUCOSE 76 2023    BUN 46 (H) 2023    CREATININE 3.44 (H) 2023     2023    K 4.6 2023    CALCIUM 8.1 (L) 2023     2023    CO2 19 (L) 2023    PROT 5.1 (L) 2023    LABALBU 2.6 (L) 2023    BILITOT 0.5 2023    ALKPHOS 158 (H) 2023    ALT 98 (H) 02/13/2023    AST 78 (H) 02/13/2023     UA:   Lab Results   Component Value Date    COLORU YELLOW 02/03/2021    CLARITYU clear 05/18/2017    SPECGRAV 1.020 02/03/2021    WBCUA 50  02/03/2021    RBCUA 5 TO 10 02/03/2021    EPITHUA 0 TO 2 02/03/2021    LEUKOCYTESUR LARGE (A) 02/03/2021    GLUCOSEU NEGATIVE 02/03/2021    BLOODU 1+ 05/18/2017    KETUA TRACE (A) 02/03/2021    PROTEINU 2+ (A) 02/03/2021    HGBUR 2+ (A) 02/03/2021    CASTUA NOT REPORTED 02/03/2021    CRYSTUA NOT REPORTED 02/03/2021    BACTERIA 2+ (A) 02/03/2021    YEAST NOT REPORTED 02/03/2021     Lactic Acid:    Latest Reference Range & Units 2/13/23 14:33   Lactic Acid, Sepsis 0.5 - 1.9 mmol/L 3.8 (H)   (H): Data is abnormally high    D-Dimer:  No results found for: DDIMER  PT/INR:  No results found for: PROTIME, INR  Troponin:  No results for input(s): TROPONINI in the last 72 hours. ABGs:   No results found for: PHART, PH, TKI8VVO, PCO2, PO2ART, PO2, XPD9XUE, HCO3, BEART, BE, THGBART, THB, NHS6ECD, Y9LCOSFU, O2SAT, FIO2        Imaging Data:  FLUORO FOR SURGICAL PROCEDURES   Final Result      XR CHEST PORTABLE   Final Result   Increased interstitial opacity. While much or all of this may be chronic,   please correlate with any clinical evidence of acute interstitial edema. A focal infiltrate is not detected                 Assessment:    Principal Problem:    Ureterolithiasis  Active Problems:    Septic shock due to urinary tract infection (HCC)    Complicated UTI (urinary tract infection)  Resolved Problems:    * No resolved hospital problems.  *      Patient Active Problem List    Diagnosis Date Noted    Renal colic on right side 65/22/4590    Ureterolithiasis 02/13/2023    Septic shock due to urinary tract infection ([facility] [address] 04/21/5429    Complicated UTI (urinary tract infection) 02/13/2023    Mixed hyperlipidemia 10/24/2019    Hypertension     History of colon polyps 07/08/2018    Osteoporosis     Melanocytic nevus 08/30/2016    Renal lithiasis lythotripsy 4/28/15 07/16/2014    Ureteral calculus 10/11/2013    Hypothyroidism 01/01/2007       Plan:       Primary Problem(s): Ureterolithiasis  Differential diagnoses: None  Condition is an acute or chronic illness or injury that poses threat to life or bodily function  Condition is stable  Treatment plan: Consultation: Urology  Imaging: no further imaging studies ordered today  Medications:   IV fluids  IV Levaquin  Medication Monitoring / High Risk Medications: none   Postop cystoscopy with stent placement today     Septic Shock due to UTI    Condition is unchanged  Treatment plan: Consultation: Nephrology  Imaging: no further imaging studies ordered today  Medications:   Continue IV Trace-Synephrine-keep MAP greater than 65  Consider Levophed  Continue IV fluids at 150 mL/h  Continue IV Levaquin  Repeat 1 L fluid bolus now  Fluid bolus per sepsis protocol ordered -2 L of fluids given intraoperatively initially  Urine culture-pending  Blood culture x2-pending  Place PICC line  Hourly outputs  DINA  Monitor renal function  Hourly outputs  Appreciate nephrology  BMP every 4 hours  Liver shock  Monitor LFTs  Hold Lipitor    DINA  Condition is worsening  Treatment plan: Consultation: Nephrology  Imaging: no further imaging studies ordered today  Medications:   IV fluids  No NSAIDs  Hourly outputs  BMP every 4 hours    HTN    Condition is a chronic stable condition  Treatment plan: Continue current treatment  Imaging: no further imaging studies ordered today  Medications:   Hold losartan due to hypotension    Nutrition status:    Well developed, well nourished with no malnutrition  Dietician consult initiated    Hospital Prophylaxis:   DVT: Heparin  Stress Ulcer: Not at this time    MDM Data:   Test interpretation:  My independent EKG interpretation: sinus tachycardia, T wave depression in V1, V2, aVR  My independent X-ray interpretation:  CXR shows no acute process  Management and/or test interpretation discussed with Dr. Mini Castro, Dr. Jenny Argueta and Nursing notes were personally reviewed, all current labs and imaging were personally reviewed, tests ordered: CBC, BMP, and history obtained by independent historian       Disposition:  Shared decision making: All test results, treatment options and disposition options were discussed with the patient today  Social determinants of health that may impact management: none  Code status: Full Code   Disposition: Discharge plan is pending        Critical Care Time:  Total critical care time caring for this patient with life threatening, unstable organ failure, including direct patient contact, management of life support systems, review of data including imaging and labs, discussions with other team members and physicians at least 0 minutes so far today, excluding separately billable procedures. [facility] Medication Reconciliation documentation:    [x] I have utilized all available immediate resources to obtain, update, or review the patient's current medications (including all prescriptions, over-the-counter products, herbals, cannabinoid products and bitamin/mineral/dietary/nutritional supplements. [email or web address] 'yes\"José Miguel     []  The patient is not eligible for medication reconciliation; the patient is in an emergent medical situation where delaying treatment would jeopardize the patient's health    []  I did NOT confirm, update or review the patient's current list of medications today. [DOES NOT SATISFY [facility] PERFORMANCE]    CORE MEASURES  DVT prophylaxis: Heparin  Decubitus ulcer present on admission: No  CODE STATUS: FULL CODE  Nutrition Status: good   Physical therapy: No   Old Charts reviewed: Yes  EKG Reviewed:  Yes  Advance Directive Addressed: Yes      [facility] Advanced Care Planning documentation:  [x] I have confirmed that the patient's Advance Care Plan is present, Code Status is documented, or surrogate decision maker is listed in the patient's medical record  [If \"yes\", STOP HERE]     [] The patient's Advance Care Plan is NOT present because:    []  I confirmed today that the patient does not wish or was not able to name a   surrogate decision maker or provide and advance care plan.    [] Hospice care is currently being provided or has been provided within the   calendar year. []  I did NOT confirm today the presence of an [address] or surrogate   decision maker documented within the patient's medical record.    [DOES NOT SATISFY [facility] PERFORMANCE]    SAL Vogt - CNP, SAL, NP-C  2/13/2023, 3:41 PM

## 2023-02-14 PROBLEM — N13.30 HYDRONEPHROSIS OF RIGHT KIDNEY: Status: ACTIVE | Noted: 2023-02-14

## 2023-02-14 PROBLEM — E83.51 HYPOCALCEMIA: Status: ACTIVE | Noted: 2023-02-14

## 2023-02-14 PROBLEM — N13.9 ACUTE UNILATERAL OBSTRUCTIVE UROPATHY: Status: ACTIVE | Noted: 2023-02-14

## 2023-02-14 PROBLEM — E87.20 METABOLIC ACIDOSIS: Status: ACTIVE | Noted: 2023-02-14

## 2023-02-14 PROBLEM — N17.9 AKI (ACUTE KIDNEY INJURY) (HCC): Status: ACTIVE | Noted: 2023-02-14

## 2023-02-14 PROBLEM — A49.8 E. COLI INFECTION: Status: ACTIVE | Noted: 2023-02-14

## 2023-02-14 LAB
ABSOLUTE EOS #: 0 K/UL (ref 0–0.44)
ABSOLUTE IMMATURE GRANULOCYTE: 1.32 K/UL (ref 0–0.3)
ABSOLUTE LYMPH #: 0.66 K/UL (ref 1.1–3.7)
ABSOLUTE MONO #: 0.33 K/UL (ref 0.1–1.2)
ALBUMIN SERPL-MCNC: 2.2 G/DL (ref 3.5–5.2)
ALBUMIN/GLOBULIN RATIO: 0.9 (ref 1–2.5)
ALP SERPL-CCNC: 166 U/L (ref 35–104)
ALT SERPL-CCNC: 143 U/L (ref 5–33)
ANION GAP SERPL CALCULATED.3IONS-SCNC: 10 MMOL/L (ref 9–17)
ANION GAP SERPL CALCULATED.3IONS-SCNC: 11 MMOL/L (ref 9–17)
ANION GAP SERPL CALCULATED.3IONS-SCNC: 12 MMOL/L (ref 9–17)
AST SERPL-CCNC: 134 U/L
BASOPHILS # BLD: 0 % (ref 0–2)
BASOPHILS ABSOLUTE: 0 K/UL (ref 0–0.2)
BILIRUB SERPL-MCNC: 0.4 MG/DL (ref 0.3–1.2)
BNP SERPL-MCNC: ABNORMAL PG/ML
BUN SERPL-MCNC: 44 MG/DL (ref 8–23)
BUN SERPL-MCNC: 45 MG/DL (ref 8–23)
BUN SERPL-MCNC: 47 MG/DL (ref 8–23)
BUN SERPL-MCNC: 47 MG/DL (ref 8–23)
BUN SERPL-MCNC: 49 MG/DL (ref 8–23)
BUN SERPL-MCNC: 50 MG/DL (ref 8–23)
BUN/CREAT BLD: 16 (ref 9–20)
BUN/CREAT BLD: 17 (ref 9–20)
BUN/CREAT BLD: 19 (ref 9–20)
BUN/CREAT BLD: 20 (ref 9–20)
BUN/CREAT BLD: 20 (ref 9–20)
BUN/CREAT BLD: 21 (ref 9–20)
CALCIUM SERPL-MCNC: 5.9 MG/DL (ref 8.6–10.4)
CALCIUM SERPL-MCNC: 6.9 MG/DL (ref 8.6–10.4)
CALCIUM SERPL-MCNC: 7 MG/DL (ref 8.6–10.4)
CALCIUM SERPL-MCNC: 7.9 MG/DL (ref 8.6–10.4)
CALCIUM SERPL-MCNC: 7.9 MG/DL (ref 8.6–10.4)
CALCIUM SERPL-MCNC: 8.1 MG/DL (ref 8.6–10.4)
CHLORIDE SERPL-SCNC: 109 MMOL/L (ref 98–107)
CHLORIDE SERPL-SCNC: 110 MMOL/L (ref 98–107)
CHLORIDE SERPL-SCNC: 111 MMOL/L (ref 98–107)
CHLORIDE SERPL-SCNC: 112 MMOL/L (ref 98–107)
CHLORIDE SERPL-SCNC: 112 MMOL/L (ref 98–107)
CHLORIDE SERPL-SCNC: 115 MMOL/L (ref 98–107)
CO2 SERPL-SCNC: 13 MMOL/L (ref 20–31)
CO2 SERPL-SCNC: 15 MMOL/L (ref 20–31)
CO2 SERPL-SCNC: 16 MMOL/L (ref 20–31)
CO2 SERPL-SCNC: 16 MMOL/L (ref 20–31)
CREAT SERPL-MCNC: 2.16 MG/DL (ref 0.5–0.9)
CREAT SERPL-MCNC: 2.3 MG/DL (ref 0.5–0.9)
CREAT SERPL-MCNC: 2.35 MG/DL (ref 0.5–0.9)
CREAT SERPL-MCNC: 2.4 MG/DL (ref 0.5–0.9)
CREAT SERPL-MCNC: 2.97 MG/DL (ref 0.5–0.9)
CREAT SERPL-MCNC: 3.02 MG/DL (ref 0.5–0.9)
CRP SERPL HS-MCNC: 217.4 MG/L (ref 0–5)
EOSINOPHILS RELATIVE PERCENT: 0 % (ref 1–4)
GFR SERPL CREATININE-BSD FRML MDRD: 16 ML/MIN/1.73M2
GFR SERPL CREATININE-BSD FRML MDRD: 17 ML/MIN/1.73M2
GFR SERPL CREATININE-BSD FRML MDRD: 22 ML/MIN/1.73M2
GFR SERPL CREATININE-BSD FRML MDRD: 22 ML/MIN/1.73M2
GFR SERPL CREATININE-BSD FRML MDRD: 23 ML/MIN/1.73M2
GFR SERPL CREATININE-BSD FRML MDRD: 25 ML/MIN/1.73M2
GLUCOSE BLD-MCNC: 64 MG/DL (ref 74–100)
GLUCOSE BLD-MCNC: 89 MG/DL (ref 74–100)
GLUCOSE SERPL-MCNC: 101 MG/DL (ref 70–99)
GLUCOSE SERPL-MCNC: 108 MG/DL (ref 70–99)
GLUCOSE SERPL-MCNC: 113 MG/DL (ref 70–99)
GLUCOSE SERPL-MCNC: 122 MG/DL (ref 70–99)
GLUCOSE SERPL-MCNC: 60 MG/DL (ref 70–99)
GLUCOSE SERPL-MCNC: 90 MG/DL (ref 70–99)
HCT VFR BLD AUTO: 27.6 % (ref 36.3–47.1)
HGB BLD-MCNC: 9.2 G/DL (ref 11.9–15.1)
IMMATURE GRANULOCYTES: 4 %
LACTATE PLASV-SCNC: 1.8 MMOL/L (ref 0.5–2.2)
LACTATE PLASV-SCNC: 2.2 MMOL/L (ref 0.5–2.2)
LACTATE PLASV-SCNC: 2.6 MMOL/L (ref 0.5–2.2)
LACTATE PLASV-SCNC: 2.7 MMOL/L (ref 0.5–2.2)
LACTATE PLASV-SCNC: 2.8 MMOL/L (ref 0.5–2.2)
LACTATE PLASV-SCNC: 3.4 MMOL/L (ref 0.5–2.2)
LYMPHOCYTES # BLD: 2 % (ref 24–43)
MCH RBC QN AUTO: 31.5 PG (ref 25.2–33.5)
MCHC RBC AUTO-ENTMCNC: 33.3 G/DL (ref 28.4–34.8)
MCV RBC AUTO: 94.5 FL (ref 82.6–102.9)
MONOCYTES # BLD: 1 % (ref 3–12)
MORPHOLOGY: ABNORMAL
MORPHOLOGY: ABNORMAL
NRBC AUTOMATED: 0 PER 100 WBC
PDW BLD-RTO: 14.2 % (ref 11.8–14.4)
PLATELET # BLD AUTO: ABNORMAL K/UL (ref 138–453)
PLATELET, FLUORESCENCE: 81 K/UL (ref 138–453)
PLATELET, IMMATURE FRACTION: 4.9 % (ref 1.1–10.3)
POTASSIUM SERPL-SCNC: 4 MMOL/L (ref 3.7–5.3)
POTASSIUM SERPL-SCNC: 4.1 MMOL/L (ref 3.7–5.3)
POTASSIUM SERPL-SCNC: 4.1 MMOL/L (ref 3.7–5.3)
POTASSIUM SERPL-SCNC: 4.2 MMOL/L (ref 3.7–5.3)
POTASSIUM SERPL-SCNC: 4.7 MMOL/L (ref 3.7–5.3)
POTASSIUM SERPL-SCNC: 4.7 MMOL/L (ref 3.7–5.3)
PROT SERPL-MCNC: 4.7 G/DL (ref 6.4–8.3)
RBC # BLD: 2.92 M/UL (ref 3.95–5.11)
SEG NEUTROPHILS: 93 % (ref 36–65)
SEGMENTED NEUTROPHILS ABSOLUTE COUNT: 30.69 K/UL (ref 1.5–8.1)
SODIUM SERPL-SCNC: 136 MMOL/L (ref 135–144)
SODIUM SERPL-SCNC: 136 MMOL/L (ref 135–144)
SODIUM SERPL-SCNC: 138 MMOL/L (ref 135–144)
SODIUM SERPL-SCNC: 139 MMOL/L (ref 135–144)
WBC # BLD AUTO: 33 K/UL (ref 3.5–11.3)

## 2023-02-14 PROCEDURE — 85025 COMPLETE CBC W/AUTO DIFF WBC: CPT

## 2023-02-14 PROCEDURE — 6360000002 HC RX W HCPCS: Performed by: UROLOGY

## 2023-02-14 PROCEDURE — 6370000000 HC RX 637 (ALT 250 FOR IP): Performed by: INTERNAL MEDICINE

## 2023-02-14 PROCEDURE — 6360000002 HC RX W HCPCS: Performed by: NURSE ANESTHETIST, CERTIFIED REGISTERED

## 2023-02-14 PROCEDURE — 83880 ASSAY OF NATRIURETIC PEPTIDE: CPT

## 2023-02-14 PROCEDURE — 2580000003 HC RX 258: Performed by: UROLOGY

## 2023-02-14 PROCEDURE — 36415 COLL VENOUS BLD VENIPUNCTURE: CPT

## 2023-02-14 PROCEDURE — 2580000003 HC RX 258: Performed by: NURSE PRACTITIONER

## 2023-02-14 PROCEDURE — APPSS60 APP SPLIT SHARED TIME 46-60 MINUTES: Performed by: NURSE PRACTITIONER

## 2023-02-14 PROCEDURE — 99232 SBSQ HOSP IP/OBS MODERATE 35: CPT | Performed by: INTERNAL MEDICINE

## 2023-02-14 PROCEDURE — 85055 RETICULATED PLATELET ASSAY: CPT

## 2023-02-14 PROCEDURE — 2000000000 HC ICU R&B

## 2023-02-14 PROCEDURE — 86140 C-REACTIVE PROTEIN: CPT

## 2023-02-14 PROCEDURE — 6360000002 HC RX W HCPCS: Performed by: NURSE PRACTITIONER

## 2023-02-14 PROCEDURE — 80053 COMPREHEN METABOLIC PANEL: CPT

## 2023-02-14 PROCEDURE — 82947 ASSAY GLUCOSE BLOOD QUANT: CPT

## 2023-02-14 PROCEDURE — 2700000000 HC OXYGEN THERAPY PER DAY

## 2023-02-14 PROCEDURE — 99254 IP/OBS CNSLTJ NEW/EST MOD 60: CPT | Performed by: INTERNAL MEDICINE

## 2023-02-14 PROCEDURE — 6360000002 HC RX W HCPCS: Performed by: INTERNAL MEDICINE

## 2023-02-14 PROCEDURE — 2500000003 HC RX 250 WO HCPCS: Performed by: NURSE PRACTITIONER

## 2023-02-14 PROCEDURE — 94761 N-INVAS EAR/PLS OXIMETRY MLT: CPT

## 2023-02-14 PROCEDURE — 80048 BASIC METABOLIC PNL TOTAL CA: CPT

## 2023-02-14 PROCEDURE — 2580000003 HC RX 258: Performed by: INTERNAL MEDICINE

## 2023-02-14 PROCEDURE — 83605 ASSAY OF LACTIC ACID: CPT

## 2023-02-14 PROCEDURE — 2580000003 HC RX 258: Performed by: NURSE ANESTHETIST, CERTIFIED REGISTERED

## 2023-02-14 RX ORDER — SODIUM BICARBONATE 650 MG/1
1300 TABLET ORAL 3 TIMES DAILY
Status: COMPLETED | OUTPATIENT
Start: 2023-02-14 | End: 2023-02-17

## 2023-02-14 RX ORDER — SODIUM CHLORIDE 9 MG/ML
INJECTION, SOLUTION INTRAVENOUS
Status: DISPENSED
Start: 2023-02-14 | End: 2023-02-14

## 2023-02-14 RX ORDER — PHENYLEPHRINE HYDROCHLORIDE 10 MG/ML
INJECTION INTRAVENOUS
Status: DISPENSED
Start: 2023-02-14 | End: 2023-02-14

## 2023-02-14 RX ORDER — LEVOFLOXACIN 5 MG/ML
250 INJECTION, SOLUTION INTRAVENOUS
Status: DISCONTINUED | OUTPATIENT
Start: 2023-02-14 | End: 2023-02-14

## 2023-02-14 RX ADMIN — CALCIUM GLUCONATE 1000 MG: 10 INJECTION, SOLUTION INTRAVENOUS at 13:37

## 2023-02-14 RX ADMIN — SODIUM CHLORIDE: 9 INJECTION, SOLUTION INTRAVENOUS at 12:04

## 2023-02-14 RX ADMIN — ONDANSETRON 4 MG: 2 INJECTION INTRAMUSCULAR; INTRAVENOUS at 07:35

## 2023-02-14 RX ADMIN — HEPARIN SODIUM 5000 UNITS: 5000 INJECTION INTRAVENOUS; SUBCUTANEOUS at 20:00

## 2023-02-14 RX ADMIN — PHENYLEPHRINE HYDROCHLORIDE 65 MCG/MIN: 10 INJECTION INTRAVENOUS at 06:34

## 2023-02-14 RX ADMIN — SODIUM CHLORIDE: 9 INJECTION, SOLUTION INTRAVENOUS at 06:22

## 2023-02-14 RX ADMIN — ONDANSETRON 4 MG: 2 INJECTION INTRAMUSCULAR; INTRAVENOUS at 18:54

## 2023-02-14 RX ADMIN — SODIUM CHLORIDE: 9 INJECTION, SOLUTION INTRAVENOUS at 18:59

## 2023-02-14 RX ADMIN — HEPARIN SODIUM 5000 UNITS: 5000 INJECTION INTRAVENOUS; SUBCUTANEOUS at 09:05

## 2023-02-14 RX ADMIN — SODIUM CHLORIDE 5 MCG/MIN: 9 INJECTION, SOLUTION INTRAVENOUS at 09:01

## 2023-02-14 RX ADMIN — SODIUM CHLORIDE, PRESERVATIVE FREE 10 ML: 5 INJECTION INTRAVENOUS at 20:01

## 2023-02-14 RX ADMIN — SODIUM CHLORIDE: 9 INJECTION, SOLUTION INTRAVENOUS at 02:16

## 2023-02-14 RX ADMIN — CALCIUM CARBONATE-VITAMIN D TAB 500 MG-200 UNIT 2 TABLET: 500-200 TAB at 20:01

## 2023-02-14 RX ADMIN — SODIUM BICARBONATE 1300 MG: 650 TABLET ORAL at 20:01

## 2023-02-14 RX ADMIN — SODIUM BICARBONATE 1300 MG: 650 TABLET ORAL at 13:45

## 2023-02-14 RX ADMIN — CEFTRIAXONE 2000 MG: 2 INJECTION, POWDER, FOR SOLUTION INTRAMUSCULAR; INTRAVENOUS at 10:21

## 2023-02-14 RX ADMIN — SODIUM CHLORIDE, PRESERVATIVE FREE 10 ML: 5 INJECTION INTRAVENOUS at 09:08

## 2023-02-14 RX ADMIN — CALCIUM GLUCONATE 2000 MG: 98 INJECTION, SOLUTION INTRAVENOUS at 11:58

## 2023-02-14 RX ADMIN — ACETAMINOPHEN 650 MG: 325 TABLET ORAL at 03:53

## 2023-02-14 RX ADMIN — SODIUM CHLORIDE, PRESERVATIVE FREE 10 ML: 5 INJECTION INTRAVENOUS at 09:07

## 2023-02-14 RX ADMIN — CALCIUM CARBONATE-VITAMIN D TAB 500 MG-200 UNIT 2 TABLET: 500-200 TAB at 13:44

## 2023-02-14 RX ADMIN — ACETAMINOPHEN 650 MG: 325 TABLET ORAL at 17:51

## 2023-02-14 RX ADMIN — MORPHINE SULFATE 4 MG: 4 INJECTION, SOLUTION INTRAMUSCULAR; INTRAVENOUS at 21:55

## 2023-02-14 RX ADMIN — ACETAMINOPHEN 650 MG: 325 TABLET ORAL at 10:54

## 2023-02-14 ASSESSMENT — PAIN SCALES - GENERAL
PAINLEVEL_OUTOF10: 0
PAINLEVEL_OUTOF10: 3
PAINLEVEL_OUTOF10: 2
PAINLEVEL_OUTOF10: 2
PAINLEVEL_OUTOF10: 5
PAINLEVEL_OUTOF10: 5
PAINLEVEL_OUTOF10: 0
PAINLEVEL_OUTOF10: 4
PAINLEVEL_OUTOF10: 0
PAINLEVEL_OUTOF10: 4
PAINLEVEL_OUTOF10: 5
PAINLEVEL_OUTOF10: 7
PAINLEVEL_OUTOF10: 0

## 2023-02-14 ASSESSMENT — PAIN DESCRIPTION - PAIN TYPE
TYPE: ACUTE PAIN

## 2023-02-14 ASSESSMENT — PAIN - FUNCTIONAL ASSESSMENT
PAIN_FUNCTIONAL_ASSESSMENT: ACTIVITIES ARE NOT PREVENTED

## 2023-02-14 ASSESSMENT — PAIN DESCRIPTION - ORIENTATION
ORIENTATION: RIGHT;LEFT
ORIENTATION: RIGHT;LEFT
ORIENTATION: MID;LOWER
ORIENTATION: RIGHT;LEFT

## 2023-02-14 ASSESSMENT — PAIN DESCRIPTION - ONSET
ONSET: ON-GOING

## 2023-02-14 ASSESSMENT — PAIN DESCRIPTION - LOCATION
LOCATION: HEAD
LOCATION: BACK
LOCATION: HEAD
LOCATION: HEAD

## 2023-02-14 ASSESSMENT — PAIN DESCRIPTION - DESCRIPTORS
DESCRIPTORS: ACHING
DESCRIPTORS: ACHING;DISCOMFORT
DESCRIPTORS: ACHING

## 2023-02-14 ASSESSMENT — PAIN DESCRIPTION - FREQUENCY
FREQUENCY: CONTINUOUS

## 2023-02-14 ASSESSMENT — PAIN SCALES - WONG BAKER
WONGBAKER_NUMERICALRESPONSE: 0

## 2023-02-14 NOTE — PROGRESS NOTES
Patients BS recheck was 89. Vitals and assessment as charted. Pt rated her pain a 4 out of 10 in her head, tylenol 650mg PO given. Hourly output on the childress was 65ml. Lactic drawn and sent down to lab. Pt denies any further needs. Call light within reach. Bed alarm on.

## 2023-02-14 NOTE — PROGRESS NOTES
BS recheck was 64. Pt stated she cannot eat the crackers they are to dry. Pt given ice cream at this time. Writer will recheck BS.

## 2023-02-14 NOTE — PROGRESS NOTES
[facility]   [phone]   Nephrology progress note  2/14/2023, 1:03 PM      Pt Name:    Sylvia Jaeger  MRN:     [id number]     YOB: 1955  Admit Date:    2/13/2023 10:56 AM  Primary Care Physician:  Sridevi Doss MD   Room number  W125/K209-26    TELEHEALTH EVALUATION -- Audio/Visual (During CEVKO-33 public health emergency)    Patient's Location: [address] [place] [address] (myself) Location: [address], [address] [facility]  Patient's nurse: Present    Chief Complaint: Nephrology following for DINA/CKD    Subjective:  Patient seen and examined  No chest pain or shortness of breath  Feels okay  On room air  On levophed drip  Good urine output      Objective:  24HR INTAKE/OUTPUT:    Intake/Output Summary (Last 24 hours) at 2/14/2023 1303  Last data filed at 2/14/2023 1202  Gross per 24 hour   Intake 5924 ml   Output 1507 ml   Net 4417 ml     I/O last 3 completed shifts: In: 6804 [P.O.:790; I.V.:6014]  Out: 1073 [SWGXU:2293]  I/O this shift:  In: 120 [P.O.:120]  Out: 434 [Urine:434]  Admission weight: 111 lb (50.3 kg)  Wt Readings from Last 3 Encounters:   02/14/23 129 lb 3.2 oz (58.6 kg)   02/13/23 112 lb (50.8 kg)   02/02/23 113 lb (51.3 kg)     Body mass index is 22.89 kg/m².     Physical examination  VITALS:     Vitals:    02/14/23 1140 02/14/23 1200 02/14/23 1210 02/14/23 1230   BP: (!) 104/50 (!) 93/57 (!) 106/56 (!) 109/58   Pulse: 70 74 87 81   Resp: 21 20 19 21   Temp:       TempSrc:       SpO2: 97% 93% 98% 94%   Weight:       Height:         Constitutional and General Appearance: alert and cooperative, appears comfortable, no apparent distress  Lungs: no use of accessory muscles or labored breathing noted, Respiratory effort observed to be normal  Extremities: No visible swelling  Skin:  No discoloration noted on facial skin  Neuro: no slurred speech, no facial drooping  Psychiatric: Normal mood and affect, Not agitated  Mental status: Alert and awake, Oriented to person/place/time, Able to follow commands      Due to this being a TeleHealth encounter, evaluation of the following organ systems is limited: EENT/Resp/CV/GI//MS/Neuro/Skin/Lymph    Lab Data  CBC:   Recent Labs     02/13/23  0930 02/13/23  1433 02/14/23  0545   WBC 34.3* 18.3* 33.0*   HGB 11.5* 9.7* 9.2*   HCT 35.2* 29.2* 27.6*   PLT See Reflexed IPF Result See Reflexed IPF Result See Reflexed IPF Result     BMP:  Recent Labs     02/14/23  0150 02/14/23  0545 02/14/23  0946    138 139   K 4.7 4.7 4.0   * 111* 115*   CO2 16* 15* 13*   BUN 49* 50* 44*   CREATININE 3.02* 2.97* 2.30*   GLUCOSE 60* 122* 90   CALCIUM 6.9* 7.0* 5.9*     Hepatic:   Recent Labs     02/13/23  0930 02/13/23  1433 02/14/23  0545   LABALBU 3.4* 2.6* 2.2*   * 78* 134*   * 98* 143*   BILITOT 0.6 0.5 0.4   ALKPHOS 183* 158* 166*         Meds:  Infusion:    sodium chloride      norepinephrine 9 mcg/min (02/14/23 0938)    sodium chloride 150 mL/hr at 02/14/23 1204    sodium chloride      sodium chloride       Meds:    phenylephrine        cefTRIAXone (ROCEPHIN) IV  2,000 mg IntraVENous Q24H    sodium chloride  30 mL/kg IntraVENous Once    heparin (porcine)  5,000 Units SubCUTAneous BID    sodium chloride flush  5-40 mL IntraVENous 2 times per day    lidocaine 1 % injection  5 mL IntraDERmal Once    sodium chloride flush  5-40 mL IntraVENous 2 times per day     Meds prn: ondansetron **OR** ondansetron, morphine **OR** morphine, sodium chloride, polyethylene glycol, sodium chloride flush, sodium chloride flush, sodium chloride, acetaminophen       Impression and Plan:  1. DINA secondary to septic shock. Likely ischemic ATN. Also noted to have moderate hydroureteronephrosis. Underwent cystoscopy with stent placement. Blood pressure is slowly improving. Continue with aggressive IV fluid hydration. No need for renal replacement therapy. 2.  Septic shock secondary to UTI. Currently on Levophed. ID following. Antibiotic changed to Rocephin  3. Right hydroureteronephrosis status post stent placement  4. Kidney stone  5. Hypocalcemia. Will give another gram of IV calcium gluconate for total of 3 g.  Recheck calcium postinfusion. Add vitamin D level. Add oral Os-Nate.  6.  Metabolic acidosis. We will start patient on oral sodium bicarbonate 1300 mg 3 times daily. Once calcium levels have improved then can consider starting IV bicarbonate drip  7. Leukocytosis    D/W patient and TERRA Castañeda MD  [facility]    Patient was evaluated through a synchronous (real-time) audio-video encounter. The patient (or guardian if applicable) is aware that this is a billable service, which includes applicable co-pays. This virtual visit was conducted with patient's (and/or legal guardian's consent). The visit was conducted pursuant to the emergency declaration under the [address] [place] [address] authority and the [employer] General Act. Patient identification was verified, and a caregiver was present when appropriate. The patient was located at home in a state where the provider was licensed to provide care    Virtual visit was done to address concerns as mentioned above. Due to this being a TeleHealth encounter (During DYPUL-05 public health emergency), evaluation of the following organ systems was limited: EENT/Resp/CV/GI//MS/Neuro/Skin/Lymph     Services were provided through a video synchronous discussion virtually to substitute for in-person visit.

## 2023-02-14 NOTE — PROGRESS NOTES
Patient requested to lay back in bed, patient placed back in bed at this time, patient tolerates well. Call light within reach,  at bedside.

## 2023-02-14 NOTE — PROGRESS NOTES
Physician Progress Note      Bulmaro Muhammad  CSN #:                  [id number]  :                       1955  ADMIT DATE:       2023 10:56 AM  [address] [place] DATE:  Deana Haskins  PROVIDER #:        Carlton Lyman MD          QUERY TEXT:        Pt admitted 23 with ureteral calculus. Per 23 Hospitalist consult note: Septic Shock due to UTI ? If possible, please document in progress notes and discharge summary:    The medical record reflects the following:  Risk Factors: multiple stones in right ureter, significant hydronephrosis,  Clinical Indicators: Per IM/hospitalist consult:  \"Patient was not seen prior   to going to OR as they were taken down 20 minutes after admission. .. Vital   signs-systolic blood pressure 81-71, pulse 90, afebrile no hypoxia. .. Creatinine 3.83-baseline 0.65-0.74. .. WBC 34.3, Segs   absolute-27.40. ..notified CRNA of labs and testing that I have ordered due to   concern of septic shock as patient is already tachycardic and hypotensive\"; Treatment: Fluid bolus per sepsis protocol ordered -2 L of fluids given   intraoperatively initially, IV Trace-Synephrine infusion, IV Levophed infusion,    IV Levaquin,  cysto, stent    Jamaica King, MSN, RN, CCDS, [facility]  Clinical   [phone]  . Options provided:  -- Septic shock due to UTI confirmed present on admission  -- Septic shock due to UTI confirmed, not present on admission, developed   during hospital stay  -- Septic shock  due to UTI ruled out  -- Defer to Internal Medicine/hospitalist  consultant documentation regarding   septic shock due to UTI  -- Other - I will add my own diagnosis  -- Disagree - Not applicable / Not valid  -- Disagree - Clinically unable to determine / Unknown  -- Refer to Clinical Documentation Reviewer    PROVIDER RESPONSE TEXT:    The diagnosis of septic shock due to UTI confirmed as present on admission.     Query created by: Oscar Thurman on 2/14/2023 11:17 AM      Electronically signed by:  Megan Coyle MD 2/14/2023 11:41 AM

## 2023-02-14 NOTE — PROGRESS NOTES
Pt resting in bed,  at bedside, re-assessment and vitals completed as charted, denies pain at this time, Levo decreased to 6 mcg/min at this time, patient tolerates, all needs met, call light within reach.

## 2023-02-14 NOTE — CARE COORDINATION
Case Management Assessment  Initial Evaluation    Date/Time of Evaluation: 2/14/2023 10:33 AM  Assessment Completed by: JEANNA Oseguera    If patient is discharged prior to next notation, then this note serves as note for discharge by case management. Patient Name: Frederic Young                   YOB: 1955  Diagnosis: Ureterolithiasis [N20.1]                   Date / Time: 2/13/2023 10:56 AM    Patient Admission Status: Inpatient   Readmission Risk (Low < 19, Mod (19-27), High > 27): Readmission Risk Score: 15.6    Current PCP: Adwoa Srinivasan MD  PCP verified by CM? Yes    Chart Reviewed: Yes      History Provided by: Patient, Significant Other  Patient Orientation: Alert and Oriented, Person, Place, Situation, Self    Patient Cognition: Alert    Hospitalization in the last 30 days (Readmission):  No    If yes, Readmission Assessment in CM Navigator will be completed. Advance Directives:      Code Status: Full Code   Patient's Primary Decision Maker is: Named in Scanned ACP Document      Discharge Planning:    Patient lives with: Spouse/Significant Other Type of Home: House  Primary Care Giver: Self  Patient Support Systems include: Spouse/Significant Other, Children, Family Members   Current Financial resources: Medicare  Current community resources: None  Current services prior to admission: None            Current DME:              Type of Home Care services:  PT, IV Therapy    ADLS  Prior functional level: Independent in ADLs/IADLs  Current functional level: Independent in ADLs/IADLs    PT AM-PAC:   /24  OT AM-PAC:   /24    Family can provide assistance at DC: Yes  Would you like Case Management to discuss the discharge plan with any other family members/significant others, and if so, who?  Yes  Plans to Return to Present Housing: Yes  Other Identified Issues/Barriers to RETURNING to current housing: none  Potential Assistance needed at discharge: 1 Kaylene Jones, Outpatient IV Potential DME:    Patient expects to discharge to: House  Plan for transportation at discharge: Family    Financial    Payor: Jill Resendiz / Plan: Francisco Gautam / Product Type: *No Product type* /     Does insurance require precert for SNF: Yes    Potential assistance Purchasing Medications: No      Ciara Reyes [id number] - [place], [place] - [address] [place] [address]  Phone: [phone] Fax: [phone]    [facility] Mail Delivery - [address] [phone] - F [phone]  [address] [facility] [address]  Phone: [phone] Fax: [phone]    [facility] Mail - [address], [address] [phone] Illa Delay [phone]  [address] [facility]  [address] [address]  Phone: [phone] Fax: [phone]      Notes:    Factors facilitating achievement of predicted outcomes: Family support, Motivated, Cooperative, and Pleasant    Barriers to discharge: none    Additional Case Management Notes: Patient is  and lives at home with her . She uses no medical equipment. Both the patient and  share in the cooking and the housekeeping. She is independent with her ADL's. Patient manages her own medications and drives. She has no outside services in the home. The discharge plan is pending. Patient may need IV medication and or therapy. Will know closer to discharge. The Plan for Transition of Care is related to the following treatment goals of Ureterolithiasis [N20.1]    Transportation/Food Security/Housekeeping Addressed: No issues identified or concerns. The Patient and/or Patient Representative Agree with the Discharge Plan? Yes    The discharge plan is pending. Patient may need IV medication and or therapy. Will know closer to discharge.     Ellie Boyd [facility]  Case Management Department  Ph: [phone]

## 2023-02-14 NOTE — PROGRESS NOTES
Yannick West M.D. Internal Medicine Progress Note    Patient: Deandra Ward  Date of Admission: 2/13/2023 10:56 AM  Hospital Day # 1  Date of Evaluation: 2/14/2023      SUBJECTIVE:    Ms. Isak Coyle  was seen and examined today for f/u of Ureterolithiasis. She  is still feeling \"lousy\" and complains of nausea and a headache this AM .  She became slightly hypoxic while sleeping last night with SpO2 at 89%. She was started on supplemental O2 but was weaned off this AM upon awakening. She  has been afebrile overnight . ROS:   Constitutional: positive  for fevers, and positive for chills. Respiratory: negative for shortness of breath, negative for cough, and negative for wheezing  Cardiovascular: negative for chest pain, and negative for palpitations  Gastrointestinal: positive for abdominal pain, positive for nausea,positive for vomiting, negative for diarrhea, and negative for constipation     All other systems were reviewed with the patient and are negative unless otherwise stated in HPI    -----------------------------------------------------------------  OBJECTIVE:  Vitals:   Temp: 99.2 °F (37.3 °C)  BP: 106/64  Resp: 18  Heart Rate: 71  SpO2: 100 % on room air    Weight  Wt Readings from Last 3 Encounters:   02/14/23 129 lb 3.2 oz (58.6 kg)   02/13/23 112 lb (50.8 kg)   02/02/23 113 lb (51.3 kg)     Body mass index is 22.89 kg/m². 24HR INTAKE/OUTPUT:      Intake/Output Summary (Last 24 hours) at 2/14/2023 0723  Last data filed at 2/14/2023 0559  Gross per 24 hour   Intake 6804 ml   Output 1073 ml   Net 5731 ml       Exam:  GEN:    Awake, alert and oriented x 3. EYES:  EOMI, pupils equal   NECK: Supple. No lymphadenopathy. No carotid bruit  CVS:    regular rate and rhythm, no audible murmur  PULM:  CTA, no wheezes, rales or rhonchi, no acute respiratory distress  ABD:    Bowels sounds normal.  Abdomen is soft. No distention. suprapubic tenderness to palpation. EXT:   trace edema bilaterally .   No calf tenderness. NEURO: Moves all extremities. Motor and sensory are grossly intact  SKIN:  No rashes. No skin lesions.    -----------------------------------------------------------------  DATA:  Complete Blood Count:   Recent Labs     02/13/23 0930 02/13/23 1433 02/14/23  0545   WBC 34.3* 18.3* 33.0*   RBC 3.67* 3.06* 2.92*   HGB 11.5* 9.7* 9.2*   HCT 35.2* 29.2* 27.6*   MCV 95.9 95.4 94.5   MCH 31.3 31.7 31.5   MCHC 32.7 33.2 33.3   RDW 13.6 13.8 14.2   PLT See Reflexed IPF Result See Reflexed IPF Result See Reflexed IPF Result        Last 3 Blood Glucose:   Recent Labs     02/13/23 0930 02/13/23 1433 02/13/23 2145 02/14/23 0150 02/14/23  0545   GLUCOSE 103* 76 66* 60* 122*        Comprehensive Metabolic Profile:   Recent Labs     02/13/23 0930 02/13/23 1433 02/13/23 2145 02/14/23  0150 02/14/23  0545    136 136 136 138   K 4.8 4.6 4.7 4.7 4.7   CL 99 104 107 109* 111*   CO2 22 19* 15* 16* 15*   BUN 45* 46* 49* 49* 50*   CREATININE 3.83* 3.44* 3.29* 3.02* 2.97*   GLUCOSE 103* 76 66* 60* 122*   CALCIUM 9.5 8.1* 7.3* 6.9* 7.0*   PROT 6.7 5.1*  --   --  4.7*   LABALBU 3.4* 2.6*  --   --  2.2*   BILITOT 0.6 0.5  --   --  0.4   ALKPHOS 183* 158*  --   --  166*   * 78*  --   --  134*   * 98*  --   --  143*        Urinalysis:    Latest Reference Range & Units 2/13/23 17:23   Color, UA Yellow  Yellow   Turbidity UA Clear  SLIGHTLY CLOUDY ! Glucose, UA NEGATIVE  NEGATIVE   Bilirubin, Urine NEGATIVE  NEGATIVE   Ketones, Urine NEGATIVE  TRACE ! Specific Gravity, UA 1.010 - 1.020  1.025 (H)   pH, UA 5.0 - 9.0  5.5   Protein, UA NEGATIVE  2+ ! Urobilinogen, Urine Normal  Normal   Nitrite, Urine NEGATIVE  NEGATIVE   Leukocyte Esterase, Urine NEGATIVE  LARGE ! Mucus, UA None  TRACE !   WBC, UA 0 - 5 /HPF 20 TO 50   RBC, UA 0 - 2 /HPF 2 TO 5   Epithelial Cells, UA 0 - 25 /HPF 0 TO 2   Renal Epithelial, UA 0 /HPF 0 TO 2   Bacteria, UA None  2+ ! Urine Hgb NEGATIVE  2+ !          HgBA1c: Lab Results   Component Value Date/Time    LABA1C 5.9 10/04/2022 07:31 AM       Lactic Acid:   Lab Results   Component Value Date/Time    LACTA 2.8 02/14/2023 05:45 AM    LACTA 2.6 02/14/2023 03:45 AM    LACTA 2.7 02/14/2023 01:50 AM        High Sensitivity Troponin:  Recent Labs     02/13/23  1635 02/13/23  2145   TROPHS 27* 31*       Microbiology:  Urine culture 2/13/23 = in process  Blood cultures 2/13/23 x 2 = GNR's    Radiology/Imaging:  FLUORO FOR SURGICAL PROCEDURES   Final Result      XR CHEST PORTABLE   Final Result   Increased interstitial opacity. While much or all of this may be chronic,   please correlate with any clinical evidence of acute interstitial edema.       A focal infiltrate is not detected                 MEDICAL DECISION MAKING:  Primary Problem(s): Severe sepsis with septic shock due to complicated UTI with ureterolithiasis and GNR bacteremia  Condition is  critical  with continued leukocytosis, lactic acidosis and hypotension  Treatment plan:   Urology assistance appreciated  S/p cystoscopy with right ureteral stent insertion 2/13/23  Will consult ID for assistance with Abx selection  Imaging: no further imaging studies ordered today  Medications:   Continue Levofloxacin  Continue neosynephrine drip for BP support  Medication Monitoring / High Risk Medications:  Neosynephrine drip       Acute kidney injury due to sepsis    Condition is  slightly improved  Treatment plan:   Nephrology consult appreciated - Dr. Sierra Bland notes reviewed  BP support  IVF's  Monitor renal function, I/O's    Elevated LFT's due to shock liver    Condition is unchanged  Treatment plan:   Monitor LFT's as sepsis tx'd    Nutrition status:   at risk for malnutrition  Dietician consult initiated    Hospital Prophylaxis:   DVT:  Heparin       MDM Data:   Test interpretation:  My independent EKG interpretation: NSR with normal axis and intervals, non-specific ST changes  Management and/or test interpretation discussed with Dr. Gerson Keith CNP  Consults and Nursing notes were personally reviewed, all current labs and imaging were personally reviewed, and tests ordered: CBC, CMP. Lactic acid      Disposition:  Shared decision making: All test results, treatment options and disposition options were discussed with the patient today  Social determinants of health that may impact management: none  Code status: Full Code   Disposition: Discharge plan is pending        Critical Care Time:  Total critical care time caring for this patient with life threatening, unstable organ failure, including direct patient contact, management of life support systems, review of data including imaging and labs, discussions with other team members and physicians at least 40 minutes so far today, excluding separately billable procedures.         Allie Matson MD , M.D.  2/14/2023  7:23 AM

## 2023-02-14 NOTE — PROGRESS NOTES
Pt resting in bed with eyes closed. Pt awakens easily. Vitals and assessment as charted. Lactic drawn at this time and sent down to lab. Pt denies any further needs. Call light within reach. Bed alarm on.

## 2023-02-14 NOTE — PROGRESS NOTES
[address] [facility]          Department of Pharmacy   Pharmacy Renal Adjustment Note    Noble Curran is a 79 y.o. female. Pharmacist assessment of renally cleared medications. Recent Labs     02/13/23  2145 02/14/23  0150 02/14/23  0545   CREATININE 3.29* 3.02* 2.97*     Estimated Creatinine Clearance: 15 mL/min (A) (based on SCr of 2.97 mg/dL (H)). Height:   Ht Readings from Last 1 Encounters:   02/14/23 5' 3\" (1.6 m)     Weight:  Wt Readings from Last 1 Encounters:   02/14/23 129 lb 3.2 oz (58.6 kg)       The following medication(s) have been adjusted based upon renal function:             Levofloxacin 500mg IV daily for 5 days decreased to levofloxacin 500mg IV x1 then 250mg IV every 48 hours for CrCl <20mL/min.       Thank you,  Fatemeh Church [facility],2/14/2023,9:21 AM

## 2023-02-14 NOTE — PROGRESS NOTES
Pt laying in bed awake watching TV. Pt is A&O x4. Lungs are clear throughout. Vitals and assessment as charted. Pt stated she is just tired. Pt denies any further needs at this time. Call light within reach. Bed alarm on.

## 2023-02-14 NOTE — PROGRESS NOTES
Urology Progress Note  CC:right flank pain    Subjective: Af, stable with support    Patient Vitals for the past 24 hrs:   BP Temp Temp src Pulse Resp SpO2 Height Weight   02/14/23 1340 (!) 107/58 -- -- 74 17 96 % -- --   02/14/23 1330 (!) 103/55 -- -- 89 20 98 % -- --   02/14/23 1310 (!) 103/57 -- -- 85 19 96 % -- --   02/14/23 1300 (!) 108/55 -- -- 85 19 95 % -- --   02/14/23 1240 (!) 109/56 -- -- 78 18 96 % -- --   02/14/23 1230 (!) 109/58 -- -- 81 21 94 % -- --   02/14/23 1210 (!) 106/56 -- -- 87 19 98 % -- --   02/14/23 1200 (!) 93/57 -- -- 74 20 93 % -- --   02/14/23 1140 (!) 104/50 -- -- 70 21 97 % -- --   02/14/23 1130 (!) 113/50 -- -- 77 15 99 % -- --   02/14/23 1120 (!) 100/53 98.6 °F (37 °C) Temporal 72 20 -- -- --   02/14/23 1110 (!) 100/53 -- -- 83 14 99 % -- --   02/14/23 1100 (!) 98/57 -- -- 75 17 97 % -- --   02/14/23 1040 (!) 109/53 -- -- 87 19 98 % -- --   02/14/23 1030 (!) 110/57 -- -- 90 21 93 % -- --   02/14/23 1015 (!) 103/57 -- -- 74 18 98 % -- --   02/14/23 1000 (!) 90/59 -- -- 86 18 94 % -- --   02/14/23 0945 (!) 76/41 -- -- 81 18 98 % -- --   02/14/23 0940 (!) 98/54 -- -- 80 18 97 % -- --   02/14/23 0930 (!) 81/45 -- -- 75 20 95 % -- --   02/14/23 0920 (!) 85/56 -- -- 81 19 96 % -- --   02/14/23 0910 96/77 -- -- 75 19 98 % -- --   02/14/23 0900 (!) 85/44 -- -- 74 19 -- -- --   02/14/23 0840 (!) 89/48 -- -- 73 19 96 % -- --   02/14/23 0830 (!) 99/48 -- -- 72 19 95 % -- --   02/14/23 0820 (!) 98/50 -- -- 69 21 -- -- --   02/14/23 0800 (!) 101/54 -- -- 68 19 97 % -- --   02/14/23 0740 (!) 97/58 -- -- 71 18 96 % -- --   02/14/23 0730 (!) 106/50 98.7 °F (37.1 °C) Temporal 66 20 97 % -- --   02/14/23 0721 -- -- -- -- -- -- 5' 3\" (1.6 m) --   02/14/23 0715 (!) 105/59 -- -- 74 16 -- -- --   02/14/23 0630 106/64 -- -- 71 18 100 % -- --   02/14/23 0615 (!) 105/59 -- -- 76 19 98 % -- --   02/14/23 0600 (!) 95/57 -- -- 72 20 99 % -- --   02/14/23 0545 (!) 95/54 -- -- 80 15 97 % -- --   02/14/23 0530 108/60 -- -- 78 20 98 % -- --   02/14/23 0515 (!) 107/58 -- -- 76 19 97 % -- --   02/14/23 0500 (!) 105/56 -- -- 75 19 97 % -- 129 lb 3.2 oz (58.6 kg)   02/14/23 0445 (!) 102/58 -- -- 77 19 97 % -- --   02/14/23 0430 (!) 102/57 -- -- 78 19 97 % -- --   02/14/23 0415 (!) 101/56 -- -- 77 20 97 % -- --   02/14/23 0400 (!) 94/52 99.2 °F (37.3 °C) Temporal 79 20 97 % -- --   02/14/23 0345 (!) 90/56 -- -- 87 19 97 % -- --   02/14/23 0330 (!) 105/58 -- -- 82 14 98 % -- --   02/14/23 0315 (!) 98/53 -- -- 79 21 96 % -- --   02/14/23 0300 (!) 98/57 -- -- 81 20 97 % -- --   02/14/23 0245 (!) 105/54 -- -- 78 19 97 % -- --   02/14/23 0230 (!) 95/55 -- -- 77 19 96 % -- --   02/14/23 0215 (!) 96/50 -- -- 79 19 94 % -- --   02/14/23 0200 (!) 96/50 -- -- 75 19 94 % -- --   02/14/23 0145 (!) 95/52 -- -- 77 19 98 % -- --   02/14/23 0130 (!) 99/55 -- -- 83 19 97 % -- --   02/14/23 0115 (!) 103/52 -- -- 82 18 98 % -- --   02/14/23 0100 (!) 111/52 -- -- 80 20 96 % -- --   02/14/23 0045 (!) 112/55 -- -- 79 20 97 % -- --   02/14/23 0030 (!) 104/52 -- -- 79 20 98 % -- --   02/14/23 0015 (!) 108/58 -- -- 79 20 97 % -- --   02/14/23 0000 (!) 107/52 -- -- 78 19 96 % -- --   02/13/23 2359 -- -- -- -- -- 95 % -- --   02/13/23 2345 (!) 115/52 99 °F (37.2 °C) Temporal 78 19 (!) 89 % -- --   02/13/23 2330 (!) 117/59 -- -- 75 20 90 % -- --   02/13/23 2315 (!) 115/55 -- -- 83 19 91 % -- --   02/13/23 2300 (!) 121/54 -- -- 78 21 91 % -- --   02/13/23 2245 (!) 126/57 -- -- 79 19 91 % -- --   02/13/23 2230 (!) 116/58 -- -- 78 16 91 % -- --   02/13/23 2215 (!) 115/57 -- -- 77 20 92 % -- --   02/13/23 2210 (!) 115/57 -- -- 82 15 93 % -- --   02/13/23 2200 111/60 -- -- 81 20 93 % -- --   02/13/23 2145 (!) 107/56 -- -- 79 24 92 % -- --   02/13/23 2130 (!) 107/51 -- -- 80 18 93 % -- --   02/13/23 2115 (!) 93/59 -- -- 86 22 92 % -- --   02/13/23 2100 -- -- -- 84 21 93 % -- --   02/13/23 2045 -- -- -- 78 20 92 % -- --   02/13/23 2030 137/69 -- -- 76 21 93 % -- --   02/13/23 2015 137/64 -- -- 81 21 92 % -- --   02/13/23 2000 134/65 -- -- 80 21 92 % -- --   02/13/23 1945 131/68 -- -- 80 22 92 % -- --   02/13/23 1930 126/69 -- -- 79 21 92 % -- --   02/13/23 1915 130/73 -- -- 77 19 93 % -- --   02/13/23 1900 117/65 98.5 °F (36.9 °C) Temporal 76 22 93 % -- --   02/13/23 1845 118/62 -- -- 82 21 92 % -- --   02/13/23 1830 110/63 -- -- 81 20 93 % -- --   02/13/23 1815 (!) 107/57 -- -- 82 22 93 % -- --   02/13/23 1810 (!) 100/58 -- -- 85 20 94 % -- --   02/13/23 1800 (!) 66/45 -- -- 89 21 94 % -- --   02/13/23 1740 (!) 89/52 -- -- 88 19 -- -- --   02/13/23 1730 (!) 96/53 -- -- 88 21 -- -- --   02/13/23 1710 (!) 96/57 -- -- 92 18 94 % -- --   02/13/23 1700 112/66 -- -- 94 21 95 % -- --   02/13/23 1640 (!) 97/58 -- -- 90 22 -- -- --   02/13/23 1615 (!) 92/49 -- -- 88 15 94 % -- --   02/13/23 1610 (!) 80/52 -- -- 91 20 94 % -- --   02/13/23 1600 (!) 81/51 -- -- 91 19 95 % -- --   02/13/23 1554 -- -- -- -- 21 -- -- --   02/13/23 1545 (!) 87/53 -- -- 93 17 95 % -- --   02/13/23 1530 (!) 88/58 -- -- 99 18 95 % -- --   02/13/23 1524 -- -- -- -- 23 -- -- --   02/13/23 1515 (!) 80/57 100.3 °F (37.9 °C) Temporal (!) 103 20 94 % -- --   02/13/23 1455 (!) 78/50 (!) 101.6 °F (38.7 °C) Temporal (!) 109 20 98 % -- --   02/13/23 1450 (!) 83/47 -- -- (!) 107 21 98 % -- --   02/13/23 1445 (!) 81/51 -- -- (!) 107 20 98 % -- --   02/13/23 1440 (!) 83/49 -- -- (!) 108 26 98 % -- --   02/13/23 1435 (!) 86/50 -- -- (!) 106 20 98 % -- --   02/13/23 1430 (!) 86/50 -- -- (!) 105 17 98 % -- --   02/13/23 1420 (!) 85/52 -- -- (!) 108 20 99 % -- --   02/13/23 1415 (!) 82/50 -- -- (!) 109 18 98 % -- --   02/13/23 1410 (!) 87/52 -- -- (!) 103 20 99 % -- --   02/13/23 1405 (!) 85/54 -- -- (!) 101 26 100 % -- --       Intake/Output Summary (Last 24 hours) at 2/14/2023 1357  Last data filed at 2/14/2023 1346  Gross per 24 hour   Intake 4924 ml   Output 1777 ml   Net 3147 ml       Recent Labs     02/13/23  9873 02/13/23  1433 02/14/23  0545   WBC 34.3* 18.3* 33.0*   HGB 11.5* 9.7* 9.2*   HCT 35.2* 29.2* 27.6*   MCV 95.9 95.4 94.5   PLT See Reflexed IPF Result See Reflexed IPF Result See Reflexed IPF Result     Recent Labs     02/14/23  0150 02/14/23  0545 02/14/23  0946    138 139   K 4.7 4.7 4.0   * 111* 115*   CO2 16* 15* 13*   BUN 49* 50* 44*   CREATININE 3.02* 2.97* 2.30*       Recent Labs     02/13/23  1723   COLORU Yellow   PHUR 5.5   WBCUA 20 TO 50   RBCUA 2 TO 5   MUCUS TRACE*   BACTERIA 2+*   SPECGRAV 1.025*   LEUKOCYTESUR LARGE*   UROBILINOGEN Normal   BILIRUBINUR NEGATIVE       Additional Lab/culture results:     ROS:  Constitutional:  Negative for appetite change, chills and fever. Eyes:  Negative for redness and visual disturbance. Respiratory:  Negative for cough, shortness of breath and wheezing. Cardiovascular:  Negative for chest pain and leg swelling. Gastrointestinal:  Negative for abdominal pain, constipation, nausea and vomiting. Genitourinary:  Negative for decreased urine volume, difficulty urinating, dysuria, enuresis, flank pain, frequency, hematuria, penile discharge, penile pain, scrotal swelling, testicular pain and urgency. Musculoskeletal:  Negative for back pain, joint swelling and myalgias. Skin:  Negative for color change, rash and wound. Neurological:  Negative for dizziness, tremors and numbness. Hematological:  Negative for adenopathy. Does not bruise/bleed easily.       Physical Exam:   Resting/ NAD  CTA  RRR  Soft nt/nd  Urine clear  No lower extremity swelling or pain      Interval Imaging Findings:    Impression:    Patient Active Problem List   Diagnosis    Ureteral calculus    Renal lithiasis lythotripsy 4/28/15    Hypothyroidism    Osteoporosis    Melanocytic nevus    History of colon polyps    Mixed hyperlipidemia    Hypertension    Renal colic on right side    Ureterolithiasis    Septic shock due to urinary tract infection ([address]    Complicated UTI (urinary tract infection)    Acute unilateral obstructive uropathy    E. coli infection    DINA (acute kidney injury) ([facility] [address]    Hydronephrosis of right kidney    Metabolic acidosis    Hypocalcemia       Plan:   Await cultures  Abx  Supportive care  Maintain childress stent for now  Chanel Michael MD  1:57 PM 2/14/2023

## 2023-02-14 NOTE — PROGRESS NOTES
Comprehensive Nutrition Assessment    Type and Reason for Visit:  Initial    Nutrition Recommendations/Plan:   Encourage oral fluids (water)     Malnutrition Assessment:  Malnutrition Status: At risk for malnutrition (Comment) (02/14/23 8076)    Context:  Acute Illness     Findings of the 6 clinical characteristics of malnutrition:  Energy Intake:  Mild decrease in energy intake (Comment) (pta)  Weight Loss:  No significant weight loss     Body Fat Loss:  No significant body fat loss     Muscle Mass Loss:  No significant muscle mass loss    Fluid Accumulation:  No significant fluid accumulation     Strength:  Not Performed    Nutrition Assessment:    Predicted suboptimal nutrient intakes r/t altered renal function, AEB low PO intakes with UTI and sepsis. Low intakes just recent with nausea/vomiting pta reported. Weight increases from volume provided (+5.6 L per I/O). Mild hypoglycemia without known history and may be secondary to extended fast.  Prediabetes A1C noted otherwise, only trying to eat regularly at home ~2 meals daily. Will monitor PO adequacey. She declines use of supplement for now. Nutrition Related Findings:    no malnutrition indices Wound Type: None       Current Nutrition Intake & Therapies:    Average Meal Intake: Unable to assess (no PO records)  Average Supplements Intake: None Ordered  ADULT DIET; Regular    Anthropometric Measures:  Height: 5' 3\" (160 cm)  Ideal Body Weight (IBW): 115 lbs (52 kg)    Admission Body Weight: 111 lb (50.3 kg)  Current Body Weight: 129 lb 3.2 oz (58.6 kg), 112.3 % IBW. Weight Source: Bed Scale  Current BMI (kg/m2): 22.9  Usual Body Weight: 118 lb 9.6 oz (53.8 kg) (October)  % Weight Change (Calculated): 8.9  Weight Adjustment For: No Adjustment                 BMI Categories: Normal Weight (BMI 18.5-24. 9)    Estimated Daily Nutrient Needs:  Energy Requirements Based On: Kcal/kg  Weight Used for Energy Requirements: Usual  Energy (kcal/day): 7956-3323 (25-30)  Weight Used for Protein Requirements: Usual  Protein (g/day): 54-65 (1.-1.2)  Method Used for Fluid Requirements: 1 ml/kcal  Fluid (ml/day): 1600+    Nutrition Diagnosis:   Predicted inadequate energy intake related to renal dysfunction as evidenced by nausea, poor intake prior to admission    Lab Results   Component Value Date     02/14/2023    K 4.7 02/14/2023     (H) 02/14/2023    CO2 15 (L) 02/14/2023    BUN 50 (H) 02/14/2023    CREATININE 2.97 (H) 02/14/2023    GLUCOSE 122 (H) 02/14/2023    CALCIUM 7.0 (L) 02/14/2023    PROT 4.7 (L) 02/14/2023    LABALBU 2.2 (L) 02/14/2023    BILITOT 0.4 02/14/2023    ALKPHOS 166 (H) 02/14/2023     (H) 02/14/2023     (H) 02/14/2023    LABGLOM 17 (L) 02/14/2023    GFRAA >60 04/01/2022     Hemoglobin A1C   Date Value Ref Range Status   10/04/2022 5.9 4.0 - 6.0 % Final     No results found for: VITD25    Nutrition Interventions:   Food and/or Nutrient Delivery: Continue Current Diet  Nutrition Education/Counseling: No recommendation at this time  Coordination of Nutrition Care: Continue to monitor while inpatient  Plan of Care discussed with: patient    Goals:     Goals: Meet at least 75% of estimated needs       Nutrition Monitoring and Evaluation:   Behavioral-Environmental Outcomes: None Identified  Food/Nutrient Intake Outcomes: Food and Nutrient Intake  Physical Signs/Symptoms Outcomes: Biochemical Data, Weight    Discharge Planning:    No discharge needs at this time     Mae Bello, [address], [address]: [id number]

## 2023-02-14 NOTE — PROGRESS NOTES
This nurse evaluated chart prior to placing line. It was noted in the physician's note it stated they were working her up for urosepsis. This nurse spoke with Dr. Anthony Aquino, who's group ordered the picc line, to ensure it was ok to place line even though [facility] were just drawn around 1400 today and no results had come back. The patient had poor IV access and was behind on lab work they needed to treat the patient, benefits outweighed the risk, per physician. Line placed without difficulty.  Patient Response:  Patient tolerated procedure with no complaints - Patient pain level of 0 during procedure - Patient clean and comfortable, bed low , call light in reach and patient handoff completed

## 2023-02-14 NOTE — CONSULTS
[facility] - Initial Consult Note-Telemedicine  Today's Date and Time: 2/14/2023, 9:37 AM    Impression :   Concern for urosepsis  UTI  Right hydroureteronephrosis  S/p right ureteral stent placement on 2/13/23  Leukocytosis  DINA  Hypotension  Chronic history of nephrolithiasis  Transaminase elevation  Thrombocytopenia  Anemia    Recommendations:   Discontinue IV Levaquin because of DINA  Initiate IV Rocephin 2 gm IV q 24 hr, pending culture results  Renal considerations    Medical Decision Making/Summary/Discussion:2/14/2023       Infection Control Recommendations   [place] Precautions    Antimicrobial Stewardship Recommendations     Simplification of therapy  Targeted therapy      Coordination of Outpatient Care:   Estimated Length of IV antimicrobials:TBD  Patient will need Midline Catheter Insertion: TBD  Patient will need PICC line Insertion:TBD  Patient will need: Home IV , [place],  SNF,  LTAC: TBD  Patient will need outpatient wound care:No    Chief complaint/reason for consultation:   Concern for urosepsis      History of Present Illness:   Burt Lucero is a 79y.o.-year-old female who was initially admitted on 2/13/2023. Patient seen at the request of Dr. Karlie Griffin:    This patient, with a chronic history of kidney stones, saw her urologist on 2/13/23 and had complaints of 2 days of right sided flank pain with associated nausea and vomiting. A CT revealed multiple stones in the right ureter with significant hydroureteronephrosis. There are also non-obstructing stones in both kidneys. Her WBC was 34.3 at that time and she had a creatinine of 3.8. Her baseline creatinine is 0.65. She underwent emergent right-sided ureteral stenting and was then admitted to [facility].     IV Levaquin was initiated. A the time of this consult note, the patient was noted to be hypotensive and is requiring vasopressors.    She is afebrile and oxygenation well on room air. There is no growth on blood cultures thus far and the urine culture is pending. Her last UTI was noted to have grown pan sensitive E. Coli in 2021. Abnormal labs include:  Cr:2.97  GFR:17  Trop:31  Alk phos:166  ALT:143  AST:134  WBC:33.0  Hgb:9.2  Plt: 81    Imaging of the chest also revealed increased interstitial opacities with mild subsegmental atelectasis in the posterior lung bases. CT abd/pelvis 2/13/23  Impression   1. 2 obstructing calculi in the distal right ureter about 3 and 4 mm in size   noted close to the uterovesical junction. There may be additional punctate   calculus or 2 in the same region. This causes moderate right hydronephrosis   and hydroureter. 2. Nonobstructing bilateral renal calculi. 5 mm calculus in the right kidney   and a few punctate and 2 mm calculi in the left kidney. 3. Mild subsegmental atelectasis posterior lung bases. CXR 2/13/23  Impression   Increased interstitial opacity. While much or all of this may be chronic,   please correlate with any clinical evidence of acute interstitial edema. A focal infiltrate is not detected           ID was consulted for antibiotic recommendations    CURRENT EVALUATION 2/14/2023  BP (!) 85/56   Pulse 81   Temp 98.7 °F (37.1 °C) (Temporal)   Resp 19   Ht 5' 3\" (1.6 m)   Wt 129 lb 3.2 oz (58.6 kg) Comment: bed was re-zeroed  LMP  (LMP Unknown)   SpO2 96%   BMI 22.89 kg/m²     Afebrile  Hypotension on vasopressors    The patient is alert and oriented on room air. She is still experiencing some nausea with right flank tenderness. DINA is improving    Ambrose catheter is in place with hazy, maxim urine. Medications reviewed:  IV Levaquin discontinued 2/14/23 and IV Rocephin initiated pending culture results.      Labs, X rays reviewed: 2/14/2023    BUN:50  Cr:2.97    WBC:33.0  Hb:9.2  Plat: 81    CRP:Pending    Cultures:  Urine:  2/13/23: pending  Blood:  2/13/23: No growth thus far  Sputum :    Wound:    MRSA Nares:      Imaging:    CT abd/pelvis 2/13/23              CXR 2/13/23      Discussed with patient, RN, family. I have personally reviewed the past medical history, past surgical history, medications, social history, and family history, and I have updated the database accordingly.   Past Medical History:     Past Medical History:   Diagnosis Date    Complicated UTI (urinary tract infection) 2/13/2023    Fracture of wrist, unspecified laterality, closed, initial encounter     right    History of sepsis 2009    following lithotripsy    Hyperlipidemia     Hypertension     Hypothyroidism 2007    secondary to radioactive iodine 2007    Kidney stone     Osteoporosis     Primary localized osteoarthritis of right hip 3/18/2019    Septic shock due to urinary tract infection ([address] 2/13/2023    Severe sepsis ([address] 2/13/2023       Past Surgical  History:     Past Surgical History:   Procedure Laterality Date    CHOLECYSTECTOMY, LAPAROSCOPIC  2009    COLONOSCOPY  07/17/2014    Dr. Jeovany Israel - tubular adenomatous polyp removed    COLONOSCOPY N/A 08/01/2018    Dr. Wilson Median architectural distortion, suggestive of mucosal prolapse    CYSTOSCOPY  2015    stent removal and reinsert     CYSTOSCOPY Left     HLL with stent    CYSTOSCOPY Left 02/05/2021    CYSTOSCOPY URETEROSCOPY LASER-HLL performed by Ganesh Vilchis MD at [address] [facility] Right 2/13/2023    CYSTOSCOPY URETERAL STENT INSERTION performed by Ganesh Vilchis MD at [address] [facility] [address] / STONE Left 02/05/2021    CYSTOSCOPY RETROGRADE PYELOGRAM STENT INSERTION performed by Ganesh Vilchis MD at [address] [facility] Right 02/13/2023    Dr. Lyly Thompson    LITHOTRIPSY  04/28/2015    left    LITHOTRIPSY Left 01/30/2018    cystoscopy- Dr. Hany Nichols LITHOTRIPSY Right 04/20/2021    LITHOTRIPSY Right 04/20/2021    ESWL EXTRACORPOREAL SHOCK WAVE LITHOTRIPSY performed by Debbie Rider MD at [address] [facility]  2007    radioactive iodine procedure    ID CYSTOURETHROSCOPY Left 01/30/2018    CYSTOSCOPY performed by Debbie Rider MD at [address] Left 01/30/2018    ESWL [address] LITHOTRIPSY performed by Debbie Rider MD at [address] Right 03/18/2019    Dr. Rodgers Cure       Medications:      phenylephrine        levofloxacin  500 mg IntraVENous Once    Followed by    levofloxacin  250 mg IntraVENous Q48H    sodium chloride  30 mL/kg IntraVENous Once    heparin (porcine)  5,000 Units SubCUTAneous BID    sodium chloride flush  5-40 mL IntraVENous 2 times per day    lidocaine 1 % injection  5 mL IntraDERmal Once    sodium chloride flush  5-40 mL IntraVENous 2 times per day       Social History:     Social History     Socioeconomic History    Marital status:      Spouse name: Not on file    Number of children: Not on file    Years of education: Not on file    Highest education level: Not on file   Occupational History    Not on file   Tobacco Use    Smoking status: Some Days     Packs/day: 0.25     Years: 30.00     Pack years: 7.50     Types: Cigarettes    Smokeless tobacco: Never   Vaping Use    Vaping Use: Never used   Substance and Sexual Activity    Alcohol use:  Yes     Alcohol/week: 1.0 standard drink     Types: 1 Shots of liquor per week     Comment: socially    Drug use: No    Sexual activity: Yes     Partners: Male     Comment: postmeno   Other Topics Concern    Not on file   Social History Narrative    Not on file     Social Determinants of Health     Financial Resource Strain: Low Risk     Difficulty of Paying Living Expenses: Not hard at all   Food Insecurity: No Food Insecurity    Worried About [address] [facility] in the Last Year: Never true    [employer] in the Last Year: Never true   Transportation Needs: Not on file   Physical Activity: Insufficiently Active    Days of Exercise per Week: 2 days    Minutes of Exercise per Session: 10 min   Stress: Not on file   Social Connections: Not on file   Intimate Partner Violence: Not on file   Housing Stability: Not on file       Family History:     Family History   Problem Relation Age of Onset    Breast Cancer Maternal Grandmother     Stroke Father     Hypertension Father     Alzheimer's Disease Mother         Allergies:   Patient has no known allergies. Review of Systems:   Constitutional: No fevers or chills. Head: No headaches  Eyes: No double vision or blurry vision. No conjunctival inflammation. ENT: No sore throat or runny nose. . No hearing loss, tinnitus or vertigo. Cardiovascular: No chest pain or palpitations. No shortness of breath. No CARUSO  Lung: No shortness of breath or cough. No sputum production  Abdomen: nausea, no vomiting, no diarrhea, left sided flank pain. .   Genitourinary: Ambrose in place. Rt sided CVA pain  Musculoskeletal: No muscle aches or pains. No joint effusions, swelling or deformities  Hematologic: No bleeding or bruising. Neurologic: No headache, weakness, numbness, or tingling. Integument: No rash, no ulcers. Psychiatric: No depression. Endocrine: No polyuria, no polydipsia, no polyphagia.     Physical Examination :   Patient Vitals for the past 8 hrs:   BP Temp Temp src Pulse Resp SpO2 Height Weight   02/14/23 0920 (!) 85/56 -- -- 81 19 96 % -- --   02/14/23 0910 96/77 -- -- 75 19 98 % -- --   02/14/23 0900 (!) 85/44 -- -- 74 19 -- -- --   02/14/23 0840 (!) 89/48 -- -- 73 19 96 % -- --   02/14/23 0830 (!) 99/48 -- -- 72 19 95 % -- --   02/14/23 0820 (!) 98/50 -- -- 69 21 -- -- --   02/14/23 0800 (!) 101/54 -- -- 68 19 97 % -- --   02/14/23 0740 (!) 97/58 -- -- 71 18 96 % -- --   02/14/23 0730 (!) 106/50 98.7 °F (37.1 °C) Temporal 66 20 97 % -- --   02/14/23 0721 -- -- -- -- -- -- 5' 3\" (1.6 m) --   02/14/23 0715 (!) 105/59 -- -- 74 16 -- -- --   02/14/23 0630 106/64 -- -- 71 18 100 % -- --   02/14/23 0615 (!) 105/59 -- -- 76 19 98 % -- --   02/14/23 0600 (!) 95/57 -- -- 72 20 99 % -- --   02/14/23 0545 (!) 95/54 -- -- 80 15 97 % -- --   02/14/23 0530 108/60 -- -- 78 20 98 % -- --   02/14/23 0515 (!) 107/58 -- -- 76 19 97 % -- --   02/14/23 0500 (!) 105/56 -- -- 75 19 97 % -- 129 lb 3.2 oz (58.6 kg)   02/14/23 0445 (!) 102/58 -- -- 77 19 97 % -- --   02/14/23 0430 (!) 102/57 -- -- 78 19 97 % -- --   02/14/23 0415 (!) 101/56 -- -- 77 20 97 % -- --   02/14/23 0400 (!) 94/52 99.2 °F (37.3 °C) Temporal 79 20 97 % -- --   02/14/23 0345 (!) 90/56 -- -- 87 19 97 % -- --   02/14/23 0330 (!) 105/58 -- -- 82 14 98 % -- --   02/14/23 0315 (!) 98/53 -- -- 79 21 96 % -- --   02/14/23 0300 (!) 98/57 -- -- 81 20 97 % -- --   02/14/23 0245 (!) 105/54 -- -- 78 19 97 % -- --   02/14/23 0230 (!) 95/55 -- -- 77 19 96 % -- --   02/14/23 0215 (!) 96/50 -- -- 79 19 94 % -- --   02/14/23 0200 (!) 96/50 -- -- 75 19 94 % -- --   02/14/23 0145 (!) 95/52 -- -- 77 19 98 % -- --     General Appearance: Awake, alert, and in no apparent distress  Head:  Normocephalic, no trauma  Eyes: Pupils equal, round, reactive to light and accommodation; extraocular movements intact; sclera anicteric; conjunctivae pink. No embolic phenomena. ENT: Oropharynx clear, without erythema, exudate, or thrush. No tenderness of sinuses. Mouth/throat: mucosa pink and moist. No lesions. Dentition in good repair. Neck:Supple, without lymphadenopathy. Thyroid normal, No bruits. Pulmonary/Chest: Diminished to auscultation, without wheezes, rales, or rhonchi. No dullness to percussion. Cardiovascular: Regular rate and rhythm without murmurs, rubs, or gallops. Abdomen: Soft,  tender. Bowel sounds normal. No organomegaly . Rt CVA pain  All four Extremities: No cyanosis, clubbing, edema, or effusions.   Neurologic: No gross sensory or motor deficits. Skin: Warm and dry with good turgor. No signs of peripheral arterial or venous insufficiency. No ulcerations. No open wounds. Medical Decision Making -Laboratory:   I have independently reviewed/ordered the following labs:    CBC with Differential:   Recent Labs     02/13/23 1433 02/14/23  0545   WBC 18.3* 33.0*   HGB 9.7* 9.2*   HCT 29.2* 27.6*   PLT See Reflexed IPF Result See Reflexed IPF Result   LYMPHOPCT 4* 2*   MONOPCT 5 1*     BMP:   Recent Labs     02/14/23  0150 02/14/23  0545    138   K 4.7 4.7   * 111*   CO2 16* 15*   BUN 49* 50*   CREATININE 3.02* 2.97*     Hepatic Function Panel:   Recent Labs     02/13/23 1433 02/14/23  0545   PROT 5.1* 4.7*   LABALBU 2.6* 2.2*   BILITOT 0.5 0.4   ALKPHOS 158* 166*   ALT 98* 143*   AST 78* 134*     No results for input(s): RPR in the last 72 hours. No results for input(s): HIV in the last 72 hours. No results for input(s): BC in the last 72 hours. Lab Results   Component Value Date/Time    MUCUS TRACE 02/13/2023 05:23 PM    RBC 2.92 02/14/2023 05:45 AM    RBC 4.08 05/08/2012 08:10 AM    TRICHOMONAS NOT REPORTED 02/03/2021 02:30 PM    WBC 33.0 02/14/2023 05:45 AM    YEAST NOT REPORTED 02/03/2021 02:30 PM    TURBIDITY SLIGHTLY CLOUDY 02/13/2023 05:23 PM     Lab Results   Component Value Date/Time    CREATININE 2.97 02/14/2023 05:45 AM    GLUCOSE 122 02/14/2023 05:45 AM    GLUCOSE 102 05/08/2012 08:10 AM       Medical Decision Making-Imaging:     Narrative   EXAMINATION:   ONE XRAY VIEW OF THE CHEST       2/13/2023 9:25 am       COMPARISON:   11/07/2013       HISTORY:   ORDERING SYSTEM PROVIDED HISTORY: sepsis   TECHNOLOGIST PROVIDED HISTORY:   sepsis       FINDINGS:   Cardial pericardial silhouette is unremarkable. Increased interstitial   opacities are seen throughout both lungs. A focal infiltrate is not   detected. No pneumothorax. No free air. No acute bony abnormality. Impression   Increased interstitial opacity.   While much or all of this may be chronic,   please correlate with any clinical evidence of acute interstitial edema. A focal infiltrate is not detected         Narrative   EXAMINATION:   CT OF THE ABDOMEN AND PELVIS WITHOUT CONTRAST 2/13/2023 9:50 am       TECHNIQUE:   CT of the abdomen and pelvis was performed without the administration of   intravenous contrast. Multiplanar reformatted images are provided for review. Automated exposure control, iterative reconstruction, and/or weight based   adjustment of the mA/kV was utilized to reduce the radiation dose to as low   as reasonably achievable. COMPARISON:   02/03/2021       HISTORY:   ORDERING SYSTEM PROVIDED HISTORY: Renal colic   TECHNOLOGIST PROVIDED HISTORY:           FINDINGS:   Lower Chest: Mild subsegmental atelectasis posterior lung bases. Organs: There is 5 mm calculus in the lower pole right kidney which is also   evident on the prior. Additional smaller calculi in the right kidney on   prior no longer present. There is moderate right hydronephrosis with   significant perinephric stranding. There is also moderate right hydroureter. There are 2 calculi measuring 3 mm and 4 mm in the distal right ureter a few   cm from the uterovesical junction accounting for the obstructive signs. There may be additional punctate calculi in the same region. Left kidney shows a few punctate calculi along with a 2 mm calculus in the   lower pole. No left hydronephrosis or hydroureter. No left renal calculus. The unenhanced liver shows a 1 cm cyst in the left lobe which is unchanged. Cholecystectomy. Spleen, pancreas, adrenal glands show no significant   abnormalities. GI/Bowel: There is limited evaluation due to absence of oral contrast.       The stomach shows no focal lesions. Small bowel loops normal in caliber   showing no focal abnormalities. Normal appendix. Evaluation of the colon shows no acute process. Pelvis: Streak artifact from right hip prosthesis obscures portions of the   pelvis. There are pelvic phleboliths. Uterus and urinary bladder grossly   normal.       Peritoneum/Retroperitoneum: No free fluid. No lymphadenopathy. Atherosclerotic disease. Bones/Soft Tissues: Degenerative changes in the spine. No acute abnormality   of the bones. The superficial soft tissues show no significant abnormalities. Impression   1. 2 obstructing calculi in the distal right ureter about 3 and 4 mm in size   noted close to the uterovesical junction. There may be additional punctate   calculus or 2 in the same region. This causes moderate right hydronephrosis   and hydroureter. 2. Nonobstructing bilateral renal calculi. 5 mm calculus in the right kidney   and a few punctate and 2 mm calculi in the left kidney. 3. Mild subsegmental atelectasis posterior lung bases. Medical Decision [id number]:       Medical Decision Making-Other:     Note:  Labs, medications, radiologic studies were reviewed with personal review of films  Large amounts of data were reviewed  Discussed with nursing Staff, Discharge planner  Infection Control and Prevention measures reviewed  All prior entries were reviewed  Administer medications as ordered  Prognosis: [facility]  Discharge planning reviewed      Thank you for allowing us to participate in the care of this patient. Please call with questions. Jennifer Avalos, SAL - CNP    ATTESTATION:    I have discussed the case, including pertinent history and exam findings with the APRN. I have evaluated the  History, physical findings and pictures of the patient and the key elements of the encounter have been performed by me. I have reviewed the laboratory data, other diagnostic studies and discussed them with the APRN. I have updated the medical record where necessary. I agree with the assessment, plan and orders as documented by the APRN.     Sandeep MD.    Pager: [phone] - Office: [phone]

## 2023-02-14 NOTE — PROGRESS NOTES
Pt sitting up in bedside chair, alert and orient x4, assessment and vitals complete as charted, patient denies pain at this time other than a slight headache, patient noted to have nausea, prn Zofran given at this time, Dr. Bird Wong at bedside, blood drawn and sent to lab, all needs met, call light within reach.

## 2023-02-14 NOTE — PLAN OF CARE
Problem: Discharge Planning  Goal: Discharge to home or other facility with appropriate resources  Outcome: Progressing     Problem: Pain  Goal: Verbalizes/displays adequate comfort level or baseline comfort level  2/14/2023 0758 by Sailaja Marion RN  Outcome: Progressing  2/13/2023 2249 by Anna Cody RN  Outcome: Progressing  Note: Pt is able to verbalize when in pain. Pt given pain medications as needed. Will continue to monitor. Problem: Safety - Adult  Goal: Free from fall injury  2/14/2023 0758 by Sailaja Marion RN  Outcome: Progressing  Flowsheets (Taken 2/14/2023 0757)  Free From Fall Injury: Instruct family/caregiver on patient safety  2/13/2023 2249 by Anna Cody RN  Outcome: Progressing  Note: Bed in low position. Wheels locked. Bed alarm on. 2/4 side rails are up. Fall band on. Call light within reach. Problem: Infection - Adult  Goal: Absence of infection at discharge  2/14/2023 0758 by Sailaja Marion RN  Outcome: Progressing  2/13/2023 2249 by Anna Cody RN  Outcome: Progressing  Note: Temperature WNL, pt getting Levaquin, will continue to monitor.       Problem: Nutrition Deficit:  Goal: Optimize nutritional status  2/14/2023 0758 by Sailaja Marion RN  Outcome: Progressing  2/14/2023 0756 by Courtney Romo RD, LD  Outcome: Progressing  Flowsheets (Taken 2/14/2023 0756)  Nutrient intake appropriate for improving, restoring, or maintaining nutritional needs: Monitor oral intake, labs, and treatment plans  Note: Nutrition Problem #1: Predicted inadequate energy intake  Intervention: Food and/or Nutrient Delivery: Continue Current Diet

## 2023-02-14 NOTE — PLAN OF CARE
Problem: Pain  Goal: Verbalizes/displays adequate comfort level or baseline comfort level  Outcome: Progressing  Note: Pt is able to verbalize when in pain. Pt given pain medications as needed. Will continue to monitor. Problem: Safety - Adult  Goal: Free from fall injury  Outcome: Progressing  Note: Bed in low position. Wheels locked. Bed alarm on. 2/4 side rails are up. Fall band on. Call light within reach. Problem: Infection - Adult  Goal: Absence of infection at discharge  Outcome: Progressing  Note: Temperature WNL, pt getting Levaquin, will continue to monitor.

## 2023-02-15 PROBLEM — D69.6 THROMBOCYTOPENIA (HCC): Status: ACTIVE | Noted: 2023-02-15

## 2023-02-15 LAB
25(OH)D3 SERPL-MCNC: 16 NG/ML
ABSOLUTE EOS #: 0 K/UL (ref 0–0.44)
ABSOLUTE IMMATURE GRANULOCYTE: 0 K/UL (ref 0–0.3)
ABSOLUTE LYMPH #: 0.89 K/UL (ref 1.1–3.7)
ABSOLUTE MONO #: 0 K/UL (ref 0.1–1.2)
ALBUMIN SERPL-MCNC: 2 G/DL (ref 3.5–5.2)
ALBUMIN/GLOBULIN RATIO: 0.8 (ref 1–2.5)
ALP SERPL-CCNC: 161 U/L (ref 35–104)
ALT SERPL-CCNC: 94 U/L (ref 5–33)
ANION GAP SERPL CALCULATED.3IONS-SCNC: 10 MMOL/L (ref 9–17)
ANION GAP SERPL CALCULATED.3IONS-SCNC: 9 MMOL/L (ref 9–17)
AST SERPL-CCNC: 44 U/L
BASOPHILS # BLD: 0 % (ref 0–2)
BASOPHILS ABSOLUTE: 0 K/UL (ref 0–0.2)
BILIRUB SERPL-MCNC: 0.3 MG/DL (ref 0.3–1.2)
BUN SERPL-MCNC: 43 MG/DL (ref 8–23)
BUN SERPL-MCNC: 44 MG/DL (ref 8–23)
BUN/CREAT BLD: 21 (ref 9–20)
BUN/CREAT BLD: 24 (ref 9–20)
CALCIUM SERPL-MCNC: 7.8 MG/DL (ref 8.6–10.4)
CALCIUM SERPL-MCNC: 7.8 MG/DL (ref 8.6–10.4)
CHLORIDE SERPL-SCNC: 114 MMOL/L (ref 98–107)
CHLORIDE SERPL-SCNC: 118 MMOL/L (ref 98–107)
CO2 SERPL-SCNC: 14 MMOL/L (ref 20–31)
CO2 SERPL-SCNC: 15 MMOL/L (ref 20–31)
CREAT SERPL-MCNC: 1.8 MG/DL (ref 0.5–0.9)
CREAT SERPL-MCNC: 2.03 MG/DL (ref 0.5–0.9)
EOSINOPHILS RELATIVE PERCENT: 0 % (ref 1–4)
GFR SERPL CREATININE-BSD FRML MDRD: 26 ML/MIN/1.73M2
GFR SERPL CREATININE-BSD FRML MDRD: 30 ML/MIN/1.73M2
GLUCOSE SERPL-MCNC: 102 MG/DL (ref 70–99)
GLUCOSE SERPL-MCNC: 97 MG/DL (ref 70–99)
HCT VFR BLD AUTO: 27.5 % (ref 36.3–47.1)
HGB BLD-MCNC: 9.1 G/DL (ref 11.9–15.1)
IMMATURE GRANULOCYTES: 0 %
LYMPHOCYTES # BLD: 3 % (ref 24–43)
MAGNESIUM SERPL-MCNC: 1.5 MG/DL (ref 1.6–2.6)
MCH RBC QN AUTO: 31.3 PG (ref 25.2–33.5)
MCHC RBC AUTO-ENTMCNC: 33.1 G/DL (ref 28.4–34.8)
MCV RBC AUTO: 94.5 FL (ref 82.6–102.9)
MICROORGANISM SPEC CULT: ABNORMAL
MONOCYTES # BLD: 0 % (ref 3–12)
MORPHOLOGY: ABNORMAL
NRBC AUTOMATED: 0 PER 100 WBC
PDW BLD-RTO: 14.6 % (ref 11.8–14.4)
PHOSPHATE SERPL-MCNC: 2.6 MG/DL (ref 2.6–4.5)
PLATELET # BLD AUTO: ABNORMAL K/UL (ref 138–453)
PLATELET, FLUORESCENCE: 66 K/UL (ref 138–453)
PLATELET, IMMATURE FRACTION: 6.9 % (ref 1.1–10.3)
POTASSIUM SERPL-SCNC: 4 MMOL/L (ref 3.7–5.3)
POTASSIUM SERPL-SCNC: 4.1 MMOL/L (ref 3.7–5.3)
PROT SERPL-MCNC: 4.5 G/DL (ref 6.4–8.3)
RBC # BLD: 2.91 M/UL (ref 3.95–5.11)
SEG NEUTROPHILS: 97 % (ref 36–65)
SEGMENTED NEUTROPHILS ABSOLUTE COUNT: 28.91 K/UL (ref 1.5–8.1)
SODIUM SERPL-SCNC: 139 MMOL/L (ref 135–144)
SODIUM SERPL-SCNC: 141 MMOL/L (ref 135–144)
SPECIMEN DESCRIPTION: ABNORMAL
WBC # BLD AUTO: 29.8 K/UL (ref 3.5–11.3)

## 2023-02-15 PROCEDURE — 97166 OT EVAL MOD COMPLEX 45 MIN: CPT

## 2023-02-15 PROCEDURE — 80048 BASIC METABOLIC PNL TOTAL CA: CPT

## 2023-02-15 PROCEDURE — 2500000003 HC RX 250 WO HCPCS: Performed by: INTERNAL MEDICINE

## 2023-02-15 PROCEDURE — 83735 ASSAY OF MAGNESIUM: CPT

## 2023-02-15 PROCEDURE — 80053 COMPREHEN METABOLIC PANEL: CPT

## 2023-02-15 PROCEDURE — 6360000002 HC RX W HCPCS: Performed by: UROLOGY

## 2023-02-15 PROCEDURE — 2700000000 HC OXYGEN THERAPY PER DAY

## 2023-02-15 PROCEDURE — 2000000000 HC ICU R&B

## 2023-02-15 PROCEDURE — 6370000000 HC RX 637 (ALT 250 FOR IP): Performed by: INTERNAL MEDICINE

## 2023-02-15 PROCEDURE — APPSS30 APP SPLIT SHARED TIME 16-30 MINUTES: Performed by: NURSE PRACTITIONER

## 2023-02-15 PROCEDURE — 85055 RETICULATED PLATELET ASSAY: CPT

## 2023-02-15 PROCEDURE — 97162 PT EVAL MOD COMPLEX 30 MIN: CPT

## 2023-02-15 PROCEDURE — 82306 VITAMIN D 25 HYDROXY: CPT

## 2023-02-15 PROCEDURE — 6360000002 HC RX W HCPCS: Performed by: NURSE PRACTITIONER

## 2023-02-15 PROCEDURE — 36415 COLL VENOUS BLD VENIPUNCTURE: CPT

## 2023-02-15 PROCEDURE — 99232 SBSQ HOSP IP/OBS MODERATE 35: CPT | Performed by: INTERNAL MEDICINE

## 2023-02-15 PROCEDURE — 85025 COMPLETE CBC W/AUTO DIFF WBC: CPT

## 2023-02-15 PROCEDURE — 97530 THERAPEUTIC ACTIVITIES: CPT

## 2023-02-15 PROCEDURE — 6370000000 HC RX 637 (ALT 250 FOR IP): Performed by: NURSE PRACTITIONER

## 2023-02-15 PROCEDURE — 6360000002 HC RX W HCPCS: Performed by: INTERNAL MEDICINE

## 2023-02-15 PROCEDURE — 84100 ASSAY OF PHOSPHORUS: CPT

## 2023-02-15 PROCEDURE — 99233 SBSQ HOSP IP/OBS HIGH 50: CPT | Performed by: INTERNAL MEDICINE

## 2023-02-15 PROCEDURE — 94761 N-INVAS EAR/PLS OXIMETRY MLT: CPT

## 2023-02-15 PROCEDURE — 2580000003 HC RX 258: Performed by: INTERNAL MEDICINE

## 2023-02-15 PROCEDURE — 2580000003 HC RX 258: Performed by: UROLOGY

## 2023-02-15 PROCEDURE — 2580000003 HC RX 258: Performed by: NURSE PRACTITIONER

## 2023-02-15 RX ORDER — HYDROCODONE BITARTRATE AND ACETAMINOPHEN 5; 325 MG/1; MG/1
2 TABLET ORAL EVERY 4 HOURS PRN
Status: DISCONTINUED | OUTPATIENT
Start: 2023-02-15 | End: 2023-02-23 | Stop reason: HOSPADM

## 2023-02-15 RX ORDER — TRAZODONE HYDROCHLORIDE 50 MG/1
50 TABLET ORAL NIGHTLY
Status: DISCONTINUED | OUTPATIENT
Start: 2023-02-15 | End: 2023-02-23 | Stop reason: HOSPADM

## 2023-02-15 RX ORDER — HYDROCODONE BITARTRATE AND ACETAMINOPHEN 5; 325 MG/1; MG/1
1 TABLET ORAL EVERY 4 HOURS PRN
Status: DISCONTINUED | OUTPATIENT
Start: 2023-02-15 | End: 2023-02-23 | Stop reason: HOSPADM

## 2023-02-15 RX ORDER — MAGNESIUM SULFATE 1 G/100ML
1000 INJECTION INTRAVENOUS ONCE
Status: COMPLETED | OUTPATIENT
Start: 2023-02-15 | End: 2023-02-15

## 2023-02-15 RX ADMIN — MAGNESIUM SULFATE HEPTAHYDRATE 1000 MG: 1 INJECTION, SOLUTION INTRAVENOUS at 11:31

## 2023-02-15 RX ADMIN — SODIUM BICARBONATE 1300 MG: 650 TABLET ORAL at 15:30

## 2023-02-15 RX ADMIN — HYDROCODONE BITARTRATE AND ACETAMINOPHEN 2 TABLET: 5; 325 TABLET ORAL at 23:05

## 2023-02-15 RX ADMIN — CEFTRIAXONE 2000 MG: 2 INJECTION, POWDER, FOR SOLUTION INTRAMUSCULAR; INTRAVENOUS at 10:40

## 2023-02-15 RX ADMIN — MORPHINE SULFATE 2 MG: 2 INJECTION, SOLUTION INTRAMUSCULAR; INTRAVENOUS at 08:15

## 2023-02-15 RX ADMIN — CALCIUM CARBONATE-VITAMIN D TAB 500 MG-200 UNIT 2 TABLET: 500-200 TAB at 20:24

## 2023-02-15 RX ADMIN — SODIUM BICARBONATE 1300 MG: 650 TABLET ORAL at 08:09

## 2023-02-15 RX ADMIN — SODIUM CHLORIDE, PRESERVATIVE FREE 10 ML: 5 INJECTION INTRAVENOUS at 20:23

## 2023-02-15 RX ADMIN — SODIUM BICARBONATE 1300 MG: 650 TABLET ORAL at 20:24

## 2023-02-15 RX ADMIN — SODIUM BICARBONATE: 84 INJECTION INTRAVENOUS at 11:30

## 2023-02-15 RX ADMIN — ACETAMINOPHEN 650 MG: 325 TABLET ORAL at 05:42

## 2023-02-15 RX ADMIN — HYDROCODONE BITARTRATE AND ACETAMINOPHEN 1 TABLET: 5; 325 TABLET ORAL at 12:27

## 2023-02-15 RX ADMIN — SODIUM CHLORIDE, PRESERVATIVE FREE 10 ML: 5 INJECTION INTRAVENOUS at 08:09

## 2023-02-15 RX ADMIN — SODIUM BICARBONATE: 84 INJECTION INTRAVENOUS at 20:25

## 2023-02-15 RX ADMIN — CALCIUM CARBONATE-VITAMIN D TAB 500 MG-200 UNIT 2 TABLET: 500-200 TAB at 08:09

## 2023-02-15 RX ADMIN — TRAZODONE HYDROCHLORIDE 50 MG: 50 TABLET ORAL at 20:24

## 2023-02-15 RX ADMIN — SODIUM CHLORIDE, PRESERVATIVE FREE 10 ML: 5 INJECTION INTRAVENOUS at 20:24

## 2023-02-15 RX ADMIN — SODIUM CHLORIDE: 9 INJECTION, SOLUTION INTRAVENOUS at 01:35

## 2023-02-15 RX ADMIN — SODIUM CHLORIDE: 9 INJECTION, SOLUTION INTRAVENOUS at 08:08

## 2023-02-15 ASSESSMENT — PAIN DESCRIPTION - PAIN TYPE
TYPE: ACUTE PAIN
TYPE: ACUTE PAIN

## 2023-02-15 ASSESSMENT — PAIN DESCRIPTION - LOCATION
LOCATION: GENERALIZED
LOCATION: HEAD

## 2023-02-15 ASSESSMENT — PAIN DESCRIPTION - ONSET
ONSET: GRADUAL
ONSET: GRADUAL

## 2023-02-15 ASSESSMENT — PAIN SCALES - GENERAL
PAINLEVEL_OUTOF10: 4
PAINLEVEL_OUTOF10: 4
PAINLEVEL_OUTOF10: 0
PAINLEVEL_OUTOF10: 4
PAINLEVEL_OUTOF10: 3
PAINLEVEL_OUTOF10: 7

## 2023-02-15 ASSESSMENT — PAIN DESCRIPTION - FREQUENCY
FREQUENCY: INTERMITTENT
FREQUENCY: CONTINUOUS

## 2023-02-15 ASSESSMENT — PAIN - FUNCTIONAL ASSESSMENT
PAIN_FUNCTIONAL_ASSESSMENT: ACTIVITIES ARE NOT PREVENTED
PAIN_FUNCTIONAL_ASSESSMENT: ACTIVITIES ARE NOT PREVENTED

## 2023-02-15 ASSESSMENT — PAIN DESCRIPTION - ORIENTATION: ORIENTATION: LOWER;MID

## 2023-02-15 ASSESSMENT — PAIN DESCRIPTION - DESCRIPTORS
DESCRIPTORS: ACHING
DESCRIPTORS: ACHING;DISCOMFORT

## 2023-02-15 NOTE — PROGRESS NOTES
Pt is very comfortable. Pt rated her pain a 7 out of 10 in her back. Morphine 4mg IV given at this time. Will continue to monitor. Call light within reach.

## 2023-02-15 NOTE — PROGRESS NOTES
Occupational Therapy  Facility/Department: [address] [place] [address]  Occupational Therapy Initial Assessment    Name: Eris Jiang  : [birth year]  MRN: [id number]  Date of Service: 2/15/2023    Discharge Recommendations:  Continue to assess pending progress        Patient Diagnosis(es): There were no encounter diagnoses. Past Medical History:  has a past medical history of Complicated UTI (urinary tract infection), Fracture of wrist, unspecified laterality, closed, initial encounter, History of sepsis, Hyperlipidemia, Hypertension, Hypothyroidism, Kidney stone, Osteoporosis, Primary localized osteoarthritis of right hip, Septic shock due to urinary tract infection ([address] Severe sepsis ([facility] [address] and Thrombocytopenia ([facility] [address] Past Surgical History:  has a past surgical history that includes Dilation & curettage (); Cystoscopy (); Lithotripsy (2015); Cholecystectomy, laparoscopic (); Dilation and curettage of uterus (); Lithotripsy (Left, 2018); pr cystourethroscopy (Left, 2018); pr lithotripsy xtrcorp shock wave (Left, 2018); Colonoscopy (2014); Colonoscopy (N/A, 2018); other surgical history (); Total hip arthroplasty (Right, 2019); Cystoscopy (Left); Cystoscopy (Left, 2021); CYSTOSCOPY INSERTION / REMOVAL STENT / STONE (Left, 2021); Lithotripsy (Right, 2021); Lithotripsy (Right, 2021); Cystoscopy w/ ureteral stent placement (Right, 2023); and Cystoscopy (Right, 2023). Treatment Diagnosis: Generalized Weakness      Assessment   Performance deficits / Impairments: Decreased functional mobility ; Decreased ADL status; Decreased strength;Decreased endurance;Decreased balance;Decreased high-level IADLs  Treatment Diagnosis: Generalized Weakness  Prognosis: Good  Decision Making: Medium Complexity  REQUIRES OT FOLLOW-UP: Yes  Activity Tolerance  Activity Tolerance: Patient Tolerated treatment well        Plan   Occupational Therapy Plan  Times Per Week: 7x/wk  Times Per Day:  Once a day  Current Treatment Recommendations: Strengthening, Balance training, Functional mobility training, Self-Care / ADL, Safety education & training     Restrictions  Restrictions/Precautions  Restrictions/Precautions: General Precautions, Fall Risk    Subjective   General  Chart Reviewed: Yes  Patient assessed for rehabilitation services?: Yes  Diagnosis: Ureterolithiasis  General Comment  Comments: Pt. denies pain at rest. Supine in bed upon arrival, agreeable to OT eval.     Social/Functional History  Social/Functional History  Lives With: Spouse  Type of Home: House  Home Layout: One level  Home Access: Stairs to enter with rails  Entrance Stairs - Number of Steps: 3  Bathroom Shower/Tub: Tub/Shower unit  Bathroom Equipment: Grab bars in shower  ADL Assistance: [address] [place] [address] Avenue: [address] [place] [address] Responsibilities: Yes  Ambulation Assistance: Independent  Transfer Assistance: Independent  Active : Yes       Objective   Heart Rate: 67  Heart Rate Source: Apical;Monitor  BP: (!) 93/48  BP Location: Left upper arm  BP Method: Automatic  Patient Position: High fowlers  MAP (Calculated): 63  Resp: 20  SpO2: 92 %  O2 Device: Nasal cannula             Safety Devices  Type of Devices: Left in chair;Chair alarm in place;Call light within reach;Nurse notified  Gait  Overall Level of Assistance: Minimum assistance;Assist X1  Assistive Device: Other (comment) (pushing IV pole)        ADL  Feeding: Independent  Grooming: Contact guard assistance  UE Bathing: Contact guard assistance  LE Bathing: Minimal assistance  UE Dressing: Contact guard assistance  LE Dressing: Minimal assistance  Toileting: Minimal assistance        Bed mobility  Supine to Sit: Contact guard assistance  Transfers  Stand Pivot Transfers: Minimal assistance  Sit to stand: Minimal assistance  Stand to sit: Minimal assistance  Transfer Comments: unsteadiness noted  Vision  Vision: Within Functional Limits  Hearing  Hearing: Within functional limits  Cognition  Overall Cognitive Status: WFL  Orientation  Overall Orientation Status: Within Functional Limits           Education Given To: Patient  Education Provided: Role of Therapy;Plan of Care;Transfer Training  Education Method: Demonstration;Verbal  Barriers to Learning: None  Education Outcome: Verbalized understanding;Demonstrated understanding  LUE AROM (degrees)  LUE AROM : WFL  RUE AROM (degrees)  RUE AROM : WFL          Goals  Short Term Goals  Time Frame for Short Term Goals: 20 visits  Short Term Goal 1: Pt. will tolerate 15 mins of BUE ther ex/act to increase overall strength/activity tolerance for functional tasks. Short Term Goal 2: Pt. will demo standing tolerance x 5-7 mins w/o LOB to increase safety/participation with ADL's. Short Term Goal 3: Pt. will complete ADL routine with mod I and G safety. Short Term Goal 4: Pt. will complete ADL transfers/mobility with mod I and reduced risk for falls.        Therapy Time   Individual Concurrent Group Co-treatment   Time In 1011         Time Out 1024         Minutes 1 [place], [place]

## 2023-02-15 NOTE — OP NOTE
[address] [place], [place] [address]                                OPERATIVE REPORT    PATIENT NAME: Kitty Villalobos                       :        1955  MED REC NO:   [id number]                              ROOM:       I305  ACCOUNT NO:   [de-identified]                           ADMIT DATE: 2023  PROVIDER:     Karena Wills    DATE OF PROCEDURE:  2023    SURGEON:  Dr. Karena Wlils. ASSISTANT:  None. PREOPERATIVE DIAGNOSIS:  Right ureteral calculus. POSTOPERATIVE DIAGNOSIS:  Right ureteral calculus. PROCEDURE PERFORMED:  Cystoscopy with right ureteral stent placement. ANESTHESIA:  General.    COMPLICATIONS:  None. ESTIMATED BLOOD LOSS:  Minimal.    SPECIMENS:  None. PROSTHESES:  1. A 6-Yi x 24-cm double-J ureteral stent. 2.  A 16-Yi Ambrose catheter. DISPOSITION:  Stable. FINDINGS:  Right ureteral obstruction. INDICATIONS:  This patient is a 80-year-old female who presented to my  office with severe leukocytosis, right flank pain, and CT-proven right  obstructing calculus, here now for emergent temporizing therapy. DESCRIPTION OF PROCEDURE:  The patient was taken back to the operating  room after informed consent including all risks, benefits, and  alternatives was obtained. The patient was transferred from the [facility]  onto the operating room table, where she was induced under general  anesthesia. She was given IV Levaquin for preoperative antibiotic  prophylaxis. To begin the case, she was prepped and draped in the  normal sterile fashion and placed in dorsal lithotomy. She had a  22-Yi sheath with a 30-degree lens passed through the urethra into  the bladder. Once in the bladder, we identified the right ureteral  orifice. We then cannulated the right ureter with a 0.035-inch wire  _____ kidney, confirmed via fluoroscopy.   We were then able to place a  6-Yi x 24-cm double-J ureteral stent over the Glidewire up into the  kidney. Glidewire was removed. Proximal curl was confirmed via  fluoroscopy and distal curl was confirmed via visualization. At this  point in time, we did see a copious amount of purulence coming out of  the stent as it was placed. We were then able to place a 16-Wallisian  Ambrose catheter. She was awoken from general anesthesia, transferred to  the [facility], and taken to the PACU in satisfactory condition by Nursing  and Anesthesia Teams. PLAN:  The patient will be admitted to the ICU for supportive care. Cultures will be taken and broad-spectrum antibiotics will be used. We  will plan for definitive stone therapy later.         Lida Meng    D: 02/14/2023 20:59:17       T: 02/14/2023 23:19:45     [email or web address]  Job#: [id number]     Doc#: [id number]    CC:

## 2023-02-15 NOTE — PROGRESS NOTES
Pt resting in bed with eyes closed. Vitals and assessment as charted. Levophed increased to 10 mcg/min. Call light within reach. Bed alarm on.

## 2023-02-15 NOTE — PROGRESS NOTES
[facility] - Progress Note-Telemedicine  Today's Date and Time: 2/15/2023, 9:50 AM    Impression :   Concern for urosepsis  UTI  Right hydroureteronephrosis  S/p right ureteral stent placement on 2/13/23  Leukocytosis  DINA  Hypotension  Chronic history of nephrolithiasis  Transaminase elevation  Thrombocytopenia  Anemia    Recommendations:   Discontinue IV Levaquin because of DINA  Initiate IV Rocephin 2 gm IV q 24 hr, pending culture results  GNR on BC x 2  E. Coli on urine culture  Renal considerations    Medical Decision Making/Summary/Discussion:2/15/2023       Infection Control Recommendations   [place] Precautions    Antimicrobial Stewardship Recommendations     Simplification of therapy  Targeted therapy      Coordination of Outpatient Care:   Estimated Length of IV antimicrobials:TBD  Patient will need Midline Catheter Insertion: TBD  Patient will need PICC line Insertion:TBD  Patient will need: Home IV , [place],  SNF,  LTAC: TBD  Patient will need outpatient wound care:No    Chief complaint/reason for consultation:   Concern for urosepsis      History of Present Illness:   Noble Curran is a 79y.o.-year-old female who was initially admitted on 2/13/2023. Patient seen at the request of Dr. Weinstein Folds:    This patient, with a chronic history of kidney stones, saw her urologist on 2/13/23 and had complaints of 2 days of right sided flank pain with associated nausea and vomiting. A CT revealed multiple stones in the right ureter with significant hydroureteronephrosis. There are also non-obstructing stones in both kidneys. Her WBC was 34.3 at that time and she had a creatinine of 3.8. Her baseline creatinine is 0.65. She underwent emergent right-sided ureteral stenting and was then admitted to [facility].     IV Levaquin was initiated.     A the time of this consult note, the patient was noted to be hypotensive and is requiring vasopressors. She is afebrile and oxygenation well on room air. There is no growth on blood cultures thus far and the urine culture is pending. Her last UTI was noted to have grown pan sensitive E. Coli in 2021. Abnormal labs include:  Cr:2.97  GFR:17  Trop:31  Alk phos:166  ALT:143  AST:134  WBC:33.0  Hgb:9.2  Plt: 81    Imaging of the chest also revealed increased interstitial opacities with mild subsegmental atelectasis in the posterior lung bases. CT abd/pelvis 2/13/23  Impression   1. 2 obstructing calculi in the distal right ureter about 3 and 4 mm in size   noted close to the uterovesical junction. There may be additional punctate   calculus or 2 in the same region. This causes moderate right hydronephrosis   and hydroureter. 2. Nonobstructing bilateral renal calculi. 5 mm calculus in the right kidney   and a few punctate and 2 mm calculi in the left kidney. 3. Mild subsegmental atelectasis posterior lung bases. CXR 2/13/23  Impression   Increased interstitial opacity. While much or all of this may be chronic,   please correlate with any clinical evidence of acute interstitial edema. A focal infiltrate is not detected           ID was consulted for antibiotic recommendations    CURRENT EVALUATION 2/15/2023  BP (!) 89/46   Pulse 69   Temp 98.6 °F (37 °C) (Temporal)   Resp 18   Ht 5' 3\" (1.6 m)   Wt 135 lb 6.4 oz (61.4 kg)   LMP  (LMP Unknown)   SpO2 93%   BMI 23.99 kg/m²     Afebrile  Hypotension on low dose levophed    The patient is alert and oriented on 2 L NC. She is still experiencing some nausea with right flank tenderness. DINA is improving    Ambrose catheter is in place with hazy, maxim urine.      Medications reviewed:  IV Levaquin discontinued 2/14/23 and IV Rocephin initiated   [facility] x2 with GNR and urine culture with E. Coli    Labs, X rays reviewed: 2/15/2023    BUN:50-->44  Cr:2.97-->1.80    WBC:33.0-->29.8  Hb:9.2-->9.1  Plat: 81-->66    CRP:217.4  XSW:[id number]    Cultures:  Urine:  2/13/23: E. coli  Blood:  2/13/23: GNR x2  Sputum :    Wound:    MRSA Nares:      Imaging:    CT abd/pelvis 2/13/23              CXR 2/13/23      Discussed with patient, RN, family. I have personally reviewed the past medical history, past surgical history, medications, social history, and family history, and I have updated the database accordingly.   Past Medical History:     Past Medical History:   Diagnosis Date    Complicated UTI (urinary tract infection) 2/13/2023    Fracture of wrist, unspecified laterality, closed, initial encounter     right    History of sepsis 2009    following lithotripsy    Hyperlipidemia     Hypertension     Hypothyroidism 2007    secondary to radioactive iodine 2007    Kidney stone     Osteoporosis     Primary localized osteoarthritis of right hip 3/18/2019    Septic shock due to urinary tract infection ([address] 2/13/2023    Severe sepsis ([address] 2/13/2023    Thrombocytopenia [address] 2/15/2023       Past Surgical  History:     Past Surgical History:   Procedure Laterality Date    CHOLECYSTECTOMY, LAPAROSCOPIC  2009    COLONOSCOPY  07/17/2014    Dr. Zita Casillas - tubular adenomatous polyp removed    COLONOSCOPY N/A 08/01/2018    Dr. Sadie Morgan architectural distortion, suggestive of mucosal prolapse    CYSTOSCOPY  2015    stent removal and reinsert     CYSTOSCOPY Left     HLL with stent    CYSTOSCOPY Left 02/05/2021    CYSTOSCOPY URETEROSCOPY LASER-HLL performed by Sade Wright MD at [facility] Right 2/13/2023    CYSTOSCOPY URETERAL STENT INSERTION performed by Sade Wright MD at [facility] / STONE Left 02/05/2021    CYSTOSCOPY RETROGRADE PYELOGRAM STENT INSERTION performed by Sade Wright MD at [address] [place] [address] Right 02/13/2023    Dr. Víctor Shipley 04/28/2015    left    LITHOTRIPSY Left 01/30/2018    cystoscopy- Dr. Morgan Faye     LITHOTRIPSY Right 04/20/2021    LITHOTRIPSY Right 04/20/2021    ESWL EXTRACORPOREAL SHOCK WAVE LITHOTRIPSY performed by Karena Wills MD at [address]  2007    radioactive iodine procedure    [place] CYSTOURETHROSCOPY Left 01/30/2018    CYSTOSCOPY performed by Karena Wills MD at [address] Left 01/30/2018    ESWL [address] LITHOTRIPSY performed by Karena Wills MD at [address] Right 03/18/2019    Dr. Margo Lamasrador       Medications:      traZODone  50 mg Oral Nightly    cefTRIAXone (ROCEPHIN) IV  2,000 mg IntraVENous Q24H    oyster shell calcium w/D  2 tablet Oral BID    sodium bicarbonate  1,300 mg Oral TID    sodium chloride  30 mL/kg IntraVENous Once    sodium chloride flush  5-40 mL IntraVENous 2 times per day    lidocaine 1 % injection  5 mL IntraDERmal Once    sodium chloride flush  5-40 mL IntraVENous 2 times per day       Social History:     Social History     Socioeconomic History    Marital status:      Spouse name: Not on file    Number of children: Not on file    Years of education: Not on file    Highest education level: Not on file   Occupational History    Not on file   Tobacco Use    Smoking status: Some Days     Packs/day: 0.25     Years: 30.00     Pack years: 7.50     Types: Cigarettes    Smokeless tobacco: Never   Vaping Use    Vaping Use: Never used   Substance and Sexual Activity    Alcohol use:  Yes     Alcohol/week: 1.0 standard drink     Types: 1 Shots of liquor per week     Comment: socially    Drug use: No    Sexual activity: Yes     Partners: Male     Comment: postmeno   Other Topics Concern    Not on file   Social History Narrative    Not on file     Social Determinants of Health     Financial Resource Strain: Low Risk     Difficulty of Paying Living Expenses: Not hard at all   Food Insecurity: No Food Insecurity    Worried About Running Out of Food in the Last Year: Never true    Ran Out of Food in the Last Year: Never true   Transportation Needs: Not on file   Physical Activity: Insufficiently Active    Days of Exercise per Week: 2 days    Minutes of Exercise per Session: 10 min   Stress: Not on file   Social Connections: Not on file   Intimate Partner Violence: Not on file   Housing Stability: Not on file       Family History:     Family History   Problem Relation Age of Onset    Breast Cancer Maternal Grandmother     Stroke Father     Hypertension Father     Alzheimer's Disease Mother         Allergies:   Patient has no known allergies. Review of Systems:   Constitutional: No fevers or chills. Head: No headaches  Eyes: No double vision or blurry vision. No conjunctival inflammation. ENT: No sore throat or runny nose. . No hearing loss, tinnitus or vertigo. Cardiovascular: No chest pain or palpitations. No shortness of breath. No CARUSO  Lung: No shortness of breath or cough. No sputum production  Abdomen: nausea, no vomiting, no diarrhea, left sided flank pain. .   Genitourinary: Ambrose in place. Rt sided CVA pain  Musculoskeletal: No muscle aches or pains. No joint effusions, swelling or deformities  Hematologic: No bleeding or bruising. Neurologic: No headache, weakness, numbness, or tingling. Integument: No rash, no ulcers. Psychiatric: No depression. Endocrine: No polyuria, no polydipsia, no polyphagia.     Physical Examination :   Patient Vitals for the past 8 hrs:   BP Temp Temp src Pulse Resp SpO2 Weight   02/15/23 0845 (!) 89/46 -- -- 69 18 93 % --   02/15/23 0830 (!) 90/49 -- -- 67 17 91 % --   02/15/23 0815 (!) 110/43 -- -- 87 18 95 % --   02/15/23 0800 (!) 101/56 -- -- 73 18 96 % --   02/15/23 0745 (!) 100/49 -- -- 69 18 97 % --   02/15/23 0730 (!) 100/54 -- -- 63 14 98 % --   02/15/23 0715 (!) 96/56 -- -- 65 18 97 % --   02/15/23 0700 (!) 107/54 -- -- 69 14 97 % --   02/15/23 0645 (!) 108/59 98.6 °F (37 °C) Temporal 67 16 97 % --   02/15/23 0630 (!) 100/51 -- -- 67 17 97 % --   02/15/23 0615 (!) 113/54 -- -- 75 18 97 % --   02/15/23 0600 (!) 107/56 -- -- 60 15 98 % --   02/15/23 0545 (!) 107/59 -- -- 77 16 96 % --   02/15/23 0530 104/62 -- -- 67 15 98 % --   02/15/23 0515 (!) 118/56 -- -- 75 16 98 % --   02/15/23 0500 109/61 -- -- 75 17 97 % --   02/15/23 0445 117/68 -- -- 68 20 96 % --   02/15/23 0430 (!) 114/58 98 °F (36.7 °C) Temporal 78 16 97 % 135 lb 6.4 oz (61.4 kg)   02/15/23 0415 (!) 140/71 -- -- 83 16 99 % --   02/15/23 0400 (!) 119/52 -- -- 70 16 99 % --   02/15/23 0345 120/60 -- -- 72 15 98 % --   02/15/23 0330 125/60 -- -- 75 15 98 % --   02/15/23 0315 (!) 116/59 -- -- 80 17 98 % --   02/15/23 0300 126/61 -- -- 80 17 98 % --   02/15/23 0245 (!) 122/59 -- -- 70 16 98 % --   02/15/23 0230 114/79 -- -- 77 16 98 % --   02/15/23 0215 117/60 -- -- 73 15 99 % --   02/15/23 0200 (!) 126/55 -- -- 73 14 99 % --     General Appearance: Awake, alert, and in no apparent distress  Head:  Normocephalic, no trauma  Eyes: Pupils equal, round, reactive to light and accommodation; extraocular movements intact; sclera anicteric; conjunctivae pink. No embolic phenomena. ENT: Oropharynx clear, without erythema, exudate, or thrush. No tenderness of sinuses. Mouth/throat: mucosa pink and moist. No lesions. Dentition in good repair. Neck:Supple, without lymphadenopathy. Thyroid normal, No bruits. Pulmonary/Chest: Diminished to auscultation, without wheezes, rales, or rhonchi. No dullness to percussion. Cardiovascular: Regular rate and rhythm without murmurs, rubs, or gallops. Abdomen: Soft,  tender. Bowel sounds normal. No organomegaly . Rt CVA pain  All four Extremities: No cyanosis, clubbing, edema, or effusions. Neurologic: No gross sensory or motor deficits. Skin: Warm and dry with good turgor. No signs of peripheral arterial or venous insufficiency. No ulcerations. No open wounds.     Medical Decision Making -Laboratory:   I have independently reviewed/ordered the following labs:    CBC with Differential:   Recent Labs     02/14/23  0545 02/15/23  0540   WBC 33.0* 29.8*   HGB 9.2* 9.1*   HCT 27.6* 27.5*   PLT See Reflexed IPF Result See Reflexed IPF Result   LYMPHOPCT 2* 3*   MONOPCT 1* 0*     BMP:   Recent Labs     02/15/23  0135 02/15/23  0540    141   K 4.1 4.0   * 118*   CO2 15* 14*   BUN 43* 44*   CREATININE 2.03* 1.80*   MG  --  1.5*     Hepatic Function Panel:   Recent Labs     02/14/23  0545 02/15/23  0540   PROT 4.7* 4.5*   LABALBU 2.2* 2.0*   BILITOT 0.4 0.3   ALKPHOS 166* 161*   * 94*   * 44*     No results for input(s): RPR in the last 72 hours. No results for input(s): HIV in the last 72 hours. No results for input(s): BC in the last 72 hours. Lab Results   Component Value Date/Time    MUCUS TRACE 02/13/2023 05:23 PM    RBC 2.91 02/15/2023 05:40 AM    RBC 4.08 05/08/2012 08:10 AM    TRICHOMONAS NOT REPORTED 02/03/2021 02:30 PM    WBC 29.8 02/15/2023 05:40 AM    YEAST NOT REPORTED 02/03/2021 02:30 PM    TURBIDITY SLIGHTLY CLOUDY 02/13/2023 05:23 PM     Lab Results   Component Value Date/Time    CREATININE 1.80 02/15/2023 05:40 AM    GLUCOSE 97 02/15/2023 05:40 AM    GLUCOSE 102 05/08/2012 08:10 AM       Medical Decision Making-Imaging:     Narrative   EXAMINATION:   ONE XRAY VIEW OF THE CHEST       2/13/2023 9:25 am       COMPARISON:   11/07/2013       HISTORY:   ORDERING SYSTEM PROVIDED HISTORY: sepsis   TECHNOLOGIST PROVIDED HISTORY:   sepsis       FINDINGS:   Cardial pericardial silhouette is unremarkable. Increased interstitial   opacities are seen throughout both lungs. A focal infiltrate is not   detected. No pneumothorax. No free air. No acute bony abnormality. Impression   Increased interstitial opacity. While much or all of this may be chronic,   please correlate with any clinical evidence of acute interstitial edema.        A focal infiltrate is not detected         Narrative   EXAMINATION:   CT OF THE ABDOMEN AND PELVIS WITHOUT CONTRAST 2/13/2023 9:50 am       TECHNIQUE:   CT of the abdomen and pelvis was performed without the administration of   intravenous contrast. Multiplanar reformatted images are provided for review. Automated exposure control, iterative reconstruction, and/or weight based   adjustment of the mA/kV was utilized to reduce the radiation dose to as low   as reasonably achievable. COMPARISON:   02/03/2021       HISTORY:   ORDERING SYSTEM PROVIDED HISTORY: Renal colic   TECHNOLOGIST PROVIDED HISTORY:           FINDINGS:   Lower Chest: Mild subsegmental atelectasis posterior lung bases. Organs: There is 5 mm calculus in the lower pole right kidney which is also   evident on the prior. Additional smaller calculi in the right kidney on   prior no longer present. There is moderate right hydronephrosis with   significant perinephric stranding. There is also moderate right hydroureter. There are 2 calculi measuring 3 mm and 4 mm in the distal right ureter a few   cm from the uterovesical junction accounting for the obstructive signs. There may be additional punctate calculi in the same region. Left kidney shows a few punctate calculi along with a 2 mm calculus in the   lower pole. No left hydronephrosis or hydroureter. No left renal calculus. The unenhanced liver shows a 1 cm cyst in the left lobe which is unchanged. Cholecystectomy. Spleen, pancreas, adrenal glands show no significant   abnormalities. GI/Bowel: There is limited evaluation due to absence of oral contrast.       The stomach shows no focal lesions. Small bowel loops normal in caliber   showing no focal abnormalities. Normal appendix. Evaluation of the colon shows no acute process. Pelvis: Streak artifact from right hip prosthesis obscures portions of the   pelvis. There are pelvic phleboliths.   Uterus and urinary bladder grossly   normal.       Peritoneum/Retroperitoneum: No free fluid. No lymphadenopathy. Atherosclerotic disease. Bones/Soft Tissues: Degenerative changes in the spine. No acute abnormality   of the bones. The superficial soft tissues show no significant abnormalities. Impression   1. 2 obstructing calculi in the distal right ureter about 3 and 4 mm in size   noted close to the uterovesical junction. There may be additional punctate   calculus or 2 in the same region. This causes moderate right hydronephrosis   and hydroureter. 2. Nonobstructing bilateral renal calculi. 5 mm calculus in the right kidney   and a few punctate and 2 mm calculi in the left kidney. 3. Mild subsegmental atelectasis posterior lung bases. Medical Decision Qrmakw-[id number]-[id number]:       Medical Decision Making-Other:     Note:  Labs, medications, radiologic studies were reviewed with personal review of films  Large amounts of data were reviewed  Discussed with nursing Staff, Discharge planner  Infection Control and Prevention measures reviewed  All prior entries were reviewed  Administer medications as ordered  Prognosis: [facility]  Discharge planning reviewed      Thank you for allowing us to participate in the care of this patient. Please call with questions. SAL Belle - CNP    ATTESTATION:    I have discussed the case, including pertinent history and exam findings with the APRN. I have evaluated the  History, physical findings and pictures of the patient and the key elements of the encounter have been performed by me. I have reviewed the laboratory data, other diagnostic studies and discussed them with the APRN. I have updated the medical record where necessary. I agree with the assessment, plan and orders as documented by the APRN.     Kirby Cook MD.      Pager: [phone] - Office: [phone]

## 2023-02-15 NOTE — PROGRESS NOTES
Patient laying in bed awake. Complains of headache. States she did get some sleep after morphine last night. Ambrose draining. Levophed titrated to 3 mcg. Blood pressure 107/54. Will continue to monitor.

## 2023-02-15 NOTE — PROGRESS NOTES
Moise Carlos M.D. Internal Medicine Progress Note    Patient: Italo Smyth  Date of Admission: 2/13/2023 10:56 AM  Hospital Day # 2  Date of Evaluation: 2/15/2023      SUBJECTIVE:    Ms. China Christie  was seen and examined today for f/u of Ureterolithiasis. She is starting to feel a little better today but still feels nauseous. She has been afebrile. BP's are better with Levophed drip but so far attempts to wean off drip have not been successful. ROS:   Constitutional: positive  for fevers, and positive for chills. Respiratory: negative for shortness of breath, negative for cough, and negative for wheezing  Cardiovascular: negative for chest pain, and negative for palpitations  Gastrointestinal: positive for abdominal pain, positive for nausea,positive for vomiting, negative for diarrhea, and negative for constipation     All other systems were reviewed with the patient and are negative unless otherwise stated in HPI    -----------------------------------------------------------------  OBJECTIVE:  Vitals:   Temp: 98.2 °F (36.8 °C)  BP: (!) 99/49  Resp: 25  Heart Rate: 70  SpO2: 92 % on room air    Weight  Wt Readings from Last 3 Encounters:   02/15/23 135 lb 6.4 oz (61.4 kg)   02/13/23 112 lb (50.8 kg)   02/02/23 113 lb (51.3 kg)     Body mass index is 23.99 kg/m². 24HR INTAKE/OUTPUT:      Intake/Output Summary (Last 24 hours) at 2/15/2023 1342  Last data filed at 2/15/2023 0845  Gross per 24 hour   Intake 7344.58 ml   Output 675 ml   Net 6669.58 ml       Exam:  GEN:    Awake, alert and oriented x 3. EYES:  EOMI, pupils equal   NECK: Supple. No lymphadenopathy. No carotid bruit  CVS:    regular rate and rhythm, no audible murmur  PULM:  CTA, no wheezes, rales or rhonchi, no acute respiratory distress  ABD:    Bowels sounds normal.  Abdomen is soft. No distention. suprapubic tenderness to palpation. EXT:   trace edema bilaterally . No calf tenderness. NEURO: Moves all extremities.   Motor and sensory are grossly intact  SKIN:  No rashes. No skin lesions.    -----------------------------------------------------------------  DATA:  Complete Blood Count:   Recent Labs     02/13/23  1433 02/14/23  0545 02/15/23  0540   WBC 18.3* 33.0* 29.8*   RBC 3.06* 2.92* 2.91*   HGB 9.7* 9.2* 9.1*   HCT 29.2* 27.6* 27.5*   MCV 95.4 94.5 94.5   MCH 31.7 31.5 31.3   MCHC 33.2 33.3 33.1   RDW 13.8 14.2 14.6*   PLT See Reflexed IPF Result See Reflexed IPF Result See Reflexed IPF Result        Last 3 Blood Glucose:   Recent Labs     02/14/23  0150 02/14/23  0545 02/14/23  0946 02/14/23  1535 02/14/23  1749 02/14/23  2145 02/15/23  0135 02/15/23  0540   GLUCOSE 60* 122* 90 101* 113* 108* 102* 97        Comprehensive Metabolic Profile:   Recent Labs     02/13/23  1433 02/13/23  2145 02/14/23  0545 02/14/23  0946 02/14/23  2145 02/15/23  0135 02/15/23  0540      < > 138   < > 138 139 141   K 4.6   < > 4.7   < > 4.1 4.1 4.0      < > 111*   < > 112* 114* 118*   CO2 19*   < > 15*   < > 16* 15* 14*   BUN 46*   < > 50*   < > 45* 43* 44*   CREATININE 3.44*   < > 2.97*   < > 2.16* 2.03* 1.80*   GLUCOSE 76   < > 122*   < > 108* 102* 97   CALCIUM 8.1*   < > 7.0*   < > 7.9* 7.8* 7.8*   PROT 5.1*  --  4.7*  --   --   --  4.5*   LABALBU 2.6*  --  2.2*  --   --   --  2.0*   BILITOT 0.5  --  0.4  --   --   --  0.3   ALKPHOS 158*  --  166*  --   --   --  161*   AST 78*  --  134*  --   --   --  44*   ALT 98*  --  143*  --   --   --  94*    < > = values in this interval not displayed. Urinalysis:    Latest Reference Range & Units 2/13/23 17:23   Color, UA Yellow  Yellow   Turbidity UA Clear  SLIGHTLY CLOUDY ! Glucose, UA NEGATIVE  NEGATIVE   Bilirubin, Urine NEGATIVE  NEGATIVE   Ketones, Urine NEGATIVE  TRACE ! Specific Gravity, UA 1.010 - 1.020  1.025 (H)   pH, UA 5.0 - 9.0  5.5   Protein, UA NEGATIVE  2+ !    Urobilinogen, Urine Normal  Normal   Nitrite, Urine NEGATIVE  NEGATIVE   Leukocyte Esterase, Urine NEGATIVE LARGE ! Mucus, UA None  TRACE !   WBC, UA 0 - 5 /HPF 20 TO 50   RBC, UA 0 - 2 /HPF 2 TO 5   Epithelial Cells, UA 0 - 25 /HPF 0 TO 2   Renal Epithelial, UA 0 /HPF 0 TO 2   Bacteria, UA None  2+ ! Urine Hgb NEGATIVE  2+ ! HgBA1c:    Lab Results   Component Value Date/Time    LABA1C 5.9 10/04/2022 07:31 AM       Lactic Acid:   Lab Results   Component Value Date/Time    LACTA 1.8 02/14/2023 09:46 AM    LACTA 2.2 02/14/2023 07:45 AM    LACTA 2.8 02/14/2023 05:45 AM        High Sensitivity Troponin:  Recent Labs     02/13/23  1635 02/13/23  2145   TROPHS 27* 31*       Microbiology:  Urine culture 2/13/23 = in process  Blood cultures 2/13/23 x 2 = GNR's    Radiology/Imaging:  FLUORO FOR SURGICAL PROCEDURES   Final Result      XR CHEST PORTABLE   Final Result   Increased interstitial opacity. While much or all of this may be chronic,   please correlate with any clinical evidence of acute interstitial edema.       A focal infiltrate is not detected                 MEDICAL DECISION MAKING:  Primary Problem(s): Severe sepsis with septic shock due to complicated UTI with ureterolithiasis and GNR bacteremia  Condition is  critical  with continued leukocytosis, lactic acidosis and hypotension  Treatment plan:   Urology assistance appreciated  S/p cystoscopy with right ureteral stent insertion 2/13/23  Will consult ID for assistance with Abx selection  Imaging: no further imaging studies ordered today  Medications:   Continue Rocephin per ID rec's  Neosynephrine DC'd  Started on Levophed drip yesterday - weaning off  Medication Monitoring / High Risk Medications: Levophed     Acute kidney injury due to sepsis    Condition is  improving  Treatment plan:   Nephrology consult appreciated - Dr. Isa Romano notes reviewed  BP support  IVF's  Monitor renal function, I/O's    Elevated LFT's due to shock liver    Condition is improving  Treatment plan:   Monitor LFT's as sepsis tx'd    Thrombocytopenia  Condition is worsening  Treatment plan: monitor CBC  Medications: DC heparin    Nutrition status:   at risk for malnutrition  Dietician consult initiated    Hospital Prophylaxis:   DVT: SCDs (heparin DCd due to thrombocytopenia)    MDM Data:   Test interpretation:  My independent EKG interpretation: NSR with normal axis and intervals, non-specific ST changes  Management and/or test interpretation  discussed with Dr. Bev Hoff CNP  Consults and Nursing notes were personally reviewed, all current labs and imaging were personally reviewed, and tests ordered: CBC, CMP. Lactic acid      Disposition:  Shared decision making: All test results, treatment options and disposition options were discussed with the patient today  Social determinants of health that may impact management: none  Code status: Full Code   Disposition: Discharge plan is pending        Critical Care Time:  Total critical care time caring for this patient with life threatening, unstable organ failure, including direct patient contact, management of life support systems, review of data including imaging and labs, discussions with other team members and physicians at least 35 minutes so far today, excluding separately billable procedures.         Korey Alberto MD , M.D.  2/15/2023  1:42 PM

## 2023-02-15 NOTE — PROGRESS NOTES
[facility]   [phone]   Nephrology progress note  2/15/2023, 10:26 AM      Pt Name:    Nino Terry  MRN:     [id number]     YOB: 1955  Admit Date:    2/13/2023 10:56 AM  Primary Care Physician:  Romana Cheney MD   Room number  Q878/O957-57    TELEHEALTH EVALUATION -- Audio/Visual (During [place] [address] public health emergency)    Patient's Location: [address] [place] [address] (myself) Location: [address], [address] [place] [address] [facility]  Patient's nurse: Present    Chief Complaint: Nephrology following for DINA/CKD    Subjective:  Patient seen and examined  No chest pain or shortness of breath  Feels okay  On room air  On levophed drip-low-dose  Good urine output      Objective:  24HR INTAKE/OUTPUT:    Intake/Output Summary (Last 24 hours) at 2/15/2023 1026  Last data filed at 2/15/2023 0813  Gross per 24 hour   Intake 8895.58 ml   Output 974 ml   Net 7921.58 ml       I/O last 3 completed shifts: In: 87598.6 [P.O.:2500; I.V.:7541.6; Other:575]  Out: 1903 [QUPLE:4649]  I/O this shift:  In: 2158 [I.V.:2203]  Out: -   Admission weight: 111 lb (50.3 kg)  Wt Readings from Last 3 Encounters:   02/15/23 135 lb 6.4 oz (61.4 kg)   02/13/23 112 lb (50.8 kg)   02/02/23 113 lb (51.3 kg)     Body mass index is 23.99 kg/m².     Physical examination  VITALS:     Vitals:    02/15/23 0930 02/15/23 0945 02/15/23 1000 02/15/23 1015   BP: (!) 94/52 (!) 98/49 (!) 86/52 (!) 90/50   Pulse: 71 76 71 79   Resp: 19 17 18 21   Temp:       TempSrc:       SpO2: 93% 91% 91% 93%   Weight:       Height:         Constitutional and General Appearance: alert and cooperative, appears comfortable, no apparent distress  Lungs: no use of accessory muscles or labored breathing noted, Respiratory effort observed to be normal  Extremities: No visible swelling  Skin:  No discoloration noted on facial skin  Neuro: no slurred speech, no facial drooping  Psychiatric: Normal mood and affect, Not agitated  Mental status: Alert and awake, Oriented to person/place/time, Able to follow commands      Due to this being a TeleHealth encounter, evaluation of the following organ systems is limited: EENT/Resp/CV/GI//MS/Neuro/Skin/Lymph    Lab Data  CBC:   Recent Labs     02/13/23  1433 02/14/23  0545 02/15/23  0540   WBC 18.3* 33.0* 29.8*   HGB 9.7* 9.2* 9.1*   HCT 29.2* 27.6* 27.5*   PLT See Reflexed IPF Result See Reflexed IPF Result See Reflexed IPF Result       BMP:  Recent Labs     02/14/23  2145 02/15/23  0135 02/15/23  0540    139 141   K 4.1 4.1 4.0   * 114* 118*   CO2 16* 15* 14*   BUN 45* 43* 44*   CREATININE 2.16* 2.03* 1.80*   GLUCOSE 108* 102* 97   CALCIUM 7.9* 7.8* 7.8*   MG  --   --  1.5*   PHOS  --   --  2.6       Hepatic:   Recent Labs     02/13/23  1433 02/14/23  0545 02/15/23  0540   LABALBU 2.6* 2.2* 2.0*   AST 78* 134* 44*   ALT 98* 143* 94*   BILITOT 0.5 0.4 0.3   ALKPHOS 158* 166* 161*           Meds:  Infusion:    norepinephrine 3 mcg/min (02/15/23 0901)    sodium chloride 150 mL/hr at 02/15/23 6761    sodium chloride      sodium chloride       Meds:    traZODone  50 mg Oral Nightly    cefTRIAXone (ROCEPHIN) IV  2,000 mg IntraVENous Q24H    oyster shell calcium w/D  2 tablet Oral BID    sodium bicarbonate  1,300 mg Oral TID    sodium chloride  30 mL/kg IntraVENous Once    sodium chloride flush  5-40 mL IntraVENous 2 times per day    lidocaine 1 % injection  5 mL IntraDERmal Once    sodium chloride flush  5-40 mL IntraVENous 2 times per day     Meds prn: HYDROcodone 5 mg - acetaminophen **OR** HYDROcodone 5 mg - acetaminophen, ondansetron **OR** ondansetron, sodium chloride, polyethylene glycol, sodium chloride flush, sodium chloride flush, sodium chloride, acetaminophen       Impression and Plan:  1. DINA secondary to septic shock. Likely ischemic ATN. Also noted to have moderate hydroureteronephrosis. Underwent cystoscopy with stent placement. Serum creatinine slightly better. Still on IV fluids.   We will change IV fluids to IV bicarb. See orders. Discussed with patient  2. Septic shock secondary to UTI. Currently on Levophed. ID following. Leukocytosis starting to improve. Hemodynamically improving  3. Right hydroureteronephrosis status post stent placement  4. Kidney stone  5. Hypocalcemia. Corrected calcium is now 9.4.    6.  Metabolic acidosis. Change fluids to IV bicarbonate drip. Calcium has improved. 7.  Hypomagnesemia. Will replace    Discussed with patient and RN      Joao Atkinson MD  [facility]    Patient was evaluated through a synchronous (real-time) audio-video encounter. The patient (or guardian if applicable) is aware that this is a billable service, which includes applicable co-pays. This virtual visit was conducted with patient's (and/or legal guardian's consent). The visit was conducted pursuant to the emergency declaration under the [address] [place] [address] authority and the [employer] General Act. Patient identification was verified, and a caregiver was present when appropriate. The patient was located at home in a state where the provider was licensed to provide care    Virtual visit was done to address concerns as mentioned above. Due to this being a TeleHealth encounter (During [place]-05 public health emergency), evaluation of the following organ systems was limited: EENT/Resp/CV/GI//MS/Neuro/Skin/Lymph     Services were provided through a video synchronous discussion virtually to substitute for in-person visit.

## 2023-02-15 NOTE — PLAN OF CARE
Problem: Safety - Adult  Goal: Free from fall injury  Outcome: Progressing  Note: Bed in low position. Wheels locked. Bed alarm on. 2/4 side rails are up. Fall band on. Call light within reach. Problem: Pain  Goal: Verbalizes/displays adequate comfort level or baseline comfort level  Outcome: Progressing  Flowsheets (Taken 2/14/2023 1530 by Johan Diaz RN)  Verbalizes/displays adequate comfort level or baseline comfort level:   Encourage patient to monitor pain and request assistance   Assess pain using appropriate pain scale   Administer analgesics based on type and severity of pain and evaluate response   Implement non-pharmacological measures as appropriate and evaluate response  Note: Pt is able to verbalize when in pain. Pt given pain medications as needed. Will continue to monitor. Problem: Infection - Adult  Goal: Absence of infection at discharge  Outcome: Progressing  Flowsheets (Taken 2/14/2023 1530 by Johan Diaz, RN)  Absence of infection at discharge:   Assess and monitor for signs and symptoms of infection   Monitor lab/diagnostic results   Monitor all insertion sites i.e., indwelling lines, tubes and drains  Note: Monitoring temperature and labs. Pt is getting IV Rocephin, will continue to monitor.

## 2023-02-15 NOTE — PLAN OF CARE
Problem: Discharge Planning  Goal: Discharge to home or other facility with appropriate resources  Outcome: Progressing     Problem: Pain  Goal: Verbalizes/displays adequate comfort level or baseline comfort level  2/15/2023 0711 by Ck Mcfadden RN  Outcome: Progressing     Problem: Safety - Adult  Goal: Free from fall injury  2/15/2023 0711 by Ck Mcfadden RN  Outcome: Progressing     Problem: Infection - Adult  Goal: Absence of infection at discharge  2/15/2023 0711 by Ck Mcfadden RN  Outcome: Progressing     Problem: Nutrition Deficit:  Goal: Optimize nutritional status  Outcome: Progressing

## 2023-02-15 NOTE — PROGRESS NOTES
Physical Therapy  Facility/Department: [address] [place] [address] ICU  Daily Treatment Note  NAME: Cynthia Phalen  : 1955  MRN: [id number]    Date of Service: 2023    Discharge Recommendations:  Continue to assess pending progress, Home with Home health PT, Patient would benefit from continued therapy after discharge        Patient Diagnosis(es): There were no encounter diagnoses. Assessment   Assessment: Pt was limited this date d/t frequent desat. T/o BLE ex, spo2 levels ranged 87-92%. Pt stood at [facility] for ~30 seconds before instructed to sit down d/t spo2 levels remaining around 83% and didn't increase after deep breathing. Nasal cannula was close by, pt states she only uses it when sleeping but states she did \"just wake up recently. \"  Nasal cannula was reapplied d/t spo2 levels around 85% once seated. Nursing, Daniella, was notified. Activity Tolerance: Patient limited by fatigue; Other (comment) (Spo2 levels.)     Plan    Physcial Therapy Plan  General Plan: 2 times a day 7 days a week     Restrictions  Restrictions/Precautions  Restrictions/Precautions: General Precautions, Fall Risk     Subjective    Subjective  Subjective: Pt resting in chair upon arrival.  Pt declines pain. She states she still feels weak but \"better than yesterday. \"  Pain: 0/10  Orientation  Overall Orientation Status: Within Functional Limits  Cognition  Overall Cognitive Status: WFL     Objective   Vitals     Bed Mobility Training  Bed Mobility Training: No  Transfer Training  Transfer Training: Yes  Overall Level of Assistance: Contact-guard assistance;Assist X1  Sit to Stand: Contact-guard assistance;Assist X1  Stand to Sit: Assist X1;Contact-guard assistance  Gait Training  Gait Training: No (Spo2 levels 82% during standing therefore gait was held.)  Gait  Overall Level of Assistance: Minimum assistance;Assist X1  Assistive Device: Other (comment) (pushing IV pole)     PT Exercises  Exercise Treatment: Seated BLE ex x10 ea--SPo2 fluctuating between 87-92% during seated LE ex. Pt on room air. Safety Devices  Type of Devices: Left in chair;Chair alarm in place;Call light within reach;Nurse notified       Goals  Short Term Goals  Time Frame for Short Term Goals: 20 days  Short Term Goal 1: Initiate HEP and progress as tolerated for balance and mobility. Short Term Goal 2: Pt bed mobility will be independent without rails to allow independence at home. Short Term Goal 3: Pt will transfer independently with least restrictive device and good safety to reduce fall risk. Short Term Goal 4: Pt will ambulate independently with least restrictive device for up to 250 feet to allow community ambulation. Short Term Goal 5: Negotiate 3 steps with handrail, independently to allow entry and exit of the home. Patient Goals   Patient Goals : feel better    Education  Patient Education  Education Given To: Patient  Education Provided: Role of Therapy  Education Provided Comments: Continued BLE ex thoughout day.     Therapy Time   Individual Concurrent Group Co-treatment   Time In 6792         Time Out 1610         Minutes [address] [place] [address]

## 2023-02-15 NOTE — PROGRESS NOTES
Pt sitting up in the chair when writer entered the room. Pt had a small amount of emesis in the basin. Writer gave pt Zofran 4 mg IV. Vitals and assessment as charted. Pt rated her pain a 5 out of 10 in her head. Pt requested warm wash cloth for her forehead, writer provided wash cloth. Writer titrated Levophed down to 7 mcg/min. Pt denies any further needs at this time. Call light within reach. Family at bedside.

## 2023-02-15 NOTE — PROGRESS NOTES
Pt awake laying in bed when writer entered the room. Vitals and assessment as charted. Pt is feeling better and denies pain at this time. Levophed decreased to 7 mcg/min. Pt stated she is starting to get hungry again. Pt denies any further needs. Call light within reach. Bed alarm on.

## 2023-02-16 ENCOUNTER — APPOINTMENT (OUTPATIENT)
Dept: GENERAL RADIOLOGY | Age: 68
DRG: 853 | End: 2023-02-16
Attending: INTERNAL MEDICINE
Payer: MEDICARE

## 2023-02-16 PROBLEM — R78.81 BACTEREMIA DUE TO ESCHERICHIA COLI: Status: ACTIVE | Noted: 2023-02-16

## 2023-02-16 PROBLEM — A41.51 SEPSIS DUE TO ESCHERICHIA COLI (HCC): Status: ACTIVE | Noted: 2023-02-16

## 2023-02-16 PROBLEM — B96.20 BACTEREMIA DUE TO ESCHERICHIA COLI: Status: ACTIVE | Noted: 2023-02-16

## 2023-02-16 PROBLEM — D61.9 ANEMIA DUE TO BONE MARROW FAILURE (HCC): Status: ACTIVE | Noted: 2023-02-16

## 2023-02-16 LAB
ABSOLUTE EOS #: 0.18 K/UL (ref 0–0.44)
ABSOLUTE IMMATURE GRANULOCYTE: 0.18 K/UL (ref 0–0.3)
ABSOLUTE LYMPH #: 1.24 K/UL (ref 1.1–3.7)
ABSOLUTE MONO #: 0.71 K/UL (ref 0.1–1.2)
ABSOLUTE RETIC #: 0.02 M/UL (ref 0.03–0.08)
ALBUMIN SERPL-MCNC: 1.8 G/DL (ref 3.5–5.2)
ALBUMIN/GLOBULIN RATIO: 0.7 (ref 1–2.5)
ALP SERPL-CCNC: 154 U/L (ref 35–104)
ALT SERPL-CCNC: 89 U/L (ref 5–33)
ANION GAP SERPL CALCULATED.3IONS-SCNC: 6 MMOL/L (ref 9–17)
AST SERPL-CCNC: 37 U/L
BASOPHILS # BLD: 0 % (ref 0–2)
BASOPHILS ABSOLUTE: 0 K/UL (ref 0–0.2)
BILIRUB DIRECT SERPL-MCNC: <0.1 MG/DL
BILIRUB SERPL-MCNC: 0.3 MG/DL (ref 0.3–1.2)
BILIRUB SERPL-MCNC: 0.4 MG/DL (ref 0.3–1.2)
BUN SERPL-MCNC: 32 MG/DL (ref 8–23)
BUN/CREAT BLD: 26 (ref 9–20)
CALCIUM SERPL-MCNC: 8.2 MG/DL (ref 8.6–10.4)
CHLORIDE SERPL-SCNC: 113 MMOL/L (ref 98–107)
CO2 SERPL-SCNC: 21 MMOL/L (ref 20–31)
CREAT SERPL-MCNC: 1.23 MG/DL (ref 0.5–0.9)
D DIMER BLD IA.RAPID-MCNC: 3.06 MG/L FEU (ref 0–0.59)
DAT, POLYSPECIFIC: NEGATIVE
EOSINOPHILS RELATIVE PERCENT: 1 % (ref 1–4)
FIBRINOGEN: 710 MG/DL (ref 179–518)
GFR SERPL CREATININE-BSD FRML MDRD: 48 ML/MIN/1.73M2
GLUCOSE SERPL-MCNC: 105 MG/DL (ref 70–99)
HCT VFR BLD AUTO: 25.8 % (ref 36.3–47.1)
HGB BLD-MCNC: 8.7 G/DL (ref 11.9–15.1)
IMMATURE GRANULOCYTES: 1 %
IMMATURE RETIC FRACT: 6.5 % (ref 2.7–18.3)
INR PPP: 1.1
LDLC SERPL-MCNC: 240 U/L (ref 135–214)
LYMPHOCYTES # BLD: 7 % (ref 24–43)
MCH RBC QN AUTO: 31 PG (ref 25.2–33.5)
MCHC RBC AUTO-ENTMCNC: 33.7 G/DL (ref 28.4–34.8)
MCV RBC AUTO: 91.8 FL (ref 82.6–102.9)
MICROORGANISM SPEC CULT: ABNORMAL
MONOCYTES # BLD: 4 % (ref 3–12)
MORPHOLOGY: ABNORMAL
NRBC AUTOMATED: 0 PER 100 WBC
PARTIAL THROMBOPLASTIN TIME: 31.8 SEC (ref 26.8–34.8)
PDW BLD-RTO: 14.6 % (ref 11.8–14.4)
PLATELET # BLD AUTO: ABNORMAL K/UL (ref 138–453)
PLATELET, FLUORESCENCE: 52 K/UL (ref 138–453)
PLATELET, IMMATURE FRACTION: 7.5 % (ref 1.1–10.3)
POTASSIUM SERPL-SCNC: 3.9 MMOL/L (ref 3.7–5.3)
PROT SERPL-MCNC: 4.4 G/DL (ref 6.4–8.3)
PROTHROMBIN TIME: 14.2 SEC (ref 11.5–14.2)
RBC # BLD: 2.81 M/UL (ref 3.95–5.11)
RETIC HEMOGLOBIN: 23.1 PG (ref 28.2–35.7)
RETICS/RBC NFR AUTO: 0.8 % (ref 0.5–1.9)
SEG NEUTROPHILS: 87 % (ref 36–65)
SEGMENTED NEUTROPHILS ABSOLUTE COUNT: 15.39 K/UL (ref 1.5–8.1)
SERVICE CMNT-IMP: ABNORMAL
SERVICE CMNT-IMP: ABNORMAL
SODIUM SERPL-SCNC: 140 MMOL/L (ref 135–144)
SPECIMEN DESCRIPTION: ABNORMAL
SPECIMEN DESCRIPTION: ABNORMAL
WBC # BLD AUTO: 17.7 K/UL (ref 3.5–11.3)

## 2023-02-16 PROCEDURE — 2580000003 HC RX 258: Performed by: INTERNAL MEDICINE

## 2023-02-16 PROCEDURE — 85055 RETICULATED PLATELET ASSAY: CPT

## 2023-02-16 PROCEDURE — 6370000000 HC RX 637 (ALT 250 FOR IP): Performed by: NURSE PRACTITIONER

## 2023-02-16 PROCEDURE — 97110 THERAPEUTIC EXERCISES: CPT

## 2023-02-16 PROCEDURE — 6360000002 HC RX W HCPCS: Performed by: NURSE PRACTITIONER

## 2023-02-16 PROCEDURE — 87040 BLOOD CULTURE FOR BACTERIA: CPT

## 2023-02-16 PROCEDURE — 85610 PROTHROMBIN TIME: CPT

## 2023-02-16 PROCEDURE — 6370000000 HC RX 637 (ALT 250 FOR IP): Performed by: INTERNAL MEDICINE

## 2023-02-16 PROCEDURE — 83615 LACTATE (LD) (LDH) ENZYME: CPT

## 2023-02-16 PROCEDURE — 2580000003 HC RX 258: Performed by: UROLOGY

## 2023-02-16 PROCEDURE — 94761 N-INVAS EAR/PLS OXIMETRY MLT: CPT

## 2023-02-16 PROCEDURE — APPSS30 APP SPLIT SHARED TIME 16-30 MINUTES: Performed by: NURSE PRACTITIONER

## 2023-02-16 PROCEDURE — 6360000002 HC RX W HCPCS: Performed by: INTERNAL MEDICINE

## 2023-02-16 PROCEDURE — 99223 1ST HOSP IP/OBS HIGH 75: CPT | Performed by: INTERNAL MEDICINE

## 2023-02-16 PROCEDURE — 2580000003 HC RX 258: Performed by: NURSE PRACTITIONER

## 2023-02-16 PROCEDURE — 2000000000 HC ICU R&B

## 2023-02-16 PROCEDURE — 85025 COMPLETE CBC W/AUTO DIFF WBC: CPT

## 2023-02-16 PROCEDURE — 85730 THROMBOPLASTIN TIME PARTIAL: CPT

## 2023-02-16 PROCEDURE — 86022 PLATELET ANTIBODIES: CPT

## 2023-02-16 PROCEDURE — 36592 COLLECT BLOOD FROM PICC: CPT

## 2023-02-16 PROCEDURE — 85379 FIBRIN DEGRADATION QUANT: CPT

## 2023-02-16 PROCEDURE — 2500000003 HC RX 250 WO HCPCS: Performed by: INTERNAL MEDICINE

## 2023-02-16 PROCEDURE — 71045 X-RAY EXAM CHEST 1 VIEW: CPT

## 2023-02-16 PROCEDURE — 86880 COOMBS TEST DIRECT: CPT

## 2023-02-16 PROCEDURE — 99233 SBSQ HOSP IP/OBS HIGH 50: CPT | Performed by: INTERNAL MEDICINE

## 2023-02-16 PROCEDURE — 85384 FIBRINOGEN ACTIVITY: CPT

## 2023-02-16 PROCEDURE — 82247 BILIRUBIN TOTAL: CPT

## 2023-02-16 PROCEDURE — 83010 ASSAY OF HAPTOGLOBIN QUANT: CPT

## 2023-02-16 PROCEDURE — 82248 BILIRUBIN DIRECT: CPT

## 2023-02-16 PROCEDURE — 80053 COMPREHEN METABOLIC PANEL: CPT

## 2023-02-16 PROCEDURE — 85045 AUTOMATED RETICULOCYTE COUNT: CPT

## 2023-02-16 PROCEDURE — 99232 SBSQ HOSP IP/OBS MODERATE 35: CPT | Performed by: INTERNAL MEDICINE

## 2023-02-16 PROCEDURE — 97530 THERAPEUTIC ACTIVITIES: CPT

## 2023-02-16 PROCEDURE — 2700000000 HC OXYGEN THERAPY PER DAY

## 2023-02-16 RX ORDER — FUROSEMIDE 10 MG/ML
20 INJECTION INTRAMUSCULAR; INTRAVENOUS ONCE
Status: COMPLETED | OUTPATIENT
Start: 2023-02-16 | End: 2023-02-16

## 2023-02-16 RX ORDER — TAMSULOSIN HYDROCHLORIDE 0.4 MG/1
0.4 CAPSULE ORAL DAILY
Status: DISCONTINUED | OUTPATIENT
Start: 2023-02-16 | End: 2023-02-23 | Stop reason: HOSPADM

## 2023-02-16 RX ORDER — FUROSEMIDE 10 MG/ML
40 INJECTION INTRAMUSCULAR; INTRAVENOUS ONCE
Status: COMPLETED | OUTPATIENT
Start: 2023-02-16 | End: 2023-02-16

## 2023-02-16 RX ADMIN — TRAZODONE HYDROCHLORIDE 50 MG: 50 TABLET ORAL at 20:33

## 2023-02-16 RX ADMIN — CALCIUM CARBONATE-VITAMIN D TAB 500 MG-200 UNIT 2 TABLET: 500-200 TAB at 08:29

## 2023-02-16 RX ADMIN — SODIUM CHLORIDE, PRESERVATIVE FREE 10 ML: 5 INJECTION INTRAVENOUS at 20:33

## 2023-02-16 RX ADMIN — SODIUM BICARBONATE: 84 INJECTION INTRAVENOUS at 04:24

## 2023-02-16 RX ADMIN — SODIUM BICARBONATE 1300 MG: 650 TABLET ORAL at 20:33

## 2023-02-16 RX ADMIN — SODIUM BICARBONATE 1300 MG: 650 TABLET ORAL at 08:30

## 2023-02-16 RX ADMIN — CEFTRIAXONE 2000 MG: 2 INJECTION, POWDER, FOR SOLUTION INTRAMUSCULAR; INTRAVENOUS at 10:02

## 2023-02-16 RX ADMIN — FUROSEMIDE 40 MG: 10 INJECTION, SOLUTION INTRAMUSCULAR; INTRAVENOUS at 08:29

## 2023-02-16 RX ADMIN — HYDROCODONE BITARTRATE AND ACETAMINOPHEN 1 TABLET: 5; 325 TABLET ORAL at 12:28

## 2023-02-16 RX ADMIN — SODIUM BICARBONATE 1300 MG: 650 TABLET ORAL at 14:19

## 2023-02-16 RX ADMIN — TAMSULOSIN HYDROCHLORIDE 0.4 MG: 0.4 CAPSULE ORAL at 14:19

## 2023-02-16 RX ADMIN — HYDROCODONE BITARTRATE AND ACETAMINOPHEN 1 TABLET: 5; 325 TABLET ORAL at 18:55

## 2023-02-16 RX ADMIN — SODIUM CHLORIDE, PRESERVATIVE FREE 10 ML: 5 INJECTION INTRAVENOUS at 08:30

## 2023-02-16 RX ADMIN — FUROSEMIDE 20 MG: 10 INJECTION, SOLUTION INTRAMUSCULAR; INTRAVENOUS at 14:18

## 2023-02-16 RX ADMIN — HYDROCODONE BITARTRATE AND ACETAMINOPHEN 1 TABLET: 5; 325 TABLET ORAL at 08:34

## 2023-02-16 RX ADMIN — SODIUM CHLORIDE, PRESERVATIVE FREE 10 ML: 5 INJECTION INTRAVENOUS at 14:19

## 2023-02-16 RX ADMIN — SODIUM CHLORIDE, PRESERVATIVE FREE 10 ML: 5 INJECTION INTRAVENOUS at 20:32

## 2023-02-16 RX ADMIN — CALCIUM CARBONATE-VITAMIN D TAB 500 MG-200 UNIT 2 TABLET: 500-200 TAB at 20:33

## 2023-02-16 ASSESSMENT — PAIN DESCRIPTION - PAIN TYPE
TYPE: ACUTE PAIN
TYPE: ACUTE PAIN

## 2023-02-16 ASSESSMENT — PAIN DESCRIPTION - LOCATION
LOCATION: BACK
LOCATION: BACK

## 2023-02-16 ASSESSMENT — PAIN SCALES - GENERAL
PAINLEVEL_OUTOF10: 0
PAINLEVEL_OUTOF10: 4
PAINLEVEL_OUTOF10: 0
PAINLEVEL_OUTOF10: 2
PAINLEVEL_OUTOF10: 4
PAINLEVEL_OUTOF10: 0

## 2023-02-16 ASSESSMENT — PAIN DESCRIPTION - DESCRIPTORS
DESCRIPTORS: ACHING
DESCRIPTORS: ACHING;DISCOMFORT

## 2023-02-16 ASSESSMENT — PAIN DESCRIPTION - FREQUENCY
FREQUENCY: INTERMITTENT
FREQUENCY: INTERMITTENT

## 2023-02-16 ASSESSMENT — PAIN DESCRIPTION - ORIENTATION
ORIENTATION: RIGHT;MID
ORIENTATION: RIGHT;MID

## 2023-02-16 ASSESSMENT — PAIN DESCRIPTION - ONSET
ONSET: GRADUAL
ONSET: GRADUAL

## 2023-02-16 NOTE — PROGRESS NOTES
Patient in bed, respirations labored at rest with 2 liters of oxygen in place. Pulse oximetry 88-92%. Lung sounds have fine crackles at base. Trace edema in BLE and BUE. Patient has gained almost 30 pounds since admission.

## 2023-02-16 NOTE — PROGRESS NOTES
Elmer Stinson M.D. Internal Medicine Progress Note    Patient: Talisha Freeman  Date of Admission: 2/13/2023 10:56 AM  Hospital Day # 3  Date of Evaluation: 2/16/2023      SUBJECTIVE:    Ms. Comfort Barrera  was seen and examined today for f/u of Ureterolithiasis. She feels SOB this AM and c/o being \"puffy\". Per nursing, she has gained 30 lbs since admission due to IVF's. Her nausea is improved but she hasn't been eating well. She denies chest pain or palpitations. BP has improved and she was weaned off levophed yesterday. ROS:   Constitutional: positive  for fevers, and positive for chills. Respiratory: negative for shortness of breath, negative for cough, and negative for wheezing  Cardiovascular: negative for chest pain, and negative for palpitations  Gastrointestinal: positive for abdominal pain, positive for nausea,positive for vomiting, negative for diarrhea, and negative for constipation     All other systems were reviewed with the patient and are negative unless otherwise stated in HPI    -----------------------------------------------------------------  OBJECTIVE:  Vitals:   Temp: 98.7 °F (37.1 °C)  BP: (!) 112/50  Resp: 21  Heart Rate: 73  SpO2: 92 % on room air    Weight  Wt Readings from Last 3 Encounters:   02/16/23 140 lb (63.5 kg)   02/13/23 112 lb (50.8 kg)   02/02/23 113 lb (51.3 kg)     Body mass index is 24.8 kg/m². 24HR INTAKE/OUTPUT:      Intake/Output Summary (Last 24 hours) at 2/16/2023 7367  Last data filed at 2/16/2023 0424  Gross per 24 hour   Intake 4231 ml   Output 1600 ml   Net 2631 ml       Exam:  GEN:    Awake, alert and oriented x 3. EYES:  EOMI, pupils equal   NECK: Supple. No lymphadenopathy. No carotid bruit  CVS:    regular rate and rhythm, no audible murmur  PULM:  fine bibasilar rales, no acute respiratory distress  ABD:    Bowels sounds normal.  Abdomen is soft. No distention. suprapubic tenderness to palpation. EXT:   1+ edema bilaterally . No calf tenderness. NEURO: Moves all extremities. Motor and sensory are grossly intact  SKIN:  No rashes. No skin lesions.    -----------------------------------------------------------------  DATA:  Complete Blood Count:   Recent Labs     02/14/23  0545 02/15/23  0540 02/16/23  0520   WBC 33.0* 29.8* 17.7*   RBC 2.92* 2.91* 2.81*   HGB 9.2* 9.1* 8.7*   HCT 27.6* 27.5* 25.8*   MCV 94.5 94.5 91.8   MCH 31.5 31.3 31.0   MCHC 33.3 33.1 33.7   RDW 14.2 14.6* 14.6*   PLT See Reflexed IPF Result See Reflexed IPF Result See Reflexed IPF Result        Last 3 Blood Glucose:   Recent Labs     02/14/23  0545 02/14/23  0946 02/14/23  1535 02/14/23  1749 02/14/23  2145 02/15/23  0135 02/15/23  0540 02/16/23  0520   GLUCOSE 122* 90 101* 113* 108* 102* 97 105*        Comprehensive Metabolic Profile:   Recent Labs     02/14/23  0545 02/14/23  0946 02/15/23  0135 02/15/23  0540 02/16/23  0520      < > 139 141 140   K 4.7   < > 4.1 4.0 3.9   *   < > 114* 118* 113*   CO2 15*   < > 15* 14* 21   BUN 50*   < > 43* 44* 32*   CREATININE 2.97*   < > 2.03* 1.80* 1.23*   GLUCOSE 122*   < > 102* 97 105*   CALCIUM 7.0*   < > 7.8* 7.8* 8.2*   PROT 4.7*  --   --  4.5* 4.4*   LABALBU 2.2*  --   --  2.0* 1.8*   BILITOT 0.4  --   --  0.3 0.3   ALKPHOS 166*  --   --  161* 154*   *  --   --  44* 37*   *  --   --  94* 89*    < > = values in this interval not displayed. Urinalysis:    Latest Reference Range & Units 2/13/23 17:23   Color, UA Yellow  Yellow   Turbidity UA Clear  SLIGHTLY CLOUDY ! Glucose, UA NEGATIVE  NEGATIVE   Bilirubin, Urine NEGATIVE  NEGATIVE   Ketones, Urine NEGATIVE  TRACE ! Specific Gravity, UA 1.010 - 1.020  1.025 (H)   pH, UA 5.0 - 9.0  5.5   Protein, UA NEGATIVE  2+ ! Urobilinogen, Urine Normal  Normal   Nitrite, Urine NEGATIVE  NEGATIVE   Leukocyte Esterase, Urine NEGATIVE  LARGE !    Mucus, UA None  TRACE !   WBC, UA 0 - 5 /HPF 20 TO 50   RBC, UA 0 - 2 /HPF 2 TO 5   Epithelial Cells, UA 0 - 25 /HPF 0 TO 2   Renal Epithelial, UA 0 /HPF 0 TO 2   Bacteria, UA None  2+ ! Urine Hgb NEGATIVE  2+ ! HgBA1c:    Lab Results   Component Value Date/Time    LABA1C 5.9 10/04/2022 07:31 AM       Lactic Acid:   Lab Results   Component Value Date/Time    LACTA 1.8 02/14/2023 09:46 AM    LACTA 2.2 02/14/2023 07:45 AM    LACTA 2.8 02/14/2023 05:45 AM        High Sensitivity Troponin:  Recent Labs     02/13/23  1635 02/13/23  2145   TROPHS 27* 31*       Microbiology:  Urine culture 2/13/23 = in process  Blood cultures 2/13/23 x 2 = GNR's    Radiology/Imaging:  FLUORO FOR SURGICAL PROCEDURES   Final Result      XR CHEST PORTABLE   Final Result   Increased interstitial opacity. While much or all of this may be chronic,   please correlate with any clinical evidence of acute interstitial edema.       A focal infiltrate is not detected                 MEDICAL DECISION MAKING:  Primary Problem(s): Severe sepsis with septic shock due to complicated UTI with ureterolithiasis and GNR bacteremia  Condition is improving  Treatment plan:   Urology assistance appreciated  S/p cystoscopy with right ureteral stent insertion 2/13/23  ID consult appreciated  Imaging: no further imaging studies ordered today  Medications:   Continue Rocephin per ID rec's  Weaned off Levophed  DC IVF's due to edema / SOB  Lasix 40 IV x 1  Medication Monitoring / High Risk Medications: Levophed     Acute kidney injury due to sepsis    Condition is  improving  Treatment plan:   Nephrology consult appreciated - Dr. Lora Owens notes reviewed  BP support  Monitor renal function, I/O's    Elevated LFT's due to shock liver    Condition is improving  Treatment plan:   Monitor LFT's as sepsis tx'd    Thrombocytopenia  Condition is worsening  Treatment plan: monitor CBC  Medications: DC heparin    Nutrition status:   at risk for malnutrition  Dietician consult initiated    Hospital Prophylaxis:   DVT: SCDs (heparin DCd due to thrombocytopenia)    MDM Data:   Test interpretation:  My independent EKG interpretation: NSR with normal axis and intervals, non-specific ST changes  Management and/or test interpretation  discussed with Dr. Catherine Quinonez CNP  Consults and Nursing notes were personally reviewed, all current labs and imaging were personally reviewed, and tests ordered: CBC, CMP. Lactic acid      Disposition:  Shared decision making: All test results, treatment options and disposition options were discussed with the patient today  Social determinants of health that may impact management: none  Code status: Full Code   Disposition: Discharge plan is pending        Critical Care Time:  Total critical care time caring for this patient with life threatening, unstable organ failure, including direct patient contact, management of life support systems, review of data including imaging and labs, discussions with other team members and physicians at least 35 minutes so far today, excluding separately billable procedures.         Manyn Mccartney MD , MARGELIA.  2/16/2023  7:56 AM

## 2023-02-16 NOTE — PROGRESS NOTES
Occupational Therapy  Facility/Department: [facility] AT THE [facility]  Daily Treatment Note  NAME: Eris Jiang  :   MRN: [id number]    Date of Service: 2023    Discharge Recommendations:  Continue to assess pending progress         Patient Diagnosis(es): There were no encounter diagnoses. Assessment    Activity Tolerance: Patient tolerated treatment well;Patient limited by fatigue  Discharge Recommendations: Continue to assess pending progress      Plan   Occupational Therapy Plan  Times Per Week: 7x/wk  Times Per Day: Once a day  Current Treatment Recommendations: Strengthening;Balance training;Functional mobility training;Self-Care / ADL; Safety education & training     Restrictions   Restrictions/Precautions  Restrictions/Precautions: General Precautions, Fall Risk    Subjective   Subjective  Subjective: pt lying in bed requesting to get up into chair for therapy, check with RN, Rn stated to increase O2 to 3 during transfer. at end of session, Rn came in with telehealth call from pts doctor  Pain: pt denied pain this date ans said shes feeling better than yesterday but not completly better  Orientation  Overall Orientation Status: Within Functional Limits  Pain: no c/o pain           Objective    Vitals     Bed Mobility Training  Bed Mobility Training: Yes  Overall Level of Assistance: Stand-by assistance;Assist X1;Additional time  Interventions: Verbal cues  Transfer Training  Transfer Training: Yes  Bed to Chair: Contact-guard assistance;Assist X1;Additional time; Adaptive equipment       OT Exercises  Exercise Treatment: pt tolerated seated ther ex for increased endurance and strength for fxl tasks. pt completed AROM BUE ther ex x7 planes of shoulder, elbow, and wrist x15 reps x1 set with RBs throughout d/t fatigue and varying o2     Safety Devices  Type of Devices: All fall risk precautions in place;Call light within reach; Heels elevated for pressure relief;Left in chair;Chair alarm in place; Patient at risk for falls     Patient Education  Education Given To: Patient  Education Provided: Role of Therapy;Plan of Care;Transfer Training  Education Method: Demonstration;Verbal  Barriers to Learning: None  Education Outcome: Verbalized understanding;Demonstrated understanding    Goals  Short Term Goals  Time Frame for Short Term Goals: 20 visits  Short Term Goal 1: Pt. will tolerate 15 mins of BUE ther ex/act to increase overall strength/activity tolerance for functional tasks. Short Term Goal 2: Pt. will demo standing tolerance x 5-7 mins w/o LOB to increase safety/participation with ADL's. Short Term Goal 3: Pt. will complete ADL routine with mod I and G safety. Short Term Goal 4: Pt. will complete ADL transfers/mobility with mod I and reduced risk for falls.        Therapy Time   Individual Concurrent Group Co-treatment   Time In 1300         Time Out 1330         Minutes 1900 [facility] BASS/ 2/16/23

## 2023-02-16 NOTE — PROGRESS NOTES
[facility]   [phone]   Nephrology progress note  2/16/2023, 1:21 PM      Pt Name:    Sugey Lozada  MRN:     [id number]     YOB: 1955  Admit Date:    2/13/2023 10:56 AM  Primary Care Physician:  Korey Alberto MD   Room number  W639/D964-13    TELEHEALTH EVALUATION -- Audio/Visual (During ZEGDI-87 public health emergency)    Patient's Location: [address] [place] [address] (myself) Location: [address], [address] [place] [address] [facility]  Patient's nurse: Present    Chief Complaint: Nephrology following for DINA/CKD    Subjective:  Patient seen and examined  Was short of breath this morning  Off IV fluids  Required 1 dose of Lasix earlier this morning  Doing better  Off levophed since last night    Objective:  24HR INTAKE/OUTPUT:    Intake/Output Summary (Last 24 hours) at 2/16/2023 1321  Last data filed at 2/16/2023 1300  Gross per 24 hour   Intake 3699 ml   Output 4300 ml   Net -601 ml       I/O last 3 completed shifts: In: 0555 [P.O.:2350; I.V.:5213]  Out: 2400 [Urine:2400]  I/O this shift:  In: 240 [P.O.:240]  Out: 2700 [Urine:2700]  Admission weight: 111 lb (50.3 kg)  Wt Readings from Last 3 Encounters:   02/16/23 140 lb (63.5 kg)   02/13/23 112 lb (50.8 kg)   02/02/23 113 lb (51.3 kg)     Body mass index is 24.8 kg/m².     Physical examination  VITALS:     Vitals:    02/16/23 1000 02/16/23 1100 02/16/23 1200 02/16/23 1300   BP: (!) 121/46 (!) 103/51 (!) 144/60 130/65   Pulse: 82 73 84 92   Resp: 24 21 26 30   Temp:   99.3 °F (37.4 °C)    TempSrc:   Temporal    SpO2: 90% 93% 91% 92%   Weight:       Height:         Constitutional and General Appearance: alert and cooperative, appears comfortable, no apparent distress  Lungs: no use of accessory muscles or labored breathing noted, Respiratory effort observed to be normal  Extremities: No visible swelling  Skin:  No discoloration noted on facial skin  Neuro: no slurred speech, no facial drooping  Psychiatric: Normal mood and affect, Not agitated  Mental status: Alert and awake, Oriented to person/place/time, Able to follow commands      Due to this being a TeleHealth encounter, evaluation of the following organ systems is limited: EENT/Resp/CV/GI//MS/Neuro/Skin/Lymph    Lab Data  CBC:   Recent Labs     02/14/23  0545 02/15/23  0540 02/16/23  0520   WBC 33.0* 29.8* 17.7*   HGB 9.2* 9.1* 8.7*   HCT 27.6* 27.5* 25.8*   PLT See Reflexed IPF Result See Reflexed IPF Result See Reflexed IPF Result       BMP:  Recent Labs     02/15/23  0135 02/15/23  0540 02/16/23  0520    141 140   K 4.1 4.0 3.9   * 118* 113*   CO2 15* 14* 21   BUN 43* 44* 32*   CREATININE 2.03* 1.80* 1.23*   GLUCOSE 102* 97 105*   CALCIUM 7.8* 7.8* 8.2*   MG  --  1.5*  --    PHOS  --  2.6  --        Hepatic:   Recent Labs     02/14/23  0545 02/15/23  0540 02/16/23  0520   LABALBU 2.2* 2.0* 1.8*   * 44* 37*   * 94* 89*   BILITOT 0.4 0.3 0.3   ALKPHOS 166* 161* 154*           Meds:  Infusion:    sodium chloride      sodium chloride       Meds:    sodium bicarbonate        tamsulosin  0.4 mg Oral Daily    traZODone  50 mg Oral Nightly    cefTRIAXone (ROCEPHIN) IV  2,000 mg IntraVENous Q24H    oyster shell calcium w/D  2 tablet Oral BID    sodium bicarbonate  1,300 mg Oral TID    sodium chloride flush  5-40 mL IntraVENous 2 times per day    lidocaine 1 % injection  5 mL IntraDERmal Once    sodium chloride flush  5-40 mL IntraVENous 2 times per day     Meds prn: HYDROcodone 5 mg - acetaminophen **OR** HYDROcodone 5 mg - acetaminophen, ondansetron **OR** ondansetron, sodium chloride, polyethylene glycol, sodium chloride flush, sodium chloride flush, sodium chloride, acetaminophen       Impression and Plan:  1. DINA secondary to septic shock. Likely ischemic ATN. Also noted to have moderate hydroureteronephrosis. Underwent cystoscopy with stent placement. Creatinine improved. Down to 1.2. Off IV fluids at this time.   Received IV Lasix for fluid overload this morning. Will give another additional IV Lasix this afternoon. 2.  Septic shock secondary to UTI. Off Levophed and hemodynamically more stable  3. Right hydroureteronephrosis status post stent placement  4. Kidney stone  5. Hypocalcemia. Improved  6. Metabolic acidosis. Improved. Off bicarbonate drip at this time  7. Hypomagnesemia. Recheck magnesium in a.m. Discussed with patient and RN      Jose Enrique Collazo MD  [facility]    Patient was evaluated through a synchronous (real-time) audio-video encounter. The patient (or guardian if applicable) is aware that this is a billable service, which includes applicable co-pays. This virtual visit was conducted with patient's (and/or legal guardian's consent). The visit was conducted pursuant to the emergency declaration under the [address] [place] [address], [address] [place] [address] authority and the [employer] and [employer] [place] General Act. Patient identification was verified, and a caregiver was present when appropriate. The patient was located at home in a state where the provider was licensed to provide care    Virtual visit was done to address concerns as mentioned above. Due to this being a TeleHealth encounter ([facility]), evaluation of the following organ systems was limited: EENT/Resp/CV/GI//MS/Neuro/Skin/Lymph     Services were provided through a video synchronous discussion virtually to substitute for in-person visit.

## 2023-02-16 NOTE — PROGRESS NOTES
Pt ambulated from chair to bed with contact assistance from Beau Gasca. Pt positioned for comfort. Assessment and vital signs as charted. Pt denies pain at this time. Pt is A & O x 4. IV fluids infusing as ordered. Cardiac monitor shows NSR. SCD's on. Pt denies any other needs. Family at bedside visiting with pt. Bed alarm on. Call light in reach. Will continue to monitor.

## 2023-02-16 NOTE — PROGRESS NOTES
Physical Therapy  Facility/Department: [facility] AT THE [facility]  Daily Treatment Note  NAME: Tracy Vora  : 1955  MRN: [id number]    Date of Service: 2023    Discharge Recommendations:  Continue to assess pending progress, Home with Home health PT, Patient would benefit from continued therapy after discharge      Patient Diagnosis(es): There were no encounter diagnoses. Assessment   Assessment: Bed mobs SBA. Pts SpO2 dropped to 86% post bed mobility. Pt requested to transfer to chair and nursing raised O2 from 2L to 3L for therapy session. Transfers CGA x1 with [employer]. Pt ambulated 5 steps bed to chair with slow magdiel and CGA x1. Pt performed static standing ~ 1:30 with WW and CGA x1, no LOB. Seated BLE ex x15 LAQ's, AP's and hip abd/add. RB's provided between ea. Pts O2 remained between 88-90% on 3L room air. Post session pts O2 was dropped back to 2L. Activity Tolerance: Patient tolerated treatment well;Patient limited by fatigue     Plan    Physcial Therapy Plan  General Plan: 2 times a day 7 days a week (1x/day on  weekends and holidays)  Current Treatment Recommendations: Strengthening;Balance training;Functional mobility training;Transfer training; Endurance training;Home exercise program;Safety education & training; Therapeutic activities     Restrictions  Restrictions/Precautions  Restrictions/Precautions: General Precautions, Fall Risk     Subjective    Subjective  Subjective: pt awake in bed, agreeable to therapy  Pain: no c/o pain  Orientation  Overall Orientation Status: Within Functional Limits     Objective   Bed Mobility Training  Bed Mobility Training: Yes  Overall Level of Assistance: Stand-by assistance;Assist X1;Additional time  Interventions: Verbal cues  Supine to Sit: Stand-by assistance;Assist X1  Scooting: Stand-by assistance;Assist X1  Transfer Training  Transfer Training: Yes  Overall Level of Assistance: Contact-guard assistance;Assist X1  Sit to Stand: Contact-guard assistance;Assist X1  Stand to Sit: Assist X1;Contact-guard assistance  Stand Pivot Transfers: Contact-guard assistance;Assist X1  Bed to Chair: Contact-guard assistance;Assist X1;Additional time  Gait Training  Gait Training: Yes  Gait  Overall Level of Assistance: Contact-guard assistance;Assist X1  Interventions: Verbal cues; Safety awareness training  Speed/Maggie: Slow  Distance (ft): 5 Feet  Assistive Device: Walker, rolling;Gait belt  Neuromuscular Education  Neuromuscular Education: No  PT Exercises  Exercise Treatment: Seated BLE ex x15 LAQ's, AP's and hip abd/add. RB's provided between ea. Pts O2 remained between 88-90% on 3L room air. Static Standing Balance Exercises: Pt performed static standing ~ 1:30 with WW and CGA x1, no LOB. Pts O2 was raised to 3L for transfers/gait, per nursing. Safety Devices  Type of Devices: All fall risk precautions in place;Call light within reach; Left in chair;Nurse notified; Chair alarm in place;Gait belt; Heels elevated for pressure relief       Goals  Short Term Goals  Time Frame for Short Term Goals: 20 days  Short Term Goal 1: Initiate HEP and progress as tolerated for balance and mobility. Short Term Goal 2: Pt bed mobility will be independent without rails to allow independence at home. Short Term Goal 3: Pt will transfer independently with least restrictive device and good safety to reduce fall risk. Short Term Goal 4: Pt will ambulate independently with least restrictive device for up to 250 feet to allow community ambulation. Short Term Goal 5: Negotiate 3 steps with handrail, independently to allow entry and exit of the home. Patient Goals   Patient Goals : feel better    Education  Patient Education  Education Given To: Patient  Education Provided Comments: Education in taking RB's for energy conservation between exercises.   Education Method: Verbal  Barriers to Learning: None  Education Outcome: Verbalized understanding;Demonstrated understanding    Therapy Time   Individual Concurrent Group Co-treatment   Time In 1300         Time Out 1330         Minutes [address] [place], [place]

## 2023-02-16 NOTE — PLAN OF CARE
Problem: Pain  Goal: Verbalizes/displays adequate comfort level or baseline comfort level  2/15/2023 2008 by Diane Lamas RN  Outcome: Progressing  Flowsheets (Taken 2/15/2023 2008)  Verbalizes/displays adequate comfort level or baseline comfort level:   Encourage patient to monitor pain and request assistance   Assess pain using appropriate pain scale  Note: Pt denies pain at this time. Pain meds given as needed & as ordered. Will continue to assess. Problem: Safety - Adult  Goal: Free from fall injury  2/15/2023 2008 by Diane Lamas RN  Outcome: Progressing  Flowsheets (Taken 2/15/2023 2008)  Free From Fall Injury: Instruct family/caregiver on patient safety  Note: Pt is at high risk for falls. Non skid socks on. Bed in low position and wheels locked. Fall sign posted and bracelet on. Siderails up x 2. Bed alarm on. Call light within reach. Will continue to assess. Problem: Infection - Adult  Goal: Absence of infection at discharge  2/15/2023 2008 by Diane Lamas RN  Outcome: Progressing  Flowsheets (Taken 2/15/2023 2008)  Absence of infection at discharge:   Assess and monitor for signs and symptoms of infection   Monitor lab/diagnostic results   Administer medications as ordered  Note: No temperature noted. VS stable and WNL. Will continue to assess.

## 2023-02-16 NOTE — PROGRESS NOTES
UROLOGY PROGRESS NOTE    Patient:  Yahaira Kitchen  MRN: [id number]      Subjective:   Complaints of worsening SOB. Has gained 30# due to fluid. Getting lasix. Denies nausea or flank pain. Objective:  Creatinine improving, 1.23 today. WBC improving, 17.7 today. Blood and urine cultures positive for e.coli. REVIEW OF SYSTEMS:  Constitutional: Negative for fever, chills and unexpected weight change. Respiratory: Positive for shortness of breath   Cardiovascular: Negative for chest pain and palpitations. Gastrointestinal: Negative for nausea or vomiting. Endocrine: Negative for polydipsia and polyuria. Genitourinary: Negative for flank pain or dysuria  Musculoskeletal: Negative for myalgias and joint swelling. Skin: Negative for rash and wound. Neurological: Negative for dizziness and headaches. Hematological: Negative for adenopathy. Does not bruise/bleed easily. PHYSICAL EXAM:  Constitutional: Patient resting comfortably, in no acute distress. Neuro: Alert and oriented to person place and time. Cranial nerves grossly intact. Psych: Mood and affect normal.  Skin: Warm, dry, non-diaphoretic  HEENT: normocephalic, atraumatic  Lymphatics: No palpable lymphadenopathy  Lungs: Respiratory effort labored  Cardiovascular:  Normal peripheral pulses  Abdomen: Soft, non-tender, non-distended with no organomegaly or palpable masses. : No CVA tenderness bilat. Bladder non-tender and not distended.   Extremities: Calves are non-tender    Labs:  Recent Labs     02/14/23  0545 02/15/23  0540 02/16/23  0520   WBC 33.0* 29.8* 17.7*   HGB 9.2* 9.1* 8.7*   HCT 27.6* 27.5* 25.8*   MCV 94.5 94.5 91.8   PLT See Reflexed IPF Result See Reflexed IPF Result See Reflexed IPF Result     Recent Labs     02/15/23  0135 02/15/23  0540 02/16/23  0520    141 140   K 4.1 4.0 3.9   * 118* 113*   CO2 15* 14* 21   PHOS  --  2.6  --    BUN 43* 44* 32*   CREATININE 2.03* 1.80* 1.23*     No results found for: PSA    Urinalysis:   Recent Labs     02/13/23  1723   COLORU Yellow   PHUR 5.5   WBCUA 21 TO 48   RBCUA 2 TO 5   MUCUS TRACE*   BACTERIA 2+*   SPECGRAV 1.025*   LEUKOCYTESUR LARGE*   UROBILINOGEN Normal   BILIRUBINUR NEGATIVE        Additional Lab/culture results:  none    Imaging Results:  none    ASSESSMENT AND PLAN:  Impression:    Patient Active Problem List   Diagnosis    Ureteral calculus    Renal lithiasis lythotripsy 4/28/15    Hypothyroidism    Osteoporosis    Melanocytic nevus    History of colon polyps    Mixed hyperlipidemia    Hypertension    Renal colic on right side    Ureterolithiasis    Septic shock due to urinary tract infection ([address]    Complicated UTI (urinary tract infection)    Acute unilateral obstructive uropathy    E. coli infection    DINA (acute kidney injury) ([address]    Hydronephrosis of right kidney    Metabolic acidosis    Hypocalcemia    Thrombocytopenia ([address]    Bacteremia due to Escherichia coli    Sepsis due to Escherichia coli ([address]       Plan:   Continue rocephin. Will need a total of 10 days of antibiotics, so likely send home on oral antibiotics. Start flomax due to stent and upcoming surgery. Will need outpatient HLL.  Continue hospitalists plan of care.     ------------------------------------------------  I have discussed the care of this patient including pertinent history and exam findings, lab and imaging results, assessment, orders and plan as documented above with Stephenie Patrick MD.    Electronically signed by SAL Sharp CNP on 2/16/2023 at 12:10 PM

## 2023-02-16 NOTE — PROGRESS NOTES
Patient up to chair with therapy. Patient short of breath with exertion but states it is improving since lasix. Oxygen remains at 2 liters via nasal cannula. Pulse oximetry 89-93% with exertion. Complete bed change done. Patient wants to wait until later to get washed up when  returns.

## 2023-02-16 NOTE — PROGRESS NOTES
Pt resting in bed quietly and awake. Assessment and vital signs as charted. Pt requesting pain medication. Pt rates pain 7/10, 2 tabs of Norco to be given as ordered. Pt is A & O x 4. Ambrose catheter remains intact, patent and draining. IV fluids infusing as ordered. Pt denies any other needs. Call light in reach. Bed alarm on. Will continue to monitor.

## 2023-02-16 NOTE — PLAN OF CARE
Problem: Discharge Planning  Goal: Discharge to home or other facility with appropriate resources  Outcome: Progressing     Problem: Pain  Goal: Verbalizes/displays adequate comfort level or baseline comfort level  Outcome: Progressing     Problem: Safety - Adult  Goal: Free from fall injury  Outcome: Progressing     Problem: Infection - Adult  Goal: Absence of infection at discharge  Outcome: Progressing     Problem: Nutrition Deficit:  Goal: Optimize nutritional status  Outcome: Progressing

## 2023-02-16 NOTE — PROGRESS NOTES
[facility] - Progress Note-Telemedicine  Today's Date and Time: 2/16/2023, 9:02 AM    Impression :   E. Coli urosepsis  E. Coli UTI  Right hydroureteronephrosis  S/p right ureteral stent placement on 2/13/23  Leukocytosis  DINA  Hypotension-resolved  Chronic history of nephrolithiasis  Transaminase elevation  Thrombocytopenia  Anemia    Recommendations:   Discontinue IV Levaquin because of DINA  Continue IV Rocephin 2 gm IV q 24 hr for E. Coli septicemia  E. Coli on BC x 2  Repeat BC ordered 2/16/23  E. Coli on urine culture  Renal considerations    Medical Decision Making/Summary/Discussion:2/16/2023       Infection Control Recommendations   Lynch Precautions    Antimicrobial Stewardship Recommendations     Simplification of therapy  Targeted therapy      Coordination of Outpatient Care:   Estimated Length of IV antimicrobials:TBD  Patient will need Midline Catheter Insertion: TBD  Patient will need PICC line Insertion:TBD  Patient will need: Home IV , [place],  SNF,  LTAC: TBD  Patient will need outpatient wound care:No    Chief complaint/reason for consultation:   Concern for urosepsis      History of Present Illness:   Lory Cardenas is a 79y.o.-year-old female who was initially admitted on 2/13/2023. Patient seen at the request of Dr. Tobi Hendrix:    This patient, with a chronic history of kidney stones, saw her urologist on 2/13/23 and had complaints of 2 days of right sided flank pain with associated nausea and vomiting. A CT revealed multiple stones in the right ureter with significant hydroureteronephrosis. There are also non-obstructing stones in both kidneys. Her WBC was 34.3 at that time and she had a creatinine of 3.8. Her baseline creatinine is 0.65. She underwent emergent right-sided ureteral stenting and was then admitted to [facility].     IV Levaquin was initiated.     A the time of this consult note, the patient was noted to be hypotensive and is requiring vasopressors. She is afebrile and oxygenation well on room air. There is no growth on blood cultures thus far and the urine culture is pending. Her last UTI was noted to have grown pan sensitive E. Coli in 2021. Abnormal labs include:  Cr:2.97  GFR:17  Trop:31  Alk phos:166  ALT:143  AST:134  WBC:33.0  Hgb:9.2  Plt: 81    Imaging of the chest also revealed increased interstitial opacities with mild subsegmental atelectasis in the posterior lung bases. CT abd/pelvis 2/13/23  Impression   1. 2 obstructing calculi in the distal right ureter about 3 and 4 mm in size   noted close to the uterovesical junction. There may be additional punctate   calculus or 2 in the same region. This causes moderate right hydronephrosis   and hydroureter. 2. Nonobstructing bilateral renal calculi. 5 mm calculus in the right kidney   and a few punctate and 2 mm calculi in the left kidney. 3. Mild subsegmental atelectasis posterior lung bases. CXR 2/13/23  Impression   Increased interstitial opacity. While much or all of this may be chronic,   please correlate with any clinical evidence of acute interstitial edema. A focal infiltrate is not detected           ID was consulted for antibiotic recommendations    CURRENT EVALUATION 2/16/2023  BP (!) 145/60   Pulse 89   Temp 98.8 °F (37.1 °C) (Temporal)   Resp 24   Ht 5' 3\" (1.6 m)   Wt 140 lb (63.5 kg)   LMP  (LMP Unknown)   SpO2 91%   BMI 24.80 kg/m²     Afebrile  VS stable off vasopressors    The patient is alert and oriented on 2 L NC.      DINA is improving  Ambrose catheter is in place    Medications reviewed:  IV Levaquin discontinued 2/14/23 and IV Rocephin initiated   [facility] x2 with E. coli and urine culture with E. Coli    Leukocytosis is on a down-trend    Labs, X rays reviewed: 2/16/2023    BUN:50-->44-->32  Cr:2.97-->1.80-->1.23    WBC:33.0-->29.8-->17.7  Hb:9.2-->9.1-->8.7  Plat: 81-->66-->52    CRP:217.4  ZKQ:[id number]    Cultures:  Urine:  2/13/23: E. coli  Blood:  2/13/23: GNR x2  Sputum :    Wound:    MRSA Nares:      Imaging:    CT abd/pelvis 2/13/23              CXR 2/13/23      Discussed with patient, RN, family. I have personally reviewed the past medical history, past surgical history, medications, social history, and family history, and I have updated the database accordingly.   Past Medical History:     Past Medical History:   Diagnosis Date    Bacteremia due to Escherichia coli 1/11/1335    Complicated UTI (urinary tract infection) 2/13/2023    Fracture of wrist, unspecified laterality, closed, initial encounter     right    History of sepsis 2009    following lithotripsy    Hyperlipidemia     Hypertension     Hypothyroidism 2007    secondary to radioactive iodine 2007    Kidney stone     Osteoporosis     Primary localized osteoarthritis of right hip 3/18/2019    Sepsis due to Escherichia coli ([address] 2/16/2023    Septic shock due to urinary tract infection ([address] 2/13/2023    Severe sepsis ([address] 2/13/2023    Thrombocytopenia ([address] 2/15/2023       Past Surgical  History:     Past Surgical History:   Procedure Laterality Date    CHOLECYSTECTOMY, LAPAROSCOPIC  2009    COLONOSCOPY  07/17/2014    Dr. Adrián Joseph - tubular adenomatous polyp removed    COLONOSCOPY N/A 08/01/2018    Dr. Richardson [place] architectural distortion, suggestive of mucosal prolapse    CYSTOSCOPY  2015    stent removal and reinsert     CYSTOSCOPY Left     HLL with stent    CYSTOSCOPY Left 02/05/2021    CYSTOSCOPY URETEROSCOPY LASER-HLL performed by Juan Hand MD at [address] Right 2/13/2023    CYSTOSCOPY URETERAL STENT INSERTION performed by Juan Hand MD at [facility] Left 02/05/2021    CYSTOSCOPY RETROGRADE PYELOGRAM STENT INSERTION performed by Juan Hand MD at [address] [place] [address] Right 02/13/2023 Dr. Reji Schaefer    LITHOTRIPSY  04/28/2015    left    LITHOTRIPSY Left 01/30/2018    cystoscopy- Dr. Audra Epley     LITHOTRIPSY Right 04/20/2021    LITHOTRIPSY Right 04/20/2021    ESWL EXTRACORPOREAL SHOCK WAVE LITHOTRIPSY performed by Tawanna Douglas MD at [address] [address]    radioactive iodine procedure    [place] CYSTOURETHROSCOPY Left 01/30/2018    CYSTOSCOPY performed by Tawanna Douglas MD at [address] Left 01/30/2018    ESWL [address] LITHOTRIPSY performed by Tawanna Douglas MD at [address] Right 03/18/2019    Dr. Maribell Alcazar       Medications:      sodium bicarbonate        traZODone  50 mg Oral Nightly    cefTRIAXone (ROCEPHIN) IV  2,000 mg IntraVENous Q24H    oyster shell calcium w/D  2 tablet Oral BID    sodium bicarbonate  1,300 mg Oral TID    sodium chloride flush  5-40 mL IntraVENous 2 times per day    lidocaine 1 % injection  5 mL IntraDERmal Once    sodium chloride flush  5-40 mL IntraVENous 2 times per day       Social History:     Social History     Socioeconomic History    Marital status:      Spouse name: Not on file    Number of children: Not on file    Years of education: Not on file    Highest education level: Not on file   Occupational History    Not on file   Tobacco Use    Smoking status: Some Days     Packs/day: 0.25     Years: 30.00     Pack years: 7.50     Types: Cigarettes    Smokeless tobacco: Never   Vaping Use    Vaping Use: Never used   Substance and Sexual Activity    Alcohol use:  Yes     Alcohol/week: 1.0 standard drink     Types: 1 Shots of liquor per week     Comment: socially    Drug use: No    Sexual activity: Yes     Partners: Male     Comment: postmeno   Other Topics Concern    Not on file   Social History Narrative    Not on file     Social Determinants of Health     Financial Resource Strain: Low Risk Difficulty of Paying Living Expenses: Not hard at all   Food Insecurity: No Food Insecurity    Worried About Running Out of Food in the Last Year: Never true    Ran Out of Food in the Last Year: Never true   Transportation Needs: Not on file   Physical Activity: Insufficiently Active    Days of Exercise per Week: 2 days    Minutes of Exercise per Session: 10 min   Stress: Not on file   Social Connections: Not on file   Intimate Partner Violence: Not on file   Housing Stability: Not on file       Family History:     Family History   Problem Relation Age of Onset    Breast Cancer Maternal Grandmother     Stroke Father     Hypertension Father     Alzheimer's Disease Mother         Allergies:   Patient has no known allergies. Review of Systems:   Constitutional: No fevers or chills. Head: No headaches  Eyes: No double vision or blurry vision. No conjunctival inflammation. ENT: No sore throat or runny nose. . No hearing loss, tinnitus or vertigo. Cardiovascular: No chest pain or palpitations. No shortness of breath. No CARUSO  Lung: No shortness of breath or cough. No sputum production  Abdomen: nausea, no vomiting, no diarrhea, left sided flank pain. .   Genitourinary: Ambrose in place. Rt sided CVA pain  Musculoskeletal: No muscle aches or pains. No joint effusions, swelling or deformities  Hematologic: No bleeding or bruising. Neurologic: No headache, weakness, numbness, or tingling. Integument: No rash, no ulcers. Psychiatric: No depression. Endocrine: No polyuria, no polydipsia, no polyphagia.     Physical Examination :   Patient Vitals for the past 8 hrs:   BP Temp Temp src Pulse Resp SpO2 Weight   02/16/23 0834 -- -- -- -- 24 -- --   02/16/23 0800 (!) 145/60 98.8 °F (37.1 °C) Temporal 89 22 91 % --   02/16/23 0700 (!) 112/50 -- -- 73 21 92 % --   02/16/23 0600 (!) 106/49 -- -- 71 22 91 % --   02/16/23 0500 (!) 105/53 -- -- 67 20 91 % --   02/16/23 0400 (!) 97/49 -- -- 63 18 92 % --   02/16/23 0300 (!) 107/49 98.7 °F (37.1 °C) Temporal 67 14 93 % 140 lb (63.5 kg)   02/16/23 0200 (!) 85/47 -- -- 71 15 91 % --     General Appearance: Awake, alert, and in no apparent distress  Head:  Normocephalic, no trauma  Eyes: Pupils equal, round, reactive to light and accommodation; extraocular movements intact; sclera anicteric; conjunctivae pink. No embolic phenomena. ENT: Oropharynx clear, without erythema, exudate, or thrush. No tenderness of sinuses. Mouth/throat: mucosa pink and moist. No lesions. Dentition in good repair. Neck:Supple, without lymphadenopathy. Thyroid normal, No bruits. Pulmonary/Chest: Diminished to auscultation, without wheezes, rales, or rhonchi. No dullness to percussion. Cardiovascular: Regular rate and rhythm without murmurs, rubs, or gallops. Abdomen: Soft,  tender. Bowel sounds normal. No organomegaly . Rt CVA pain  All four Extremities: No cyanosis, clubbing, edema, or effusions. Neurologic: No gross sensory or motor deficits. Skin: Warm and dry with good turgor. No signs of peripheral arterial or venous insufficiency. No ulcerations. No open wounds. Medical Decision Making -Laboratory:   I have independently reviewed/ordered the following labs:    CBC with Differential:   Recent Labs     02/15/23  0540 02/16/23  0520   WBC 29.8* 17.7*   HGB 9.1* 8.7*   HCT 27.5* 25.8*   PLT See Reflexed IPF Result See Reflexed IPF Result   LYMPHOPCT 3* 7*   MONOPCT 0* 4     BMP:   Recent Labs     02/15/23  0540 02/16/23  0520    140   K 4.0 3.9   * 113*   CO2 14* 21   BUN 44* 32*   CREATININE 1.80* 1.23*   MG 1.5*  --      Hepatic Function Panel:   Recent Labs     02/15/23  0540 02/16/23  0520   PROT 4.5* 4.4*   LABALBU 2.0* 1.8*   BILITOT 0.3 0.3   ALKPHOS 161* 154*   ALT 94* 89*   AST 44* 37*     No results for input(s): RPR in the last 72 hours. No results for input(s): HIV in the last 72 hours. No results for input(s): BC in the last 72 hours.   Lab Results   Component Value Date/Time MUCUS TRACE 02/13/2023 05:23 PM    RBC 2.81 02/16/2023 05:20 AM    RBC 4.08 05/08/2012 08:10 AM    TRICHOMONAS NOT REPORTED 02/03/2021 02:30 PM    WBC 17.7 02/16/2023 05:20 AM    YEAST NOT REPORTED 02/03/2021 02:30 PM    TURBIDITY SLIGHTLY CLOUDY 02/13/2023 05:23 PM     Lab Results   Component Value Date/Time    CREATININE 1.23 02/16/2023 05:20 AM    GLUCOSE 105 02/16/2023 05:20 AM    GLUCOSE 102 05/08/2012 08:10 AM       Medical Decision Making-Imaging:     Narrative   EXAMINATION:   ONE XRAY VIEW OF THE CHEST       2/13/2023 9:25 am       COMPARISON:   11/07/2013       HISTORY:   ORDERING SYSTEM PROVIDED HISTORY: sepsis   TECHNOLOGIST PROVIDED HISTORY:   sepsis       FINDINGS:   Cardial pericardial silhouette is unremarkable. Increased interstitial   opacities are seen throughout both lungs. A focal infiltrate is not   detected. No pneumothorax. No free air. No acute bony abnormality. Impression   Increased interstitial opacity. While much or all of this may be chronic,   please correlate with any clinical evidence of acute interstitial edema. A focal infiltrate is not detected         Narrative   EXAMINATION:   CT OF THE ABDOMEN AND PELVIS WITHOUT CONTRAST 2/13/2023 9:50 am       TECHNIQUE:   CT of the abdomen and pelvis was performed without the administration of   intravenous contrast. Multiplanar reformatted images are provided for review. Automated exposure control, iterative reconstruction, and/or weight based   adjustment of the mA/kV was utilized to reduce the radiation dose to as low   as reasonably achievable. COMPARISON:   02/03/2021       HISTORY:   ORDERING SYSTEM PROVIDED HISTORY: Renal colic   TECHNOLOGIST PROVIDED HISTORY:           FINDINGS:   Lower Chest: Mild subsegmental atelectasis posterior lung bases. Organs: There is 5 mm calculus in the lower pole right kidney which is also   evident on the prior.   Additional smaller calculi in the right kidney on prior no longer present. There is moderate right hydronephrosis with   significant perinephric stranding. There is also moderate right hydroureter. There are 2 calculi measuring 3 mm and 4 mm in the distal right ureter a few   cm from the uterovesical junction accounting for the obstructive signs. There may be additional punctate calculi in the same region. Left kidney shows a few punctate calculi along with a 2 mm calculus in the   lower pole. No left hydronephrosis or hydroureter. No left renal calculus. The unenhanced liver shows a 1 cm cyst in the left lobe which is unchanged. Cholecystectomy. Spleen, pancreas, adrenal glands show no significant   abnormalities. GI/Bowel: There is limited evaluation due to absence of oral contrast.       The stomach shows no focal lesions. Small bowel loops normal in caliber   showing no focal abnormalities. Normal appendix. Evaluation of the colon shows no acute process. Pelvis: Streak artifact from right hip prosthesis obscures portions of the   pelvis. There are pelvic phleboliths. Uterus and urinary bladder grossly   normal.       Peritoneum/Retroperitoneum: No free fluid. No lymphadenopathy. Atherosclerotic disease. Bones/Soft Tissues: Degenerative changes in the spine. No acute abnormality   of the bones. The superficial soft tissues show no significant abnormalities. Impression   1. 2 obstructing calculi in the distal right ureter about 3 and 4 mm in size   noted close to the uterovesical junction. There may be additional punctate   calculus or 2 in the same region. This causes moderate right hydronephrosis   and hydroureter. 2. Nonobstructing bilateral renal calculi. 5 mm calculus in the right kidney   and a few punctate and 2 mm calculi in the left kidney. 3. Mild subsegmental atelectasis posterior lung bases.          Medical Decision Ujudsb-Hlyuqktx-Nyfid:    Contains abnormal data Culture, Blood 1  Order: [id number]  Status: Final result    Visible to patient: Yes (not seen)    Next appt: 03/16/2023 at 03:00 PM in Radiology [facility])    Specimen Information: Blood   0 Result Notes  Component 2/13/23 1300  Resulting Agency   Specimen Description . BLOOD  [address]   Special Requests 20ML, [address] Lab   Culture POSITIVE Blood Culture Abnormal   [address]   Culture DIRECT GRAM STAIN FROM BOTTLE: [address] COLI Abnormal   [address]   Culture (NOTE) Direct Gram Stain from bottle result called to and read back by:RAMON BARTLETT 2/14/2023 @0249 [address] [place] [address]        Susceptibility    Escherichia coli (3)    Antibiotic Interpretation Microscan Method Status    ampicillin Sensitive 4 BACTERIAL SUSCEPTIBILITY PANEL RAMON Final    ceFAZolin Sensitive <=4 BACTERIAL SUSCEPTIBILITY PANEL RAMON Final    cefTRIAXone Sensitive <=0.25 BACTERIAL SUSCEPTIBILITY PANEL RAMON Final    Confirmatory Extended Spectrum Beta-Lactamase Sensitive NEGATIVE BACTERIAL SUSCEPTIBILITY PANEL RAMON Final    gentamicin Sensitive <=1 BACTERIAL SUSCEPTIBILITY PANEL RAMON Final    levofloxacin Sensitive <=0.12 BACTERIAL SUSCEPTIBILITY PANEL RAMON Final    piperacillin-tazobactam Sensitive <=4 BACTERIAL SUSCEPTIBILITY PANEL RAMON Final    tobramycin Sensitive <=1 BACTERIAL SUSCEPTIBILITY PANEL RAMON Final    trimethoprim-sulfamethoxazole Sensitive <=20 BACTERIAL SUSCEPTIBILITY PANEL RAMON Final             Culture, Urine  Order: [id number]  Status: Final result    Visible to patient: Yes (not seen)    Next appt: 03/16/2023 at 03:00 PM in Radiology [facility])    Specimen Information: Urine, clean catch   0 Result Notes  Component 2/13/23 1317    Specimen Description . CLEAN CATCH URINE    Culture ESCHERICHIA COLI >[id number] CFU/ML Abnormal     Resulting [address]        Susceptibility    Escherichia coli (1)    Antibiotic Interpretation Microscan Method Status    ampicillin Sensitive 4 BACTERIAL SUSCEPTIBILITY PANEL RAMON Final    ceFAZolin Sensitive <=4 BACTERIAL SUSCEPTIBILITY PANEL RAMON Final     Cefazolin sensitivity results can be used to predict the effectiveness of oral   cephalosporins (eg. Cephalexin) in uncomplicated Urinary Tract Infections due to E. coli, K.    pneumoniae, and P. mirabilis        cefTRIAXone Sensitive <=0.25 BACTERIAL SUSCEPTIBILITY PANEL RAMON Final    Confirmatory Extended Spectrum Beta-Lactamase Sensitive NEGATIVE BACTERIAL SUSCEPTIBILITY PANEL RAMON Final    gentamicin Sensitive <=1 BACTERIAL SUSCEPTIBILITY PANEL RAMON Final    levofloxacin Sensitive <=0.12 BACTERIAL SUSCEPTIBILITY PANEL RAMON Final    nitrofurantoin Sensitive <=16 BACTERIAL SUSCEPTIBILITY PANEL RAMON Final    piperacillin-tazobactam Sensitive <=4 BACTERIAL SUSCEPTIBILITY PANEL RAMON Final    tobramycin Sensitive <=1 BACTERIAL SUSCEPTIBILITY PANEL RAMON Final    trimethoprim-sulfamethoxazole Sensitive <=20 BACTERIAL SUSCEPTIBILITY PANEL RAMON                  Medical Decision Making-Other:     Note:  Labs, medications, radiologic studies were reviewed with personal review of films  Large amounts of data were reviewed  Discussed with nursing Staff, Discharge planner  Infection Control and Prevention measures reviewed  All prior entries were reviewed  Administer medications as ordered  Prognosis: Fair  Discharge planning reviewed      Thank you for allowing us to participate in the care of this patient. Please call with questions. Carrie Kellogg, APRN - CNP    ATTESTATION:    I have discussed the case, including pertinent history and exam findings with the APRN. I have evaluated the  History, physical findings and pictures of the patient and the key elements of the encounter have been performed by me. I have reviewed the laboratory data, other diagnostic studies and discussed them with the APRN.  I have updated the medical record where necessary. I agree with the assessment, plan and orders as documented by the APRN.     Roxy Gregg MD.      Pager: [phone] - Office: [phone]

## 2023-02-16 NOTE — PROGRESS NOTES
Pt resting in bed with eyes closed. Respirations even and unlabored. No distress noted. Pt wakes easily to verbal stimuli. Assessment and vital signs as charted. Pt denies pain and is A & O x 4. IV fluids infusing as ordered. Ambrose catheter remains intact, draining and patent. Cardiac monitor continues to show NSR. Pt denies any other needs at this time. Call light in reach. Bed alarm on. Will continue to monitor.

## 2023-02-16 NOTE — CONSULTS
Today's Date: 2/16/2023  Patient Name: Lacie Wood  Date of admission: 2/13/2023 10:56 AM  Patient's age: 79 y.o., 1955  Admission Dx: Ureterolithiasis [N20.1]    Reason for Consult: Thrombocytopenia  Requesting Physician: Stephenie Patrick MD    CHIEF COMPLAINT: Hypotension/sepsis/septic shock    History Obtained From:  patient    HISTORY OF PRESENT ILLNESS:      The patient is a 79 y.o.  female who is admitted to the hospital for right flank pain on February 13, 2023 she did experience nausea vomiting and WBC on admission were 34,000 and creatinine up to 3.83,This is up from baseline of 0.65. She does not have a fever  She does state that this pain began about 2 days ago. Patient did have a stat CT scan show multiple stones in the right ureter. This is causing significant hydroureteronephrosis on the side, found to have urosepsis/septic shock status post cystoscopy and right ureteral stent insertion  Patient was on pressor, also did have elevated liver enzyme which is improving and hematology consulted for progressive thrombocytopenia  She was on heparin which was discontinued  Creatinine started trending down today to 1.23, and WBC improving to 17.7  Hemoglobin down to 8.7 and platelet down to 52 from baseline 143 and heparin has been discontinued  Liver enzymes trending down    Past Medical History:   has a past medical history of Bacteremia due to Escherichia coli, Complicated UTI (urinary tract infection), Fracture of wrist, unspecified laterality, closed, initial encounter, History of sepsis, Hyperlipidemia, Hypertension, Hypothyroidism, Kidney stone, Osteoporosis, Primary localized osteoarthritis of right hip, Sepsis due to Escherichia coli [facility]), Septic shock due to urinary tract infection ([address] Severe sepsis ([address] and Thrombocytopenia ([address] Past Surgical History:   has a past surgical history that includes Dilation & curettage (1980); Cystoscopy (2015);  Lithotripsy (04/28/2015); Cholecystectomy, laparoscopic (); Dilation and curettage of uterus (); Lithotripsy (Left, 2018); pr cystourethroscopy (Left, 2018); pr lithotripsy xtrcorp shock wave (Left, 2018); Colonoscopy (2014); Colonoscopy (N/A, 2018); other surgical history (); Total hip arthroplasty (Right, 2019); Cystoscopy (Left); Cystoscopy (Left, 2021); CYSTOSCOPY INSERTION / REMOVAL STENT / STONE (Left, 2021); Lithotripsy (Right, 2021); Lithotripsy (Right, 2021); Cystoscopy w/ ureteral stent placement (Right, 2023); and Cystoscopy (Right, 2023). Medications:    Reviewed in Epic     Allergies:  Patient has no known allergies. Social History:   reports that she has been smoking cigarettes. She has a 7.50 pack-year smoking history. She has never used smokeless tobacco. She reports current alcohol use of about 1.0 standard drink per week. She reports that she does not use drugs. Family History: family history includes Alzheimer's Disease in her mother; Breast Cancer in her maternal grandmother; Hypertension in her father; Stroke in her father.     REVIEW OF SYSTEMS:    14 point review of system has been obtained unremarkable although it was mentioned above    PHYSICAL EXAM:      BP (!) 128/56   Pulse 83   Temp 99.1 °F (37.3 °C) (Temporal)   Resp 26   Ht 5' 3\" (1.6 m)   Wt 140 lb (63.5 kg)   LMP  (LMP Unknown)   SpO2 92%   BMI 24.80 kg/m²    Temp (24hrs), Av.1 °F (37.3 °C), Min:98.7 °F (37.1 °C), Max:99.6 °F (37.6 °C)    General appearance - well appearing, no in pain or distress   Mental status - alert and cooperative   Eyes - pupils equal and reactive, extraocular eye movements intact   Ears - bilateral TM's and external ear canals normal   Mouth - mucous membranes moist, pharynx normal without lesions   Neck - supple, no significant adenopathy   Lymphatics - no palpable lymphadenopathy, no hepatosplenomegaly   Chest - clear to auscultation, no wheezes, rales or rhonchi, symmetric air entry   Heart - normal rate, regular rhythm, normal S1, S2, no murmurs  Abdomen - soft, nontender, nondistended, no masses or organomegaly   Neurological - alert, oriented, normal speech, no focal findings or movement disorder noted   Musculoskeletal - no joint tenderness, deformity or swelling   Extremities - peripheral pulses normal, no pedal edema, no clubbing or cyanosis   Skin - normal coloration and turgor, no rashes, no suspicious skin lesions noted ,    DATA:    Labs:   CBC:   Recent Labs     02/15/23  0540 02/16/23  0520   WBC 29.8* 17.7*   HGB 9.1* 8.7*   HCT 27.5* 25.8*   PLT See Reflexed IPF Result See Reflexed IPF Result     BMP:   Recent Labs     02/15/23  0540 02/16/23  0520    140   K 4.0 3.9   CO2 14* 21   BUN 44* 32*   CREATININE 1.80* 1.23*   LABGLOM 30* 48*   GLUCOSE 97 105*     PT/INR: No results for input(s): PROTIME, INR in the last 72 hours. IMAGING DATA:  FLUORO FOR SURGICAL PROCEDURES   Final Result      XR CHEST PORTABLE   Final Result   Increased interstitial opacity. While much or all of this may be chronic,   please correlate with any clinical evidence of acute interstitial edema.       A focal infiltrate is not detected         XR CHEST PORTABLE    (Results Pending)       Primary Problem  Ureterolithiasis    Active Hospital Problems    Diagnosis Date Noted    Bacteremia due to Escherichia coli [R78.81, B96.20] 02/16/2023     Priority: Medium    Sepsis due to Escherichia coli ([facility] [A41.51] 02/16/2023     Priority: Medium    Thrombocytopenia ([facility] [D69.6] 02/15/2023     Priority: Medium    Acute unilateral obstructive uropathy [N13.9] 02/14/2023     Priority: Medium    E. coli infection [A49.8] 02/14/2023     Priority: Medium    DINA (acute kidney injury) ([facility] [N17.9] 02/14/2023     Priority: Medium    Hydronephrosis of right kidney [N13.30] 02/14/2023     Priority: Medium    Metabolic acidosis [K88.46] 02/14/2023 Priority: Medium    Hypocalcemia [E83.51] 02/14/2023     Priority: Medium    Ureterolithiasis [N20.1] 02/13/2023     Priority: Medium    Septic shock due to urinary tract infection ([facility] [A41.9, R65.21, N39.0] 02/13/2023     Priority: Medium    Complicated UTI (urinary tract infection) [N39.0] 02/13/2023     Priority: Medium    Hypertension [I10]          IMPRESSION:   Thrombocytopenia  Leukocytosis  Anemia  Septic shock/severe sepsis due to complicated UTI and GNR bacteremia  Multiorgan failure  Severe malnutrition  High LFTs/liver decompensation    RECOMMENDATIONS:  I reviewed the labs/imaging available to me,outside records and discussed with the patient. I explained to the patient the nature of this problem. I explained the significance of these abnormalities and possible etiology and management options   Progressive thrombocytopenia which is multifactorial related consumption process in the setting of sepsis and bacteremia, multiorgan failure, liver decompensation,in general thrombocytopenia lag behind patient general condition improving> continue current management per primary team  Fluid shift can aggravate platelet count due to dilutional effect  Rule out heparin-induced thrombocytopenia> HIT panel  Hemolysis panel and DIC panel, reviewing blood smear> my suspicion clinically for TTP is low  If Platelet keep trending down with general condition improved> consider change antibiotic as Rocephin can aggravate thrombocytopenia  No anticoagulation  Follow-up with hematology after discharge    Please call for any question  Discussed with patient and Nurse. Thank you for asking us to see this patient.                                     [facility]                            This note is created with the assistance of a speech recognition program.  While intending to generate a document that actually reflects the content of the visit, the document can still have some errors including those of syntax and sound a like substitutions which may escape proof reading. It such instances, actual meaning can be extrapolated by contextual diversion.      Hematologist/Medical Oncologist

## 2023-02-16 NOTE — PROGRESS NOTES
Physical Therapy  Facility/Department: [facility] AT THE [facility]  Physical Therapy Initial Assessment    Name: Cynthia Phalen  : 1955  MRN: [id number]  Date of Service: 2/15/2023    Discharge Recommendations:  Continue to assess pending progress, Home with Home health PT, Patient would benefit from continued therapy after discharge          Patient Diagnosis(es): There were no encounter diagnoses. Past Medical History:  has a past medical history of Complicated UTI (urinary tract infection), Fracture of wrist, unspecified laterality, closed, initial encounter, History of sepsis, Hyperlipidemia, Hypertension, Hypothyroidism, Kidney stone, Osteoporosis, Primary localized osteoarthritis of right hip, Septic shock due to urinary tract infection ([facility] Severe sepsis ([facility] and Thrombocytopenia ([facility] Past Surgical History:  has a past surgical history that includes Dilation & curettage (); Cystoscopy (); Lithotripsy (2015); Cholecystectomy, laparoscopic (); Dilation and curettage of uterus (); Lithotripsy (Left, 2018); pr cystourethroscopy (Left, 2018); pr lithotripsy xtrcorp shock wave (Left, 2018); Colonoscopy (2014); Colonoscopy (N/A, 2018); other surgical history (); Total hip arthroplasty (Right, 2019); Cystoscopy (Left); Cystoscopy (Left, 2021); CYSTOSCOPY INSERTION / REMOVAL STENT / STONE (Left, 2021); Lithotripsy (Right, 2021); Lithotripsy (Right, 2021); Cystoscopy w/ ureteral stent placement (Right, 2023); and Cystoscopy (Right, 2023). Assessment   Body Structures, Functions, Activity Limitations Requiring Skilled Therapeutic Intervention: Decreased functional mobility ; Decreased safe awareness;Decreased balance;Decreased strength;Decreased endurance  Assessment: Pt referred for general debility. Pt is currently having trouble with her BP and feels weak and unsteady. Progress patient as tolerated.  Pt my use RW and progress to least restrictive. Treatment Diagnosis: difficulty walking  Therapy Prognosis: Good  Decision Making: Medium Complexity  Barriers to Learning: none  Requires PT Follow-Up: Yes  Activity Tolerance  Activity Tolerance: Patient tolerated evaluation without incident     Plan   Physcial Therapy Plan  General Plan: 2 times a day 7 days a week (with exception of weekends, daily)  Current Treatment Recommendations: Strengthening, Balance training, Functional mobility training, Transfer training, Endurance training, Home exercise program, Safety education & training, Therapeutic activities  Safety Devices  Type of Devices: Left in chair, Chair alarm in place, Call light within reach, Nurse notified     Restrictions  Restrictions/Precautions  Restrictions/Precautions: General Precautions, Fall Risk     Subjective   Pain: 0/10  General  Chart Reviewed: Yes  Patient assessed for rehabilitation services?: Yes  Follows Commands: Within Functional Limits  Subjective  Subjective: Pt feeling unsteady and knows her blood pressure is low. Social/Functional History  Social/Functional History  Lives With: Spouse  Type of Home: House  Home Layout: One level  Home Access: Stairs to enter with rails  Entrance Stairs - Number of Steps: 3  Bathroom Shower/Tub: Tub/Shower unit  Bathroom Equipment: Grab bars in shower  ADL Assistance: [address] [place] [address] Avenue: Independent  Homemaking Responsibilities: Yes  Ambulation Assistance: Independent  Transfer Assistance: Independent  Active : Yes  Vision/Hearing  Vision  Vision: Within Functional Limits  Hearing  Hearing: Within functional limits    Cognition   Orientation  Overall Orientation Status: Within Functional Limits  Cognition  Overall Cognitive Status: WFL     Objective   Heart Rate: 80  Heart Rate Source: Monitor; Apical  BP: 120/63  BP Location: Left upper arm  BP Method: Automatic  Patient Position: Turns self;Semi fowlers  MAP (Calculated): 82  Resp: 20  SpO2: 92 %  O2 Device: Nasal cannula     Observation/Palpation  Posture: Fair  Observation: Pt is unsteady and unsure of herself standing. Gross Assessment  AROM: Within functional limits  Strength: Generally decreased, functional     Bed Mobility Training  Bed Mobility Training: Yes  Overall Level of Assistance: Stand-by assistance;Assist X1  Interventions: Verbal cues  Rolling: Stand-by assistance;Assist X1  Supine to Sit: Stand-by assistance;Assist X1  Scooting: Stand-by assistance;Assist X1  Balance  Sitting: Impaired  Sitting - Static: Fair (occasional) (pt has low BP)  Sitting - Dynamic: Fair (occasional)  Standing: Impaired  Standing - Static: Fair  Standing - Dynamic: Fair  Transfer Training  Transfer Training: Yes  Overall Level of Assistance: Contact-guard assistance;Assist X1  Sit to Stand: Contact-guard assistance;Assist X1;Minimum assistance  Stand to Sit: Assist X1;Contact-guard assistance  Stand Pivot Transfers: Contact-guard assistance;Assist X1  Bed to Chair: Contact-guard assistance;Assist X1  Gait Training  Gait Training: No (Spo2 levels 82% during standing therefore gait was held.)  Gait  Overall Level of Assistance: Minimum assistance;Assist X1  Assistive Device: Walker        Exercise Treatment: Seated BLE ex x10 ea--SPo2 fluctuating between 87-92% during seated LE ex. Pt on room air. OutComes Score     AM-PAC Score  AM-PAC Inpatient Mobility Raw Score : 17 (02/15/23 2258)  AM-PAC Inpatient T-Scale Score : 42.13 (02/15/23 2258)  Mobility Inpatient CMS 0-100% Score: 50.57 (02/15/23 2258)  Mobility Inpatient CMS G-Code Modifier : CK (02/15/23 2258)     Goals  Short Term Goals  Time Frame for Short Term Goals: 20 days  Short Term Goal 1: Initiate HEP and progress as tolerated for balance and mobility. Short Term Goal 2: Pt bed mobility will be independent without rails to allow independence at home.   Short Term Goal 3: Pt will transfer independently with least restrictive device and good safety to reduce fall risk. Short Term Goal 4: Pt will ambulate independently with least restrictive device for up to 250 feet to allow community ambulation. Short Term Goal 5: Negotiate 3 steps with handrail, independently to allow entry and exit of the home. Patient Goals   Patient Goals : feel better       Education  Patient Education  Education Given To: Patient  Education Provided: Role of Therapy  Education Provided Comments: Continued BLE ex thoughout day.       Therapy Time   Individual Concurrent Group Co-treatment   Time In 1055         Time Out 1123         Minutes [address], BRITTNEY Springer

## 2023-02-16 NOTE — PROGRESS NOTES
Physical Therapy  Facility/Department: [address] [facility] ICU  Daily Treatment Note  NAME: Cynthia Phalen  : 1955  MRN: [id number]    Date of Service: 2023    Discharge Recommendations:  Continue to assess pending progress, Home with Home health PT, Patient would benefit from continued therapy after discharge      Patient Diagnosis(es): There were no encounter diagnoses. Assessment   Assessment: Per nursing requested to hold off on transfers at this time due to BP and Oxygen levels, pt had just been given lasix. Supine BLE ex x15 HS's, AP's, Hip flexion, GS's, Hip abd/add. Pt's O2 remained between 86-89% with there ex. Pt on 2L room air. Nursing in during treat to draw blood. Pts spouse stated pt had some confusion, pt didn't appear confused during treatment but stated she felt \"foggy\" and required extra time this morning when texting her daughter. Pt able to follow commands during there ex with no difficulty. Activity Tolerance: Patient tolerated treatment well;Patient limited by fatigue     Plan    Physcial Therapy Plan  General Plan: 2 times a day 7 days a week (1x/day on weekends and holidays)  Current Treatment Recommendations: Strengthening;Balance training;Functional mobility training;Transfer training; Endurance training;Home exercise program;Safety education & training; Therapeutic activities     Restrictions  Restrictions/Precautions  Restrictions/Precautions: General Precautions, Fall Risk     Subjective    Subjective  Subjective: Pt in bed upon arrival with  present stating pt demoed some confusion. Nursing okay'd treatment but requested holding off on transfers due to pts BP and O2 levels.   Pain: no c/o pain  Orientation  Overall Orientation Status: Within Functional Limits  Cognition  Overall Cognitive Status: WFL     Objective   Bed Mobility Training  Bed Mobility Training: No  Overall Level of Assistance: Stand-by assistance;Assist X1  Interventions: Verbal cues  Rolling: Stand-by assistance;Assist X1  Supine to Sit: Stand-by assistance;Assist X1  Scooting: Stand-by assistance;Assist X1  Balance  Sitting: Impaired  Sitting - Static: Fair (occasional) (pt has low BP)  Sitting - Dynamic: Fair (occasional)  Standing: Impaired  Standing - Static: Fair  Standing - Dynamic: Fair  Transfer Training  Transfer Training: No  Overall Level of Assistance: Contact-guard assistance;Assist X1  Sit to Stand: Contact-guard assistance;Assist X1;Minimum assistance  Stand to Sit: Assist X1;Contact-guard assistance  Stand Pivot Transfers: Contact-guard assistance;Assist X1  Bed to Chair: Contact-guard assistance;Assist X1  Gait Training  Gait Training: No  Gait  Overall Level of Assistance: Minimum assistance;Assist X1  Assistive Device: Walker  Neuromuscular Education  Neuromuscular Education: No  PT Exercises  Exercise Treatment: Supine BLE ex x15 HS's, AP's, Hip flexion, GS's, Hip abd/add. Pt's O2 remained between 86-89% with there ex. Pt on 2L room air.     Safety Devices  Type of Devices: All fall risk precautions in place;Bed alarm in place;Left in bed;Call light within reach;Nurse notified;Heels elevated for pressure relief       Goals  Short Term Goals  Time Frame for Short Term Goals: 20 days  Short Term Goal 1: Initiate HEP and progress as tolerated for balance and mobility.  Short Term Goal 2: Pt bed mobility will be independent without rails to allow independence at home.  Short Term Goal 3: Pt will transfer independently with least restrictive device and good safety to reduce fall risk.  Short Term Goal 4: Pt will ambulate independently with least restrictive device for up to 250 feet to allow community ambulation.  Short Term Goal 5: Negotiate 3 steps with handrail, independently to allow entry and exit of the home.  Patient Goals   Patient Goals : feel better    Education  Patient Education  Education Given To: Patient  Education Provided Comments: Continued ed in HEP as tolerated when in bed  Education Method:  Verbal  Barriers to Learning: None  Education Outcome: Verbalized understanding;Demonstrated understanding    Therapy Time   Individual Concurrent Group Co-treatment   Time In 0948         Time Out 1011         Minutes [address], [place]

## 2023-02-17 ENCOUNTER — APPOINTMENT (OUTPATIENT)
Dept: CT IMAGING | Age: 68
DRG: 853 | End: 2023-02-17
Attending: INTERNAL MEDICINE
Payer: MEDICARE

## 2023-02-17 ENCOUNTER — APPOINTMENT (OUTPATIENT)
Dept: NON INVASIVE DIAGNOSTICS | Age: 68
DRG: 853 | End: 2023-02-17
Attending: INTERNAL MEDICINE
Payer: MEDICARE

## 2023-02-17 PROBLEM — E83.42 HYPOMAGNESEMIA: Status: ACTIVE | Noted: 2023-02-17

## 2023-02-17 LAB
ALBUMIN SERPL-MCNC: 1.9 G/DL (ref 3.5–5.2)
ALBUMIN/GLOBULIN RATIO: 0.7 (ref 1–2.5)
ALP SERPL-CCNC: 158 U/L (ref 35–104)
ALT SERPL-CCNC: 74 U/L (ref 5–33)
ANION GAP SERPL CALCULATED.3IONS-SCNC: 5 MMOL/L (ref 9–17)
AST SERPL-CCNC: 26 U/L
BILIRUB SERPL-MCNC: 0.4 MG/DL (ref 0.3–1.2)
BUN SERPL-MCNC: 22 MG/DL (ref 8–23)
BUN/CREAT BLD: 22 (ref 9–20)
CALCIUM SERPL-MCNC: 8.1 MG/DL (ref 8.6–10.4)
CHLORIDE SERPL-SCNC: 102 MMOL/L (ref 98–107)
CO2 SERPL-SCNC: 30 MMOL/L (ref 20–31)
CREAT SERPL-MCNC: 1 MG/DL (ref 0.5–0.9)
GFR SERPL CREATININE-BSD FRML MDRD: >60 ML/MIN/1.73M2
GLUCOSE SERPL-MCNC: 118 MG/DL (ref 70–99)
HAPTOGLOB SERPL-MCNC: 323 MG/DL (ref 30–200)
HCT VFR BLD AUTO: 25.1 % (ref 36.3–47.1)
HGB BLD-MCNC: 8.7 G/DL (ref 11.9–15.1)
LV EF: 65 %
LVEF MODALITY: NORMAL
MAGNESIUM SERPL-MCNC: 1.3 MG/DL (ref 1.6–2.6)
MAGNESIUM SERPL-MCNC: 1.6 MG/DL (ref 1.6–2.6)
MCH RBC QN AUTO: 31.2 PG (ref 25.2–33.5)
MCHC RBC AUTO-ENTMCNC: 34.7 G/DL (ref 28.4–34.8)
MCV RBC AUTO: 90 FL (ref 82.6–102.9)
NRBC AUTOMATED: 0 PER 100 WBC
PDW BLD-RTO: 14.2 % (ref 11.8–14.4)
PLATELET # BLD AUTO: ABNORMAL K/UL (ref 138–453)
PLATELET, FLUORESCENCE: 46 K/UL (ref 138–453)
PLATELET, IMMATURE FRACTION: 10.5 % (ref 1.1–10.3)
POTASSIUM SERPL-SCNC: 3.4 MMOL/L (ref 3.7–5.3)
POTASSIUM SERPL-SCNC: 3.4 MMOL/L (ref 3.7–5.3)
PROT SERPL-MCNC: 4.7 G/DL (ref 6.4–8.3)
RBC # BLD: 2.79 M/UL (ref 3.95–5.11)
SODIUM SERPL-SCNC: 137 MMOL/L (ref 135–144)
WBC # BLD AUTO: 16.1 K/UL (ref 3.5–11.3)

## 2023-02-17 PROCEDURE — 6360000002 HC RX W HCPCS: Performed by: NURSE PRACTITIONER

## 2023-02-17 PROCEDURE — 2580000003 HC RX 258: Performed by: UROLOGY

## 2023-02-17 PROCEDURE — 6370000000 HC RX 637 (ALT 250 FOR IP): Performed by: UROLOGY

## 2023-02-17 PROCEDURE — 99233 SBSQ HOSP IP/OBS HIGH 50: CPT | Performed by: INTERNAL MEDICINE

## 2023-02-17 PROCEDURE — 83735 ASSAY OF MAGNESIUM: CPT

## 2023-02-17 PROCEDURE — 71260 CT THORAX DX C+: CPT | Performed by: NURSE PRACTITIONER

## 2023-02-17 PROCEDURE — 80053 COMPREHEN METABOLIC PANEL: CPT

## 2023-02-17 PROCEDURE — 85027 COMPLETE CBC AUTOMATED: CPT

## 2023-02-17 PROCEDURE — 2580000003 HC RX 258: Performed by: NURSE PRACTITIONER

## 2023-02-17 PROCEDURE — 6370000000 HC RX 637 (ALT 250 FOR IP): Performed by: INTERNAL MEDICINE

## 2023-02-17 PROCEDURE — 85055 RETICULATED PLATELET ASSAY: CPT

## 2023-02-17 PROCEDURE — 6360000002 HC RX W HCPCS: Performed by: UROLOGY

## 2023-02-17 PROCEDURE — 99232 SBSQ HOSP IP/OBS MODERATE 35: CPT | Performed by: INTERNAL MEDICINE

## 2023-02-17 PROCEDURE — 97535 SELF CARE MNGMENT TRAINING: CPT

## 2023-02-17 PROCEDURE — 93306 TTE W/DOPPLER COMPLETE: CPT

## 2023-02-17 PROCEDURE — 94761 N-INVAS EAR/PLS OXIMETRY MLT: CPT

## 2023-02-17 PROCEDURE — 97110 THERAPEUTIC EXERCISES: CPT

## 2023-02-17 PROCEDURE — 97116 GAIT TRAINING THERAPY: CPT

## 2023-02-17 PROCEDURE — 6360000004 HC RX CONTRAST MEDICATION: Performed by: NURSE PRACTITIONER

## 2023-02-17 PROCEDURE — 36592 COLLECT BLOOD FROM PICC: CPT

## 2023-02-17 PROCEDURE — 6370000000 HC RX 637 (ALT 250 FOR IP): Performed by: NURSE PRACTITIONER

## 2023-02-17 PROCEDURE — 51702 INSERT TEMP BLADDER CATH: CPT

## 2023-02-17 PROCEDURE — 84132 ASSAY OF SERUM POTASSIUM: CPT

## 2023-02-17 PROCEDURE — 2700000000 HC OXYGEN THERAPY PER DAY

## 2023-02-17 PROCEDURE — 2000000000 HC ICU R&B

## 2023-02-17 RX ORDER — POTASSIUM CHLORIDE 20 MEQ/1
40 TABLET, EXTENDED RELEASE ORAL 2 TIMES DAILY WITH MEALS
Status: COMPLETED | OUTPATIENT
Start: 2023-02-17 | End: 2023-02-17

## 2023-02-17 RX ORDER — FUROSEMIDE 10 MG/ML
40 INJECTION INTRAMUSCULAR; INTRAVENOUS ONCE
Status: COMPLETED | OUTPATIENT
Start: 2023-02-17 | End: 2023-02-17

## 2023-02-17 RX ORDER — POTASSIUM CHLORIDE 20 MEQ/1
20 TABLET, EXTENDED RELEASE ORAL 2 TIMES DAILY
Status: DISCONTINUED | OUTPATIENT
Start: 2023-02-17 | End: 2023-02-23 | Stop reason: HOSPADM

## 2023-02-17 RX ORDER — 0.9 % SODIUM CHLORIDE 0.9 %
250 INTRAVENOUS SOLUTION INTRAVENOUS ONCE
Status: COMPLETED | OUTPATIENT
Start: 2023-02-17 | End: 2023-02-17

## 2023-02-17 RX ORDER — LEVOFLOXACIN 5 MG/ML
500 INJECTION, SOLUTION INTRAVENOUS EVERY 24 HOURS
Status: COMPLETED | OUTPATIENT
Start: 2023-02-17 | End: 2023-02-22

## 2023-02-17 RX ORDER — ATORVASTATIN CALCIUM 20 MG/1
20 TABLET, FILM COATED ORAL DAILY
Status: DISCONTINUED | OUTPATIENT
Start: 2023-02-17 | End: 2023-02-23 | Stop reason: HOSPADM

## 2023-02-17 RX ORDER — LEVOTHYROXINE SODIUM 0.07 MG/1
75 TABLET ORAL DAILY
Status: DISCONTINUED | OUTPATIENT
Start: 2023-02-17 | End: 2023-02-23 | Stop reason: HOSPADM

## 2023-02-17 RX ORDER — SODIUM CHLORIDE 9 MG/ML
INJECTION, SOLUTION INTRAVENOUS CONTINUOUS
Status: DISCONTINUED | OUTPATIENT
Start: 2023-02-17 | End: 2023-02-19

## 2023-02-17 RX ORDER — MAGNESIUM SULFATE IN WATER 40 MG/ML
2000 INJECTION, SOLUTION INTRAVENOUS ONCE
Status: COMPLETED | OUTPATIENT
Start: 2023-02-17 | End: 2023-02-17

## 2023-02-17 RX ADMIN — HYDROCODONE BITARTRATE AND ACETAMINOPHEN 1 TABLET: 5; 325 TABLET ORAL at 10:46

## 2023-02-17 RX ADMIN — ONDANSETRON 4 MG: 2 INJECTION INTRAMUSCULAR; INTRAVENOUS at 16:57

## 2023-02-17 RX ADMIN — ATORVASTATIN CALCIUM 20 MG: 20 TABLET, FILM COATED ORAL at 11:56

## 2023-02-17 RX ADMIN — IOPAMIDOL 75 ML: 755 INJECTION, SOLUTION INTRAVENOUS at 11:22

## 2023-02-17 RX ADMIN — TRAZODONE HYDROCHLORIDE 50 MG: 50 TABLET ORAL at 20:49

## 2023-02-17 RX ADMIN — NYSTATIN 500000 UNITS: 100000 SUSPENSION ORAL at 20:49

## 2023-02-17 RX ADMIN — SODIUM CHLORIDE 250 ML: 9 INJECTION, SOLUTION INTRAVENOUS at 12:03

## 2023-02-17 RX ADMIN — POTASSIUM CHLORIDE 20 MEQ: 1500 TABLET, EXTENDED RELEASE ORAL at 14:34

## 2023-02-17 RX ADMIN — SODIUM CHLORIDE, PRESERVATIVE FREE 10 ML: 5 INJECTION INTRAVENOUS at 09:13

## 2023-02-17 RX ADMIN — SODIUM CHLORIDE, PRESERVATIVE FREE 10 ML: 5 INJECTION INTRAVENOUS at 09:12

## 2023-02-17 RX ADMIN — LEVOFLOXACIN 500 MG: 5 INJECTION, SOLUTION INTRAVENOUS at 12:07

## 2023-02-17 RX ADMIN — FUROSEMIDE 40 MG: 10 INJECTION, SOLUTION INTRAMUSCULAR; INTRAVENOUS at 07:16

## 2023-02-17 RX ADMIN — SODIUM BICARBONATE 1300 MG: 650 TABLET ORAL at 10:00

## 2023-02-17 RX ADMIN — TAMSULOSIN HYDROCHLORIDE 0.4 MG: 0.4 CAPSULE ORAL at 09:05

## 2023-02-17 RX ADMIN — SODIUM CHLORIDE, PRESERVATIVE FREE 10 ML: 5 INJECTION INTRAVENOUS at 20:49

## 2023-02-17 RX ADMIN — ONDANSETRON 4 MG: 4 TABLET, ORALLY DISINTEGRATING ORAL at 09:04

## 2023-02-17 RX ADMIN — POTASSIUM CHLORIDE 20 MEQ: 1500 TABLET, EXTENDED RELEASE ORAL at 20:49

## 2023-02-17 RX ADMIN — MAGNESIUM SULFATE HEPTAHYDRATE 2000 MG: 40 INJECTION, SOLUTION INTRAVENOUS at 07:28

## 2023-02-17 RX ADMIN — NYSTATIN 500000 UNITS: 100000 SUSPENSION ORAL at 16:57

## 2023-02-17 RX ADMIN — SODIUM CHLORIDE, PRESERVATIVE FREE 10 ML: 5 INJECTION INTRAVENOUS at 20:56

## 2023-02-17 RX ADMIN — HYDROCODONE BITARTRATE AND ACETAMINOPHEN 1 TABLET: 5; 325 TABLET ORAL at 20:48

## 2023-02-17 RX ADMIN — CEFTRIAXONE 2000 MG: 2 INJECTION, POWDER, FOR SOLUTION INTRAMUSCULAR; INTRAVENOUS at 10:04

## 2023-02-17 RX ADMIN — POTASSIUM CHLORIDE 40 MEQ: 1500 TABLET, EXTENDED RELEASE ORAL at 07:16

## 2023-02-17 RX ADMIN — POTASSIUM CHLORIDE 40 MEQ: 1500 TABLET, EXTENDED RELEASE ORAL at 11:56

## 2023-02-17 RX ADMIN — NYSTATIN 500000 UNITS: 100000 SUSPENSION ORAL at 10:46

## 2023-02-17 ASSESSMENT — PAIN SCALES - GENERAL
PAINLEVEL_OUTOF10: 0
PAINLEVEL_OUTOF10: 0
PAINLEVEL_OUTOF10: 5
PAINLEVEL_OUTOF10: 0
PAINLEVEL_OUTOF10: 2
PAINLEVEL_OUTOF10: 4
PAINLEVEL_OUTOF10: 0
PAINLEVEL_OUTOF10: 1
PAINLEVEL_OUTOF10: 0
PAINLEVEL_OUTOF10: 2
PAINLEVEL_OUTOF10: 5
PAINLEVEL_OUTOF10: 0

## 2023-02-17 ASSESSMENT — PAIN SCALES - WONG BAKER
WONGBAKER_NUMERICALRESPONSE: 0
WONGBAKER_NUMERICALRESPONSE: 2
WONGBAKER_NUMERICALRESPONSE: 2
WONGBAKER_NUMERICALRESPONSE: 0
WONGBAKER_NUMERICALRESPONSE: 2

## 2023-02-17 ASSESSMENT — PAIN DESCRIPTION - LOCATION
LOCATION: BACK
LOCATION: HEAD

## 2023-02-17 ASSESSMENT — PAIN DESCRIPTION - ORIENTATION: ORIENTATION: MID

## 2023-02-17 ASSESSMENT — PAIN - FUNCTIONAL ASSESSMENT: PAIN_FUNCTIONAL_ASSESSMENT: ACTIVITIES ARE NOT PREVENTED

## 2023-02-17 ASSESSMENT — PAIN DESCRIPTION - DESCRIPTORS
DESCRIPTORS: ACHING
DESCRIPTORS: ACHING

## 2023-02-17 ASSESSMENT — PAIN DESCRIPTION - PAIN TYPE: TYPE: ACUTE PAIN

## 2023-02-17 NOTE — PROGRESS NOTES
Gwen aware that Levophed and IV fluids have not been started yet due to patients current BP (115/45 map 70.)

## 2023-02-17 NOTE — PROGRESS NOTES
Physical Therapy  Facility/Department: [facility] AT THE [facility]  Daily Treatment Note  NAME: Burt Lucero  : 1955  MRN: [id number]    Date of Service: 2023    Discharge Recommendations:  Continue to assess pending progress, Home with Home health PT, Patient would benefit from continued therapy after discharge     Patient Diagnosis(es): There were no encounter diagnoses. Assessment   Assessment: Pt limited by fatigue and nausea, however pt was able to complete supine B LE therex x 10-15. Pt able to complete gait training 8ft with RW and CGA with increased time to complete d/t fatigue. Will continue to progress as tolerated. Activity Tolerance: Patient limited by fatigue;Patient limited by endurance     Plan    Physcial Therapy Plan  General Plan: 2 times a day 7 days a week (1x per day on weekends.)     Restrictions  Restrictions/Precautions  Restrictions/Precautions: General Precautions, Fall Risk     Subjective    Subjective  Subjective: pt awake in bed, agreeable to therapy despite being nauseous  Pain: no c/o pain  Orientation  Overall Orientation Status: Within Functional Limits     Objective   Vitals     Bed Mobility Training  Bed Mobility Training: Yes  Overall Level of Assistance: Stand-by assistance;Assist X1;Additional time  Interventions: Verbal cues;Demonstration  Rolling: Stand-by assistance;Assist X1  Supine to Sit: Stand-by assistance;Assist X1  Scooting: Stand-by assistance;Assist X1  Transfer Training  Transfer Training: Yes  Overall Level of Assistance: Contact-guard assistance;Assist X1  Interventions: Demonstration;Visual cues; Verbal cues  Sit to Stand: Contact-guard assistance;Assist X1  Stand to Sit: Assist X1;Contact-guard assistance  Stand Pivot Transfers: Contact-guard assistance;Assist X1  Gait Training  Gait Training: Yes  Gait  Overall Level of Assistance: Contact-guard assistance;Assist X1  Interventions: Verbal cues; Safety awareness training  Base of Support: Narrowed  Speed/Maggie: Slow  Distance (ft): 8 Feet  Assistive Device: Walker, rolling;Gait belt     PT Exercises  Exercise Treatment: Supine B LE therex x 10-15 with short rest break between each d/t fatigue and nausea. sp02 occ dropping to low 80s on 2L supplimental oxygen if reading correctly. Safety Devices  Type of Devices: All fall risk precautions in place;Call light within reach; Chair alarm in place; Left in chair;Nurse notified;Gait belt;Patient at risk for falls (RN in room at completion of tx.)       Goals  Short Term Goals  Time Frame for Short Term Goals: 20 days  Short Term Goal 1: Initiate HEP and progress as tolerated for balance and mobility. Short Term Goal 2: Pt bed mobility will be independent without rails to allow independence at home. Short Term Goal 3: Pt will transfer independently with least restrictive device and good safety to reduce fall risk. Short Term Goal 4: Pt will ambulate independently with least restrictive device for up to 250 feet to allow community ambulation. Short Term Goal 5: Negotiate 3 steps with handrail, independently to allow entry and exit of the home.   Patient Goals   Patient Goals : feel better    Education  Patient Education  Education Given To: Patient  Education Provided: Fall Prevention Strategies;Transfer Training  Education Method: Verbal;Demonstration  Barriers to Learning: None  Education Outcome: Verbalized understanding;Continued education needed    Therapy Time   Individual Concurrent Group Co-treatment   Time In [address]33 [address]         Time Out 0905         Minutes [address]

## 2023-02-17 NOTE — PROGRESS NOTES
Comprehensive Nutrition Assessment    Type and Reason for Visit:  Reassess    Nutrition Recommendations/Plan:   Ensure tid (chocolate)  Encourage protein sources     Malnutrition Assessment:  Malnutrition Status: At risk for malnutrition (Comment) (02/14/23 4562)    Context:  Acute Illness     Findings of the 6 clinical characteristics of malnutrition:  Energy Intake:  Mild decrease in energy intake (Comment) (pta)  Weight Loss:  No significant weight loss     Body Fat Loss:  No significant body fat loss     Muscle Mass Loss:  No significant muscle mass loss    Fluid Accumulation:  No significant fluid accumulation     Strength:  Not Performed    Nutrition Assessment:    Worsening nutritient intakes with soreness in mouth, lack of appetite. Weight elevations with fluid retention, on diuretic tx (17% gains from usual weights). States nursing is working on order for something for her oral cavity soreness. Agreeable to try some Ensure in small amounts. Will remain with a significant deficit in calorie and protien intakes. Nutrition Related Findings:    tired appearing. Trace upper and lower edema Wound Type: None       Current Nutrition Intake & Therapies:    Average Meal Intake: 1-25%  Average Supplements Intake: None Ordered  ADULT DIET; Regular  ADULT ORAL NUTRITION SUPPLEMENT; Breakfast, Lunch, Dinner; Standard High Calorie/High Protein Oral Supplement    Anthropometric Measures:  Height: 5' 3\" (160 cm)  Ideal Body Weight (IBW): 115 lbs (52 kg)    Admission Body Weight: 111 lb (50.3 kg)  Current Body Weight: 139 lb 3.2 oz (63.1 kg), 112.3 % IBW. Weight Source: Bed Scale  Current BMI (kg/m2): 24.7  Usual Body Weight: 118 lb 9.6 oz (53.8 kg)  % Weight Change (Calculated): 17.4  Weight Adjustment For: No Adjustment                 BMI Categories: Normal Weight (BMI 18.5-24. 9)    Estimated Daily Nutrient Needs:  Energy Requirements Based On: Kcal/kg  Weight Used for Energy Requirements: Usual  Energy (kcal/day): 3053-4500 (25-30)  Weight Used for Protein Requirements: Usual  Protein (g/day): 54-65 (1.-1.2)  Method Used for Fluid Requirements: 1 ml/kcal  Fluid (ml/day): 1600+    Nutrition Diagnosis:   Predicted inadequate energy intake related to renal dysfunction as evidenced by nausea, poor intake prior to admission    Recent Labs     02/15/23  0540 02/16/23  0520 02/17/23  0520    140 137   K 4.0 3.9 3.4*   * 113* 102   CO2 14* 21 30   BUN 44* 32* 22   CREATININE 1.80* 1.23* 1.00*   GLUCOSE 97 105* 118*   ALT 94* 89* 74*   ALKPHOS 161* 154* 158*      Lab Results   Component Value Date/Time    LABALBU 1.9 02/17/2023 05:20 AM      Lab Results   Component Value Date/Time    PHOS 2.6 02/15/2023 05:40 AM      Lab Results   Component Value Date/Time    MG 1.3 02/17/2023 05:20 AM    No results found for: PREALBUMIN    Nutrition Interventions:   Food and/or Nutrient Delivery: Start Oral Nutrition Supplement, Continue Current Diet  Nutrition Education/Counseling: Education initiated (protein in diet)  Coordination of Nutrition Care: Continue to monitor while inpatient  Plan of Care discussed with: patient and daughter at bedside    Goals:  Previous Goal Met: Progress towards Goal(s) Declining  Goals: Meet at least 75% of estimated needs       Nutrition Monitoring and Evaluation:   Behavioral-Environmental Outcomes: None Identified  Food/Nutrient Intake Outcomes: Supplement Intake, Food and Nutrient Intake  Physical Signs/Symptoms Outcomes: Fluid Status or Edema, Biochemical Data, Weight, GI Status    Discharge Planning:     Too soon to determine     Peyton Bower [address] [place] [address],   Contact: [phone]

## 2023-02-17 NOTE — PROGRESS NOTES
Pt resting in bed quietly with eyes closed. Respirations even and unlabored. No distress noted. Pt wakes easily to verbal stimuli. Assessment and vitals signs as charted. Pt denies pain at this time. Pt is A & O x 4. Nasal canula remains at 2 L. Continuous pulse ox remains WNL. Cardiac monitor continues to show NSR. Ambrose catheter remains intact, draining and patent. Pt denies any further needs. Call light in reach. Bed alarm on. Will continue to monitor.

## 2023-02-17 NOTE — PROGRESS NOTES
Pt resting in bed watching TV. No dyspnea at rest noted. Continuous pulse ox at 100% on NC 3 L. Writer titrated O2 down to 2 L. Temp recheck was 99.8. Respirations at 17. Pt states she feels better at this time. Call light in reach. Bed alarm on. Will continue to monitor.

## 2023-02-17 NOTE — PROGRESS NOTES
Obi Weldon M.D. Internal Medicine Progress Note    Patient: Georgeann Soulier  Date of Admission: 2/13/2023 10:56 AM  Hospital Day # 4  Date of Evaluation: 2/17/2023      SUBJECTIVE:    Ms. Amy Post  was seen and examined today for f/u of Ureterolithiasis. She is less SOB this morning but is still nauseous and doesn't feel well. She states everytime she smells food she becomes nauseous. She did tolerate some fruit this morning. She diuresed well with IV lasix yesterday. She denies chest pain or palpitations. BP has improved and she was weaned off levophed. Renal function is improving but platelet count is still dropping. Heme/Onc recommends switching off Rocephin, which can cause thrombocytopenia. ROS:   Constitutional: negative  for fevers, and negative for chills. Respiratory: positive for shortness of breath, negative for cough, and negative for wheezing  Cardiovascular: negative for chest pain, and negative for palpitations  Gastrointestinal: negative for abdominal pain, positive for nausea, negative for vomiting, negative for diarrhea, and negative for constipation     All other systems were reviewed with the patient and are negative unless otherwise stated in HPI    -----------------------------------------------------------------  OBJECTIVE:  Vitals:   Temp: 99 °F (37.2 °C)  BP: (!) 135/56  Resp: 20  Heart Rate: 76  SpO2: 93 % on room air    Weight  Wt Readings from Last 3 Encounters:   02/17/23 139 lb 3.2 oz (63.1 kg)   02/13/23 112 lb (50.8 kg)   02/02/23 113 lb (51.3 kg)     Body mass index is 24.66 kg/m². 24HR INTAKE/OUTPUT:      Intake/Output Summary (Last 24 hours) at 2/17/2023 0801  Last data filed at 2/17/2023 0700  Gross per 24 hour   Intake 660 ml   Output 4825 ml   Net -4165 ml       Exam:  GEN:    Awake, alert and oriented x 3. EYES:  EOMI, pupils equal   NECK: Supple. No lymphadenopathy.   No carotid bruit  CVS:    regular rate and rhythm, no audible murmur  PULM:  fine bibasilar rales, no acute respiratory distress  ABD:    Bowels sounds normal.  Abdomen is soft. No distention. suprapubic tenderness to palpation. EXT:   1+ edema bilaterally . No calf tenderness. NEURO: Moves all extremities. Motor and sensory are grossly intact  SKIN:  No rashes. No skin lesions.    -----------------------------------------------------------------  DATA:  Complete Blood Count:   Recent Labs     02/15/23  0540 02/16/23  0520 02/17/23  0520   WBC 29.8* 17.7* 16.1*   RBC 2.91* 2.81* 2.79*   HGB 9.1* 8.7* 8.7*   HCT 27.5* 25.8* 25.1*   MCV 94.5 91.8 90.0   MCH 31.3 31.0 31.2   MCHC 33.1 33.7 34.7   RDW 14.6* 14.6* 14.2   PLT See Reflexed IPF Result See Reflexed IPF Result See Reflexed IPF Result       Latest Reference Range & Units 2/15/23 05:40 2/16/23 05:20 2/17/23 05:20   Platelet, Fluorescence 138 - 453 k/uL 66 (L) 52 (L) 46 (L)       Blood Glucose:   Recent Labs     02/14/23  0946 02/14/23  1535 02/14/23  1749 02/14/23  2145 02/15/23  0135 02/15/23  0540 02/16/23  0520 02/17/23  0520   GLUCOSE 90 101* 113* 108* 102* 97 105* 118*        Comprehensive Metabolic Profile:   Recent Labs     02/15/23  0540 02/16/23  0520 02/16/23  1900 02/17/23  0520    140  --  137   K 4.0 3.9  --  3.4*   * 113*  --  102   CO2 14* 21  --  30   BUN 44* 32*  --  22   CREATININE 1.80* 1.23*  --  1.00*   GLUCOSE 97 105*  --  118*   CALCIUM 7.8* 8.2*  --  8.1*   PROT 4.5* 4.4*  --  4.7*   LABALBU 2.0* 1.8*  --  1.9*   BILITOT 0.3 0.3 0.4 0.4   ALKPHOS 161* 154*  --  158*   AST 44* 37*  --  26   ALT 94* 89*  --  74*        Urinalysis:    Latest Reference Range & Units 2/13/23 17:23   Color, UA Yellow  Yellow   Turbidity UA Clear  SLIGHTLY CLOUDY ! Glucose, UA NEGATIVE  NEGATIVE   Bilirubin, Urine NEGATIVE  NEGATIVE   Ketones, Urine NEGATIVE  TRACE ! Specific Gravity, UA 1.010 - 1.020  1.025 (H)   pH, UA 5.0 - 9.0  5.5   Protein, UA NEGATIVE  2+ !    Urobilinogen, Urine Normal  Normal   Nitrite, Urine NEGATIVE  NEGATIVE   Leukocyte Esterase, Urine NEGATIVE  LARGE ! Mucus, UA None  TRACE !   WBC, UA 0 - 5 /HPF 20 TO 50   RBC, UA 0 - 2 /HPF 2 TO 5   Epithelial Cells, UA 0 - 25 /HPF 0 TO 2   Renal Epithelial, UA 0 /HPF 0 TO 2   Bacteria, UA None  2+ ! Urine Hgb NEGATIVE  2+ ! HgBA1c:    Lab Results   Component Value Date/Time    LABA1C 5.9 10/04/2022 07:31 AM       Lactic Acid:   Lab Results   Component Value Date/Time    LACTA 1.8 02/14/2023 09:46 AM    LACTA 2.2 02/14/2023 07:45 AM    LACTA 2.8 02/14/2023 05:45 AM            Microbiology:  Urine culture 2/13/23 = E coli  Blood cultures 2/13/23 x 2 = E coli  Blood cultures 2/15/23 x 2 = no growth x 21 hours    Radiology/Imaging:  XR CHEST PORTABLE   Final Result      Right PICC line distal tip is within the mid right atrium and needs to be   pulled back approximately 4 cm. Diffusely increased interstitial markings throughout both lungs with   associated ground-glass densities. Findings can be suggestive of multifocal   pneumonia. Consolidative pulmonary edema is another possibility. Small bilateral pleural effusion. FLUORO FOR SURGICAL PROCEDURES   Final Result      XR CHEST PORTABLE   Final Result   Increased interstitial opacity. While much or all of this may be chronic,   please correlate with any clinical evidence of acute interstitial edema.       A focal infiltrate is not detected                 MEDICAL DECISION MAKING:  Primary Problem(s): Severe sepsis with septic shock due to complicated UTI with ureterolithiasis and GNR bacteremia  Condition is improving  Treatment plan:   Urology assistance appreciated  S/p cystoscopy with right ureteral stent insertion 2/13/23  ID consult appreciated  Imaging: no further imaging studies ordered today  Medications:   Continue Rocephin per ID rec's  Weaned off Levophed  DC IVF's due to edema / SOB  Lasix 40 IV x 1  Medication Monitoring / High Risk Medications: Levophed     Acute kidney injury due to sepsis    Condition is  improving  Treatment plan:   Nephrology consult appreciated - Dr. Melody Talbert notes reviewed  BP support  Monitor renal function, I/O's    Elevated LFT's due to shock liver    Condition is improving  Treatment plan:   Monitor LFT's as sepsis tx'd    Thrombocytopenia  Condition is worsening  Treatment plan: monitor CBC  Medications: DC heparin    Nutrition status:   at risk for malnutrition  Dietician consult initiated    Hospital Prophylaxis:   DVT: SCDs (heparin DCd due to thrombocytopenia)    MDM Data:   Test interpretation:  My independent EKG interpretation: NSR with normal axis and intervals, non-specific ST changes  Management and/or test interpretation  discussed with Dr. Ayana Hirsch CNP  Consults and Nursing notes were personally reviewed, all current labs and imaging were personally reviewed, and tests ordered: CBC, CMP. Lactic acid      Disposition:  Shared decision making: All test results, treatment options and disposition options were discussed with the patient today  Social determinants of health that may impact management: none  Code status: Full Code   Disposition: Discharge plan is pending        Critical Care Time:  Total critical care time caring for this patient with life threatening, unstable organ failure, including direct patient contact, management of life support systems, review of data including imaging and labs, discussions with other team members and physicians at least 35 minutes so far today, excluding separately billable procedures.         Miguel Ángel Hodge MD , MARGELIA.  2/17/2023  8:01 AM

## 2023-02-17 NOTE — PROGRESS NOTES
Pt resting in bed quietly with eyes closed. Respirations even and unlabored. No distress noted. Pt wakes easily to verbal stimuli. Assessment and vital signs as charted. Pt is A & O x 4. Pt denies pain at this time. Cardiac monitor continues to show NSR. Continuous pulse ox WNL. Nasal canula titrated down from 2 L to 1 L. Pt states she feels better. No temperature noted. Pt denies any other needs at this time. Call light in reach. Bed alarm on. Will continue to monitor.

## 2023-02-17 NOTE — PROGRESS NOTES
[facility]   [phone]   Nephrology progress note  2/17/2023, 10:26 AM      Pt Name:    Hue Rock  MRN:     [id number]     YOB: 1955  Admit Date:    2/13/2023 10:56 AM  Primary Care Physician:  Yi Leonard MD   Room number  K293/R103-23    TELEHEALTH EVALUATION -- Audio/Visual (During SQOWB-52 public health emergency)    Patient's Location: [address] [place] [address] (myself) Location: [place] [address] office, [address] [facility] Nir[address]iston  Patient's nurse: Present    Chief Complaint: Nephrology following for DINA/CKD    Subjective:  Patient seen and examined  Patient remains off Levophed  Short of breath   On 2L NC  Did get some lasix IV    Objective:  24HR INTAKE/OUTPUT:    Intake/Output Summary (Last 24 hours) at 2/17/2023 1026  Last data filed at 2/17/2023 0900  Gross per 24 hour   Intake 420 ml   Output 6175 ml   Net -5755 ml       I/O last 3 completed shifts: In: 2995 [P.O.:1140; I.V.:1855]  Out: 4481 [Urine:5775]  I/O this shift:  In: -   Out: 1350 [PDWDI:5948]  Admission weight: 111 lb (50.3 kg)  Wt Readings from Last 3 Encounters:   02/17/23 139 lb 3.2 oz (63.1 kg)   02/13/23 112 lb (50.8 kg)   02/02/23 113 lb (51.3 kg)     Body mass index is 24.66 kg/m².     Physical examination  VITALS:     Vitals:    02/17/23 0600 02/17/23 0700 02/17/23 0800 02/17/23 0900   BP: 125/60 (!) 135/56 131/62 126/61   Pulse: 76 76 81 (!) 104   Resp: 20 20 16 18   Temp:   98.9 °F (37.2 °C)    TempSrc:   Temporal    SpO2: 94% 93% (!) 89% 93%   Weight:       Height:         Constitutional and General Appearance: alert and cooperative, appears comfortable, no apparent distress  Lungs: no use of accessory muscles or labored breathing noted, Respiratory effort observed to be normal  Extremities: + mild swelling  Skin:  No discoloration noted on facial skin  Neuro: no slurred speech, no facial drooping  Psychiatric: Normal mood and affect, Not agitated  Mental status: Alert and awake, Oriented to person/place/time, Able to follow commands      Due to this being a TeleHealth encounter, evaluation of the following organ systems is limited: EENT/Resp/CV/GI//MS/Neuro/Skin/Lymph    Lab Data  CBC:   Recent Labs     02/15/23  0540 02/16/23  0520 02/17/23  0520   WBC 29.8* 17.7* 16.1*   HGB 9.1* 8.7* 8.7*   HCT 27.5* 25.8* 25.1*   PLT See Reflexed IPF Result See Reflexed IPF Result See Reflexed IPF Result       BMP:  Recent Labs     02/15/23  0540 02/16/23  0520 02/17/23  0520    140 137   K 4.0 3.9 3.4*   * 113* 102   CO2 14* 21 30   BUN 44* 32* 22   CREATININE 1.80* 1.23* 1.00*   GLUCOSE 97 105* 118*   CALCIUM 7.8* 8.2* 8.1*   MG 1.5*  --  1.3*   PHOS 2.6  --   --        Hepatic:   Recent Labs     02/15/23  0540 02/16/23  0520 02/16/23  1900 02/17/23  0520   LABALBU 2.0* 1.8*  --  1.9*   AST 44* 37*  --  26   ALT 94* 89*  --  74*   BILITOT 0.3 0.3 0.4 0.4   ALKPHOS 161* 154*  --  158*           Meds:  Infusion:    sodium chloride      sodium chloride       Meds:    potassium chloride  40 mEq Oral BID WC    nystatin  5 mL Oral 4x Daily    atorvastatin  20 mg Oral Daily    levothyroxine  75 mcg Oral Daily    levofloxacin  500 mg IntraVENous Q24H    tamsulosin  0.4 mg Oral Daily    traZODone  50 mg Oral Nightly    sodium chloride flush  5-40 mL IntraVENous 2 times per day    lidocaine 1 % injection  5 mL IntraDERmal Once    sodium chloride flush  5-40 mL IntraVENous 2 times per day     Meds prn: HYDROcodone 5 mg - acetaminophen **OR** HYDROcodone 5 mg - acetaminophen, ondansetron **OR** ondansetron, sodium chloride, polyethylene glycol, sodium chloride flush, sodium chloride flush, sodium chloride, acetaminophen       Impression and Plan:  1. DINA secondary to septic shock. Likely ischemic ATN. Also noted to have moderate hydroureteronephrosis. Underwent cystoscopy with stent placement. Serum creatinine down to 1.0 today  Okay with diuretics as needed/prn    2. Septic shock secondary to UTI. Remains off pressors. .  White count slowly improving  3. Right hydroureteronephrosis status post stent placement  4. Kidney stone  5. Hypocalcemia. Improved  6. Metabolic acidosis. Resolved  7. Hypomagnesemia. Recheck magnesium in a.m.  8.  Borderline hypokalemia. Will replace    Discussed with patient and RN      Naomi Garcia MD  [facility]    Patient was evaluated through a synchronous (real-time) audio-video encounter. The patient (or guardian if applicable) is aware that this is a billable service, which includes applicable co-pays. This virtual visit was conducted with patient's (and/or legal guardian's consent). The visit was conducted pursuant to the emergency declaration under the [address] [place] [address] authority and the [employer] General Act. Patient identification was verified, and a caregiver was present when appropriate. The patient was located at home in a state where the provider was licensed to provide care    Virtual visit was done to address concerns as mentioned above. Due to this being a TeleHealth encounter (During RWD-32 public health emergency), evaluation of the following organ systems was limited: EENT/Resp/CV/GI//MS/Neuro/Skin/Lymph     Services were provided through a video synchronous discussion virtually to substitute for in-person visit.

## 2023-02-17 NOTE — PROGRESS NOTES
Occupational Therapy  Facility/Department: [facility]  Daily Treatment Note  NAME: Hue Rock  :   MRN: [id number]    Date of Service: 2023    Discharge Recommendations:  Continue to assess pending progress         Patient Diagnosis(es): There were no encounter diagnoses. Assessment    Activity Tolerance: Patient limited by fatigue;Patient limited by endurance (nausea)  Discharge Recommendations: Continue to assess pending progress      Plan   Occupational Therapy Plan  Times Per Week: 7x/wk  Times Per Day: Once a day  Current Treatment Recommendations: Strengthening;Balance training;Functional mobility training;Self-Care / ADL; Safety education & training     Restrictions   Fall risk    Subjective   Subjective  Subjective: Pt in bed,  present, c/o nausea and \"buring in mouth. Nurse aware. Pain: denied  Orientation  Overall Orientation Status: Within Functional Limits  Pain: no c/o pain           Objective    Vitals     Bed Mobility Training  Bed Mobility Training: Yes  Overall Level of Assistance: Stand-by assistance;Assist X1;Additional time  Interventions: Verbal cues;Demonstration  Balance  Sitting: Intact (EOB ~ 3 min)  Sitting - Static: Good (unsupported)  Sitting - Dynamic: Good (unsupported)  Standing: Impaired (~ 2-3 min LPUE supported)  Standing - Static: Fair  Standing - Dynamic: Fair  Transfer Training  Transfer Training: Yes  Overall Level of Assistance: Contact-guard assistance;Assist X1  Interventions: Demonstration;Verbal cues; Safety awareness training (hand placement, P return)  Gait Training  Gait Training: Yes  Gait  Overall Level of Assistance: Contact-guard assistance;Assist X1  Interventions: Verbal cues; Safety awareness training  Distance (ft):  (normal household distance)  Assistive Device: Walker, rolling;Gait belt     ADL  Feeding: Independent  Grooming: Setup;Supervision  Grooming Skilled Clinical Factors: seated in bed supported  UE Bathing: Supervision;Setup  UE Bathing Skilled Clinical Factors: seated in bed supported        Safety Devices  Type of Devices: All fall risk precautions in place;Call light within reach; Left in chair;Nurse notified     Patient Education  Education Given To: Patient  Education Provided: Role of Therapy;Plan of Care;Transfer Training;Energy Conservation; Fall Prevention Strategies  Education Provided Comments: safety/tech ADLs transfers  Education Method: Demonstration;Verbal  Barriers to Learning: None  Education Outcome: Verbalized understanding;Demonstrated understanding    Goals  Short Term Goals  Time Frame for Short Term Goals: 20 visits  Short Term Goal 1: Pt. will tolerate 15 mins of BUE ther ex/act to increase overall strength/activity tolerance for functional tasks. Short Term Goal 2: Pt. will demo standing tolerance x 5-7 mins w/o LOB to increase safety/participation with ADL's. Short Term Goal 3: Pt. will complete ADL routine with mod I and G safety. Short Term Goal 4: Pt. will complete ADL transfers/mobility with mod I and reduced risk for falls.        Therapy Time   Individual Concurrent Group Co-treatment   Time In 0903         Time Out 0927         Minutes 24                 [facility], \[facility]\""

## 2023-02-17 NOTE — PROGRESS NOTES
[facility] - Progress Note-Telemedicine  Today's Date and Time: 2/17/2023, 7:12 AM    Impression :   E. Coli septicemia 2-13-23 x 2  E. Coli UTI  Right hydroureteronephrosis  S/p right ureteral stent placement on 2/13/23  Leukocytosis  DINA  Hypotension-resolved  Chronic history of nephrolithiasis  Transaminase elevation  Thrombocytopenia  Anemia    Recommendations:   Discontinue IV Levaquin because of DINA  Continue IV Rocephin 2 gm IV q 24 hr for E. Coli septicemia  E. Coli on BC x 2  Repeat BC 2/16/23: No growth   E. Coli on urine culture  Renal considerations    Medical Decision Making/Summary/Discussion:2/17/2023       Infection Control Recommendations   [place] Precautions    Antimicrobial Stewardship Recommendations     Simplification of therapy  Targeted therapy      Coordination of Outpatient Care:   Estimated Length of IV antimicrobials:TBD  Patient will need Midline Catheter Insertion: TBD  Patient will need PICC line Insertion:TBD  Patient will need: Home IV , [place],  SNF,  LTAC: TBD  Patient will need outpatient wound care:No    Chief complaint/reason for consultation:   Concern for urosepsis      History of Present Illness:   Josué Valle is a 79y.o.-year-old female who was initially admitted on 2/13/2023. Patient seen at the request of Dr. Cristina Bowman:    This patient, with a chronic history of kidney stones, saw her urologist on 2/13/23 and had complaints of 2 days of right sided flank pain with associated nausea and vomiting. A CT revealed multiple stones in the right ureter with significant hydroureteronephrosis. There are also non-obstructing stones in both kidneys. Her WBC was 34.3 at that time and she had a creatinine of 3.8. Her baseline creatinine is 0.65. She underwent emergent right-sided ureteral stenting and was then admitted to [facility].     IV Levaquin was initiated.     A the time of this consult note, the patient was noted to be hypotensive and is requiring vasopressors. She is afebrile and oxygenation well on room air. There is no growth on blood cultures thus far and the urine culture is pending. Her last UTI was noted to have grown pan sensitive E. Coli in 2021. Abnormal labs include:  Cr:2.97  GFR:17  Trop:31  Alk phos:166  ALT:143  AST:134  WBC:33.0  Hgb:9.2  Plt: 81    Imaging of the chest also revealed increased interstitial opacities with mild subsegmental atelectasis in the posterior lung bases. CT abd/pelvis 2/13/23  Impression   1. 2 obstructing calculi in the distal right ureter about 3 and 4 mm in size   noted close to the uterovesical junction. There may be additional punctate   calculus or 2 in the same region. This causes moderate right hydronephrosis   and hydroureter. 2. Nonobstructing bilateral renal calculi. 5 mm calculus in the right kidney   and a few punctate and 2 mm calculi in the left kidney. 3. Mild subsegmental atelectasis posterior lung bases. CXR 2/13/23  Impression   Increased interstitial opacity. While much or all of this may be chronic,   please correlate with any clinical evidence of acute interstitial edema.        A focal infiltrate is not detected           ID was consulted for antibiotic recommendations    CURRENT EVALUATION 2/17/2023  /60   Pulse 76   Temp 99 °F (37.2 °C) (Temporal)   Resp 20   Ht 5' 3\" (1.6 m)   Wt 139 lb 3.2 oz (63.1 kg)   LMP  (LMP Unknown)   SpO2 94%   BMI 24.66 kg/m²     Afebrile  VS stable    Patient feels better  No complaints  No new issues per RN    The patient is alert and oriented on 2 L NC.   RR 26  02 sat 91    DINA is improving  Ambrose catheter is in place    Medications reviewed:  IV Levaquin discontinued 2/14/23 and IV Rocephin initiated   BC x2 with E. coli and urine culture with E. Coli    Leukocytosis is on a down-trend    Labs, X rays reviewed: 2/17/2023    BUN:50-->44-->32-->22  Cr:2.97-->1.80-->1.23-->1.0    WBC:  33.0-->29.8-->17.7-->16.1  Hb:9.2-->9.1-->8.7  Plat: 81-->66-->52-->46    CRP:217.4  AMF:[id number]    Cultures:  Urine:  2/13/23: E. coli  Blood:  2/13/23: GNR x2  Sputum :    Wound:    MRSA Nares:      Imaging:    CT abd/pelvis 2/13/23              CXR 2/13/23      Discussed with patient, RN, family. I have personally reviewed the past medical history, past surgical history, medications, social history, and family history, and I have updated the database accordingly.   Past Medical History:     Past Medical History:   Diagnosis Date    Bacteremia due to Escherichia coli 6/71/9545    Complicated UTI (urinary tract infection) 2/13/2023    Fracture of wrist, unspecified laterality, closed, initial encounter     right    History of sepsis 2009    following lithotripsy    Hyperlipidemia     Hypertension     Hypothyroidism 2007    secondary to radioactive iodine 2007    Kidney stone     Osteoporosis     Primary localized osteoarthritis of right hip 3/18/2019    Sepsis due to Escherichia coli ([address] 2/16/2023    Septic shock due to urinary tract infection ([address] 2/13/2023    Severe sepsis ([address] 2/13/2023    Thrombocytopenia ([address] 2/15/2023       Past Surgical  History:     Past Surgical History:   Procedure Laterality Date    CHOLECYSTECTOMY, LAPAROSCOPIC  2009    COLONOSCOPY  07/17/2014    Dr. Ruth Chandra - tubular adenomatous polyp removed    COLONOSCOPY N/A 08/01/2018    Dr. Liudmila Wilson architectural distortion, suggestive of mucosal prolapse    CYSTOSCOPY  2015    stent removal and reinsert     CYSTOSCOPY Left     HLL with stent    CYSTOSCOPY Left 02/05/2021    CYSTOSCOPY URETEROSCOPY LASER-HLL performed by Froilan Pastor MD at [facility] Right 2/13/2023    CYSTOSCOPY URETERAL STENT INSERTION performed by Froilan Pastor MD at [facility] Left 02/05/2021    CYSTOSCOPY RETROGRADE PYELOGRAM STENT INSERTION performed by Karena Wills MD at [facility] Right 02/13/2023    Dr. Anne Srinivasan    LITHOTRIPSY  04/28/2015    left    LITHOTRIPSY Left 01/30/2018    cystoscopy- Dr. Morgan Faye     LITHOTRIPSY Right 04/20/2021    LITHOTRIPSY Right 04/20/2021    ESWL EXTRACORPOREAL SHOCK WAVE LITHOTRIPSY performed by Karena Wills MD at [facility]  2007    radioactive iodine procedure    [place] CYSTOURETHROSCOPY Left 01/30/2018    CYSTOSCOPY performed by Karena Wills MD at [address] Left 01/30/2018    ESWL [address] LITHOTRIPSY performed by Karena Wills MD at [address] Right 03/18/2019    Dr. Margo Soriano       Medications:      potassium chloride  40 mEq Oral BID WC    magnesium sulfate  2,000 mg IntraVENous Once    furosemide  40 mg IntraVENous Once    tamsulosin  0.4 mg Oral Daily    traZODone  50 mg Oral Nightly    cefTRIAXone (ROCEPHIN) IV  2,000 mg IntraVENous Q24H    sodium bicarbonate  1,300 mg Oral TID    sodium chloride flush  5-40 mL IntraVENous 2 times per day    lidocaine 1 % injection  5 mL IntraDERmal Once    sodium chloride flush  5-40 mL IntraVENous 2 times per day       Social History:     Social History     Socioeconomic History    Marital status:      Spouse name: Not on file    Number of children: Not on file    Years of education: Not on file    Highest education level: Not on file   Occupational History    Not on file   Tobacco Use    Smoking status: Some Days     Packs/day: 0.25     Years: 30.00     Pack years: 7.50     Types: Cigarettes    Smokeless tobacco: Never   Vaping Use    Vaping Use: Never used   Substance and Sexual Activity    Alcohol use: Yes     Alcohol/week: 1.0 standard drink     Types: 1 Shots of liquor per week     Comment: socially    Drug use:  No Sexual activity: Yes     Partners: Male     Comment: postmeno   Other Topics Concern    Not on file   Social History Narrative    Not on file     Social Determinants of Health     Financial Resource Strain: Low Risk     Difficulty of Paying Living Expenses: Not hard at all   Food Insecurity: No Food Insecurity    Worried About Running Out of Food in the Last Year: Never true    Ran Out of Food in the Last Year: Never true   Transportation Needs: Not on file   Physical Activity: Insufficiently Active    Days of Exercise per Week: 2 days    Minutes of Exercise per Session: 10 min   Stress: Not on file   Social Connections: Not on file   Intimate Partner Violence: Not on file   Housing Stability: Not on file       Family History:     Family History   Problem Relation Age of Onset    Breast Cancer Maternal Grandmother     Stroke Father     Hypertension Father     Alzheimer's Disease Mother         Allergies:   Patient has no known allergies. Review of Systems:   Constitutional: No fevers or chills. Head: No headaches  Eyes: No double vision or blurry vision. No conjunctival inflammation. ENT: No sore throat or runny nose. . No hearing loss, tinnitus or vertigo. Cardiovascular: No chest pain or palpitations. No shortness of breath. No CARUSO  Lung: No shortness of breath or cough. No sputum production  Abdomen: nausea, no vomiting, no diarrhea, left sided flank pain. .   Genitourinary: Ambrose in place. Rt sided CVA pain  Musculoskeletal: No muscle aches or pains. No joint effusions, swelling or deformities  Hematologic: No bleeding or bruising. Neurologic: No headache, weakness, numbness, or tingling. Integument: No rash, no ulcers. Psychiatric: No depression. Endocrine: No polyuria, no polydipsia, no polyphagia.     Physical Examination :   Patient Vitals for the past 8 hrs:   BP Temp Temp src Pulse Resp SpO2 Weight   02/17/23 0600 125/60 -- -- 76 20 94 % --   02/17/23 0500 (!) 125/56 99 °F (37.2 °C) Temporal 68 22 93 % 139 lb 3.2 oz (63.1 kg)   02/17/23 0400 (!) 101/55 -- -- 74 22 94 % --   02/17/23 0300 (!) 118/55 -- -- 82 23 93 % --   02/17/23 0200 (!) 125/52 -- -- 78 17 91 % --   02/17/23 0100 (!) 123/52 -- -- 65 19 94 % --   02/17/23 0015 -- -- -- 65 20 (!) 88 % --   02/17/23 0000 (!) 130/58 -- -- 73 20 92 % --   02/16/23 2331 (!) 104/59 97.9 °F (36.6 °C) Temporal 69 16 96 % --       General Appearance: Awake, alert, and in no apparent distress  Head:  Normocephalic, no trauma  Eyes: Pupils equal, round, reactive to light and accommodation; extraocular movements intact; sclera anicteric; conjunctivae pink. No embolic phenomena. ENT: Oropharynx clear, without erythema, exudate, or thrush. No tenderness of sinuses. Mouth/throat: mucosa pink and moist. No lesions. Dentition in good repair. Neck:Supple, without lymphadenopathy. Thyroid normal, No bruits. Pulmonary/Chest: Diminished to auscultation, without wheezes, rales, or rhonchi. No dullness to percussion. Cardiovascular: Regular rate and rhythm without murmurs, rubs, or gallops. Abdomen: Soft,  tender. Bowel sounds normal. No organomegaly . Rt CVA pain  All four Extremities: No cyanosis, clubbing, edema, or effusions. Neurologic: No gross sensory or motor deficits. Skin: Warm and dry with good turgor. No signs of peripheral arterial or venous insufficiency. No ulcerations. No open wounds.     Medical Decision Making -Laboratory:   I have independently reviewed/ordered the following labs:    CBC with Differential:   Recent Labs     02/15/23  0540 02/16/23  0520 02/17/23  0520   WBC 29.8* 17.7* 16.1*   HGB 9.1* 8.7* 8.7*   HCT 27.5* 25.8* 25.1*   PLT See Reflexed IPF Result See Reflexed IPF Result See Reflexed IPF Result   LYMPHOPCT 3* 7*  --    MONOPCT 0* 4  --        BMP:   Recent Labs     02/15/23  0540 02/16/23  0520 02/17/23  0520    140 137   K 4.0 3.9 3.4*   * 113* 102   CO2 14* 21 30   BUN 44* 32* 22   CREATININE 1.80* 1.23* 1.00*   MG 1.5*  -- 1.3*       Hepatic Function Panel:   Recent Labs     02/16/23  0520 02/16/23  1900 02/17/23  0520   PROT 4.4*  --  4.7*   LABALBU 1.8*  --  1.9*   BILIDIR  --  <0.1  --    BILITOT 0.3 0.4 0.4   ALKPHOS 154*  --  158*   ALT 89*  --  74*   AST 37*  --  26       No results for input(s): RPR in the last 72 hours. No results for input(s): HIV in the last 72 hours. No results for input(s): BC in the last 72 hours. Lab Results   Component Value Date/Time    MUCUS TRACE 02/13/2023 05:23 PM    RBC 2.79 02/17/2023 05:20 AM    RBC 4.08 05/08/2012 08:10 AM    TRICHOMONAS NOT REPORTED 02/03/2021 02:30 PM    WBC 16.1 02/17/2023 05:20 AM    YEAST NOT REPORTED 02/03/2021 02:30 PM    TURBIDITY SLIGHTLY CLOUDY 02/13/2023 05:23 PM     Lab Results   Component Value Date/Time    CREATININE 1.00 02/17/2023 05:20 AM    GLUCOSE 118 02/17/2023 05:20 AM    GLUCOSE 102 05/08/2012 08:10 AM       Medical Decision Making-Imaging:     Narrative   EXAMINATION:   ONE XRAY VIEW OF THE CHEST       2/13/2023 9:25 am       COMPARISON:   11/07/2013       HISTORY:   ORDERING SYSTEM PROVIDED HISTORY: sepsis   TECHNOLOGIST PROVIDED HISTORY:   sepsis       FINDINGS:   Cardial pericardial silhouette is unremarkable. Increased interstitial   opacities are seen throughout both lungs. A focal infiltrate is not   detected. No pneumothorax. No free air. No acute bony abnormality. Impression   Increased interstitial opacity. While much or all of this may be chronic,   please correlate with any clinical evidence of acute interstitial edema. A focal infiltrate is not detected         Narrative   EXAMINATION:   CT OF THE ABDOMEN AND PELVIS WITHOUT CONTRAST 2/13/2023 9:50 am       TECHNIQUE:   CT of the abdomen and pelvis was performed without the administration of   intravenous contrast. Multiplanar reformatted images are provided for review.    Automated exposure control, iterative reconstruction, and/or weight based   adjustment of the mA/kV was utilized to reduce the radiation dose to as low   as reasonably achievable. COMPARISON:   02/03/2021       HISTORY:   ORDERING SYSTEM PROVIDED HISTORY: Renal colic   TECHNOLOGIST PROVIDED HISTORY:           FINDINGS:   Lower Chest: Mild subsegmental atelectasis posterior lung bases. Organs: There is 5 mm calculus in the lower pole right kidney which is also   evident on the prior. Additional smaller calculi in the right kidney on   prior no longer present. There is moderate right hydronephrosis with   significant perinephric stranding. There is also moderate right hydroureter. There are 2 calculi measuring 3 mm and 4 mm in the distal right ureter a few   cm from the uterovesical junction accounting for the obstructive signs. There may be additional punctate calculi in the same region. Left kidney shows a few punctate calculi along with a 2 mm calculus in the   lower pole. No left hydronephrosis or hydroureter. No left renal calculus. The unenhanced liver shows a 1 cm cyst in the left lobe which is unchanged. Cholecystectomy. Spleen, pancreas, adrenal glands show no significant   abnormalities. GI/Bowel: There is limited evaluation due to absence of oral contrast.       The stomach shows no focal lesions. Small bowel loops normal in caliber   showing no focal abnormalities. Normal appendix. Evaluation of the colon shows no acute process. Pelvis: Streak artifact from right hip prosthesis obscures portions of the   pelvis. There are pelvic phleboliths. Uterus and urinary bladder grossly   normal.       Peritoneum/Retroperitoneum: No free fluid. No lymphadenopathy. Atherosclerotic disease. Bones/Soft Tissues: Degenerative changes in the spine. No acute abnormality   of the bones. The superficial soft tissues show no significant abnormalities.            Impression   1. 2 obstructing calculi in the distal right ureter about 3 and 4 mm in size   noted close to the uterovesical junction. There may be additional punctate   calculus or 2 in the same region. This causes moderate right hydronephrosis   and hydroureter. 2. Nonobstructing bilateral renal calculi. 5 mm calculus in the right kidney   and a few punctate and 2 mm calculi in the left kidney. 3. Mild subsegmental atelectasis posterior lung bases. Medical Decision Atdhid-Smgnlexb-Xrefw:        Culture, Blood 1  Order: [id number]  Status: Final result    Visible to patient: Yes (not seen)    Next appt: 03/16/2023 at 03:00 PM in Radiology [facility]    Specimen Information: Blood   0 Result Notes  Component 2/13/23 1300  Resulting Agency   Specimen Description . BLOOD  [facility]   Special Requests 20ML, [address] Lab   Culture POSITIVE Blood Culture Abnormal   [address]   Culture DIRECT Lutricia Alvarez STAIN FROM BOTTLE: [address] COLI Abnormal   [address]   Culture (NOTE) Direct Gram Stain from bottle result called to and read back by:RAMON BARTLETT 2/14/2023 @[facility] [address] [place] [address]        Susceptibility    Escherichia coli (3)    Antibiotic Interpretation Microscan Method Status    ampicillin Sensitive 4 BACTERIAL SUSCEPTIBILITY PANEL RAMON Final    ceFAZolin Sensitive <=4 BACTERIAL SUSCEPTIBILITY PANEL RAMON Final    cefTRIAXone Sensitive <=0.25 BACTERIAL SUSCEPTIBILITY PANEL RAMON Final    Confirmatory Extended Spectrum Beta-Lactamase Sensitive NEGATIVE BACTERIAL SUSCEPTIBILITY PANEL RAMON Final    gentamicin Sensitive <=1 BACTERIAL SUSCEPTIBILITY PANEL RAMON Final    levofloxacin Sensitive <=0.12 BACTERIAL SUSCEPTIBILITY PANEL RAMON Final    piperacillin-tazobactam Sensitive <=4 BACTERIAL SUSCEPTIBILITY PANEL RAMON Final    tobramycin Sensitive <=1 BACTERIAL SUSCEPTIBILITY PANEL RAMON Final    trimethoprim-sulfamethoxazole Sensitive <=20 BACTERIAL SUSCEPTIBILITY PANEL RAMON Final Specimen Collected: 02/13/23 13:00 EST Last Resulted: 02/16/23 00:28 EST           Contains abnormal data Culture, Blood 1  Order: [id number]  Status: Final result    Visible to patient: Yes (not seen)    Next appt: 03/16/2023 at 03:00 PM in Radiology [facility])    Specimen Information: Blood   0 Result Notes  Component 2/13/23 1300  Resulting Agency   Specimen Description . BLOOD  [address] [facility]   Special Requests 20ML, [id number] Lab   Culture POSITIVE Blood Culture Abnormal   [address]   Culture DIRECT GRAM STAIN FROM BOTTLE: [address] COLI Abnormal   [address]   Culture (NOTE) Direct Gram Stain from bottle result called to and read back by:RAMON BARTLETT 2/14/2023 @[address] [place] [address]        Susceptibility    Escherichia coli (3)    Antibiotic Interpretation Microscan Method Status    ampicillin Sensitive 4 BACTERIAL SUSCEPTIBILITY PANEL RAMON Final    ceFAZolin Sensitive <=4 BACTERIAL SUSCEPTIBILITY PANEL RAMON Final    cefTRIAXone Sensitive <=0.25 BACTERIAL SUSCEPTIBILITY PANEL RAMON Final    Confirmatory Extended Spectrum Beta-Lactamase Sensitive NEGATIVE BACTERIAL SUSCEPTIBILITY PANEL RAMON Final    gentamicin Sensitive <=1 BACTERIAL SUSCEPTIBILITY PANEL RAMON Final    levofloxacin Sensitive <=0.12 BACTERIAL SUSCEPTIBILITY PANEL RAMON Final    piperacillin-tazobactam Sensitive <=4 BACTERIAL SUSCEPTIBILITY PANEL RAMON Final    tobramycin Sensitive <=1 BACTERIAL SUSCEPTIBILITY PANEL RAMON Final    trimethoprim-sulfamethoxazole Sensitive <=20 BACTERIAL SUSCEPTIBILITY PANEL RAMON Final             Culture, Urine  Order: [id number]  Status: Final result    Visible to patient: Yes (not seen)    Next appt: 03/16/2023 at 03:00 PM in Radiology [facility])    Specimen Information: Urine, clean catch   0 Result Notes  Component 2/13/23 1317    Specimen Description . CLEAN CATCH URINE    Culture ESCHERICHIA COLI >[id number] CFU/ML Abnormal     Resulting [address]        Susceptibility    Escherichia coli (1)    Antibiotic Interpretation Microscan Method Status    ampicillin Sensitive 4 BACTERIAL SUSCEPTIBILITY PANEL RAMON Final    ceFAZolin Sensitive <=4 BACTERIAL SUSCEPTIBILITY PANEL RAMON Final     Cefazolin sensitivity results can be used to predict the effectiveness of oral   cephalosporins (eg. Cephalexin) in uncomplicated Urinary Tract Infections due to E. coli, K.    pneumoniae, and P. mirabilis        cefTRIAXone Sensitive <=0.25 BACTERIAL SUSCEPTIBILITY PANEL RAMON Final    Confirmatory Extended Spectrum Beta-Lactamase Sensitive NEGATIVE BACTERIAL SUSCEPTIBILITY PANEL RAMON Final    gentamicin Sensitive <=1 BACTERIAL SUSCEPTIBILITY PANEL RAMON Final    levofloxacin Sensitive <=0.12 BACTERIAL SUSCEPTIBILITY PANEL RAMON Final    nitrofurantoin Sensitive <=16 BACTERIAL SUSCEPTIBILITY PANEL RAMON Final    piperacillin-tazobactam Sensitive <=4 BACTERIAL SUSCEPTIBILITY PANEL RAMON Final    tobramycin Sensitive <=1 BACTERIAL SUSCEPTIBILITY PANEL RAMON Final    trimethoprim-sulfamethoxazole Sensitive <=20 BACTERIAL SUSCEPTIBILITY PANEL RAMON                  Medical Decision Making-Other:     Note:  Labs, medications, radiologic studies were reviewed with personal review of films  Large amounts of data were reviewed  Discussed with nursing Staff, Discharge planner  Infection Control and Prevention measures reviewed  All prior entries were reviewed  Administer medications as ordered  Prognosis: Fair  Discharge planning reviewed      Thank you for allowing us to participate in the care of this patient. Please call with questions.     Zachary Rogers MD        Pager: [phone] - Office: [phone]

## 2023-02-17 NOTE — PROGRESS NOTES
Physical Therapy  Facility/Department: [facility] AT THE [facility]  Daily Treatment Note  NAME: Grace Cody  : 1955  MRN: [id number]    Date of Service: 2023    Discharge Recommendations:  Continue to assess pending progress, Home with Home health PT, Patient would benefit from continued therapy after discharge     Patient Diagnosis(es): There were no encounter diagnoses. Assessment   Assessment: Pt able to progress gait training 5ft forward and backwards x 5 trials consecutively with 2 short standing rest breaks d/t fatigue (total of 50ft with CGA). Transfers with CGA. Seated and supine/reclined B LE therex completed x 15 each. sp02 remained 87% and above on 2L supplimental oxygen. Much improved tolerance compared to previous tx. Activity Tolerance: Patient limited by fatigue;Patient limited by endurance (nausea)     Plan    Physcial Therapy Plan  General Plan: 2 times a day 7 days a week (1x per day on weekends.)     Restrictions  Restrictions/Precautions  Restrictions/Precautions: General Precautions, Fall Risk     Subjective    Subjective  Subjective: pt in chair, pleasant and agreeable to therapy. reports she is feeling a little better. Pain: no c/o pain  Orientation  Overall Orientation Status: Within Functional Limits     Objective   Bed Mobility Training  Bed Mobility Training: No  Overall Level of Assistance: Stand-by assistance;Assist X1;Additional time  Interventions: Verbal cues;Demonstration  Rolling: Stand-by assistance;Assist X1  Supine to Sit: Stand-by assistance;Assist X1  Scooting: Stand-by assistance;Assist X1  Standing - Static: Fair  Standing - Dynamic: Fair  Transfer Training  Transfer Training: Yes  Overall Level of Assistance: Contact-guard assistance;Assist X1  Interventions: Demonstration;Verbal cues; Safety awareness training  Sit to Stand: Contact-guard assistance;Assist X1  Stand to Sit: Assist X1;Contact-guard assistance  Stand Pivot Transfers: Contact-guard assistance;Assist X1  Gait Training  Gait Training: Yes  Gait  Overall Level of Assistance: Contact-guard assistance;Assist X1  Interventions: Verbal cues; Safety awareness training  Base of Support: Narrowed  Speed/Maggie: Slow  Distance (ft): 50 Feet (5ft forward and backwards x 5 trials consecutively with 2 short standing rest breaks d/t fatigue.)  Assistive Device: Walker, rolling;Gait belt     PT Exercises  Exercise Treatment: Seated and supine B LE therex x 15 with 2-3 short rest breaks d/t fatigue and mild SOB--sp02 remained 87% and above on 2L supplimental oxygen. Safety Devices  Type of Devices: All fall risk precautions in place;Call light within reach;Gait belt;Patient at risk for falls; Left in chair;Nurse notified ( in room upon completion of tx, no alarm present.)       Goals  Short Term Goals  Time Frame for Short Term Goals: 20 days  Short Term Goal 1: Initiate HEP and progress as tolerated for balance and mobility. Short Term Goal 2: Pt bed mobility will be independent without rails to allow independence at home. Short Term Goal 3: Pt will transfer independently with least restrictive device and good safety to reduce fall risk. Short Term Goal 4: Pt will ambulate independently with least restrictive device for up to 250 feet to allow community ambulation. Short Term Goal 5: Negotiate 3 steps with handrail, independently to allow entry and exit of the home. Patient Goals   Patient Goals : feel better    Education  Patient Education  Education Given To: Patient  Education Provided: Fall Prevention Strategies;Transfer Training  Education Provided Comments: Education in taking RB's for energy conservation between exercises.   Education Method: Verbal;Demonstration  Barriers to Learning: None  Education Outcome: Verbalized understanding;Continued education needed    Therapy Time   Individual Concurrent Group Co-treatment   Time In 5         Time Out 1245         Minutes [address] [place], [place] [address]

## 2023-02-17 NOTE — PLAN OF CARE
Problem: Pain  Goal: Verbalizes/displays adequate comfort level or baseline comfort level  2/16/2023 1931 by Pio Bar RN  Outcome: Progressing  Flowsheets (Taken 2/16/2023 1931)  Verbalizes/displays adequate comfort level or baseline comfort level:   Encourage patient to monitor pain and request assistance   Assess pain using appropriate pain scale  Note: Pain meds given as needed & as ordered. Will continue to assess. Problem: Safety - Adult  Goal: Free from fall injury  2/16/2023 1931 by Pio Bar RN  Outcome: Progressing  Flowsheets (Taken 2/16/2023 1931)  Free From Fall Injury: Instruct family/caregiver on patient safety  Note: Pt is at high risk for falls. Non skid socks on. Bed in low position and wheels locked. Fall sign posted and bracelet on. Siderails up x 2. Bed alarm on. Call light within reach. Will continue to assess. Problem: Infection - Adult  Goal: Absence of infection at discharge  2/16/2023 1931 by Pio Bar RN  Outcome: Progressing  Flowsheets (Taken 2/16/2023 1931)  Absence of infection at discharge:   Assess and monitor for signs and symptoms of infection   Administer medications as ordered  Note: Temperature noted. Antibiotics given as ordered. VS stable and WNL. Will continue to assess.

## 2023-02-17 NOTE — PROGRESS NOTES
Patient complains of nausea. PRN zofran administered. Suturegard Body: The suture ends were repeatedly re-tightened and re-clamped to achieve the desired tissue expansion.

## 2023-02-17 NOTE — PROGRESS NOTES
Pt sitting up in chair requesting to go to bed and to have pain medication. Writer re-zeroed bed and will weigh pt after getting in bed tonight then weigh pt again in the morning. Assessment and vital signs as charted. Pt rates pain 4/10. 1 tab Norco given. Pt states that when she is just sitting still she is not as SOB as earlier today. But when she moves around she feels more SOB. Writer assisted pt up to the bed, pt tolerated fairly well. Tachypnea noted. Increased SOB with exertion noted. Writer increased nasal canula from 2 L to 3 L. Writer helped position pt in bed for comfort. SCD's put on BLE.  at bedside visiting. Writer aries labs via PICC line per Dr. Gris Aguilar orders. Pt denies any other needs at this time. Bed alarm on. Call light in reach. Will continue to monitor.

## 2023-02-18 LAB
ABSOLUTE EOS #: 0 K/UL (ref 0–0.44)
ABSOLUTE IMMATURE GRANULOCYTE: 0.76 K/UL (ref 0–0.3)
ABSOLUTE LYMPH #: 1.33 K/UL (ref 1.1–3.7)
ABSOLUTE MONO #: 1.33 K/UL (ref 0.1–1.2)
ALBUMIN SERPL-MCNC: 2 G/DL (ref 3.5–5.2)
ALBUMIN/GLOBULIN RATIO: 0.7 (ref 1–2.5)
ALP SERPL-CCNC: 154 U/L (ref 35–104)
ALT SERPL-CCNC: 52 U/L (ref 5–33)
ANION GAP SERPL CALCULATED.3IONS-SCNC: 5 MMOL/L (ref 9–17)
AST SERPL-CCNC: 20 U/L
BASOPHILS # BLD: 0 % (ref 0–2)
BASOPHILS ABSOLUTE: 0 K/UL (ref 0–0.2)
BILIRUB SERPL-MCNC: 0.3 MG/DL (ref 0.3–1.2)
BUN SERPL-MCNC: 18 MG/DL (ref 8–23)
BUN/CREAT BLD: 21 (ref 9–20)
CALCIUM SERPL-MCNC: 8.1 MG/DL (ref 8.6–10.4)
CHLORIDE SERPL-SCNC: 101 MMOL/L (ref 98–107)
CO2 SERPL-SCNC: 32 MMOL/L (ref 20–31)
CREAT SERPL-MCNC: 0.86 MG/DL (ref 0.5–0.9)
CRP SERPL HS-MCNC: 169.2 MG/L (ref 0–5)
EOSINOPHILS RELATIVE PERCENT: 0 % (ref 1–4)
GFR SERPL CREATININE-BSD FRML MDRD: >60 ML/MIN/1.73M2
GLUCOSE SERPL-MCNC: 108 MG/DL (ref 70–99)
HCT VFR BLD AUTO: 26.3 % (ref 36.3–47.1)
HEPARIN INDUCED PLATELET ANTIBODY: 0.1 O.D. (ref 0–0.4)
HGB BLD-MCNC: 9 G/DL (ref 11.9–15.1)
IMMATURE GRANULOCYTES: 4 %
LYMPHOCYTES # BLD: 7 % (ref 24–43)
MCH RBC QN AUTO: 30.9 PG (ref 25.2–33.5)
MCHC RBC AUTO-ENTMCNC: 34.2 G/DL (ref 28.4–34.8)
MCV RBC AUTO: 90.4 FL (ref 82.6–102.9)
MONOCYTES # BLD: 7 % (ref 3–12)
MORPHOLOGY: NORMAL
NRBC AUTOMATED: 0.1 PER 100 WBC
PDW BLD-RTO: 14.3 % (ref 11.8–14.4)
PLATELET # BLD AUTO: ABNORMAL K/UL (ref 138–453)
PLATELET, FLUORESCENCE: 68 K/UL (ref 138–453)
PLATELET, IMMATURE FRACTION: 10.4 % (ref 1.1–10.3)
POTASSIUM SERPL-SCNC: 4.3 MMOL/L (ref 3.7–5.3)
PROT SERPL-MCNC: 5 G/DL (ref 6.4–8.3)
RBC # BLD: 2.91 M/UL (ref 3.95–5.11)
SARS-COV-2 RDRP RESP QL NAA+PROBE: NOT DETECTED
SEG NEUTROPHILS: 82 % (ref 36–65)
SEGMENTED NEUTROPHILS ABSOLUTE COUNT: 15.58 K/UL (ref 1.5–8.1)
SODIUM SERPL-SCNC: 138 MMOL/L (ref 135–144)
SPECIMEN DESCRIPTION: NORMAL
WBC # BLD AUTO: 19 K/UL (ref 3.5–11.3)

## 2023-02-18 PROCEDURE — 86140 C-REACTIVE PROTEIN: CPT

## 2023-02-18 PROCEDURE — 97530 THERAPEUTIC ACTIVITIES: CPT

## 2023-02-18 PROCEDURE — 99233 SBSQ HOSP IP/OBS HIGH 50: CPT | Performed by: INTERNAL MEDICINE

## 2023-02-18 PROCEDURE — 6370000000 HC RX 637 (ALT 250 FOR IP): Performed by: NURSE PRACTITIONER

## 2023-02-18 PROCEDURE — 85025 COMPLETE CBC W/AUTO DIFF WBC: CPT

## 2023-02-18 PROCEDURE — 0202U NFCT DS 22 TRGT SARS-COV-2: CPT

## 2023-02-18 PROCEDURE — 6360000002 HC RX W HCPCS: Performed by: UROLOGY

## 2023-02-18 PROCEDURE — 51702 INSERT TEMP BLADDER CATH: CPT

## 2023-02-18 PROCEDURE — 99232 SBSQ HOSP IP/OBS MODERATE 35: CPT | Performed by: INTERNAL MEDICINE

## 2023-02-18 PROCEDURE — 2580000003 HC RX 258: Performed by: UROLOGY

## 2023-02-18 PROCEDURE — 80053 COMPREHEN METABOLIC PANEL: CPT

## 2023-02-18 PROCEDURE — 87635 SARS-COV-2 COVID-19 AMP PRB: CPT

## 2023-02-18 PROCEDURE — 2580000003 HC RX 258: Performed by: NURSE PRACTITIONER

## 2023-02-18 PROCEDURE — 6360000002 HC RX W HCPCS: Performed by: NURSE PRACTITIONER

## 2023-02-18 PROCEDURE — 97535 SELF CARE MNGMENT TRAINING: CPT

## 2023-02-18 PROCEDURE — 85055 RETICULATED PLATELET ASSAY: CPT

## 2023-02-18 PROCEDURE — 6370000000 HC RX 637 (ALT 250 FOR IP): Performed by: INTERNAL MEDICINE

## 2023-02-18 PROCEDURE — 2000000000 HC ICU R&B

## 2023-02-18 PROCEDURE — 36415 COLL VENOUS BLD VENIPUNCTURE: CPT

## 2023-02-18 RX ORDER — LIDOCAINE HYDROCHLORIDE 20 MG/ML
10 SOLUTION OROPHARYNGEAL
Status: DISCONTINUED | OUTPATIENT
Start: 2023-02-18 | End: 2023-02-23 | Stop reason: HOSPADM

## 2023-02-18 RX ADMIN — NYSTATIN 500000 UNITS: 100000 SUSPENSION ORAL at 13:40

## 2023-02-18 RX ADMIN — SODIUM CHLORIDE, PRESERVATIVE FREE 10 ML: 5 INJECTION INTRAVENOUS at 11:23

## 2023-02-18 RX ADMIN — TAMSULOSIN HYDROCHLORIDE 0.4 MG: 0.4 CAPSULE ORAL at 09:16

## 2023-02-18 RX ADMIN — SODIUM CHLORIDE, PRESERVATIVE FREE 10 ML: 5 INJECTION INTRAVENOUS at 21:08

## 2023-02-18 RX ADMIN — POTASSIUM CHLORIDE 20 MEQ: 1500 TABLET, EXTENDED RELEASE ORAL at 21:08

## 2023-02-18 RX ADMIN — POTASSIUM CHLORIDE 20 MEQ: 1500 TABLET, EXTENDED RELEASE ORAL at 09:16

## 2023-02-18 RX ADMIN — SODIUM CHLORIDE, PRESERVATIVE FREE 30 ML: 5 INJECTION INTRAVENOUS at 21:23

## 2023-02-18 RX ADMIN — NYSTATIN 500000 UNITS: 100000 SUSPENSION ORAL at 21:08

## 2023-02-18 RX ADMIN — LEVOTHYROXINE SODIUM 75 MCG: 0.07 TABLET ORAL at 09:16

## 2023-02-18 RX ADMIN — LEVOFLOXACIN 500 MG: 5 INJECTION, SOLUTION INTRAVENOUS at 11:08

## 2023-02-18 RX ADMIN — HYDROCODONE BITARTRATE AND ACETAMINOPHEN 1 TABLET: 5; 325 TABLET ORAL at 11:15

## 2023-02-18 RX ADMIN — NYSTATIN 500000 UNITS: 100000 SUSPENSION ORAL at 16:31

## 2023-02-18 RX ADMIN — HYDROCODONE BITARTRATE AND ACETAMINOPHEN 1 TABLET: 5; 325 TABLET ORAL at 16:33

## 2023-02-18 RX ADMIN — TRAZODONE HYDROCHLORIDE 50 MG: 50 TABLET ORAL at 21:08

## 2023-02-18 RX ADMIN — ONDANSETRON 4 MG: 2 INJECTION INTRAMUSCULAR; INTRAVENOUS at 05:21

## 2023-02-18 RX ADMIN — SODIUM CHLORIDE, PRESERVATIVE FREE 30 ML: 5 INJECTION INTRAVENOUS at 21:07

## 2023-02-18 RX ADMIN — LIDOCAINE HYDROCHLORIDE 10 ML: 20 SOLUTION ORAL at 16:33

## 2023-02-18 RX ADMIN — ATORVASTATIN CALCIUM 20 MG: 20 TABLET, FILM COATED ORAL at 09:16

## 2023-02-18 RX ADMIN — ONDANSETRON 4 MG: 2 INJECTION INTRAMUSCULAR; INTRAVENOUS at 21:12

## 2023-02-18 RX ADMIN — NYSTATIN 500000 UNITS: 100000 SUSPENSION ORAL at 09:16

## 2023-02-18 RX ADMIN — LIDOCAINE HYDROCHLORIDE 10 ML: 20 SOLUTION ORAL at 21:08

## 2023-02-18 RX ADMIN — LIDOCAINE HYDROCHLORIDE 10 ML: 20 SOLUTION ORAL at 11:13

## 2023-02-18 ASSESSMENT — PAIN SCALES - WONG BAKER

## 2023-02-18 ASSESSMENT — PAIN SCALES - GENERAL
PAINLEVEL_OUTOF10: 0
PAINLEVEL_OUTOF10: 5
PAINLEVEL_OUTOF10: 0
PAINLEVEL_OUTOF10: 5
PAINLEVEL_OUTOF10: 0

## 2023-02-18 ASSESSMENT — PAIN DESCRIPTION - LOCATION
LOCATION: HEAD
LOCATION: BACK

## 2023-02-18 ASSESSMENT — PAIN DESCRIPTION - DESCRIPTORS
DESCRIPTORS: ACHING
DESCRIPTORS: ACHING

## 2023-02-18 ASSESSMENT — PAIN - FUNCTIONAL ASSESSMENT: PAIN_FUNCTIONAL_ASSESSMENT: ACTIVITIES ARE NOT PREVENTED

## 2023-02-18 ASSESSMENT — PAIN DESCRIPTION - PAIN TYPE
TYPE: ACUTE PAIN
TYPE: CHRONIC PAIN

## 2023-02-18 NOTE — PROGRESS NOTES
1900 - Assessment completed. Vital signs documented. Temp 99.1. Lung sounds clear to diminished. Some shortness of breath at rest noted. Abdomen soft and tender. Patient states that the nausea comes and goes. Skin pale, warm and dry. Ambrose intact and draining clear yellow urine. Patient alert and oriented x 4. Patient up in chair with family at bedside and denies needs at this time. Will continue to monitor.

## 2023-02-18 NOTE — PLAN OF CARE
Problem: Discharge Planning  Goal: Discharge to home or other facility with appropriate resources  2/17/2023 1912 by Kranthi Velazquez RN  Outcome: Progressing  Flowsheets (Taken 2/17/2023 1912)  Discharge to home or other facility with appropriate resources:   Identify barriers to discharge with patient and caregiver   Arrange for needed discharge resources and transportation as appropriate   Identify discharge learning needs (meds, wound care, etc)   Refer to discharge planning if patient needs post-hospital services based on physician order or complex needs related to functional status, cognitive ability or social support system     Problem: Pain  Goal: Verbalizes/displays adequate comfort level or baseline comfort level  2/17/2023 1912 by Kranthi Velazquez RN  Outcome: Progressing  Flowsheets  Taken 2/17/2023 1912  Verbalizes/displays adequate comfort level or baseline comfort level:   Encourage patient to monitor pain and request assistance   Assess pain using appropriate pain scale   Administer analgesics based on type and severity of pain and evaluate response   Implement non-pharmacological measures as appropriate and evaluate response   Notify Licensed Independent Practitioner if interventions unsuccessful or patient reports new pain  Taken 2/17/2023 1900  Verbalizes/displays adequate comfort level or baseline comfort level:   Encourage patient to monitor pain and request assistance   Assess pain using appropriate pain scale   Administer analgesics based on type and severity of pain and evaluate response   Implement non-pharmacological measures as appropriate and evaluate response   Notify Licensed Independent Practitioner if interventions unsuccessful or patient reports new pain  Note: Medicate with PRN pain medication as needed.      Problem: Safety - Adult  Goal: Free from fall injury  2/17/2023 1912 by Kranthi Velazquez RN  Outcome: Progressing  Flowsheets (Taken 2/17/2023 1912)  Free From Fall Injury:   Instruct family/caregiver on patient safety   Based on caregiver fall risk screen, instruct family/caregiver to ask for assistance with transferring infant if caregiver noted to have fall risk factors  Note: Bed/chair alarm for patient safety. Problem: Infection - Adult  Goal: Absence of infection at discharge  2/17/2023 1912 by Margaret Zamora RN  Outcome: Progressing  Flowsheets (Taken 2/17/2023 1912)  Absence of infection at discharge:   Assess and monitor for signs and symptoms of infection   Monitor lab/diagnostic results   Monitor all insertion sites i.e., indwelling lines, tubes and drains   Administer medications as ordered   Instruct and encourage patient and family to use good hand hygiene technique   Identify and instruct in appropriate isolation precautions for identified infection/condition  Note: Antibiotics as ordered     Problem: Nutrition Deficit:  Goal: Optimize nutritional status  2/17/2023 1912 by Margaret Zamora RN  Outcome: Progressing  Flowsheets (Taken 2/17/2023 1912)  Nutrient intake appropriate for improving, restoring, or maintaining nutritional needs:   Assess nutritional status and recommend course of action   Monitor oral intake, labs, and treatment plans   Recommend appropriate diets, oral nutritional supplements, and vitamin/mineral supplements   Provide specific nutrition education to patient or family as appropriate  Note: Monitor intake and output with food and fluid during stay.      Problem: Nutrition Deficit:  Goal: Optimize nutritional status  2/17/2023 1912 by Margaret Zamora RN  Outcome: Progressing  Flowsheets (Taken 2/17/2023 1912)  Nutrient intake appropriate for improving, restoring, or maintaining nutritional needs:   Assess nutritional status and recommend course of action   Monitor oral intake, labs, and treatment plans   Recommend appropriate diets, oral nutritional supplements, and vitamin/mineral supplements   Provide specific nutrition education to patient or family as appropriate  Note: Monitor intake and output with food and fluid during stay.   2/17/2023 1003 by Peyton Bower RD, LD  Outcome: Not Progressing  Flowsheets (Taken 2/17/2023 1003)  Nutrient intake appropriate for improving, restoring, or maintaining nutritional needs:   Monitor oral intake, labs, and treatment plans   Recommend appropriate diets, oral nutritional supplements, and vitamin/mineral supplements  Note: Nutrition Problem #1: Predicted inadequate energy intake  Intervention: Food and/or Nutrient Delivery: Start Oral Nutrition Supplement, Continue Current Diet    2/17/2023 0937 by Savannah Arshad RN  Outcome: Progressing

## 2023-02-18 NOTE — PROGRESS NOTES
Physical Therapy  Facility/Department: [facility] AT THE [facility]  Daily Treatment Note  NAME: Jodie Johnson  : 1955  MRN: [id number]    Date of Service: 2023    Discharge Recommendations:  Continue to assess pending progress, Home with Home health PT, Patient would benefit from continued therapy after discharge   PT Equipment Recommendations  Equipment Needed: No    Patient Diagnosis(es): There were no encounter diagnoses. Assessment   Assessment: Pt demonstrating decine this date compared to last reatment notes. Pt on 3L O2 via nasal canula this date reporting nausea. Pt agreebale and expressing desire to get up to chair for the morning. Pt SBA for supine to sit. Pt required to sit at edge of bed for approximately 5 minutes due to nausea and drop in oxygen to 84%. Therapist cued pt for pursed lipped breathing to facilitate increased O2 saturation. O2 able to rise to 92%. Pt performed BLE exercises during this time with rest breaks as needed. Pt continued to express desire to get to chair. Pt to transfer with CGA x2 for balance and line management. Pt quick to fatigue this date with moderate exertion. Pt left in chair, call light in reach, all needs met, nursing notified. Activity Tolerance: Patient limited by fatigue;Patient limited by endurance  Equipment Needed: No     Plan    Physcial Therapy Plan  General Plan: 2 times a day 7 days a week (1x daily on weekends)  Current Treatment Recommendations: Strengthening;Balance training;Functional mobility training;Transfer training; Endurance training;Home exercise program;Safety education & training; Therapeutic activities     Restrictions  Restrictions/Precautions  Restrictions/Precautions: General Precautions, Fall Risk     Subjective    Subjective  Subjective: Pt in bed upon arrival. Co-treat with BASS due to pt fatigue and intolerance to multiple bouts of therapy this date. Pt reporting she has nausea and pain in bilateral knees. Did not quantify. Pt holding basin.  Pt agreebale to get up to chair to sit for breakfast.  Orientation  Overall Orientation Status: Within Functional Limits  Cognition  Overall Cognitive Status: WFL     Objective   Vitals     Bed Mobility Training  Bed Mobility Training: Yes  Overall Level of Assistance: Stand-by assistance;Assist X1;Additional time  Interventions: Verbal cues  Rolling: Stand-by assistance;Assist X1  Supine to Sit: Stand-by assistance;Assist X1;Additional time  Scooting: Stand-by assistance;Assist X1;Additional time  Balance  Sitting: Intact (Pt sat unsupported EOB for ~5 mins.)  Sitting - Static: Good (unsupported)  Sitting - Dynamic: Fair (occasional)  Standing: Impaired  Standing - Static: Fair  Standing - Dynamic: Poor  Transfer Training  Transfer Training: Yes  Overall Level of Assistance: Contact-guard assistance;Assist X2;Additional time  Interventions: Verbal cues; Safety awareness training  Sit to Stand: Contact-guard assistance;Assist X2;Additional time  Stand to Sit: Contact-guard assistance;Assist X2;Additional time  Stand Pivot Transfers: Contact-guard assistance;Assist X2;Additional time  Bed to Chair: Contact-guard assistance; Additional time;Assist X2     PT Exercises  Exercise Treatment: Seated BLE heel toe raises and marches x5 minutes     Safety Devices  Type of Devices: All fall risk precautions in place;Call light within reach;Gait belt;Patient at risk for falls; Left in chair;Nurse notified       Goals  Short Term Goals  Time Frame for Short Term Goals: 20 days  Short Term Goal 1: Initiate HEP and progress as tolerated for balance and mobility. Short Term Goal 2: Pt bed mobility will be independent without rails to allow independence at home. Short Term Goal 3: Pt will transfer independently with least restrictive device and good safety to reduce fall risk. Short Term Goal 4: Pt will ambulate independently with least restrictive device for up to 250 feet to allow community ambulation.   Short Term Goal 5: Negotiate 3 steps with handrail, independently to allow entry and exit of the home.   Patient Goals   Patient Goals : feel better    Education  Patient Education  Education Given To: Patient  Education Provided Comments: Pt educated on energy conservation techniques and pursed lipped breathing  Education Method: Verbal;Demonstration  Barriers to Learning: None  Education Outcome: Verbalized understanding;Continued education needed    Therapy Time   Individual Concurrent Group Co-treatment   Time In 0735         Time Out 0754         Minutes 19         Timed Code Treatment Minutes: [facility], PT, DPT

## 2023-02-18 NOTE — PROGRESS NOTES
2300 - Reassessment completed. Vital signs documented. Lung sounds remain clear/diminished t/o lung fields. Patient remains SOB with rest and states that she feels worse day by day with her breathing. Oxygen at 3LPM via n/c. Unable to titrate down d/t patients saturations drop to the low 80's. Patient requested PRN pain med earlier in shift and states relief. No other needs noted at this time. Will continue to monitor. Improving, BP better  1.  ARF, non oliguric, no renal replacement rx  2.  Chyothorax--decrease in pleural fluid TG likely consequent to NPO.  When refeeding enterally need diet low in LONG chain fatty acids  3.  Hyponatremia--fluid restriction    discussed with MICU team

## 2023-02-18 NOTE — PROGRESS NOTES
[facility]   [phone]   Nephrology progress note  2/18/2023, 2:32 PM      Pt Name:    Yahaira Kitchen  MRN:     [id number]     YOB: 1955  Admit Date:    2/13/2023 10:56 AM  Primary Care Physician:  Heaven Koch MD   Room number  V629/X614-00    TELEHEALTH EVALUATION -- Audio/Visual (During WZKUN-40 public health emergency)    Patient's Location: [address] [place] [address] (myself) Location: [address], [address] [place] [address] [facility]  Patient's nurse: Present    Chief Complaint: Nephrology following for DINA    Subjective:  Patient seen and examined  Patient remains off Levophed  Good urine output      Objective:  24HR INTAKE/OUTPUT:    Intake/Output Summary (Last 24 hours) at 2/18/2023 1432  Last data filed at 2/18/2023 1108  Gross per 24 hour   Intake 696.28 ml   Output 1540 ml   Net -843.72 ml       I/O last 3 completed shifts: In: 1236.3 [P.O.:810; I.V.:40; IV Piggyback:386.3]  Out: 4125 [Urine:4125]  I/O this shift: In: 48 [P.O.:50]  Out: 475 [Urine:475]  Admission weight: 111 lb (50.3 kg)  Wt Readings from Last 3 Encounters:   02/18/23 138 lb 6.4 oz (62.8 kg)   02/13/23 112 lb (50.8 kg)   02/02/23 113 lb (51.3 kg)     Body mass index is 24.52 kg/m².     Physical examination  VITALS:     Vitals:    02/18/23 1000 02/18/23 1100 02/18/23 1200 02/18/23 1300   BP: (!) 119/57 127/63 (!) 94/47 (!) 97/45   Pulse: 83 79 78 82   Resp: 15 24 20 18   Temp:  98.8 °F (37.1 °C)     TempSrc:  Temporal     SpO2: 91% 96% 97% 96%   Weight:       Height:         Constitutional and General Appearance: alert and cooperative, appears comfortable, no apparent distress  Lungs: no use of accessory muscles or labored breathing noted, Respiratory effort observed to be normal  Extremities: + mild swelling  Skin:  No discoloration noted on facial skin  Neuro: no slurred speech, no facial drooping  Psychiatric: Normal mood and affect, Not agitated  Mental status: Alert and awake, Oriented to person/place/time, Able to follow commands      Due to this being a TeleHealth encounter, evaluation of the following organ systems is limited: EENT/Resp/CV/GI//MS/Neuro/Skin/Lymph    Lab Data  CBC:   Recent Labs     02/16/23  0520 02/17/23  0520 02/18/23  0510   WBC 17.7* 16.1* 19.0*   HGB 8.7* 8.7* 9.0*   HCT 25.8* 25.1* 26.3*   PLT See Reflexed IPF Result See Reflexed IPF Result See Reflexed IPF Result       BMP:  Recent Labs     02/16/23  0520 02/17/23  0520 02/17/23  1326 02/18/23  0510    137  --  138   K 3.9 3.4* 3.4* 4.3   * 102  --  101   CO2 21 30  --  32*   BUN 32* 22  --  18   CREATININE 1.23* 1.00*  --  0.86   GLUCOSE 105* 118*  --  108*   CALCIUM 8.2* 8.1*  --  8.1*   MG  --  1.3* 1.6  --        Hepatic:   Recent Labs     02/16/23  0520 02/16/23  1900 02/17/23  0520 02/18/23  0510   LABALBU 1.8*  --  1.9* 2.0*   AST 37*  --  26 20   ALT 89*  --  74* 52*   BILITOT 0.3 0.4 0.4 0.3   ALKPHOS 154*  --  158* 154*           Meds:  Infusion:    sodium chloride      norepinephrine      sodium chloride      sodium chloride       Meds:    nystatin  5 mL Oral 4x Daily    atorvastatin  20 mg Oral Daily    levothyroxine  75 mcg Oral Daily    levofloxacin  500 mg IntraVENous Q24H    potassium chloride  20 mEq Oral BID    tamsulosin  0.4 mg Oral Daily    traZODone  50 mg Oral Nightly    sodium chloride flush  5-40 mL IntraVENous 2 times per day    lidocaine 1 % injection  5 mL IntraDERmal Once    sodium chloride flush  5-40 mL IntraVENous 2 times per day     Meds prn: lidocaine viscous hcl, HYDROcodone 5 mg - acetaminophen **OR** HYDROcodone 5 mg - acetaminophen, ondansetron **OR** ondansetron, sodium chloride, polyethylene glycol, sodium chloride flush, sodium chloride flush, sodium chloride, acetaminophen       Impression and Plan:  1. DINA secondary to septic shock. Likely ischemic ATN. Also noted to have moderate hydroureteronephrosis. Underwent cystoscopy with stent placement. DINA has resolved.   Serum creatinine today is down to 0.8  Overall stable renal function at this time    2. Septic shock secondary to UTI. Remains off pressors. White count worsened slightly. Infectious disease following  3. Right hydroureteronephrosis status post stent placement: We will defer Ambrose catheter management to urology. Discussed with nursing staff. Okay to remove whenever okay with urology  4. Kidney stone  5. Hypocalcemia. Improved  6. Metabolic acidosis. Resolved    Overall stable renal function  Nephrology will sign off and see on an as-needed basis  Please call if any questions    Discussed with patient and RN      Kelli Akbar MD  [facility]    Patient was evaluated through a synchronous (real-time) audio-video encounter. The patient (or guardian if applicable) is aware that this is a billable service, which includes applicable co-pays. This virtual visit was conducted with patient's (and/or legal guardian's consent). The visit was conducted pursuant to the emergency declaration under the [address] [place] [address] authority and the [employer] General Act. Patient identification was verified, and a caregiver was present when appropriate. The patient was located at home in a state where the provider was licensed to provide care    Virtual visit was done to address concerns as mentioned above. Due to this being a TeleHealth encounter (During YHAXS-60 public health emergency), evaluation of the following organ systems was limited: EENT/Resp/CV/GI//MS/Neuro/Skin/Lymph     Services were provided through a video synchronous discussion virtually to substitute for in-person visit.

## 2023-02-18 NOTE — PROGRESS NOTES
Occupational Therapy  Facility/Department: [facility] AT THE [facility]  Daily Treatment Note  NAME: Ziyad Collier  :   MRN: [id number]    Date of Service: 2023    Discharge Recommendations:  Continue to assess pending progress         Patient Diagnosis(es): There were no encounter diagnoses. Assessment    Assessment: Pt's SpO2 84-92% on 3L of continuous O2. Activity Tolerance: Patient limited by fatigue;Patient limited by endurance  Discharge Recommendations: Continue to assess pending progress      Plan   Occupational Therapy Plan  Times Per Week: 7x/wk  Times Per Day: Once a day  Current Treatment Recommendations: Strengthening;Balance training;Functional mobility training;Self-Care / ADL; Safety education & training     Restrictions  Restrictions/Precautions  Restrictions/Precautions: General Precautions; Fall Risk    Subjective   Subjective  Subjective: Pt lying in bed upon arrival. Pt agreed to participate in therapy session. Pain: Pt complained of mouth pain d/t thrush this date. Orientation  Overall Orientation Status: Within Functional Limits  Cognition  Overall Cognitive Status: WFL        Objective    Vitals     Bed Mobility Training  Bed Mobility Training: Yes  Overall Level of Assistance: Stand-by assistance;Assist X1;Additional time  Interventions: Verbal cues  Rolling: Stand-by assistance;Assist X1  Supine to Sit: Stand-by assistance;Assist X1;Additional time  Scooting: Stand-by assistance;Assist X1;Additional time  Balance  Sitting: Intact (Pt sat unsupported EOB for ~5 mins.)  Sitting - Static: Good (unsupported)  Sitting - Dynamic: Fair (occasional)  Standing: Impaired  Standing - Static: Fair  Standing - Dynamic: Poor  Transfer Training  Transfer Training: Yes  Overall Level of Assistance: Contact-guard assistance;Assist X2;Additional time  Interventions: Verbal cues; Safety awareness training  Sit to Stand: Contact-guard assistance;Assist X2;Additional time  Stand to Sit: Contact-guard assistance;Assist X2;Additional time  Stand Pivot Transfers: Contact-guard assistance;Assist X2;Additional time  Bed to Chair: Contact-guard assistance; Additional time;Assist X2              Safety Devices  Type of Devices: All fall risk precautions in place;Call light within reach;Gait belt;Patient at risk for falls; Left in chair;Nurse notified     Patient Education  Education Given To: Patient  Education Provided: Role of Therapy;Plan of Care;Transfer Training  Education Method: Demonstration;Verbal  Barriers to Learning: None  Education Outcome: Verbalized understanding;Demonstrated understanding    Goals  Short Term Goals  Time Frame for Short Term Goals: 20 visits  Short Term Goal 1: Pt. will tolerate 15 mins of BUE ther ex/act to increase overall strength/activity tolerance for functional tasks. Short Term Goal 2: Pt. will demo standing tolerance x 5-7 mins w/o LOB to increase safety/participation with ADL's. Short Term Goal 3: Pt. will complete ADL routine with mod I and G safety. Short Term Goal 4: Pt. will complete ADL transfers/mobility with mod I and reduced risk for falls.        Therapy Time   Individual Concurrent Group Co-treatment   Time In 0735         Time Out 0754         Minutes 19                 MONI Pulido

## 2023-02-18 NOTE — PROGRESS NOTES
Ifeoma Rodriguez M.D. Internal Medicine Progress Note    Patient: Lacie Wood  Date of Admission: 2/13/2023 10:56 AM  Hospital Day # 5  Date of Evaluation: 2/18/2023      SUBJECTIVE:    Ms. Mich Garcia  was seen and examined today for f/u of Ureterolithiasis. She was a little hypotensive yesterday afternoon after IV lasix administered however she did not require vasopressors to be restarted and her BP has improved overnight and this AM.  She still has very little appetite but has been drinking about a half bottle of ensure a couple times per day. She still gets nauseous when she smells food. No further emesis. Her mouth is extremely sore due to thrush. Started on Nystatin QID yesterday but having difficulty swallowing due to tongue pain.  at bedside. All questions answered. I brought up her CXR and CTA chest images and reviewed personally with . ROS:   Constitutional: negative  for fevers, and negative for chills. Respiratory: positive for shortness of breath, negative for cough, and negative for wheezing  Cardiovascular: negative for chest pain, and negative for palpitations  Gastrointestinal: negative for abdominal pain, positive for nausea, negative for vomiting, negative for diarrhea, and negative for constipation     All other systems were reviewed with the patient and are negative unless otherwise stated in HPI    -----------------------------------------------------------------  OBJECTIVE:  Vitals:   Temp: 99 °F (37.2 °C)  BP: (!) 126/58  Resp: 17  Heart Rate: 77  SpO2: 95 % on 3L nasal cannula    Weight  Wt Readings from Last 3 Encounters:   02/18/23 138 lb 6.4 oz (62.8 kg)   02/13/23 112 lb (50.8 kg)   02/02/23 113 lb (51.3 kg)     Body mass index is 24.52 kg/m².     24HR INTAKE/OUTPUT:      Intake/Output Summary (Last 24 hours) at 2/18/2023 0987  Last data filed at 2/18/2023 0500  Gross per 24 hour   Intake 766.28 ml   Output 1800 ml   Net -1033.72 ml       Exam:  GEN:    Awake, alert and oriented x 3. EYES:  EOMI, pupils equal   NECK: Supple. No lymphadenopathy. No carotid bruit  CVS:    regular rate and rhythm, no audible murmur  PULM:  fine bibasilar rales, no acute respiratory distress  ABD:    Bowels sounds normal.  Abdomen is soft. No distention. suprapubic tenderness to palpation. EXT:   1+ edema bilaterally . No calf tenderness. NEURO: Moves all extremities. Motor and sensory are grossly intact  SKIN:  No rashes. No skin lesions.    -----------------------------------------------------------------  DATA:  Complete Blood Count:   Recent Labs     02/16/23  0520 02/17/23  0520 02/18/23  0510   WBC 17.7* 16.1* 19.0*   RBC 2.81* 2.79* 2.91*   HGB 8.7* 8.7* 9.0*   HCT 25.8* 25.1* 26.3*   MCV 91.8 90.0 90.4   MCH 31.0 31.2 30.9   MCHC 33.7 34.7 34.2   RDW 14.6* 14.2 14.3   PLT See Reflexed IPF Result See Reflexed IPF Result See Reflexed IPF Result         Latest Reference Range  2/15/23 05:40 2/16/23 05:20 2/17/23 05:20 2/18/23 05:10   Platelet, Fluorescence 138 - 453 k/uL 66 (L) 52 (L) 46 (L) 68 (L)       Blood Glucose:   Recent Labs     02/16/23  0520 02/17/23  0520 02/18/23  0510   GLUCOSE 105* 118* 108*        Comprehensive Metabolic Profile:   Recent Labs     02/16/23  0520 02/16/23  1900 02/17/23  0520 02/17/23  1326 02/18/23  0510     --  137  --  138   K 3.9  --  3.4* 3.4* 4.3   *  --  102  --  101   CO2 21  --  30  --  32*   BUN 32*  --  22  --  18   CREATININE 1.23*  --  1.00*  --  0.86   GLUCOSE 105*  --  118*  --  108*   CALCIUM 8.2*  --  8.1*  --  8.1*   PROT 4.4*  --  4.7*  --  5.0*   LABALBU 1.8*  --  1.9*  --  2.0*   BILITOT 0.3 0.4 0.4  --  0.3   ALKPHOS 154*  --  158*  --  154*   AST 37*  --  26  --  20   ALT 89*  --  74*  --  52*        Urinalysis:    Latest Reference Range & Units 2/13/23 17:23   Color, UA Yellow  Yellow   Turbidity UA Clear  SLIGHTLY CLOUDY !    Glucose, UA NEGATIVE  NEGATIVE   Bilirubin, Urine NEGATIVE  NEGATIVE   Ketones, Urine NEGATIVE  TRACE ! Specific Gravity, UA 1.010 - 1.020  1.025 (H)   pH, UA 5.0 - 9.0  5.5   Protein, UA NEGATIVE  2+ ! Urobilinogen, Urine Normal  Normal   Nitrite, Urine NEGATIVE  NEGATIVE   Leukocyte Esterase, Urine NEGATIVE  LARGE ! Mucus, UA None  TRACE !   WBC, UA 0 - 5 /HPF 20 TO 50   RBC, UA 0 - 2 /HPF 2 TO 5   Epithelial Cells, UA 0 - 25 /HPF 0 TO 2   Renal Epithelial, UA 0 /HPF 0 TO 2   Bacteria, UA None  2+ ! Urine Hgb NEGATIVE  2+ ! HgBA1c:    Lab Results   Component Value Date/Time    LABA1C 5.9 10/04/2022 07:31 AM       Lactic Acid:   Lab Results   Component Value Date/Time    LACTA 1.8 02/14/2023 09:46 AM    LACTA 2.2 02/14/2023 07:45 AM    LACTA 2.8 02/14/2023 05:45 AM        Microbiology:  Urine culture 2/13/23 = E coli  Blood cultures 2/13/23 x 2 = E coli  Blood cultures 2/15/23 x 2 = no growth x 2 days      Radiology/Imaging:  CT CHEST PULMONARY EMBOLISM W CONTRAST   Final Result   1. Multifocal airspace disease throughout the lungs which can be seen with   atypical viral pneumonia. Correlate for any history of COVID. Moderate   bilateral pleural effusions. Compressive atelectasis in both lungs. Follow-up is recommended to document resolution. 2. Atrophic thyroid gland. 3. Atherosclerotic calcification and atheromatous plaque of the aorta. 4. Scarring and cortical thinning of the upper pole left kidney. Partially   visualized superior aspect of right ureteral stent in right renal collecting   system. 5. No clear evidence for central pulmonary embolus. 6. Moderate thoracic dextroscoliosis. XR CHEST PORTABLE   Final Result      Right PICC line distal tip is within the mid right atrium and needs to be   pulled back approximately 4 cm. Diffusely increased interstitial markings throughout both lungs with   associated ground-glass densities. Findings can be suggestive of multifocal   pneumonia. Consolidative pulmonary edema is another possibility.       Small bilateral pleural effusion. FLUORO FOR SURGICAL PROCEDURES   Final Result      XR CHEST PORTABLE   Final Result   Increased interstitial opacity. While much or all of this may be chronic,   please correlate with any clinical evidence of acute interstitial edema.       A focal infiltrate is not detected                       MEDICAL DECISION MAKING:  Primary Problem(s): Severe sepsis with septic shock due to complicated UTI with ureterolithiasis and GNR bacteremia  Condition is improving  Treatment plan:   Urology assistance appreciated  S/p cystoscopy with right ureteral stent insertion 2/13/23  ID consult appreciated - Dr. Enrique Rizzo notes reviewed  Imaging: no further imaging studies ordered today  Medications:   Continue Levofloxacin  Weaned off Levophed initially but had to be restarted yesterday  DC IVF's due to edema / SOB  Lasix 40 IV   Medication Monitoring / High Risk Medications: Levophed     Multifocal pneumonia  Condition is worsening  Treatment plan:   ID consult appreciated  Will check rapid Covid and viral respiratory panel  Imaging: CTA chest reviewed  Medications: Continue IV levofloxacin    Acute kidney injury due to sepsis    Condition is resolved  Treatment plan:   Nephrology consult appreciated - Dr. Lora Owens notes reviewed  BP support  Monitor renal function, I/O's    Elevated LFT's due to shock liver    Condition is improving  Treatment plan:   Monitor LFT's as sepsis tx'd    Thrombocytopenia  Multifactorial and likely due to combination of sepsis, shock liver, possibly medications  Heme/Onc consult appreciated - Dr. Jemma Echols note reviewed  Condition is finally improving  Treatment plan: monitor CBC  Medications: heparin DC'd    Thrush  Condition is unchanged  Medications:   Nystatin S/S  Viscous lidocaine / oragel prn    Nutrition status:   at risk for malnutrition  Dietician consult initiated    Hospital Prophylaxis:   DVT: SCDs (heparin DCd due to thrombocytopenia)    MDM Data: Test interpretation:  My independent EKG interpretation: NSR with normal axis and intervals, non-specific ST changes  Management and/or test interpretation  discussed with Dr. Ayana Hirsch CNP  Consults and Nursing notes were personally reviewed, all current labs and imaging were personally reviewed, and tests ordered: CBC, CMP. Lactic acid      Disposition:  Shared decision making: All test results, treatment options and disposition options were discussed with the patient today  Social determinants of health that may impact management: none  Code status: Full Code   Disposition: Discharge plan is pending        Critical Care Time:  Total critical care time caring for this patient with life threatening, unstable organ failure, including direct patient contact, management of life support systems, review of data including imaging and labs, discussions with other team members and physicians at least 50 minutes so far today, excluding separately billable procedures.         Miguel Ángel Hodge MD , MARGELIA.  2/18/2023  9:56 AM

## 2023-02-18 NOTE — PROGRESS NOTES
[facility] - Progress Note-Telemedicine  Today's Date and Time: 2/18/2023, 7:04 AM    Impression :   E. Coli septicemia 2-13-23 x 2  E. Coli UTI  Right hydroureteronephrosis  S/p right ureteral stent placement on 2/13/23  Leukocytosis  DINA  Improved  Hypotension-resolved  Chronic history of nephrolithiasis  Transaminase elevation  Thrombocytopenia  Anemia    Recommendations:   IV Levaquin 500 mg IV q day. Stop date 2-23-23  E. Coli on BC x 2  Repeat BC 2/16/23: No growth   E. Coli on urine culture  Monitor WBC, platelets, ProBNP, CRP    Medical Decision Making/Summary/Discussion:2/18/2023       Infection Control Recommendations   [place] Precautions    Antimicrobial Stewardship Recommendations     Simplification of therapy  Targeted therapy      Coordination of Outpatient Care:   Estimated Length of IV antimicrobials:TBD  Patient will need Midline Catheter Insertion: TBD  Patient will need PICC line Insertion:TBD  Patient will need: Home IV , [place],  SNF,  LTAC: TBD  Patient will need outpatient wound care:No    Chief complaint/reason for consultation:   Concern for urosepsis      History of Present Illness:   Noble Curran is a 79y.o.-year-old female who was initially admitted on 2/13/2023. Patient seen at the request of Dr. Weinstein Folds:    This patient, with a chronic history of kidney stones, saw her urologist on 2/13/23 and had complaints of 2 days of right sided flank pain with associated nausea and vomiting. A CT revealed multiple stones in the right ureter with significant hydroureteronephrosis. There are also non-obstructing stones in both kidneys. Her WBC was 34.3 at that time and she had a creatinine of 3.8. Her baseline creatinine is 0.65. She underwent emergent right-sided ureteral stenting and was then admitted to [facility].     IV Levaquin was initiated.     A the time of this consult note, the patient was noted to be hypotensive and is requiring vasopressors. She is afebrile and oxygenation well on room air. There is no growth on blood cultures thus far and the urine culture is pending. Her last UTI was noted to have grown pan sensitive E. Coli in 2021. Abnormal labs include:  Cr:2.97  GFR:17  Trop:31  Alk phos:166  ALT:143  AST:134  WBC:33.0  Hgb:9.2  Plt: 81    Imaging of the chest also revealed increased interstitial opacities with mild subsegmental atelectasis in the posterior lung bases. CT abd/pelvis 2/13/23  Impression   1. 2 obstructing calculi in the distal right ureter about 3 and 4 mm in size   noted close to the uterovesical junction. There may be additional punctate   calculus or 2 in the same region. This causes moderate right hydronephrosis   and hydroureter. 2. Nonobstructing bilateral renal calculi. 5 mm calculus in the right kidney   and a few punctate and 2 mm calculi in the left kidney. 3. Mild subsegmental atelectasis posterior lung bases. CXR 2/13/23  Impression   Increased interstitial opacity. While much or all of this may be chronic,   please correlate with any clinical evidence of acute interstitial edema. A focal infiltrate is not detected           ID was consulted for antibiotic recommendations    CURRENT EVALUATION 2/18/2023  BP (!) 110/54   Pulse 72   Temp 98.9 °F (37.2 °C) (Temporal)   Resp 23   Ht 5' 3\" (1.6 m)   Wt 138 lb 6.4 oz (62.8 kg)   LMP  (LMP Unknown)   SpO2 94%   BMI 24.52 kg/m²     Afebrile  VS stable    Patient stable  Has residual SOB despite diuresis. Has required increase in 02 to 3 L/min  Reports nausea and lack of desire for food  No new issues per RN    The patient is alert and oriented on 2-->3 L NC.   RR 26-->20  02 sat 91-->95    Renal function much better  Ambrose catheter is in place    Medications reviewed:   On IV Levaquin because of concern with thrombocytopenia  Thrombocytopenia likely secondary to Gram negative septicemia rather than medication induced. It should improve as sepsis improves  Leukocytosis is on a down-trend    Labs, X rays reviewed: 2/18/2023    BUN:50-->44-->22-->18  Cr:2.97-->1.80-->1.0-->0.86    WBC:  33.0-->29.8-->16.1-->19  Hb: 9.1-->8.7-->9.0  Plat: 81-->66-->52-->46-->68    CRP:217.4  [facility]:[id number]    Cultures:  Urine:  2/13/23: E. coli  Blood:  2/13/23: GNR x2  Sputum :    Wound:    MRSA Nares:      Imaging:    CT abd/pelvis 2/13/23              CXR 2/13/23      Discussed with patient, RN, family. I have personally reviewed the past medical history, past surgical history, medications, social history, and family history, and I have updated the database accordingly.   Past Medical History:     Past Medical History:   Diagnosis Date    Bacteremia due to Escherichia coli 2/95/3915    Complicated UTI (urinary tract infection) 2/13/2023    Fracture of wrist, unspecified laterality, closed, initial encounter     right    History of sepsis 2009    following lithotripsy    Hyperlipidemia     Hypertension     Hypothyroidism 2007    secondary to radioactive iodine 2007    Kidney stone     Osteoporosis     Primary localized osteoarthritis of right hip 3/18/2019    Sepsis due to Escherichia coli ([address] 2/16/2023    Septic shock due to urinary tract infection ([address] 2/13/2023    Severe sepsis ([address] 2/13/2023    Thrombocytopenia ([address] 2/15/2023       Past Surgical  History:     Past Surgical History:   Procedure Laterality Date    CHOLECYSTECTOMY, LAPAROSCOPIC  2009    COLONOSCOPY  07/17/2014    Dr. Adrián Joseph - tubular adenomatous polyp removed    COLONOSCOPY N/A 08/01/2018    Dr. Richardson Cumby architectural distortion, suggestive of mucosal prolapse    CYSTOSCOPY  2015    stent removal and reinsert     CYSTOSCOPY Left     HLL with stent    CYSTOSCOPY Left 02/05/2021    CYSTOSCOPY URETEROSCOPY LASER-HLL performed by Juan Hand MD at [address] Right 2/13/2023    CYSTOSCOPY URETERAL [facility] performed by Arlene Hernandez MD at [facility] / [address] / [place] Left 02/05/2021    CYSTOSCOPY RETROGRADE PYELOGRAM STENT INSERTION performed by Arlene Hernandez MD at [address] [place] [address] Right 02/13/2023    Dr. Efren Billings    LITHOTRIPSY  04/28/2015    left    LITHOTRIPSY Left 01/30/2018    cystoscopy- Dr. Kate Buckley     LITHOTRIPSY Right 04/20/2021    LITHOTRIPSY Right 04/20/2021    ESWL EXTRACORPOREAL SHOCK WAVE LITHOTRIPSY performed by Arlene Hernandez MD at . [facility]  2007    radioactive iodine procedure    ND CYSTOURETHROSCOPY Left 01/30/2018    CYSTOSCOPY performed by Arlene Hernandez MD at [address] [facility] Left 01/30/2018    ESWL [address] LITHOTRIPSY performed by Arlene Hernandez MD at [facility] Right 03/18/2019    Dr. Emir Emerson       Medications:      nystatin  5 mL Oral 4x Daily    atorvastatin  20 mg Oral Daily    levothyroxine  75 mcg Oral Daily    levofloxacin  500 mg IntraVENous Q24H    potassium chloride  20 mEq Oral BID    tamsulosin  0.4 mg Oral Daily    traZODone  50 mg Oral Nightly    sodium chloride flush  5-40 mL IntraVENous 2 times per day    lidocaine 1 % injection  5 mL IntraDERmal Once    sodium chloride flush  5-40 mL IntraVENous 2 times per day       Social History:     Social History     Socioeconomic History    Marital status:      Spouse name: Not on file    Number of children: Not on file    Years of education: Not on file    Highest education level: Not on file   Occupational History    Not on file   Tobacco Use    Smoking status: Some Days     Packs/day: 0.25     Years: 30.00     Pack years: 7.50     Types: Cigarettes    Smokeless tobacco: Never   Vaping Use    Vaping Use: Never used   Substance and Sexual Activity    Alcohol use:  Yes     Alcohol/week: 1.0 standard drink     Types: 1 Shots of liquor per week     Comment: socially    Drug use: No    Sexual activity: Yes     Partners: Male     Comment: postmeno   Other Topics Concern    Not on file   Social History Narrative    Not on file     Social Determinants of Health     Financial Resource Strain: Low Risk     Difficulty of Paying Living Expenses: Not hard at all   Food Insecurity: No Food Insecurity    Worried About Running Out of Food in the Last Year: Never true    Ran Out of Food in the Last Year: Never true   Transportation Needs: Not on file   Physical Activity: Insufficiently Active    Days of Exercise per Week: 2 days    Minutes of Exercise per Session: 10 min   Stress: Not on file   Social Connections: Not on file   Intimate Partner Violence: Not on file   Housing Stability: Not on file       Family History:     Family History   Problem Relation Age of Onset    Breast Cancer Maternal Grandmother     Stroke Father     Hypertension Father     Alzheimer's Disease Mother         Allergies:   Patient has no known allergies. Review of Systems:   Constitutional: No fevers or chills. Head: No headaches  Eyes: No double vision or blurry vision. No conjunctival inflammation. ENT: No sore throat or runny nose. . No hearing loss, tinnitus or vertigo. Cardiovascular: No chest pain or palpitations. No shortness of breath. No CARUSO  Lung: No shortness of breath or cough. No sputum production  Abdomen: nausea, no vomiting, no diarrhea, left sided flank pain. .   Genitourinary: Ambrose in place. Rt sided CVA pain  Musculoskeletal: No muscle aches or pains. No joint effusions, swelling or deformities  Hematologic: No bleeding or bruising. Neurologic: No headache, weakness, numbness, or tingling. Integument: No rash, no ulcers. Psychiatric: No depression. Endocrine: No polyuria, no polydipsia, no polyphagia.     Physical Examination :   Patient Vitals for the past 8 hrs:   BP Temp Temp src Pulse Resp SpO2 Weight 02/18/23 0600 (!) 110/54 -- -- 72 23 94 % --   02/18/23 0500 (!) 130/56 -- -- 75 19 95 % 138 lb 6.4 oz (62.8 kg)   02/18/23 0400 (!) 118/57 -- -- 73 26 97 % --   02/18/23 0326 -- 98.9 °F (37.2 °C) Temporal 75 24 96 % --   02/18/23 0300 (!) 108/56 -- -- 71 23 96 % --   02/18/23 0200 (!) 125/54 -- -- 80 20 -- --   02/18/23 0100 (!) 98/49 -- -- 73 22 96 % --   02/18/23 0000 (!) 106/48 -- -- 74 25 97 % --       General Appearance: Awake, alert, and in no apparent distress  Head:  Normocephalic, no trauma  Eyes: Pupils equal, round, reactive to light and accommodation; extraocular movements intact; sclera anicteric; conjunctivae pink. No embolic phenomena. ENT: Oropharynx clear, without erythema, exudate, or thrush. No tenderness of sinuses. Mouth/throat: mucosa pink and moist. No lesions. Dentition in good repair. Neck:Supple, without lymphadenopathy. Thyroid normal, No bruits. Pulmonary/Chest: Diminished to auscultation, without wheezes, rales, or rhonchi. No dullness to percussion. Cardiovascular: Regular rate and rhythm without murmurs, rubs, or gallops. Abdomen: Soft,  tender. Bowel sounds normal. No organomegaly . Rt CVA pain  All four Extremities: No cyanosis, clubbing, edema, or effusions. Neurologic: No gross sensory or motor deficits. Skin: Warm and dry with good turgor. No signs of peripheral arterial or venous insufficiency. No ulcerations. No open wounds.     Medical Decision Making -Laboratory:   I have independently reviewed/ordered the following labs:    CBC with Differential:   Recent Labs     02/16/23  0520 02/17/23  0520 02/18/23  0510   WBC 17.7* 16.1* 19.0*   HGB 8.7* 8.7* 9.0*   HCT 25.8* 25.1* 26.3*   PLT See Reflexed IPF Result See Reflexed IPF Result See Reflexed IPF Result   LYMPHOPCT 7*  --  7*   MONOPCT 4  --  7       BMP:   Recent Labs     02/17/23  0520 02/17/23  1326 02/18/23  0510     --  138   K 3.4* 3.4* 4.3     --  101   CO2 30  --  32*   BUN 22  --  18 CREATININE 1.00*  --  0.86   MG 1.3* 1.6  --        Hepatic Function Panel:   Recent Labs     02/16/23  1900 02/17/23  0520 02/18/23  0510   PROT  --  4.7* 5.0*   LABALBU  --  1.9* 2.0*   BILIDIR <0.1  --   --    BILITOT 0.4 0.4 0.3   ALKPHOS  --  158* 154*   ALT  --  74* 52*   AST  --  26 20       No results for input(s): RPR in the last 72 hours. No results for input(s): HIV in the last 72 hours. No results for input(s): BC in the last 72 hours. Lab Results   Component Value Date/Time    MUCUS TRACE 02/13/2023 05:23 PM    RBC 2.91 02/18/2023 05:10 AM    RBC 4.08 05/08/2012 08:10 AM    TRICHOMONAS NOT REPORTED 02/03/2021 02:30 PM    WBC 19.0 02/18/2023 05:10 AM    YEAST NOT REPORTED 02/03/2021 02:30 PM    TURBIDITY SLIGHTLY CLOUDY 02/13/2023 05:23 PM     Lab Results   Component Value Date/Time    CREATININE 0.86 02/18/2023 05:10 AM    GLUCOSE 108 02/18/2023 05:10 AM    GLUCOSE 102 05/08/2012 08:10 AM       Medical Decision Making-Imaging:     Narrative   EXAMINATION:   ONE XRAY VIEW OF THE CHEST       2/13/2023 9:25 am       COMPARISON:   11/07/2013       HISTORY:   ORDERING SYSTEM PROVIDED HISTORY: sepsis   TECHNOLOGIST PROVIDED HISTORY:   sepsis       FINDINGS:   Cardial pericardial silhouette is unremarkable. Increased interstitial   opacities are seen throughout both lungs. A focal infiltrate is not   detected. No pneumothorax. No free air. No acute bony abnormality. Impression   Increased interstitial opacity. While much or all of this may be chronic,   please correlate with any clinical evidence of acute interstitial edema. A focal infiltrate is not detected         Narrative   EXAMINATION:   CT OF THE ABDOMEN AND PELVIS WITHOUT CONTRAST 2/13/2023 9:50 am       TECHNIQUE:   CT of the abdomen and pelvis was performed without the administration of   intravenous contrast. Multiplanar reformatted images are provided for review.    Automated exposure control, iterative reconstruction, and/or weight based   adjustment of the mA/kV was utilized to reduce the radiation dose to as low   as reasonably achievable. COMPARISON:   02/03/2021       HISTORY:   ORDERING SYSTEM PROVIDED HISTORY: Renal colic   TECHNOLOGIST PROVIDED HISTORY:           FINDINGS:   Lower Chest: Mild subsegmental atelectasis posterior lung bases. Organs: There is 5 mm calculus in the lower pole right kidney which is also   evident on the prior. Additional smaller calculi in the right kidney on   prior no longer present. There is moderate right hydronephrosis with   significant perinephric stranding. There is also moderate right hydroureter. There are 2 calculi measuring 3 mm and 4 mm in the distal right ureter a few   cm from the uterovesical junction accounting for the obstructive signs. There may be additional punctate calculi in the same region. Left kidney shows a few punctate calculi along with a 2 mm calculus in the   lower pole. No left hydronephrosis or hydroureter. No left renal calculus. The unenhanced liver shows a 1 cm cyst in the left lobe which is unchanged. Cholecystectomy. Spleen, pancreas, adrenal glands show no significant   abnormalities. GI/Bowel: There is limited evaluation due to absence of oral contrast.       The stomach shows no focal lesions. Small bowel loops normal in caliber   showing no focal abnormalities. Normal appendix. Evaluation of the colon shows no acute process. Pelvis: Streak artifact from right hip prosthesis obscures portions of the   pelvis. There are pelvic phleboliths. Uterus and urinary bladder grossly   normal.       Peritoneum/Retroperitoneum: No free fluid. No lymphadenopathy. Atherosclerotic disease. Bones/Soft Tissues: Degenerative changes in the spine. No acute abnormality   of the bones. The superficial soft tissues show no significant abnormalities.            Impression   1. 2 obstructing calculi in the distal right ureter about 3 and 4 mm in size   noted close to the uterovesical junction. There may be additional punctate   calculus or 2 in the same region. This causes moderate right hydronephrosis   and hydroureter. 2. Nonobstructing bilateral renal calculi. 5 mm calculus in the right kidney   and a few punctate and 2 mm calculi in the left kidney. 3. Mild subsegmental atelectasis posterior lung bases. Medical Decision [id number]:        Culture, Blood 1  Order: [id number]  Status: Final result    Visible to patient: Yes (not seen)    Next appt: 03/16/2023 at 03:00 PM in Radiology [facility])    Specimen Information: Blood   0 Result Notes  Component 2/13/23 1300  Resulting Agency   Specimen Description . BLOOD  [facility]   Special Requests 20ML, [id number] Lab   Culture POSITIVE Blood Culture Abnormal   [address]   Culture DIRECT Elige Carolus STAIN FROM BOTTLE: [address] COLI Abnormal   [address]   Culture (NOTE) Direct Gram Stain from bottle result called to and read back by:RAMON BARTLETT 2/14/2023 @[facility] [address] [place] [address]        Susceptibility    Escherichia coli (3)    Antibiotic Interpretation Microscan Method Status    ampicillin Sensitive 4 BACTERIAL SUSCEPTIBILITY PANEL RAMON Final    ceFAZolin Sensitive <=4 BACTERIAL SUSCEPTIBILITY PANEL RAMON Final    cefTRIAXone Sensitive <=0.25 BACTERIAL SUSCEPTIBILITY PANEL RAMON Final    Confirmatory Extended Spectrum Beta-Lactamase Sensitive NEGATIVE BACTERIAL SUSCEPTIBILITY PANEL RAMON Final    gentamicin Sensitive <=1 BACTERIAL SUSCEPTIBILITY PANEL RAMON Final    levofloxacin Sensitive <=0.12 BACTERIAL SUSCEPTIBILITY PANEL RAMON Final    piperacillin-tazobactam Sensitive <=4 BACTERIAL SUSCEPTIBILITY PANEL RAMON Final    tobramycin Sensitive <=1 BACTERIAL SUSCEPTIBILITY PANEL RAMON Final    trimethoprim-sulfamethoxazole Sensitive <=20 BACTERIAL SUSCEPTIBILITY PANEL RAMON Final          Specimen Collected: 02/13/23 13:00 EST Last Resulted: 02/16/23 00:28 EST           Contains abnormal data Culture, Blood 1  Order: [id number]  Status: Final result    Visible to patient: Yes (not seen)    Next appt: 03/16/2023 at 03:00 PM in Radiology [facility])    Specimen Information: Blood   0 Result Notes  Component 2/13/23 1300  Resulting Agency   Specimen Description . BLOOD  [address] [facility]   Special Requests 20ML, [id number] Lab   Culture POSITIVE Blood Culture Abnormal   [address]   Culture DIRECT GRAM STAIN FROM BOTTLE: [address] COLI Abnormal   [address]   Culture (NOTE) Direct Gram Stain from bottle result called to and read back by:RAMON BARTLETT 2/14/2023 @[facility] [address] [place] [address]        Susceptibility    Escherichia coli (3)    Antibiotic Interpretation Microscan Method Status    ampicillin Sensitive 4 BACTERIAL SUSCEPTIBILITY PANEL RAMON Final    ceFAZolin Sensitive <=4 BACTERIAL SUSCEPTIBILITY PANEL RAMON Final    cefTRIAXone Sensitive <=0.25 BACTERIAL SUSCEPTIBILITY PANEL RAMON Final    Confirmatory Extended Spectrum Beta-Lactamase Sensitive NEGATIVE BACTERIAL SUSCEPTIBILITY PANEL RAMON Final    gentamicin Sensitive <=1 BACTERIAL SUSCEPTIBILITY PANEL RAMON Final    levofloxacin Sensitive <=0.12 BACTERIAL SUSCEPTIBILITY PANEL RAMON Final    piperacillin-tazobactam Sensitive <=4 BACTERIAL SUSCEPTIBILITY PANEL RAMON Final    tobramycin Sensitive <=1 BACTERIAL SUSCEPTIBILITY PANEL RAMON Final    trimethoprim-sulfamethoxazole Sensitive <=20 BACTERIAL SUSCEPTIBILITY PANEL RAMON Final             Culture, Urine  Order: [id number]  Status: Final result    Visible to patient: Yes (not seen)    Next appt: 03/16/2023 at 03:00 PM in Radiology [facility])    Specimen Information: Urine, clean catch   0 Result Notes  Component 2/13/23 1317    Specimen Description . CLEAN CATCH URINE    Culture ESCHERICHIA COLI >[id number] CFU/ML Abnormal     Resulting [address]        Susceptibility    Escherichia coli (1)    Antibiotic Interpretation Microscan Method Status    ampicillin Sensitive 4 BACTERIAL SUSCEPTIBILITY PANEL RAMON Final    ceFAZolin Sensitive <=4 BACTERIAL SUSCEPTIBILITY PANEL RAMON Final     Cefazolin sensitivity results can be used to predict the effectiveness of oral   cephalosporins (eg. Cephalexin) in uncomplicated Urinary Tract Infections due to E. coli, K.    pneumoniae, and P. mirabilis        cefTRIAXone Sensitive <=0.25 BACTERIAL SUSCEPTIBILITY PANEL RAMON Final    Confirmatory Extended Spectrum Beta-Lactamase Sensitive NEGATIVE BACTERIAL SUSCEPTIBILITY PANEL RAMON Final    gentamicin Sensitive <=1 BACTERIAL SUSCEPTIBILITY PANEL RAMON Final    levofloxacin Sensitive <=0.12 BACTERIAL SUSCEPTIBILITY PANEL RAMON Final    nitrofurantoin Sensitive <=16 BACTERIAL SUSCEPTIBILITY PANEL RAMON Final    piperacillin-tazobactam Sensitive <=4 BACTERIAL SUSCEPTIBILITY PANEL RAMON Final    tobramycin Sensitive <=1 BACTERIAL SUSCEPTIBILITY PANEL RAMON Final    trimethoprim-sulfamethoxazole Sensitive <=20 BACTERIAL SUSCEPTIBILITY PANEL RAMON                  Medical Decision Making-Other:     Note:  Labs, medications, radiologic studies were reviewed with personal review of films  Large amounts of data were reviewed  Discussed with nursing Staff, Discharge planner  Infection Control and Prevention measures reviewed  All prior entries were reviewed  Administer medications as ordered  Prognosis: Fair  Discharge planning reviewed      Thank you for allowing us to participate in the care of this patient. Please call with questions.     Moise Sauer MD        Pager: [phone] - Office: [phone]

## 2023-02-19 LAB
ABSOLUTE EOS #: 0.12 K/UL (ref 0–0.44)
ABSOLUTE IMMATURE GRANULOCYTE: 1.59 K/UL (ref 0–0.3)
ABSOLUTE LYMPH #: 1.81 K/UL (ref 1.1–3.7)
ABSOLUTE MONO #: 1.17 K/UL (ref 0.1–1.2)
ADENOVIRUS PCR: NOT DETECTED
ALBUMIN SERPL-MCNC: 2.2 G/DL (ref 3.5–5.2)
ALBUMIN/GLOBULIN RATIO: 0.7 (ref 1–2.5)
ALP SERPL-CCNC: 164 U/L (ref 35–104)
ALT SERPL-CCNC: 40 U/L (ref 5–33)
ANION GAP SERPL CALCULATED.3IONS-SCNC: 6 MMOL/L (ref 9–17)
AST SERPL-CCNC: 18 U/L
B PARAP IS1001 DNA NPH QL NAA+NON-PROBE: NOT DETECTED
B PERT DNA SPEC QL NAA+PROBE: NOT DETECTED
BASOPHILS # BLD: 0 % (ref 0–2)
BASOPHILS ABSOLUTE: 0.06 K/UL (ref 0–0.2)
BILIRUB SERPL-MCNC: 0.4 MG/DL (ref 0.3–1.2)
BNP SERPL-MCNC: 1495 PG/ML
BUN SERPL-MCNC: 15 MG/DL (ref 8–23)
BUN/CREAT BLD: 17 (ref 9–20)
CALCIUM SERPL-MCNC: 8.3 MG/DL (ref 8.6–10.4)
CHLAMYDIA PNEUMONIAE BY PCR: NOT DETECTED
CHLORIDE SERPL-SCNC: 98 MMOL/L (ref 98–107)
CO2 SERPL-SCNC: 30 MMOL/L (ref 20–31)
CORONAVIRUS 229E PCR: NOT DETECTED
CORONAVIRUS HKU1 PCR: NOT DETECTED
CORONAVIRUS NL63 PCR: NOT DETECTED
CORONAVIRUS OC43 PCR: NOT DETECTED
CREAT SERPL-MCNC: 0.86 MG/DL (ref 0.5–0.9)
CRP SERPL HS-MCNC: 178.3 MG/L (ref 0–5)
EOSINOPHILS RELATIVE PERCENT: 1 % (ref 1–4)
GFR SERPL CREATININE-BSD FRML MDRD: >60 ML/MIN/1.73M2
GLUCOSE SERPL-MCNC: 115 MG/DL (ref 70–99)
HCT VFR BLD AUTO: 23.5 % (ref 36.3–47.1)
HGB BLD-MCNC: 8 G/DL (ref 11.9–15.1)
HUMAN METAPNEUMOVIRUS PCR: NOT DETECTED
IMMATURE GRANULOCYTES: 7 %
INFLUENZA A BY PCR: NOT DETECTED
INFLUENZA B BY PCR: NOT DETECTED
LYMPHOCYTES # BLD: 8 % (ref 24–43)
MCH RBC QN AUTO: 30.9 PG (ref 25.2–33.5)
MCHC RBC AUTO-ENTMCNC: 34 G/DL (ref 28.4–34.8)
MCV RBC AUTO: 90.7 FL (ref 82.6–102.9)
MONOCYTES # BLD: 5 % (ref 3–12)
MYCOPLASMA PNEUMONIAE PCR: NOT DETECTED
NRBC AUTOMATED: 0.4 PER 100 WBC
PARAINFLUENZA 1 PCR: NOT DETECTED
PARAINFLUENZA 2 PCR: NOT DETECTED
PARAINFLUENZA 3 PCR: NOT DETECTED
PARAINFLUENZA 4 PCR: NOT DETECTED
PDW BLD-RTO: 14.5 % (ref 11.8–14.4)
PLATELET # BLD AUTO: 137 K/UL (ref 138–453)
PMV BLD AUTO: 11.6 FL (ref 8.1–13.5)
POTASSIUM SERPL-SCNC: 4.2 MMOL/L (ref 3.7–5.3)
PROT SERPL-MCNC: 5.5 G/DL (ref 6.4–8.3)
RBC # BLD: 2.59 M/UL (ref 3.95–5.11)
RESP SYNCYTIAL VIRUS PCR: NOT DETECTED
RHINO/ENTEROVIRUS PCR: DETECTED
SARS-COV-2 RNA NPH QL NAA+NON-PROBE: NOT DETECTED
SEG NEUTROPHILS: 78 % (ref 36–65)
SEGMENTED NEUTROPHILS ABSOLUTE COUNT: 17.08 K/UL (ref 1.5–8.1)
SODIUM SERPL-SCNC: 134 MMOL/L (ref 135–144)
SPECIMEN DESCRIPTION: ABNORMAL
WBC # BLD AUTO: 21.8 K/UL (ref 3.5–11.3)

## 2023-02-19 PROCEDURE — 2580000003 HC RX 258: Performed by: NURSE PRACTITIONER

## 2023-02-19 PROCEDURE — 36415 COLL VENOUS BLD VENIPUNCTURE: CPT

## 2023-02-19 PROCEDURE — 6370000000 HC RX 637 (ALT 250 FOR IP): Performed by: NURSE PRACTITIONER

## 2023-02-19 PROCEDURE — 51702 INSERT TEMP BLADDER CATH: CPT

## 2023-02-19 PROCEDURE — 99233 SBSQ HOSP IP/OBS HIGH 50: CPT | Performed by: INTERNAL MEDICINE

## 2023-02-19 PROCEDURE — 6360000002 HC RX W HCPCS: Performed by: NURSE PRACTITIONER

## 2023-02-19 PROCEDURE — 2000000000 HC ICU R&B

## 2023-02-19 PROCEDURE — 83880 ASSAY OF NATRIURETIC PEPTIDE: CPT

## 2023-02-19 PROCEDURE — 85025 COMPLETE CBC W/AUTO DIFF WBC: CPT

## 2023-02-19 PROCEDURE — 94761 N-INVAS EAR/PLS OXIMETRY MLT: CPT

## 2023-02-19 PROCEDURE — 2580000003 HC RX 258: Performed by: UROLOGY

## 2023-02-19 PROCEDURE — 86140 C-REACTIVE PROTEIN: CPT

## 2023-02-19 PROCEDURE — 2700000000 HC OXYGEN THERAPY PER DAY

## 2023-02-19 PROCEDURE — 80053 COMPREHEN METABOLIC PANEL: CPT

## 2023-02-19 RX ORDER — BISACODYL 10 MG
10 SUPPOSITORY, RECTAL RECTAL PRN
Status: DISCONTINUED | OUTPATIENT
Start: 2023-02-19 | End: 2023-02-23 | Stop reason: HOSPADM

## 2023-02-19 RX ADMIN — NYSTATIN 500000 UNITS: 100000 SUSPENSION ORAL at 20:24

## 2023-02-19 RX ADMIN — LEVOFLOXACIN 500 MG: 5 INJECTION, SOLUTION INTRAVENOUS at 10:09

## 2023-02-19 RX ADMIN — POTASSIUM CHLORIDE 20 MEQ: 1500 TABLET, EXTENDED RELEASE ORAL at 09:05

## 2023-02-19 RX ADMIN — NYSTATIN 500000 UNITS: 100000 SUSPENSION ORAL at 09:05

## 2023-02-19 RX ADMIN — NYSTATIN 500000 UNITS: 100000 SUSPENSION ORAL at 13:06

## 2023-02-19 RX ADMIN — SODIUM CHLORIDE, PRESERVATIVE FREE 10 ML: 5 INJECTION INTRAVENOUS at 20:43

## 2023-02-19 RX ADMIN — LEVOTHYROXINE SODIUM 75 MCG: 0.07 TABLET ORAL at 07:29

## 2023-02-19 RX ADMIN — NYSTATIN 500000 UNITS: 100000 SUSPENSION ORAL at 17:02

## 2023-02-19 RX ADMIN — TAMSULOSIN HYDROCHLORIDE 0.4 MG: 0.4 CAPSULE ORAL at 09:05

## 2023-02-19 RX ADMIN — HYDROCODONE BITARTRATE AND ACETAMINOPHEN 1 TABLET: 5; 325 TABLET ORAL at 13:07

## 2023-02-19 RX ADMIN — POTASSIUM CHLORIDE 20 MEQ: 1500 TABLET, EXTENDED RELEASE ORAL at 20:24

## 2023-02-19 RX ADMIN — TRAZODONE HYDROCHLORIDE 50 MG: 50 TABLET ORAL at 20:24

## 2023-02-19 RX ADMIN — SODIUM CHLORIDE, PRESERVATIVE FREE 10 ML: 5 INJECTION INTRAVENOUS at 20:24

## 2023-02-19 RX ADMIN — HYDROCODONE BITARTRATE AND ACETAMINOPHEN 1 TABLET: 5; 325 TABLET ORAL at 05:11

## 2023-02-19 RX ADMIN — ATORVASTATIN CALCIUM 20 MG: 20 TABLET, FILM COATED ORAL at 09:05

## 2023-02-19 ASSESSMENT — PAIN - FUNCTIONAL ASSESSMENT: PAIN_FUNCTIONAL_ASSESSMENT: PREVENTS OR INTERFERES SOME ACTIVE ACTIVITIES AND ADLS

## 2023-02-19 ASSESSMENT — PAIN SCALES - WONG BAKER
WONGBAKER_NUMERICALRESPONSE: 0
WONGBAKER_NUMERICALRESPONSE: 2
WONGBAKER_NUMERICALRESPONSE: 0

## 2023-02-19 ASSESSMENT — PAIN DESCRIPTION - ORIENTATION: ORIENTATION: LOWER

## 2023-02-19 ASSESSMENT — PAIN DESCRIPTION - LOCATION
LOCATION: OTHER (COMMENT)
LOCATION: ABDOMEN
LOCATION: HEAD;BACK

## 2023-02-19 ASSESSMENT — PAIN SCALES - GENERAL
PAINLEVEL_OUTOF10: 5
PAINLEVEL_OUTOF10: 5
PAINLEVEL_OUTOF10: 0
PAINLEVEL_OUTOF10: 0
PAINLEVEL_OUTOF10: 7
PAINLEVEL_OUTOF10: 5
PAINLEVEL_OUTOF10: 0

## 2023-02-19 ASSESSMENT — PAIN DESCRIPTION - DESCRIPTORS
DESCRIPTORS: ACHING
DESCRIPTORS: SPASM
DESCRIPTORS: SHARP

## 2023-02-19 ASSESSMENT — PAIN DESCRIPTION - PAIN TYPE
TYPE: ACUTE PAIN
TYPE: ACUTE PAIN

## 2023-02-19 NOTE — PROGRESS NOTES
1900 - Assessment completed. Vital signs documented and patient is afebrile. Lung sounds contain fine crackles in upper and lower lobes of both lungs. Infrequent dry nonproductive cough. Respirations easy and unlabored and patient states that the SOB is improving. Abdomen soft and  at times. Negative for nausea. Ambrose draining clear/maxim colored urine with some sediment noted in the tubing. Patient alert and oriented x4 and denies needs at this time. Call light within reach. Will continue to monitor.

## 2023-02-19 NOTE — PROGRESS NOTES
Redd Resendiz M.D. Internal Medicine Progress Note    Patient: Lacie Wood  Date of Admission: 2/13/2023 10:56 AM  Hospital Day # 6  Date of Evaluation: 2/19/2023      SUBJECTIVE:    Ms. Mich Garcia  was seen and examined today for f/u of sepsis with septic shock due to UTI and ureteralithiasis. She is feeling a little better today. She was leaking around her childress this AM and c/o discomfort, so it was DC'd and she immediately voided 500 cc. She feels a lot better now that the childress is out. She also had a BM yesterday however she is requesting a PRN order for a suppository. Her tongue is feeling a little better today, and the viscous lidocaine has really helped. She has eaten some toast and working on a banana and boost this AM.  She is down 4 lbs from yesterday. ROS:   Constitutional: negative  for fevers, and negative for chills. Respiratory: positive for shortness of breath, negative for cough, and negative for wheezing  Cardiovascular: negative for chest pain, and negative for palpitations  Gastrointestinal: negative for abdominal pain, positive for nausea, negative for vomiting, negative for diarrhea, and negative for constipation     All other systems were reviewed with the patient and are negative unless otherwise stated in HPI    -----------------------------------------------------------------  OBJECTIVE:  Vitals:   Temp: 98.4 °F (36.9 °C)  BP: (!) 116/55  Resp: 18  Heart Rate: 94  SpO2: 97 % on 3L nasal cannula    Weight  Wt Readings from Last 3 Encounters:   02/18/23 138 lb 6.4 oz (62.8 kg)   02/13/23 112 lb (50.8 kg)   02/02/23 113 lb (51.3 kg)     Body mass index is 24.52 kg/m². 24HR INTAKE/OUTPUT:      Intake/Output Summary (Last 24 hours) at 2/19/2023 0940  Last data filed at 2/19/2023 0900  Gross per 24 hour   Intake 300 ml   Output 1775 ml   Net -1475 ml       Exam:  GEN:    Awake, alert and oriented x 3. EYES:  EOMI, pupils equal   NECK: Supple. No lymphadenopathy.   No carotid bruit  CVS:    regular rate and rhythm, no audible murmur  PULM:  fine bibasilar rales, no acute respiratory distress  ABD:    Bowels sounds normal.  Abdomen is soft. No distention. suprapubic tenderness to palpation. EXT:   trace edema bilaterally . No calf tenderness. NEURO: Moves all extremities. Motor and sensory are grossly intact  SKIN:  No rashes. No skin lesions.    -----------------------------------------------------------------  DATA:  Complete Blood Count:   Recent Labs     02/17/23  0520 02/18/23  0510 02/19/23  0520   WBC 16.1* 19.0* 21.8*   RBC 2.79* 2.91* 2.59*   HGB 8.7* 9.0* 8.0*   HCT 25.1* 26.3* 23.5*   MCV 90.0 90.4 90.7   MCH 31.2 30.9 30.9   MCHC 34.7 34.2 34.0   RDW 14.2 14.3 14.5*   PLT See Reflexed IPF Result See Reflexed IPF Result 137*   MPV  --   --  11.6         Reference Range  2/15/23 05:40 2/16/23 05:20 2/17/23 05:20 2/18/23 05:10 2/19/23 05:20   Platelet, Fluorescence 138 - 453 k/uL 66 (L) 52 (L) 46 (L) 68 (L) 137 (L)       Blood Glucose:   Recent Labs     02/17/23  0520 02/18/23  0510 02/19/23  0520   GLUCOSE 118* 108* 115*        Comprehensive Metabolic Profile:   Recent Labs     02/17/23  0520 02/17/23  1326 02/18/23  0510 02/19/23  0520     --  138 134*   K 3.4* 3.4* 4.3 4.2     --  101 98   CO2 30  --  32* 30   BUN 22  --  18 15   CREATININE 1.00*  --  0.86 0.86   GLUCOSE 118*  --  108* 115*   CALCIUM 8.1*  --  8.1* 8.3*   PROT 4.7*  --  5.0* 5.5*   LABALBU 1.9*  --  2.0* 2.2*   BILITOT 0.4  --  0.3 0.4   ALKPHOS 158*  --  154* 164*   AST 26  --  20 18   ALT 74*  --  52* 40*        Urinalysis:    Latest Reference Range & Units 2/13/23 17:23   Color, UA Yellow  Yellow   Turbidity UA Clear  SLIGHTLY CLOUDY ! Glucose, UA NEGATIVE  NEGATIVE   Bilirubin, Urine NEGATIVE  NEGATIVE   Ketones, Urine NEGATIVE  TRACE ! Specific Gravity, UA 1.010 - 1.020  1.025 (H)   pH, UA 5.0 - 9.0  5.5   Protein, UA NEGATIVE  2+ !    Urobilinogen, Urine Normal  Normal   Nitrite, Urine NEGATIVE  NEGATIVE   Leukocyte Esterase, Urine NEGATIVE  LARGE ! Mucus, UA None  TRACE !   WBC, UA 0 - 5 /HPF 20 TO 50   RBC, UA 0 - 2 /HPF 2 TO 5   Epithelial Cells, UA 0 - 25 /HPF 0 TO 2   Renal Epithelial, UA 0 /HPF 0 TO 2   Bacteria, UA None  2+ ! Urine Hgb NEGATIVE  2+ ! Lactic Acid:   Lab Results   Component Value Date/Time    LACTA 1.8 02/14/2023 09:46 AM    LACTA 2.2 02/14/2023 07:45 AM    LACTA 2.8 02/14/2023 05:45 AM        Microbiology:  Urine culture 2/13/23 = E coli  Blood cultures 2/13/23 x 2 = E coli  Blood cultures 2/15/23 x 2 = no growth x 2 days     Latest Reference Range & Units 2/18/23 10:10   Adenovirus PCR Not Detected  Not Detected   B Pertussis by PCR Not Detected  Not Detected   Chlamydia pneumoniae By PCR Not Detected  Not Detected   Coronavirus 229E PCR Not Detected  Not Detected   Coronavirus HKU1 PCR Not Detected  Not Detected   Coronavirus NL63 PCR Not Detected  Not Detected   Coronavirus OC Not Detected  Not Detected   Human Metapneumovirus PCR Not Detected  Not Detected   Influenza A by PCR Not Detected  Not Detected   Influenza B by PCR Not Detected  Not Detected   Parainfluenza 1 PCR Not Detected  Not Detected   Parainfluenza 2 PCR Not Detected  Not Detected   Parainfluenza 3 PCR Not Detected  Not Detected   Parainfluenza 4 PCR Not Detected  Not Detected   Resp Syncytial Virus PCR Not Detected  Not Detected   Rhino/Enterovirus PCR Not Detected  DETECTED ! Mycoplasma pneumo by PCR Not Detected  Not Detected   SARS-CoV-2, PCR Not Detected  Not Detected   SARS-CoV-2, Rapid Not Detected  Not Detected       Radiology/Imaging:  CT CHEST PULMONARY EMBOLISM W CONTRAST   Final Result   1. Multifocal airspace disease throughout the lungs which can be seen with   atypical viral pneumonia. Correlate for any history of COVID. Moderate   bilateral pleural effusions. Compressive atelectasis in both lungs. Follow-up is recommended to document resolution.    2. Atrophic thyroid gland.   3. Atherosclerotic calcification and atheromatous plaque of the aorta. 4. Scarring and cortical thinning of the upper pole left kidney. Partially   visualized superior aspect of right ureteral stent in right renal collecting   system. 5. No clear evidence for central pulmonary embolus. 6. Moderate thoracic dextroscoliosis. XR CHEST PORTABLE   Final Result      Right PICC line distal tip is within the mid right atrium and needs to be   pulled back approximately 4 cm. Diffusely increased interstitial markings throughout both lungs with   associated ground-glass densities. Findings can be suggestive of multifocal   pneumonia. Consolidative pulmonary edema is another possibility. Small bilateral pleural effusion. FLUORO FOR SURGICAL PROCEDURES   Final Result      XR CHEST PORTABLE   Final Result   Increased interstitial opacity. While much or all of this may be chronic,   please correlate with any clinical evidence of acute interstitial edema.       A focal infiltrate is not detected                       MEDICAL DECISION MAKING:  Primary Problem(s): Severe sepsis with septic shock due to complicated UTI with ureterolithiasis and GNR bacteremia  Condition is improving  Treatment plan:   Urology assistance appreciated  S/p cystoscopy with right ureteral stent insertion 2/13/23  ID consult appreciated - Dr. Benito Cronin notes reviewed  Imaging: no further imaging studies ordered today  Medications:   Continue Levofloxacin  Weaned off Levophed i  DC IVF's due to edema / SOB  Lasix 40 IV prn   Medication Monitoring / High Risk Medications: none     Multifocal pneumonia  Condition is worsening  Treatment plan:   ID consult appreciated  Rapid Covid = negative  Viral respiratory panel = Rhino/enterovirus  Imaging: CTA chest reviewed  Medications: Continue IV levofloxacin    Acute kidney injury due to sepsis    Condition is resolved  Treatment plan:   Nephrology consult appreciated -  Susy's notes reviewed  BP support  Monitor renal function, I/O's    Elevated LFT's due to shock liver    Condition is improving  Treatment plan:   Monitor LFT's as sepsis tx'd    Thrombocytopenia  Multifactorial and likely due to combination of sepsis, shock liver, possibly medications  Condition is improving  Heme/Onc consult appreciated - Dr. Julien Rowell note reviewed  Treatment plan: monitor CBC  Medications: heparin DC'd    Thrush  Condition is improving  Medications:   Nystatin S/S  Viscous lidocaine / oragel prn    Nutrition status:   at risk for malnutrition  Dietician consult initiated    Hospital Prophylaxis:   DVT: SCDs (heparin DCd due to thrombocytopenia)    MDM Data:   Test interpretation:  My independent EKG interpretation: NSR with normal axis and intervals, non-specific ST changes  Management and/or test interpretation  discussed with Dr. Sallie Patrick CNP  Consults and Nursing notes were personally reviewed, all current labs and imaging were personally reviewed, and tests ordered: CBC, CMP. Lactic acid      Disposition:  Shared decision making: All test results, treatment options and disposition options were discussed with the patient today  Social determinants of health that may impact management: none  Code status: Full Code   Disposition: Discharge plan is pending        Critical Care Time:  Total critical care time caring for this patient with life threatening, unstable organ failure, including direct patient contact, management of life support systems, review of data including imaging and labs, discussions with other team members and physicians at least 35 minutes so far today, excluding separately billable procedures.         Abena Breen MD , M.D.  2/19/2023  9:40 AM

## 2023-02-19 NOTE — PROGRESS NOTES
[facility]  Inpatient/Observation/Outpatient Rehabilitation    Date: 2023  Patient Name: Merrick Hammer       [x] Inpatient Acute/Observation       []  Outpatient  : 1955 REFUSED. Nursing states pt is having a lot of pain/difficulty with catheter and bladder spasms currently. She states can offer PT to pt but it's okay if she refuses. Upn arrival, pt shaking her head stating \"no. I'm in too much pain. \"    Therapist/Assistant will attempt to see this patient, at our earliest opportunity.        Jorge iFsh, PTA Date: 2023

## 2023-02-19 NOTE — PROGRESS NOTES
0500 - Patient complaining of severe pain at the childress catheter site and voiding around catheter. Dr. Harman George notified regarding issue. Awaiting response.

## 2023-02-19 NOTE — PROGRESS NOTES
Patient continues to have significant leaking around childress and bladder spasms. Attempted to call Dr. Kiran Maria at this time. Left VM.

## 2023-02-19 NOTE — PROGRESS NOTES
2300 - Reassessment completed. Vital signs documented. Patient noted with a low grade temp of 99.3. Lung sounds contain fine crackles in upper lobes and diminished in lower lobes. Respirations easy and unlabored. Infrequent nonproductive cough noted. Zofran administered prior for c/o nausea and patient states relief after. Encouraged to use incentive spirometer and patient states that it makes her cough too much. Education provided that she must continue to use this tool to help her pneumonia. Patient declined to use it at this time with writer in room. Ambrose catheter draining and patient. Patient denies needs at this time. Call light within reach. Will continue to monitor.

## 2023-02-19 NOTE — PLAN OF CARE
Problem: Discharge Planning  Goal: Discharge to home or other facility with appropriate resources  Outcome: Progressing  Flowsheets  Taken 2/18/2023 1918  Discharge to home or other facility with appropriate resources:   Identify barriers to discharge with patient and caregiver   Arrange for needed discharge resources and transportation as appropriate   Identify discharge learning needs (meds, wound care, etc)   Refer to discharge planning if patient needs post-hospital services based on physician order or complex needs related to functional status, cognitive ability or social support system  Taken 2/18/2023 1900  Discharge to home or other facility with appropriate resources:   Identify barriers to discharge with patient and caregiver   Arrange for needed discharge resources and transportation as appropriate   Identify discharge learning needs (meds, wound care, etc)   Refer to discharge planning if patient needs post-hospital services based on physician order or complex needs related to functional status, cognitive ability or social support system     Problem: Pain  Goal: Verbalizes/displays adequate comfort level or baseline comfort level  Outcome: Progressing  Flowsheets  Taken 2/18/2023 1918  Verbalizes/displays adequate comfort level or baseline comfort level:   Encourage patient to monitor pain and request assistance   Assess pain using appropriate pain scale   Administer analgesics based on type and severity of pain and evaluate response   Implement non-pharmacological measures as appropriate and evaluate response   Notify Licensed Independent Practitioner if interventions unsuccessful or patient reports new pain  Taken 2/18/2023 1900  Verbalizes/displays adequate comfort level or baseline comfort level:   Encourage patient to monitor pain and request assistance   Assess pain using appropriate pain scale   Administer analgesics based on type and severity of pain and evaluate response   Implement non-pharmacological measures as appropriate and evaluate response   Notify Licensed Independent Practitioner if interventions unsuccessful or patient reports new pain  Note: PRN pain meds as needed. Problem: Safety - Adult  Goal: Free from fall injury  Outcome: Progressing  Flowsheets (Taken 2/18/2023 1918)  Free From Fall Injury:   Instruct family/caregiver on patient safety   Based on caregiver fall risk screen, instruct family/caregiver to ask for assistance with transferring infant if caregiver noted to have fall risk factors     Problem: Infection - Adult  Goal: Absence of infection at discharge  Outcome: Progressing  Flowsheets  Taken 2/18/2023 1918  Absence of infection at discharge:   Assess and monitor for signs and symptoms of infection   Monitor lab/diagnostic results   Monitor all insertion sites i.e., indwelling lines, tubes and drains   Administer medications as ordered   Instruct and encourage patient and family to use good hand hygiene technique   Identify and instruct in appropriate isolation precautions for identified infection/condition  Taken 2/18/2023 1900  Absence of infection at discharge:   Assess and monitor for signs and symptoms of infection   Monitor lab/diagnostic results   Monitor all insertion sites i.e., indwelling lines, tubes and drains   Administer medications as ordered   Instruct and encourage patient and family to use good hand hygiene technique   Identify and instruct in appropriate isolation precautions for identified infection/condition  Note: Administer antibiotics as ordered.      Problem: Nutrition Deficit:  Goal: Optimize nutritional status  Outcome: Progressing  Flowsheets (Taken 2/18/2023 1918)  Nutrient intake appropriate for improving, restoring, or maintaining nutritional needs:   Assess nutritional status and recommend course of action   Monitor oral intake, labs, and treatment plans   Recommend appropriate diets, oral nutritional supplements, and vitamin/mineral supplements   Provide specific nutrition education to patient or family as appropriate   Order, calculate, and assess calorie counts as needed  Note: Monitor intake and output of foods and fluids during stay.

## 2023-02-19 NOTE — PROGRESS NOTES
[facility] - Progress Note-Telemedicine  Today's Date and Time: 2/19/2023, 7:25 AM    Impression :   E. Coli septicemia 2-13-23 x 2  E. Coli UTI  Right hydroureteronephrosis  S/p right ureteral stent placement on 2/13/23  Leukocytosis  DINA  Improved  Hypotension-resolved  Chronic history of nephrolithiasis  Transaminase elevation  Thrombocytopenia  Anemia    Recommendations:   IV Levaquin 500 mg IV q day. Stop date 2-23-23  E. Coli on BC x 2  Repeat BC 2/16/23: No growth   E. Coli on urine culture  Monitor WBC, platelets, ProBNP, CRP    Medical Decision Making/Summary/Discussion:2/19/2023       Infection Control Recommendations   Toney Precautions    Antimicrobial Stewardship Recommendations     Simplification of therapy  Targeted therapy      Coordination of Outpatient Care:   Estimated Length of IV antimicrobials:TBD  Patient will need Midline Catheter Insertion: TBD  Patient will need PICC line Insertion:TBD  Patient will need: Home IV , [place],  SNF,  LTAC: TBD  Patient will need outpatient wound care:No    Chief complaint/reason for consultation:   Concern for urosepsis      History of Present Illness:   Guy Vizcarra is a 79y.o.-year-old female who was initially admitted on 2/13/2023. Patient seen at the request of Dr. Piotr Sanford:    This patient, with a chronic history of kidney stones, saw her urologist on 2/13/23 and had complaints of 2 days of right sided flank pain with associated nausea and vomiting. A CT revealed multiple stones in the right ureter with significant hydroureteronephrosis. There are also non-obstructing stones in both kidneys. Her WBC was 34.3 at that time and she had a creatinine of 3.8. Her baseline creatinine is 0.65. She underwent emergent right-sided ureteral stenting and was then admitted to [facility].     IV Levaquin was initiated.     A the time of this consult note, the patient was noted to be hypotensive and is requiring vasopressors. She is afebrile and oxygenation well on room air. There is no growth on blood cultures thus far and the urine culture is pending. Her last UTI was noted to have grown pan sensitive E. Coli in 2021. Abnormal labs include:  Cr:2.97  GFR:17  Trop:31  Alk phos:166  ALT:143  AST:134  WBC:33.0  Hgb:9.2  Plt: 81    Imaging of the chest also revealed increased interstitial opacities with mild subsegmental atelectasis in the posterior lung bases. CT abd/pelvis 2/13/23  Impression   1. 2 obstructing calculi in the distal right ureter about 3 and 4 mm in size   noted close to the uterovesical junction. There may be additional punctate   calculus or 2 in the same region. This causes moderate right hydronephrosis   and hydroureter. 2. Nonobstructing bilateral renal calculi. 5 mm calculus in the right kidney   and a few punctate and 2 mm calculi in the left kidney. 3. Mild subsegmental atelectasis posterior lung bases. CXR 2/13/23  Impression   Increased interstitial opacity. While much or all of this may be chronic,   please correlate with any clinical evidence of acute interstitial edema. A focal infiltrate is not detected           ID was consulted for antibiotic recommendations    CURRENT EVALUATION 2/19/2023  /60   Pulse 76   Temp 98.5 °F (36.9 °C) (Temporal)   Resp 20   Ht 5' 3\" (1.6 m)   Wt 138 lb 6.4 oz (62.8 kg)   LMP  (LMP Unknown)   SpO2 93%   BMI 24.52 kg/m²     Afebrile  VS stable  0ff pressors    Patient stable  Has developed bladder spasms and leaking around Ambrose  No other complaints  No new issues per RN    02 requirements improved  The patient is alert and oriented on 2-->3-->2 L NC.   RR 26-->20-->19  02 sat 91-->95-->99    Renal function much better  Ambrose catheter is in place with leakage and spasms  Urology to evaluate    Medications reviewed:   On IV Levaquin because of concern with thrombocytopenia  Thrombocytopenia likely secondary to Gram negative septicemia rather than medication induced. It is improving as sepsis improves  Leukocytosis is on a down-trend    Labs, X rays reviewed: 2/19/2023    BUN:50-->44-->22-->18-->15  Cr:2.97-->1.80-->1.0-->0.86  Na 134    WBC:  33.0-->29.8-->16.1-->19-->21  Hb: 9.1-->8.7-->9.0-->8.0  Plat: 81-->66-->52-->46-->68-->137    CRP:217.4  ITH:20024    Cultures:  Urine:  2/13/23: E. coli  Blood:  2/13/23: GNR x2  Sputum :    Wound:    MRSA Nares:      Imaging:    CT abd/pelvis 2/13/23              CXR 2/13/23      Discussed with patient, RN, family. I have personally reviewed the past medical history, past surgical history, medications, social history, and family history, and I have updated the database accordingly.   Past Medical History:     Past Medical History:   Diagnosis Date    Bacteremia due to Escherichia coli 7/85/6441    Complicated UTI (urinary tract infection) 2/13/2023    Fracture of wrist, unspecified laterality, closed, initial encounter     right    History of sepsis 2009    following lithotripsy    Hyperlipidemia     Hypertension     Hypothyroidism 2007    secondary to radioactive iodine 2007    Kidney stone     Osteoporosis     Primary localized osteoarthritis of right hip 3/18/2019    Sepsis due to Escherichia coli ([address] 2/16/2023    Septic shock due to urinary tract infection ([address] 2/13/2023    Severe sepsis ([address] 2/13/2023    Thrombocytopenia ([address] 2/15/2023       Past Surgical  History:     Past Surgical History:   Procedure Laterality Date    CHOLECYSTECTOMY, LAPAROSCOPIC  2009    COLONOSCOPY  07/17/2014    Dr. Jean-Claude Lainez - tubular adenomatous polyp removed    COLONOSCOPY N/A 08/01/2018    Dr. Amalia Yeh architectural distortion, suggestive of mucosal prolapse    CYSTOSCOPY  2015    stent removal and reinsert     CYSTOSCOPY Left     HLL with stent    CYSTOSCOPY Left 02/05/2021    CYSTOSCOPY URETEROSCOPY LASER-HLL performed by Billie Najjar, MD at [address] Right 2/13/2023    CYSTOSCOPY URETERAL STENT INSERTION performed by Billie Najjar, MD at [facility] / [address] [place] [address] / Alisia Hopper Left 02/05/2021    CYSTOSCOPY RETROGRADE PYELOGRAM STENT INSERTION performed by Billie Najjar, MD at [facility] Right 02/13/2023    Dr. Bell Gonzalo    LITHOTRIPSY  04/28/2015    left    LITHOTRIPSY Left 01/30/2018    cystoscopy- Dr. Rodriguez Older     LITHOTRIPSY Right 04/20/2021    LITHOTRIPSY Right 04/20/2021    ESWL EXTRACORPOREAL SHOCK WAVE LITHOTRIPSY performed by Billie Najjar, MD at [facility]  2007    radioactive iodine procedure    [place] CYSTOURETHROSCOPY Left 01/30/2018    CYSTOSCOPY performed by Billie Najjar, MD at [address] Left 01/30/2018    ESWL [address] LITHOTRIPSY [facility] Bill[facility] Najjar[facility] [address] Right 03/18/2019    Dr. Mendoza Oh       Medications:      nystatin  5 mL Oral 4x Daily    atorvastatin  20 mg Oral Daily    levothyroxine  75 mcg Oral Daily    levofloxacin  500 mg IntraVENous Q24H    potassium chloride  20 mEq Oral BID    tamsulosin  0.4 mg Oral Daily    traZODone  50 mg Oral Nightly    sodium chloride flush  5-40 mL IntraVENous 2 times per day    lidocaine 1 % injection  5 mL IntraDERmal Once    sodium chloride flush  5-40 mL IntraVENous 2 times per day       Social History:     Social History     Socioeconomic History    Marital status:      Spouse name: Not on file    Number of children: Not on file    Years of education: Not on file    Highest education level: Not on file   Occupational History    Not on file   Tobacco Use    Smoking status: Some Days     Packs/day: 0.25     Years: 30.00     Pack years: 7.50     Types: Cigarettes    Smokeless tobacco: Never   Vaping Use Vaping Use: Never used   Substance and Sexual Activity    Alcohol use: Yes     Alcohol/week: 1.0 standard drink     Types: 1 Shots of liquor per week     Comment: socially    Drug use: No    Sexual activity: Yes     Partners: Male     Comment: postmeno   Other Topics Concern    Not on file   Social History Narrative    Not on file     Social Determinants of Health     Financial Resource Strain: Low Risk     Difficulty of Paying Living Expenses: Not hard at all   Food Insecurity: No Food Insecurity    Worried About Running Out of Food in the Last Year: Never true    Ran Out of Food in the Last Year: Never true   Transportation Needs: Not on file   Physical Activity: Insufficiently Active    Days of Exercise per Week: 2 days    Minutes of Exercise per Session: 10 min   Stress: Not on file   Social Connections: Not on file   Intimate Partner Violence: Not on file   Housing Stability: Not on file       Family History:     Family History   Problem Relation Age of Onset    Breast Cancer Maternal Grandmother     Stroke Father     Hypertension Father     Alzheimer's Disease Mother         Allergies:   Patient has no known allergies. Review of Systems:   Constitutional: No fevers or chills. Head: No headaches  Eyes: No double vision or blurry vision. No conjunctival inflammation. ENT: No sore throat or runny nose. . No hearing loss, tinnitus or vertigo. Cardiovascular: No chest pain or palpitations. No shortness of breath. No CARUSO  Lung: No shortness of breath or cough. No sputum production  Abdomen: nausea, no vomiting, no diarrhea, left sided flank pain. .   Genitourinary: Ambrose in place. Rt sided CVA pain  Musculoskeletal: No muscle aches or pains. No joint effusions, swelling or deformities  Hematologic: No bleeding or bruising. Neurologic: No headache, weakness, numbness, or tingling. Integument: No rash, no ulcers. Psychiatric: No depression. Endocrine: No polyuria, no polydipsia, no polyphagia.     Physical Examination :   Patient Vitals for the past 8 hrs:   BP Temp Temp src Pulse Resp SpO2   02/19/23 0700 131/60 -- Temporal 76 20 93 %   02/19/23 0600 (!) 119/54 -- -- 73 22 98 %   02/19/23 0541 -- -- -- -- 18 --   02/19/23 0511 -- -- -- -- 16 --   02/19/23 0500 102/65 -- -- (!) 104 19 90 %   02/19/23 0400 (!) 127/92 -- -- 97 15 97 %   02/19/23 0300 (!) 126/51 98.5 °F (36.9 °C) Temporal 79 19 97 %   02/19/23 0200 123/63 -- -- 84 24 96 %   02/19/23 0100 125/63 -- -- 84 22 97 %   02/19/23 0000 (!) 130/57 -- -- 79 18 96 %       General Appearance: Awake, alert, and in no apparent distress  Head:  Normocephalic, no trauma  Eyes: Pupils equal, round, reactive to light and accommodation; extraocular movements intact; sclera anicteric; conjunctivae pink. No embolic phenomena. ENT: Oropharynx clear, without erythema, exudate, or thrush. No tenderness of sinuses. Mouth/throat: mucosa pink and moist. No lesions. Dentition in good repair. Neck:Supple, without lymphadenopathy. Thyroid normal, No bruits. Pulmonary/Chest: Diminished to auscultation, without wheezes, rales, or rhonchi. No dullness to percussion. Cardiovascular: Regular rate and rhythm without murmurs, rubs, or gallops. Abdomen: Soft,  tender. Bowel sounds normal. No organomegaly . Rt CVA pain  All four Extremities: No cyanosis, clubbing, edema, or effusions. Neurologic: No gross sensory or motor deficits. Skin: Warm and dry with good turgor. No signs of peripheral arterial or venous insufficiency. No ulcerations. No open wounds.     Medical Decision Making -Laboratory:   I have independently reviewed/ordered the following labs:    CBC with Differential:   Recent Labs     02/18/23  0510 02/19/23  0520   WBC 19.0* 21.8*   HGB 9.0* 8.0*   HCT 26.3* 23.5*   PLT See Reflexed IPF Result 137*   LYMPHOPCT 7* 8*   MONOPCT 7 5       BMP:   Recent Labs     02/17/23  0520 02/17/23  1326 02/18/23  0510 02/19/23  0520     --  138 134*   K 3.4* 3.4* 4.3 4.2    --  101 98   CO2 30  --  32* 30   BUN 22  --  18 15   CREATININE 1.00*  --  0.86 0.86   MG 1.3* 1.6  --   --        Hepatic Function Panel:   Recent Labs     02/16/23  1900 02/17/23  0520 02/18/23  0510 02/19/23  0520   PROT  --    < > 5.0* 5.5*   LABALBU  --    < > 2.0* 2.2*   BILIDIR <0.1  --   --   --    BILITOT 0.4   < > 0.3 0.4   ALKPHOS  --    < > 154* 164*   ALT  --    < > 52* 40*   AST  --    < > 20 18    < > = values in this interval not displayed. No results for input(s): RPR in the last 72 hours. No results for input(s): HIV in the last 72 hours. No results for input(s): BC in the last 72 hours. Lab Results   Component Value Date/Time    MUCUS TRACE 02/13/2023 05:23 PM    RBC 2.59 02/19/2023 05:20 AM    RBC 4.08 05/08/2012 08:10 AM    TRICHOMONAS NOT REPORTED 02/03/2021 02:30 PM    WBC 21.8 02/19/2023 05:20 AM    YEAST NOT REPORTED 02/03/2021 02:30 PM    TURBIDITY SLIGHTLY CLOUDY 02/13/2023 05:23 PM     Lab Results   Component Value Date/Time    CREATININE 0.86 02/19/2023 05:20 AM    GLUCOSE 115 02/19/2023 05:20 AM    GLUCOSE 102 05/08/2012 08:10 AM       Medical Decision Making-Imaging:     Narrative   EXAMINATION:   ONE XRAY VIEW OF THE CHEST       2/13/2023 9:25 am       COMPARISON:   11/07/2013       HISTORY:   ORDERING SYSTEM PROVIDED HISTORY: sepsis   TECHNOLOGIST PROVIDED HISTORY:   sepsis       FINDINGS:   Cardial pericardial silhouette is unremarkable. Increased interstitial   opacities are seen throughout both lungs. A focal infiltrate is not   detected. No pneumothorax. No free air. No acute bony abnormality. Impression   Increased interstitial opacity. While much or all of this may be chronic,   please correlate with any clinical evidence of acute interstitial edema.        A focal infiltrate is not detected         Narrative   EXAMINATION:   CT OF THE ABDOMEN AND PELVIS WITHOUT CONTRAST 2/13/2023 9:50 am       TECHNIQUE:   CT of the abdomen and pelvis was performed without the administration of   intravenous contrast. Multiplanar reformatted images are provided for review. Automated exposure control, iterative reconstruction, and/or weight based   adjustment of the mA/kV was utilized to reduce the radiation dose to as low   as reasonably achievable. COMPARISON:   02/03/2021       HISTORY:   ORDERING SYSTEM PROVIDED HISTORY: Renal colic   TECHNOLOGIST PROVIDED HISTORY:           FINDINGS:   Lower Chest: Mild subsegmental atelectasis posterior lung bases. Organs: There is 5 mm calculus in the lower pole right kidney which is also   evident on the prior. Additional smaller calculi in the right kidney on   prior no longer present. There is moderate right hydronephrosis with   significant perinephric stranding. There is also moderate right hydroureter. There are 2 calculi measuring 3 mm and 4 mm in the distal right ureter a few   cm from the uterovesical junction accounting for the obstructive signs. There may be additional punctate calculi in the same region. Left kidney shows a few punctate calculi along with a 2 mm calculus in the   lower pole. No left hydronephrosis or hydroureter. No left renal calculus. The unenhanced liver shows a 1 cm cyst in the left lobe which is unchanged. Cholecystectomy. Spleen, pancreas, adrenal glands show no significant   abnormalities. GI/Bowel: There is limited evaluation due to absence of oral contrast.       The stomach shows no focal lesions. Small bowel loops normal in caliber   showing no focal abnormalities. Normal appendix. Evaluation of the colon shows no acute process. Pelvis: Streak artifact from right hip prosthesis obscures portions of the   pelvis. There are pelvic phleboliths. Uterus and urinary bladder grossly   normal.       Peritoneum/Retroperitoneum: No free fluid. No lymphadenopathy. Atherosclerotic disease. Bones/Soft Tissues: Degenerative changes in the spine.  No acute abnormality   of the bones.  The superficial soft tissues show no significant abnormalities.           Impression   1. 2 obstructing calculi in the distal right ureter about 3 and 4 mm in size   noted close to the uterovesical junction.  There may be additional punctate   calculus or 2 in the same region.  This causes moderate right hydronephrosis   and hydroureter.   2. Nonobstructing bilateral renal calculi.  5 mm calculus in the right kidney   and a few punctate and 2 mm calculi in the left kidney.   3. Mild subsegmental atelectasis posterior lung bases.         Medical Decision Ruylad-Gkxprvlq-Pnejp:        Culture, Blood 1  Order: [id number]  Status: Final result    Visible to patient: Yes (not seen)    Next appt: 03/16/2023 at 03:00 PM in Radiology ([facility])    Specimen Information: Blood   0 Result Notes  Component 2/13/23 1300  Resulting Agency   Specimen Description .BLOOD  [facility]   Special Requests 20ML, RPICK  [facility]   Culture POSITIVE Blood Culture Abnormal   [facility] [place]   Culture DIRECT GRAM STAIN FROM BOTTLE: GRAM NEGATIVE RODS  [facility] [place]   Culture ESCHERICHIA COLI Abnormal   [facility] [place]   Culture (NOTE) Direct Gram Stain from bottle result called to and read back by:RAMON BARTLETT 2/14/2023 @0249 [facility] [place]        Susceptibility    Escherichia coli (3)    Antibiotic Interpretation Microscan Method Status    ampicillin Sensitive 4 BACTERIAL SUSCEPTIBILITY PANEL RAMON Final    ceFAZolin Sensitive <=4 BACTERIAL SUSCEPTIBILITY PANEL RAMON Final    cefTRIAXone Sensitive <=0.25 BACTERIAL SUSCEPTIBILITY PANEL RAMON Final    Confirmatory Extended Spectrum Beta-Lactamase Sensitive NEGATIVE BACTERIAL SUSCEPTIBILITY PANEL RAMON Final    gentamicin Sensitive <=1 BACTERIAL SUSCEPTIBILITY PANEL RAMON Final    levofloxacin Sensitive <=0.12 BACTERIAL SUSCEPTIBILITY PANEL RAMON Final    piperacillin-tazobactam  Sensitive <=4 BACTERIAL SUSCEPTIBILITY PANEL RAMON Final    tobramycin Sensitive <=1 BACTERIAL SUSCEPTIBILITY PANEL RAMON Final    trimethoprim-sulfamethoxazole Sensitive <=20 BACTERIAL SUSCEPTIBILITY PANEL RAMON Final          Specimen Collected: 02/13/23 13:00 EST Last Resulted: 02/16/23 00:28 EST           Contains abnormal data Culture, Blood 1  Order: [id number]  Status: Final result    Visible to patient: Yes (not seen)    Next appt: 03/16/2023 at 03:00 PM in Radiology [facility]    Specimen Information: Blood   0 Result Notes  Component 2/13/23 1300  Resulting Agency   Specimen Description . BLOOD  [address] [facility]   Special Requests 20ML, [address] [facility]   Culture POSITIVE Blood Culture Abnormal   [address]   Culture DIRECT GRAM STAIN FROM BOTTLE: [address] COLI Abnormal   [address]   Culture (NOTE) Direct Gram Stain from bottle result called to and read back by:RAMON BARTLETT 2/14/2023 @0249 [address] [place] [address]        Susceptibility    Escherichia coli (3)    Antibiotic Interpretation Microscan Method Status    ampicillin Sensitive 4 BACTERIAL SUSCEPTIBILITY PANEL RAMON Final    ceFAZolin Sensitive <=4 BACTERIAL SUSCEPTIBILITY PANEL RAMON Final    cefTRIAXone Sensitive <=0.25 BACTERIAL SUSCEPTIBILITY PANEL RAMON Final    Confirmatory Extended Spectrum Beta-Lactamase Sensitive NEGATIVE BACTERIAL SUSCEPTIBILITY PANEL RAMON Final    gentamicin Sensitive <=1 BACTERIAL SUSCEPTIBILITY PANEL RAMON Final    levofloxacin Sensitive <=0.12 BACTERIAL SUSCEPTIBILITY PANEL RAMON Final    piperacillin-tazobactam Sensitive <=4 BACTERIAL SUSCEPTIBILITY PANEL RAMON Final    tobramycin Sensitive <=1 BACTERIAL SUSCEPTIBILITY PANEL RAMON Final    trimethoprim-sulfamethoxazole Sensitive <=20 BACTERIAL SUSCEPTIBILITY PANEL RAMON Final             Culture, Urine  Order: [id number]  Status: Final result    Visible to patient: Yes (not seen)    Next appt: 03/16/2023 at 03:00 PM in Radiology [facility])    Specimen Information: Urine, clean catch   0 Result Notes  Component 2/13/23 1317    Specimen Description . CLEAN CATCH URINE    Culture ESCHERICHIA COLI >[id number] CFU/ML Abnormal     Resulting [address]        Susceptibility    Escherichia coli (1)    Antibiotic Interpretation Microscan Method Status    ampicillin Sensitive 4 BACTERIAL SUSCEPTIBILITY PANEL RAMON Final    ceFAZolin Sensitive <=4 BACTERIAL SUSCEPTIBILITY PANEL RAMON Final     Cefazolin sensitivity results can be used to predict the effectiveness of oral   cephalosporins (eg. Cephalexin) in uncomplicated Urinary Tract Infections due to E. coli, K.    pneumoniae, and P. mirabilis        cefTRIAXone Sensitive <=0.25 BACTERIAL SUSCEPTIBILITY PANEL RAMON Final    Confirmatory Extended Spectrum Beta-Lactamase Sensitive NEGATIVE BACTERIAL SUSCEPTIBILITY PANEL RAMON Final    gentamicin Sensitive <=1 BACTERIAL SUSCEPTIBILITY PANEL RAMON Final    levofloxacin Sensitive <=0.12 BACTERIAL SUSCEPTIBILITY PANEL RAMON Final    nitrofurantoin Sensitive <=16 BACTERIAL SUSCEPTIBILITY PANEL RAMON Final    piperacillin-tazobactam Sensitive <=4 BACTERIAL SUSCEPTIBILITY PANEL RAMON Final    tobramycin Sensitive <=1 BACTERIAL SUSCEPTIBILITY PANEL RAMON Final    trimethoprim-sulfamethoxazole Sensitive <=20 BACTERIAL SUSCEPTIBILITY PANEL RAMON                  Medical Decision Making-Other:     Note:  Labs, medications, radiologic studies were reviewed with personal review of films  Large amounts of data were reviewed  Discussed with nursing Staff, Discharge planner  Infection Control and Prevention measures reviewed  All prior entries were reviewed  Administer medications as ordered  Prognosis: Fair  Discharge planning reviewed      Thank you for allowing us to participate in the care of this patient. Please call with questions.     Sabina Ramos MD        Pager: [phone] - Office: [phone]

## 2023-02-19 NOTE — PROGRESS NOTES
0300 - Reassessment completed. Vital signs documented. Patient resting quietly with no c/o voiced. Call light within reach. Will continue to monitor.

## 2023-02-19 NOTE — PROGRESS NOTES
[facility]  Inpatient/Observation/Outpatient Rehabilitation    Date: 2023  Patient Name: Anisa Hernandez       [] Inpatient Acute/Observation       []  Outpatient  : 1955       [] Pt no showed for scheduled appointment    [x] Pt refused/declined therapy at this time due to:           [] Pt cancelled due to:  [] No Reason Given   [] Sick/ill   [] Other:    OT tx not completed d/t nursing reports pt is having a lot of pain/difficulty with catheter and bladder spasms. Nurse states can offer therapy and pt refused d/t pain level. Therapist/Assistant will attempt to see this patient, at our earliest opportunity.        KALI Pulido/VINH Date: 2023

## 2023-02-19 NOTE — PROGRESS NOTES
Called Dr. Dharmesh Hernandez at this time regard issues with childress. Orders to remove at this time. 0835-Childress catheter removed. Patient tolerated without complaints. Assisted patient to [facility]. Patient able to immediatly void.

## 2023-02-20 ENCOUNTER — APPOINTMENT (OUTPATIENT)
Dept: GENERAL RADIOLOGY | Age: 68
DRG: 853 | End: 2023-02-20
Attending: INTERNAL MEDICINE
Payer: MEDICARE

## 2023-02-20 ENCOUNTER — APPOINTMENT (OUTPATIENT)
Dept: CT IMAGING | Age: 68
DRG: 853 | End: 2023-02-20
Attending: INTERNAL MEDICINE
Payer: MEDICARE

## 2023-02-20 LAB
ABSOLUTE EOS #: 0.39 K/UL (ref 0–0.44)
ABSOLUTE IMMATURE GRANULOCYTE: 0.39 K/UL (ref 0–0.3)
ABSOLUTE LYMPH #: 2.73 K/UL (ref 1.1–3.7)
ABSOLUTE MONO #: 0.98 K/UL (ref 0.1–1.2)
ALBUMIN SERPL-MCNC: 2.1 G/DL (ref 3.5–5.2)
ALBUMIN/GLOBULIN RATIO: 0.7 (ref 1–2.5)
ALP SERPL-CCNC: 139 U/L (ref 35–104)
ALT SERPL-CCNC: 28 U/L (ref 5–33)
ANION GAP SERPL CALCULATED.3IONS-SCNC: 3 MMOL/L (ref 9–17)
AST SERPL-CCNC: 16 U/L
BASOPHILS # BLD: 0 % (ref 0–2)
BASOPHILS ABSOLUTE: 0 K/UL (ref 0–0.2)
BILIRUB SERPL-MCNC: 0.3 MG/DL (ref 0.3–1.2)
BUN SERPL-MCNC: 13 MG/DL (ref 8–23)
BUN/CREAT BLD: 15 (ref 9–20)
CALCIUM SERPL-MCNC: 8.2 MG/DL (ref 8.6–10.4)
CHLORIDE SERPL-SCNC: 98 MMOL/L (ref 98–107)
CO2 SERPL-SCNC: 31 MMOL/L (ref 20–31)
CREAT SERPL-MCNC: 0.86 MG/DL (ref 0.5–0.9)
CRP SERPL HS-MCNC: 120.6 MG/L (ref 0–5)
EOSINOPHILS RELATIVE PERCENT: 2 % (ref 1–4)
GFR SERPL CREATININE-BSD FRML MDRD: >60 ML/MIN/1.73M2
GLUCOSE SERPL-MCNC: 91 MG/DL (ref 70–99)
HCT VFR BLD AUTO: 21.5 % (ref 36.3–47.1)
HGB BLD-MCNC: 7.2 G/DL (ref 11.9–15.1)
IMMATURE GRANULOCYTES: 2 %
LYMPHOCYTES # BLD: 14 % (ref 24–43)
MCH RBC QN AUTO: 30.9 PG (ref 25.2–33.5)
MCHC RBC AUTO-ENTMCNC: 33.5 G/DL (ref 28.4–34.8)
MCV RBC AUTO: 92.3 FL (ref 82.6–102.9)
MONOCYTES # BLD: 5 % (ref 3–12)
MORPHOLOGY: NORMAL
NRBC AUTOMATED: 0.3 PER 100 WBC
PDW BLD-RTO: 14.7 % (ref 11.8–14.4)
PLATELET # BLD AUTO: 199 K/UL (ref 138–453)
PMV BLD AUTO: 10.7 FL (ref 8.1–13.5)
POTASSIUM SERPL-SCNC: 4.6 MMOL/L (ref 3.7–5.3)
PROT SERPL-MCNC: 5.1 G/DL (ref 6.4–8.3)
RBC # BLD: 2.33 M/UL (ref 3.95–5.11)
SEG NEUTROPHILS: 77 % (ref 36–65)
SEGMENTED NEUTROPHILS ABSOLUTE COUNT: 15.01 K/UL (ref 1.5–8.1)
SODIUM SERPL-SCNC: 132 MMOL/L (ref 135–144)
SURGICAL PATHOLOGY REPORT: NORMAL
WBC # BLD AUTO: 19.5 K/UL (ref 3.5–11.3)

## 2023-02-20 PROCEDURE — 2580000003 HC RX 258: Performed by: NURSE PRACTITIONER

## 2023-02-20 PROCEDURE — 97535 SELF CARE MNGMENT TRAINING: CPT

## 2023-02-20 PROCEDURE — 2000000000 HC ICU R&B

## 2023-02-20 PROCEDURE — 85025 COMPLETE CBC W/AUTO DIFF WBC: CPT

## 2023-02-20 PROCEDURE — 97116 GAIT TRAINING THERAPY: CPT

## 2023-02-20 PROCEDURE — 6370000000 HC RX 637 (ALT 250 FOR IP): Performed by: NURSE PRACTITIONER

## 2023-02-20 PROCEDURE — 94761 N-INVAS EAR/PLS OXIMETRY MLT: CPT

## 2023-02-20 PROCEDURE — 97110 THERAPEUTIC EXERCISES: CPT

## 2023-02-20 PROCEDURE — 80053 COMPREHEN METABOLIC PANEL: CPT

## 2023-02-20 PROCEDURE — APPSS30 APP SPLIT SHARED TIME 16-30 MINUTES: Performed by: NURSE PRACTITIONER

## 2023-02-20 PROCEDURE — 86140 C-REACTIVE PROTEIN: CPT

## 2023-02-20 PROCEDURE — 2700000000 HC OXYGEN THERAPY PER DAY

## 2023-02-20 PROCEDURE — 99233 SBSQ HOSP IP/OBS HIGH 50: CPT | Performed by: INTERNAL MEDICINE

## 2023-02-20 PROCEDURE — 6360000002 HC RX W HCPCS: Performed by: NURSE PRACTITIONER

## 2023-02-20 PROCEDURE — 71045 X-RAY EXAM CHEST 1 VIEW: CPT

## 2023-02-20 PROCEDURE — 74176 CT ABD & PELVIS W/O CONTRAST: CPT

## 2023-02-20 PROCEDURE — 36415 COLL VENOUS BLD VENIPUNCTURE: CPT

## 2023-02-20 RX ORDER — FUROSEMIDE 10 MG/ML
40 INJECTION INTRAMUSCULAR; INTRAVENOUS DAILY
Status: DISCONTINUED | OUTPATIENT
Start: 2023-02-20 | End: 2023-02-23 | Stop reason: HOSPADM

## 2023-02-20 RX ADMIN — SODIUM CHLORIDE, PRESERVATIVE FREE 10 ML: 5 INJECTION INTRAVENOUS at 21:43

## 2023-02-20 RX ADMIN — NYSTATIN 500000 UNITS: 100000 SUSPENSION ORAL at 16:59

## 2023-02-20 RX ADMIN — HYDROCODONE BITARTRATE AND ACETAMINOPHEN 1 TABLET: 5; 325 TABLET ORAL at 09:04

## 2023-02-20 RX ADMIN — LEVOTHYROXINE SODIUM 75 MCG: 0.07 TABLET ORAL at 07:27

## 2023-02-20 RX ADMIN — HYDROCODONE BITARTRATE AND ACETAMINOPHEN 1 TABLET: 5; 325 TABLET ORAL at 20:58

## 2023-02-20 RX ADMIN — POTASSIUM CHLORIDE 20 MEQ: 1500 TABLET, EXTENDED RELEASE ORAL at 20:59

## 2023-02-20 RX ADMIN — HYDROCODONE BITARTRATE AND ACETAMINOPHEN 1 TABLET: 5; 325 TABLET ORAL at 14:44

## 2023-02-20 RX ADMIN — POTASSIUM CHLORIDE 20 MEQ: 1500 TABLET, EXTENDED RELEASE ORAL at 07:59

## 2023-02-20 RX ADMIN — NYSTATIN 500000 UNITS: 100000 SUSPENSION ORAL at 20:59

## 2023-02-20 RX ADMIN — TRAZODONE HYDROCHLORIDE 50 MG: 50 TABLET ORAL at 20:59

## 2023-02-20 RX ADMIN — ATORVASTATIN CALCIUM 20 MG: 20 TABLET, FILM COATED ORAL at 07:59

## 2023-02-20 RX ADMIN — LEVOFLOXACIN 500 MG: 5 INJECTION, SOLUTION INTRAVENOUS at 10:37

## 2023-02-20 RX ADMIN — NYSTATIN 500000 UNITS: 100000 SUSPENSION ORAL at 07:59

## 2023-02-20 RX ADMIN — NYSTATIN 500000 UNITS: 100000 SUSPENSION ORAL at 13:22

## 2023-02-20 RX ADMIN — FUROSEMIDE 40 MG: 10 INJECTION, SOLUTION INTRAMUSCULAR; INTRAVENOUS at 10:33

## 2023-02-20 RX ADMIN — TAMSULOSIN HYDROCHLORIDE 0.4 MG: 0.4 CAPSULE ORAL at 07:59

## 2023-02-20 ASSESSMENT — PAIN SCALES - GENERAL
PAINLEVEL_OUTOF10: 0
PAINLEVEL_OUTOF10: 4
PAINLEVEL_OUTOF10: 4
PAINLEVEL_OUTOF10: 0
PAINLEVEL_OUTOF10: 0
PAINLEVEL_OUTOF10: 1
PAINLEVEL_OUTOF10: 2
PAINLEVEL_OUTOF10: 0
PAINLEVEL_OUTOF10: 1
PAINLEVEL_OUTOF10: 1
PAINLEVEL_OUTOF10: 4

## 2023-02-20 ASSESSMENT — PAIN DESCRIPTION - LOCATION
LOCATION: BACK

## 2023-02-20 ASSESSMENT — PAIN SCALES - WONG BAKER
WONGBAKER_NUMERICALRESPONSE: 0
WONGBAKER_NUMERICALRESPONSE: 2
WONGBAKER_NUMERICALRESPONSE: 2

## 2023-02-20 ASSESSMENT — PAIN DESCRIPTION - ORIENTATION
ORIENTATION: MID
ORIENTATION: LOWER
ORIENTATION: LOWER

## 2023-02-20 ASSESSMENT — PAIN DESCRIPTION - DESCRIPTORS
DESCRIPTORS: ACHING
DESCRIPTORS: DISCOMFORT

## 2023-02-20 ASSESSMENT — PAIN DESCRIPTION - PAIN TYPE: TYPE: ACUTE PAIN

## 2023-02-20 ASSESSMENT — PAIN - FUNCTIONAL ASSESSMENT: PAIN_FUNCTIONAL_ASSESSMENT: ACTIVITIES ARE NOT PREVENTED

## 2023-02-20 NOTE — PROGRESS NOTES
Reassessment complete at this time. Patient remains alert and oriented, calm and cooperative with assessment. Lung fields are clear in upper airways and diminished in bases- unlabored with no increased work of breath noted. 1+ pitting edema remains BLE- continues to denying numbness/tingling. Denying any pain or discomfort- call light placed within reach, bed in lowest position and alarm engaged to promote patient safety. Writer will continue to monitor.

## 2023-02-20 NOTE — PROGRESS NOTES
Writer at bedside, patient taken to commode and then up to the chair. No complaints of pain at this time, patient left with call light within reach.

## 2023-02-20 NOTE — PROGRESS NOTES
[facility] - Progress Note-Telemedicine  Today's Date and Time: 2/20/2023, 8:38 AM    Impression :   E. Coli septicemia 2-13-23 x 2  E. Coli UTI  Right hydroureteronephrosis  S/p right ureteral stent placement on 2/13/23  Leukocytosis  DINA  Improved  Hypotension-resolved  Chronic history of nephrolithiasis  Transaminase elevation  Thrombocytopenia  Anemia    Recommendations:   IV Levaquin 500 mg IV q day. Stop date 2-23-23  E. Coli on BC x 2  Repeat BC 2/16/23: No growth   E. Coli on urine culture  Monitor WBC, platelets, ProBNP, CRP    Medical Decision Making/Summary/Discussion:2/20/2023       Infection Control Recommendations   [place] Precautions    Antimicrobial Stewardship Recommendations     Simplification of therapy  Targeted therapy      Coordination of Outpatient Care:   Estimated Length of IV antimicrobials:TBD  Patient will need Midline Catheter Insertion: TBD  Patient will need PICC line Insertion:TBD  Patient will need: Home IV , [place],  SNF,  LTAC: TBD  Patient will need outpatient wound care:No    Chief complaint/reason for consultation:   Concern for urosepsis      History of Present Illness:   Thad Shaw is a 79y.o.-year-old female who was initially admitted on 2/13/2023. Patient seen at the request of Dr. Carina Florez:    This patient, with a chronic history of kidney stones, saw her urologist on 2/13/23 and had complaints of 2 days of right sided flank pain with associated nausea and vomiting. A CT revealed multiple stones in the right ureter with significant hydroureteronephrosis. There are also non-obstructing stones in both kidneys. Her WBC was 34.3 at that time and she had a creatinine of 3.8. Her baseline creatinine is 0.65. She underwent emergent right-sided ureteral stenting and was then admitted to [facility].     IV Levaquin was initiated.     A the time of this consult note, the patient was noted to be hypotensive and is requiring vasopressors. She is afebrile and oxygenation well on room air. There is no growth on blood cultures thus far and the urine culture is pending. Her last UTI was noted to have grown pan sensitive E. Coli in 2021. Abnormal labs include:  Cr:2.97  GFR:17  Trop:31  Alk phos:166  ALT:143  AST:134  WBC:33.0  Hgb:9.2  Plt: 81    Imaging of the chest also revealed increased interstitial opacities with mild subsegmental atelectasis in the posterior lung bases. CT abd/pelvis 2/13/23  Impression   1. 2 obstructing calculi in the distal right ureter about 3 and 4 mm in size   noted close to the uterovesical junction. There may be additional punctate   calculus or 2 in the same region. This causes moderate right hydronephrosis   and hydroureter. 2. Nonobstructing bilateral renal calculi. 5 mm calculus in the right kidney   and a few punctate and 2 mm calculi in the left kidney. 3. Mild subsegmental atelectasis posterior lung bases. CXR 2/13/23  Impression   Increased interstitial opacity. While much or all of this may be chronic,   please correlate with any clinical evidence of acute interstitial edema. A focal infiltrate is not detected           ID was consulted for antibiotic recommendations    CURRENT EVALUATION 2/20/2023  /65   Pulse 78   Temp 98 °F (36.7 °C) (Temporal)   Resp 22   Ht 5' 3\" (1.6 m)   Wt 134 lb 3.2 oz (60.9 kg)   LMP  (LMP Unknown)   SpO2 95%   BMI 23.77 kg/m²     Afebrile  VS stable  0ff pressors    Patient stable  Has developed bladder spasms and leaking around Ambrose  No other complaints  No new issues per RN    02 requirements improved  The patient is alert and oriented on 1 L NC.   RR 19  02 sat 96    Renal function much better  Ambrose catheter is in place with leakage and spasms  Urology to evaluate    Medications reviewed:   On IV Levaquin because of concern with thrombocytopenia  Thrombocytopenia likely secondary to Gram negative septicemia rather than medication induced. It is improving as sepsis improves  Leukocytosis is on a down-trend    Labs, X rays reviewed: 2/20/2023    BUN:13  Cr:0.86  Na 134-->132    WBC:33.0-->29.8-->16.1-->19-->21-->19.5  Hb:7.2  Plat: 199    CRP:217.4-->120.6  LAT:16034    Cultures:  Urine:  2/13/23: E. coli  Blood:  2/13/23: GNR x2  Sputum :    Wound:    MRSA Nares:      Imaging:    CT abd/pelvis 2/13/23              CXR 2/13/23      Discussed with patient, RN, family. I have personally reviewed the past medical history, past surgical history, medications, social history, and family history, and I have updated the database accordingly.   Past Medical History:     Past Medical History:   Diagnosis Date    Bacteremia due to Escherichia coli 9/74/7369    Complicated UTI (urinary tract infection) 2/13/2023    Fracture of wrist, unspecified laterality, closed, initial encounter     right    History of sepsis 2009    following lithotripsy    Hyperlipidemia     Hypertension     Hypothyroidism 2007    secondary to radioactive iodine 2007    Kidney stone     Osteoporosis     Primary localized osteoarthritis of right hip 3/18/2019    Sepsis due to Escherichia coli ([address] 2/16/2023    Septic shock due to urinary tract infection ([address] 2/13/2023    Severe sepsis ([address] 2/13/2023    Thrombocytopenia ([address] 2/15/2023       Past Surgical  History:     Past Surgical History:   Procedure Laterality Date    CHOLECYSTECTOMY, LAPAROSCOPIC  2009    COLONOSCOPY  07/17/2014    Dr. Kim Singh - tubular adenomatous polyp removed    COLONOSCOPY N/A 08/01/2018    Dr. Lauren Cruz architectural distortion, suggestive of mucosal prolapse    CYSTOSCOPY  2015    stent removal and reinsert     CYSTOSCOPY Left     HLL with stent    CYSTOSCOPY Left 02/05/2021    CYSTOSCOPY URETEROSCOPY LASER-HLL performed by Griselda Jetty, MD at [place] [address] Right 2/13/2023    CYSTOSCOPY URETERAL STENT INSERTION performed by Tawanna Douglas MD at [facility] / [address] / STONE Left 02/05/2021    CYSTOSCOPY RETROGRADE PYELOGRAM STENT INSERTION performed by Tawanna Douglas MD at [address] [facility] Right 02/13/2023    Dr. Reji Schaefer    LITHOTRIPSY  04/28/2015    left    LITHOTRIPSY Left 01/30/2018    cystoscopy- Dr. Audra Epley     LITHOTRIPSY Right 04/20/2021    LITHOTRIPSY Right 04/20/2021    ESWL EXTRACORPOREAL SHOCK WAVE LITHOTRIPSY performed by Tawanna Douglas MD at [address] [place] [address]    radioactive iodine procedure    [place] CYSTOURETHROSCOPY Left 01/30/2018    CYSTOSCOPY performed by Tawanna Douglas MD at [address] Left 01/30/2018    ESWL [address] LITHOTRIPSY performed by Tawanna Douglas MD at [address] [email or web address] Right 03/18/2019    Dr. Maribell Alcazar       Medications:      furosemide  40 mg IntraVENous Daily    nystatin  5 mL Oral 4x Daily    atorvastatin  20 mg Oral Daily    levothyroxine  75 mcg Oral Daily    levofloxacin  500 mg IntraVENous Q24H    potassium chloride  20 mEq Oral BID    tamsulosin  0.4 mg Oral Daily    traZODone  50 mg Oral Nightly    sodium chloride flush  5-40 mL IntraVENous 2 times per day    lidocaine 1 % injection  5 mL IntraDERmal Once    sodium chloride flush  5-40 mL IntraVENous 2 times per day       Social History:     Social History     Socioeconomic History    Marital status:      Spouse name: Not on file    Number of children: Not on file    Years of education: Not on file    Highest education level: Not on file   Occupational History    Not on file   Tobacco Use    Smoking status: Some Days     Packs/day: 0.25     Years: 30.00     Pack years: 7.50     Types: Cigarettes    Smokeless tobacco: Never   Vaping Use    Vaping Use: Never used   Substance and Sexual Activity    Alcohol use:  Yes Alcohol/week: 1.0 standard drink     Types: 1 Shots of liquor per week     Comment: socially    Drug use: No    Sexual activity: Yes     Partners: Male     Comment: postmeno   Other Topics Concern    Not on file   Social History Narrative    Not on file     Social Determinants of Health     Financial Resource Strain: Low Risk     Difficulty of Paying Living Expenses: Not hard at all   Food Insecurity: No Food Insecurity    Worried About Running Out of Food in the Last Year: Never true    Ran Out of Food in the Last Year: Never true   Transportation Needs: Not on file   Physical Activity: Insufficiently Active    Days of Exercise per Week: 2 days    Minutes of Exercise per Session: 10 min   Stress: Not on file   Social Connections: Not on file   Intimate Partner Violence: Not on file   Housing Stability: Not on file       Family History:     Family History   Problem Relation Age of Onset    Breast Cancer Maternal Grandmother     Stroke Father     Hypertension Father     Alzheimer's Disease Mother         Allergies:   Patient has no known allergies. Review of Systems:   Constitutional: No fevers or chills. Head: No headaches  Eyes: No double vision or blurry vision. No conjunctival inflammation. ENT: No sore throat or runny nose. . No hearing loss, tinnitus or vertigo. Cardiovascular: No chest pain or palpitations. No shortness of breath. No CARUSO  Lung: No shortness of breath or cough. No sputum production  Abdomen: nausea, no vomiting, no diarrhea, left sided flank pain. .   Genitourinary: Ambrose in place. Rt sided CVA pain  Musculoskeletal: No muscle aches or pains. No joint effusions, swelling or deformities  Hematologic: No bleeding or bruising. Neurologic: No headache, weakness, numbness, or tingling. Integument: No rash, no ulcers. Psychiatric: No depression. Endocrine: No polyuria, no polydipsia, no polyphagia.     Physical Examination :   Patient Vitals for the past 8 hrs:   BP Temp Temp src Pulse Resp SpO2   02/20/23 0700 110/65 98 °F (36.7 °C) Temporal 78 22 95 %   02/20/23 0600 (!) 125/58 -- -- 83 22 91 %   02/20/23 0500 (!) 95/40 -- -- 75 24 99 %   02/20/23 0430 -- -- -- 81 24 --   02/20/23 0400 113/60 -- -- 76 23 --   02/20/23 0333 -- -- -- 80 24 --   02/20/23 0330 -- -- -- 81 22 --   02/20/23 0300 (!) 122/59 99.6 °F (37.6 °C) Temporal 89 27 92 %     General Appearance: Awake, alert, and in no apparent distress  Head:  Normocephalic, no trauma  Eyes: Pupils equal, round, reactive to light and accommodation; extraocular movements intact; sclera anicteric; conjunctivae pink. No embolic phenomena. ENT: Oropharynx clear, without erythema, exudate, or thrush. No tenderness of sinuses. Mouth/throat: mucosa pink and moist. No lesions. Dentition in good repair. Neck:Supple, without lymphadenopathy. Thyroid normal, No bruits. Pulmonary/Chest: Diminished to auscultation, without wheezes, rales, or rhonchi. No dullness to percussion. Cardiovascular: Regular rate and rhythm without murmurs, rubs, or gallops. Abdomen: Soft,  tender. Bowel sounds normal. No organomegaly . Rt CVA pain  All four Extremities: No cyanosis, clubbing, edema, or effusions. Neurologic: No gross sensory or motor deficits. Skin: Warm and dry with good turgor. No signs of peripheral arterial or venous insufficiency. No ulcerations. No open wounds. Medical Decision Making -Laboratory:   I have independently reviewed/ordered the following labs:    CBC with Differential:   Recent Labs     02/19/23  0520 02/20/23  0514   WBC 21.8* 19.5*   HGB 8.0* 7.2*   HCT 23.5* 21.5*   * 199   LYMPHOPCT 8* 14*   MONOPCT 5 5     BMP:   Recent Labs     02/17/23  1326 02/18/23  0510 02/19/23  0520 02/20/23  0514   NA  --    < > 134* 132*   K 3.4*   < > 4.2 4.6   CL  --    < > 98 98   CO2  --    < > 30 31   BUN  --    < > 15 13   CREATININE  --    < > 0.86 0.86   MG 1.6  --   --   --     < > = values in this interval not displayed.      Hepatic Function Panel:   Recent Labs     02/19/23  0520 02/20/23  0514   PROT 5.5* 5.1*   LABALBU 2.2* 2.1*   BILITOT 0.4 0.3   ALKPHOS 164* 139*   ALT 40* 28   AST 18 16     No results for input(s): RPR in the last 72 hours. No results for input(s): HIV in the last 72 hours. No results for input(s): BC in the last 72 hours. Lab Results   Component Value Date/Time    MUCUS TRACE 02/13/2023 05:23 PM    RBC 2.33 02/20/2023 05:14 AM    RBC 4.08 05/08/2012 08:10 AM    TRICHOMONAS NOT REPORTED 02/03/2021 02:30 PM    WBC 19.5 02/20/2023 05:14 AM    YEAST NOT REPORTED 02/03/2021 02:30 PM    TURBIDITY SLIGHTLY CLOUDY 02/13/2023 05:23 PM     Lab Results   Component Value Date/Time    CREATININE 0.86 02/20/2023 05:14 AM    GLUCOSE 91 02/20/2023 05:14 AM    GLUCOSE 102 05/08/2012 08:10 AM       Medical Decision Making-Imaging:     Narrative   EXAMINATION:   ONE XRAY VIEW OF THE CHEST       2/13/2023 9:25 am       COMPARISON:   11/07/2013       HISTORY:   ORDERING SYSTEM PROVIDED HISTORY: sepsis   TECHNOLOGIST PROVIDED HISTORY:   sepsis       FINDINGS:   Cardial pericardial silhouette is unremarkable. Increased interstitial   opacities are seen throughout both lungs. A focal infiltrate is not   detected. No pneumothorax. No free air. No acute bony abnormality. Impression   Increased interstitial opacity. While much or all of this may be chronic,   please correlate with any clinical evidence of acute interstitial edema. A focal infiltrate is not detected         Narrative   EXAMINATION:   CT OF THE ABDOMEN AND PELVIS WITHOUT CONTRAST 2/13/2023 9:50 am       TECHNIQUE:   CT of the abdomen and pelvis was performed without the administration of   intravenous contrast. Multiplanar reformatted images are provided for review. Automated exposure control, iterative reconstruction, and/or weight based   adjustment of the mA/kV was utilized to reduce the radiation dose to as low   as reasonably achievable. COMPARISON:   02/03/2021       HISTORY:   ORDERING SYSTEM PROVIDED HISTORY: Renal colic   TECHNOLOGIST PROVIDED HISTORY:           FINDINGS:   Lower Chest: Mild subsegmental atelectasis posterior lung bases. Organs: There is 5 mm calculus in the lower pole right kidney which is also   evident on the prior. Additional smaller calculi in the right kidney on   prior no longer present. There is moderate right hydronephrosis with   significant perinephric stranding. There is also moderate right hydroureter. There are 2 calculi measuring 3 mm and 4 mm in the distal right ureter a few   cm from the uterovesical junction accounting for the obstructive signs. There may be additional punctate calculi in the same region. Left kidney shows a few punctate calculi along with a 2 mm calculus in the   lower pole. No left hydronephrosis or hydroureter. No left renal calculus. The unenhanced liver shows a 1 cm cyst in the left lobe which is unchanged. Cholecystectomy. Spleen, pancreas, adrenal glands show no significant   abnormalities. GI/Bowel: There is limited evaluation due to absence of oral contrast.       The stomach shows no focal lesions. Small bowel loops normal in caliber   showing no focal abnormalities. Normal appendix. Evaluation of the colon shows no acute process. Pelvis: Streak artifact from right hip prosthesis obscures portions of the   pelvis. There are pelvic phleboliths. Uterus and urinary bladder grossly   normal.       Peritoneum/Retroperitoneum: No free fluid. No lymphadenopathy. Atherosclerotic disease. Bones/Soft Tissues: Degenerative changes in the spine. No acute abnormality   of the bones. The superficial soft tissues show no significant abnormalities. Impression   1. 2 obstructing calculi in the distal right ureter about 3 and 4 mm in size   noted close to the uterovesical junction.   There may be additional punctate   calculus or 2 in the same region. This causes moderate right hydronephrosis   and hydroureter. 2. Nonobstructing bilateral renal calculi. 5 mm calculus in the right kidney   and a few punctate and 2 mm calculi in the left kidney. 3. Mild subsegmental atelectasis posterior lung bases. Medical Decision [id number]:        Culture, Blood 1  Order: [id number]  Status: Final result    Visible to patient: Yes (not seen)    Next appt: 03/16/2023 at 03:00 PM in Radiology [facility])    Specimen Information: Blood   0 Result Notes  Component 2/13/23 1300  Resulting Agency   Specimen Description . BLOOD  [address]   Special Requests 20ML, [id number]   Culture POSITIVE Blood Culture Abnormal   [address]   Culture DIRECT Acie Sera STAIN FROM BOTTLE: [address] COLI Abnormal   [address]   Culture (NOTE) Direct Gram Stain from bottle result called to and read back by:RAMON BARTLETT 2/14/2023 @0249 [address] [place] [address]        Susceptibility    Escherichia coli (3)    Antibiotic Interpretation Microscan Method Status    ampicillin Sensitive 4 BACTERIAL SUSCEPTIBILITY PANEL RAMON Final    ceFAZolin Sensitive <=4 BACTERIAL SUSCEPTIBILITY PANEL RAMON Final    cefTRIAXone Sensitive <=0.25 BACTERIAL SUSCEPTIBILITY PANEL RAMON Final    Confirmatory Extended Spectrum Beta-Lactamase Sensitive NEGATIVE BACTERIAL SUSCEPTIBILITY PANEL RAMON Final    gentamicin Sensitive <=1 BACTERIAL SUSCEPTIBILITY PANEL RAMON Final    levofloxacin Sensitive <=0.12 BACTERIAL SUSCEPTIBILITY PANEL RAMON Final    piperacillin-tazobactam Sensitive <=4 BACTERIAL SUSCEPTIBILITY PANEL RAMON Final    tobramycin Sensitive <=1 BACTERIAL SUSCEPTIBILITY PANEL RAMON Final    trimethoprim-sulfamethoxazole Sensitive <=20 BACTERIAL SUSCEPTIBILITY PANEL RAMON Final          Specimen Collected: 02/13/23 13:00 EST Last Resulted: 02/16/23 00:28 EST Contains abnormal data Culture, Blood 1  Order: [id number]  Status: Final result    Visible to patient: Yes (not seen)    Next appt: 03/16/2023 at 03:00 PM in Radiology [facility])    Specimen Information: Blood   0 Result Notes  Component 2/13/23 1300  Resulting Agency   Specimen Description . BLOOD  [address] [facility]   Special Requests 20ML, [id number] Lab   Culture POSITIVE Blood Culture Abnormal   [address]   Culture DIRECT GRAM STAIN FROM BOTTLE: [address] COLI Abnormal   [address]   Culture (NOTE) Direct Gram Stain from bottle result called to and read back by:RAMON BARTLETT 2/14/2023 @0249 [address] [place] [address]        Susceptibility    Escherichia coli (3)    Antibiotic Interpretation Microscan Method Status    ampicillin Sensitive 4 BACTERIAL SUSCEPTIBILITY PANEL RAMON Final    ceFAZolin Sensitive <=4 BACTERIAL SUSCEPTIBILITY PANEL RAMON Final    cefTRIAXone Sensitive <=0.25 BACTERIAL SUSCEPTIBILITY PANEL RAMON Final    Confirmatory Extended Spectrum Beta-Lactamase Sensitive NEGATIVE BACTERIAL SUSCEPTIBILITY PANEL RAMON Final    gentamicin Sensitive <=1 BACTERIAL SUSCEPTIBILITY PANEL RAMON Final    levofloxacin Sensitive <=0.12 BACTERIAL SUSCEPTIBILITY PANEL RAMON Final    piperacillin-tazobactam Sensitive <=4 BACTERIAL SUSCEPTIBILITY PANEL RAMON Final    tobramycin Sensitive <=1 BACTERIAL SUSCEPTIBILITY PANEL RAMON Final    trimethoprim-sulfamethoxazole Sensitive <=20 BACTERIAL SUSCEPTIBILITY PANEL RAMON Final             Culture, Urine  Order: [id number]  Status: Final result    Visible to patient: Yes (not seen)    Next appt: 03/16/2023 at 03:00 PM in Radiology [facility])    Specimen Information: Urine, clean catch   0 Result Notes  Component 2/13/23 1317    Specimen Description . CLEAN CATCH URINE    Culture ESCHERICHIA COLI >[id number] CFU/ML Abnormal     Resulting [address] Susceptibility    Escherichia coli (1)    Antibiotic Interpretation Microscan Method Status    ampicillin Sensitive 4 BACTERIAL SUSCEPTIBILITY PANEL RAMON Final    ceFAZolin Sensitive <=4 BACTERIAL SUSCEPTIBILITY PANEL RAMON Final     Cefazolin sensitivity results can be used to predict the effectiveness of oral   cephalosporins (eg. Cephalexin) in uncomplicated Urinary Tract Infections due to E. coli, K.    pneumoniae, and P. mirabilis        cefTRIAXone Sensitive <=0.25 BACTERIAL SUSCEPTIBILITY PANEL RAMON Final    Confirmatory Extended Spectrum Beta-Lactamase Sensitive NEGATIVE BACTERIAL SUSCEPTIBILITY PANEL RAMON Final    gentamicin Sensitive <=1 BACTERIAL SUSCEPTIBILITY PANEL RAMON Final    levofloxacin Sensitive <=0.12 BACTERIAL SUSCEPTIBILITY PANEL RAMON Final    nitrofurantoin Sensitive <=16 BACTERIAL SUSCEPTIBILITY PANEL RAMON Final    piperacillin-tazobactam Sensitive <=4 BACTERIAL SUSCEPTIBILITY PANEL RAMON Final    tobramycin Sensitive <=1 BACTERIAL SUSCEPTIBILITY PANEL RAMON Final    trimethoprim-sulfamethoxazole Sensitive <=20 BACTERIAL SUSCEPTIBILITY PANEL RAMON                  Medical Decision Making-Other:     Note:  Labs, medications, radiologic studies were reviewed with personal review of films  Large amounts of data were reviewed  Discussed with nursing Staff, Discharge planner  Infection Control and Prevention measures reviewed  All prior entries were reviewed  Administer medications as ordered  Prognosis: Fair  Discharge planning reviewed      Thank you for allowing us to participate in the care of this patient. Please call with questions. Chacha Hicks, APRN - CNP      ATTESTATION:    I have discussed the case, including pertinent history and exam findings with the APRN. I have evaluated the  History, physical findings and pictures of the patient and the key elements of the encounter have been performed by me.  I have reviewed the laboratory data, other diagnostic studies and discussed them with the APRN. I have updated the medical record where necessary. I agree with the assessment, plan and orders as documented by the APRN.     Yuliana Romero MD.    Pager: [phone] - Office: [phone]

## 2023-02-20 NOTE — PROGRESS NOTES
Daya Cruz M.D. Internal Medicine Progress Note    Patient: Eris Jiang  Date of Admission: 2/13/2023 10:56 AM  Hospital Day # 7  Date of Evaluation: 2/20/2023      SUBJECTIVE:    Ms. Pranav Moore  was seen and examined today for f/u of sepsis with septic shock due to UTI and ureteralithiasis. She is feeling a little better again today. Her abdomen feels a lot better now that the childress is out. She states she is able to eat better currently as her thrush is doing better. She denies any chest pain but still gets SOB. Her LE edema seems to be improving as well. ROS:   Constitutional: negative  for fevers, and negative for chills. Respiratory: positive for shortness of breath, negative for cough, and negative for wheezing  Cardiovascular: negative for chest pain, and negative for palpitations  Gastrointestinal: negative for abdominal pain, positive for nausea, negative for vomiting, negative for diarrhea, and negative for constipation     All other systems were reviewed with the patient and are negative unless otherwise stated in HPI    -----------------------------------------------------------------  OBJECTIVE:  Vitals:   Temp: 97.6 °F (36.4 °C)  BP: 91/66  Resp: 14  Heart Rate: 81  SpO2: 97 % on 3L nasal cannula    Weight  Wt Readings from Last 3 Encounters:   02/19/23 134 lb 3.2 oz (60.9 kg)   02/13/23 112 lb (50.8 kg)   02/02/23 113 lb (51.3 kg)     Body mass index is 23.77 kg/m². 24HR INTAKE/OUTPUT:      Intake/Output Summary (Last 24 hours) at 2/20/2023 1140  Last data filed at 2/20/2023 1139  Gross per 24 hour   Intake 330 ml   Output 2525 ml   Net -2195 ml       Exam:  GEN:    Awake, alert and oriented x 3. EYES:  EOMI, pupils equal   NECK: Supple. No lymphadenopathy. No carotid bruit  CVS:    regular rate and rhythm, no audible murmur  PULM:  fine bibasilar rales, no acute respiratory distress  ABD:    Bowels sounds normal.  Abdomen is soft. No distention.   suprapubic tenderness to palpation. EXT:   trace edema bilaterally . No calf tenderness. NEURO: Moves all extremities. Motor and sensory are grossly intact  SKIN:  No rashes. No skin lesions.    -----------------------------------------------------------------  DATA:  Complete Blood Count:   Recent Labs     02/18/23  0510 02/19/23  0520 02/20/23  0514   WBC 19.0* 21.8* 19.5*   RBC 2.91* 2.59* 2.33*   HGB 9.0* 8.0* 7.2*   HCT 26.3* 23.5* 21.5*   MCV 90.4 90.7 92.3   MCH 30.9 30.9 30.9   MCHC 34.2 34.0 33.5   RDW 14.3 14.5* 14.7*   PLT See Reflexed IPF Result 137* 199   MPV  --  11.6 10.7         Latest Reference Rang3 2/15/23 05:40 2/16/23 05:20 2/17/23 05:20 2/18/23 05:10 2/19/23 05:20 2/20/23 05:14   Platelet 124 - 324 k/uL 66 (L) 52 (L) 46 (L) 68 (L) 137 (L) 199       Blood Glucose:   Recent Labs     02/18/23  0510 02/19/23  0520 02/20/23  0514   GLUCOSE 108* 115* 91        Comprehensive Metabolic Profile:   Recent Labs     02/18/23  0510 02/19/23  0520 02/20/23  0514    134* 132*   K 4.3 4.2 4.6    98 98   CO2 32* 30 31   BUN 18 15 13   CREATININE 0.86 0.86 0.86   GLUCOSE 108* 115* 91   CALCIUM 8.1* 8.3* 8.2*   PROT 5.0* 5.5* 5.1*   LABALBU 2.0* 2.2* 2.1*   BILITOT 0.3 0.4 0.3   ALKPHOS 154* 164* 139*   AST 20 18 16   ALT 52* 40* 28        Urinalysis:    Latest Reference Range & Units 2/13/23 17:23   Color, UA Yellow  Yellow   Turbidity UA Clear  SLIGHTLY CLOUDY ! Glucose, UA NEGATIVE  NEGATIVE   Bilirubin, Urine NEGATIVE  NEGATIVE   Ketones, Urine NEGATIVE  TRACE ! Specific Gravity, UA 1.010 - 1.020  1.025 (H)   pH, UA 5.0 - 9.0  5.5   Protein, UA NEGATIVE  2+ ! Urobilinogen, Urine Normal  Normal   Nitrite, Urine NEGATIVE  NEGATIVE   Leukocyte Esterase, Urine NEGATIVE  LARGE ! Mucus, UA None  TRACE !   WBC, UA 0 - 5 /HPF 20 TO 50   RBC, UA 0 - 2 /HPF 2 TO 5   Epithelial Cells, UA 0 - 25 /HPF 0 TO 2   Renal Epithelial, UA 0 /HPF 0 TO 2   Bacteria, UA None  2+ ! Urine Hgb NEGATIVE  2+ !        Lactic Acid: Lab Results   Component Value Date/Time    LACTA 1.8 02/14/2023 09:46 AM    LACTA 2.2 02/14/2023 07:45 AM    LACTA 2.8 02/14/2023 05:45 AM        Microbiology:  Urine culture 2/13/23 = E coli  Blood cultures 2/13/23 x 2 = E coli  Blood cultures 2/15/23 x 2 = no growth x 2 days     Latest Reference Range & Units 2/18/23 10:10   Adenovirus PCR Not Detected  Not Detected   B Pertussis by PCR Not Detected  Not Detected   Chlamydia pneumoniae By PCR Not Detected  Not Detected   Coronavirus 229E PCR Not Detected  Not Detected   Coronavirus HKU1 PCR Not Detected  Not Detected   Coronavirus NL63 PCR Not Detected  Not Detected   Coronavirus OC Not Detected  Not Detected   Human Metapneumovirus PCR Not Detected  Not Detected   Influenza A by PCR Not Detected  Not Detected   Influenza B by PCR Not Detected  Not Detected   Parainfluenza 1 PCR Not Detected  Not Detected   Parainfluenza 2 PCR Not Detected  Not Detected   Parainfluenza 3 PCR Not Detected  Not Detected   Parainfluenza 4 PCR Not Detected  Not Detected   Resp Syncytial Virus PCR Not Detected  Not Detected   Rhino/Enterovirus PCR Not Detected  DETECTED ! Mycoplasma pneumo by PCR Not Detected  Not Detected   SARS-CoV-2, PCR Not Detected  Not Detected   SARS-CoV-2, Rapid Not Detected  Not Detected       Radiology/Imaging:  CT ABDOMEN PELVIS WO CONTRAST Additional Contrast? None   Preliminary Result   1. Interval placement of a double pigtail right ureteral stent. The   proximal end of the stent is in appropriate position. The distal end of the   stent is difficult to ascertain due to obscuring beam-hardening artifact. The distal end of the stent may be right at the UV junction partially within   the distal ureter. There is still persistent right hydronephrosis. 2.  Bilateral parenchymal nephroliths as above. 3.  No bowel obstruction.   There is contrast enhancement of the lining of the   colon presumably from recent CT chest.      4.  Bilateral moderate pleural effusions. The patient has a small   pericardial effusion. RECOMMENDATIONS:   Advise plain film examination of the abdomen. XR CHEST PORTABLE   Final Result   No significant change from prior study. Small bilateral effusions in diffuse   interstitial opacities are again noted. CT CHEST PULMONARY EMBOLISM W CONTRAST   Final Result   1. Multifocal airspace disease throughout the lungs which can be seen with   atypical viral pneumonia. Correlate for any history of COVID. Moderate   bilateral pleural effusions. Compressive atelectasis in both lungs. Follow-up is recommended to document resolution. 2. Atrophic thyroid gland. 3. Atherosclerotic calcification and atheromatous plaque of the aorta. 4. Scarring and cortical thinning of the upper pole left kidney. Partially   visualized superior aspect of right ureteral stent in right renal collecting   system. 5. No clear evidence for central pulmonary embolus. 6. Moderate thoracic dextroscoliosis. XR CHEST PORTABLE   Final Result      Right PICC line distal tip is within the mid right atrium and needs to be   pulled back approximately 4 cm. Diffusely increased interstitial markings throughout both lungs with   associated ground-glass densities. Findings can be suggestive of multifocal   pneumonia. Consolidative pulmonary edema is another possibility. Small bilateral pleural effusion. FLUORO FOR SURGICAL PROCEDURES   Final Result      XR CHEST PORTABLE   Final Result   Increased interstitial opacity. While much or all of this may be chronic,   please correlate with any clinical evidence of acute interstitial edema.       A focal infiltrate is not detected                       MEDICAL DECISION MAKING:  Primary Problem(s): Severe sepsis with septic shock due to complicated UTI with ureterolithiasis and E coli bacteremia  Condition is improving however leukocytosis remains unchanged  Treatment plan:   Urology assistance appreciated  S/p cystoscopy with right ureteral stent insertion 2/13/23  ID consult appreciated - Dr. Arely Bone notes reviewed  Imaging:   Port CXR ordered today = bilateral interstitial infiltrates remain unchanged, no significant change, moderate bilateral pleural effusions  Repeat CT abd/pelvis ordered today = stent in place but continued hydronephrosis, moderate bilateral pleural effusions  Medications:   Continue Levofloxacin  Weaned off Levophed  - BPs stable  DC IVF's due to edema / SOB  Lasix 40 IV daily   Medication Monitoring / High Risk Medications: none     Multifocal pneumonia  Condition is stable  Treatment plan:   ID consult appreciated  Rapid Covid = negative  Viral respiratory panel = Rhino/enterovirus  Imaging: CTA chest reviewed  Medications: Continue IV levofloxacin    Acute kidney injury due to sepsis    Condition is resolved  Treatment plan:   Nephrology consult appreciated - Dr. Sree Stubbs notes reviewed  BP support  Monitor renal function, I/O's    Elevated LFT's due to shock liver    Condition is resolved  Treatment plan:   Monitor LFT's     Thrombocytopenia  Multifactorial and likely due to combination of sepsis, shock liver, possibly medications  Condition is resolved  Heme/Onc consult appreciated - Dr. Chelsea Field note reviewed  Treatment plan: monitor CBC  Medications: heparin DC'd    Thrush  Condition is improving  Medications:   Nystatin S/S  Viscous lidocaine / oragel prn    Nutrition status:   at risk for malnutrition  Dietician consult initiated    Hospital Prophylaxis:   DVT: SCDs (heparin DCd due to thrombocytopenia)    MDM Data:   Test interpretation:  My independent EKG interpretation: NSR with normal axis and intervals, non-specific ST changes  Management and/or test interpretation  discussed with Dr. Leidy Taylor CNP  Consults and Nursing notes were personally reviewed, all current labs and imaging were personally reviewed, and tests ordered: CBC, CMP. Lactic acid      Disposition:  Shared decision making: All test results, treatment options and disposition options were discussed with the patient today  Social determinants of health that may impact management: none  Code status: Full Code   Disposition: Discharge plan is pending      Critical Care Time:  Total critical care time caring for this patient with life threatening, unstable organ failure, including direct patient contact, management of life support systems, review of data including imaging and labs, discussions with other team members and physicians at least 35 minutes so far today, excluding separately billable procedures.         Lydia Almodovar MD , M.MITCHELL.  2/20/2023  11:40 AM

## 2023-02-20 NOTE — PROGRESS NOTES
2030- Writer assists patient to bedside commode at this time. When transferring Spo2 desaturation of 87% on 1 L NC- writer encourages patient to deep breathe and recovered well     2038- Patient requests more time on bedside commode current saturation of 92%    Writer encourages deep breathing. Will continue to monitor.

## 2023-02-20 NOTE — PROGRESS NOTES
Physical Therapy  Facility/Department: [facility] AT THE [facility]  Daily Treatment Note  NAME: Merrick Hammer  : 1955  MRN: [id number]    Date of Service: 2023    Discharge Recommendations:  Continue to assess pending progress, Home with Home health PT, Patient would benefit from continued therapy after discharge        Patient Diagnosis(es): There were no encounter diagnoses. Assessment   Assessment: pt with improved tolerance to PT this date as compared to previous treatments. She continues to require supplimental O2, however, spo2 levels remained >90% during entire treatment. SHe ambulated 30 ft total (5 ft fwd and 5 ft backward x3 trials) with FWW and CGA. Pt completed reclined BLE ex with few short RBs. Pt requested use of bedside commode and she voided, while able to complete pericare indepenedently. She was returned to chair.  present. Activity Tolerance: Patient tolerated treatment well     Plan    Physcial Therapy Plan  General Plan: 2 times a day 7 days a week     Restrictions  Restrictions/Precautions  Restrictions/Precautions: General Precautions, Fall Risk     Subjective    Subjective  Subjective: Pt in chair and agreeable to PT.   she states she \"needs to do something, I'm so weak. \"  Pain: 3/10 back and neck.   Per nursing, pt recently revceived pain medication  Orientation  Overall Orientation Status: Within Functional Limits     Objective   Vitals     Bed Mobility Training  Bed Mobility Training: No  Transfer Training  Transfer Training: Yes  Sit to Stand: Contact-guard assistance;Assist X2  Stand to Sit: Contact-guard assistance;Assist X2  Toilet Transfer: Assist X1;Stand-by assistance [facility])  Gait Training  Gait Training: Yes  Gait  Overall Level of Assistance: Contact-guard assistance;Assist X1  Base of Support: Narrowed  Speed/Maggie: Slow  Distance (ft): 30 Feet (5 ft fwd and 5 ft back for 3 trials consecutively before pt requesting use of BSC.)     PT Exercises  Exercise Treatment: REclined BLE ex x15 each     Safety Devices  Type of Devices: All fall risk precautions in place;Call light within reach;Gait belt;Patient at risk for falls; Left in chair;Nurse notified       Goals  Short Term Goals  Time Frame for Short Term Goals: 20 days  Short Term Goal 1: Initiate HEP and progress as tolerated for balance and mobility. Short Term Goal 2: Pt bed mobility will be independent without rails to allow independence at home. Short Term Goal 3: Pt will transfer independently with least restrictive device and good safety to reduce fall risk. Short Term Goal 4: Pt will ambulate independently with least restrictive device for up to 250 feet to allow community ambulation. Short Term Goal 5: Negotiate 3 steps with handrail, independently to allow entry and exit of the home.   Patient Goals   Patient Goals : feel better    Education  Patient Education  Education Given To: Patient  Education Provided: Role of Therapy;Transfer Training;Energy Conservation  Education Method: Verbal;Demonstration  Barriers to Learning: None  Education Outcome: Verbalized understanding    Therapy Time   Individual Concurrent Group Co-treatment   Time In 0957         Time Out 1022         Minutes [address], PTA

## 2023-02-20 NOTE — PROGRESS NOTES
Occupational Therapy  Facility/Department: [facility] AT THE [facility]  Daily Treatment Note  NAME: Yahaira Kitchen  : [birth year]  MRN: [id number]    Date of Service: 2023    Discharge Recommendations:  Continue to assess pending progress         Patient Diagnosis(es): There were no encounter diagnoses. Assessment    Activity Tolerance: Patient tolerated treatment well  Discharge Recommendations: Continue to assess pending progress      Plan   Occupational Therapy Plan  Times Per Week: 7x/wk  Times Per Day: Once a day  Current Treatment Recommendations: Strengthening;Balance training;Functional mobility training;Self-Care / ADL; Safety education & training     Restrictions  Restrictions/Precautions  Restrictions/Precautions: General Precautions; Fall Risk    Subjective   Subjective  Subjective: Pt sitting up in bedside chair upon arrival. Pt agreed to participate in therapy session. Pain: Pt reported 3/10 back and neck pain this date. Objective    Vitals     Bed Mobility Training  Bed Mobility Training: No  Transfer Training  Transfer Training: Yes  Overall Level of Assistance: Contact-guard assistance;Assist X2  Interventions: Verbal cues; Safety awareness training  Sit to Stand: Contact-guard assistance;Assist X2  Stand to Sit: Contact-guard assistance;Assist X2  Toilet Transfer: Assist X1;Stand-by assistance  Gait Training  Gait Training: Yes  Gait  Overall Level of Assistance: Contact-guard assistance;Assist X1  Assistive Device: Walker, rolling;Gait belt     ADL  Toileting: Stand by assistance  OT Exercises  Exercise Treatment: Pt tolerated BUE ther ex with 1# dumbbell x 5 planes x 15 reps x 1 set to increase UE strength and endurance in order to ease completion of ADL tasks. Pt required RBs as needed secondary to fatigue. Safety Devices  Type of Devices: All fall risk precautions in place;Call light within reach;Gait belt;Patient at risk for falls; Left in chair;Nurse notified     Patient Education  Education Given To: Patient  Education Provided: Role of Therapy;Plan of Care;Transfer Training  Education Method: Demonstration;Verbal  Barriers to Learning: None  Education Outcome: Verbalized understanding;Demonstrated understanding    Goals  Short Term Goals  Time Frame for Short Term Goals: 20 visits  Short Term Goal 1: Pt. will tolerate 15 mins of BUE ther ex/act to increase overall strength/activity tolerance for functional tasks. Short Term Goal 2: Pt. will demo standing tolerance x 5-7 mins w/o LOB to increase safety/participation with ADL's. Short Term Goal 3: Pt. will complete ADL routine with mod I and G safety. Short Term Goal 4: Pt. will complete ADL transfers/mobility with mod I and reduced risk for falls.        Therapy Time   Individual Concurrent Group Co-treatment   Time In 0957         Time Out 1021         Minutes 24                 KALI Pulido/VINH

## 2023-02-20 NOTE — PROGRESS NOTES
Writer at bedside for PICC line dressing change. Patient tolerated well. Patient denies any additional needs.

## 2023-02-20 NOTE — PROGRESS NOTES
Physical Therapy  Facility/Department: [facility] AT THE [facility]  Daily Treatment Note  NAME: Artem Grider  : 1955  MRN: [id number]    Date of Service: 2023    Discharge Recommendations:  Continue to assess pending progress, Home with Home health PT, Patient would benefit from continued therapy after discharge        Patient Diagnosis(es): There were no encounter diagnoses. Assessment   Assessment: Pt ambulated 15 ft x4 (to and from bathroom ) with HHA. She also ambulated additional 40 ft with HHA. Mild SOB following SPo2 remained 90%. Seated and standing ex completed with few, short RBs taken d/t fatigue. Activity Tolerance: Patient tolerated treatment well     Plan    Physcial Therapy Plan  General Plan: 2 times a day 7 days a week     Restrictions  Restrictions/Precautions  Restrictions/Precautions: General Precautions, Fall Risk     Subjective    Subjective  Subjective: Pt in chair. SHe is agreeable to PT. She states she needs to use bathroom first, states she was given Lasix recently so \"has been going to pee a lot. \"  Pain: 3/10 back and neck. Per nursing, pt recently revceived pain medication  Orientation  Overall Orientation Status: Within Functional Limits     Objective   Vitals     Bed Mobility Training  Bed Mobility Training: No  Transfer Training  Transfer Training: Yes  Overall Level of Assistance: Stand-by assistance;Assist X1  Interventions: Verbal cues; Safety awareness training  Sit to Stand: Assist X1;Contact-guard assistance;Stand-by assistance  Stand to Sit: Contact-guard assistance;Stand-by assistance;Assist X1  Toilet Transfer: Assist X1;Supervision  Gait Training  Gait Training: Yes  Gait  Overall Level of Assistance: Contact-guard assistance;Assist X1  Base of Support: Narrowed  Speed/Maggie: Slow  Distance (ft):  (15 ft x4 - pt amb to and from bathroom with HHA. She also ambulated 40 ft. Mild SOB following SPo2 remained 90%. )  Assistive Device:  (No device, HHA at times.)     PT Exercises  Exercise Treatment: REclined BLE ex x15 each;  Standing sink exercises x15 each     Safety Devices  Type of Devices: All fall risk precautions in place;Call light within reach;Gait belt;Patient at risk for falls; Left in chair;Nurse notified       Goals  Short Term Goals  Time Frame for Short Term Goals: 20 days  Short Term Goal 1: Initiate HEP and progress as tolerated for balance and mobility. Short Term Goal 2: Pt bed mobility will be independent without rails to allow independence at home. Short Term Goal 3: Pt will transfer independently with least restrictive device and good safety to reduce fall risk. Short Term Goal 4: Pt will ambulate independently with least restrictive device for up to 250 feet to allow community ambulation. Short Term Goal 5: Negotiate 3 steps with handrail, independently to allow entry and exit of the home.   Patient Goals   Patient Goals : feel better    Education  Patient Education  Education Given To: Patient  Education Provided: Role of Therapy;Transfer Training;Energy Conservation  Education Provided Comments: Pt educated on energy conservation techniques and pursed lipped breathing  Education Method: Verbal;Demonstration  Barriers to Learning: None  Education Outcome: Verbalized understanding    Therapy Time   Individual Concurrent Group Co-treatment   Time In 1340         Time Out 1403         Minutes [address] [place] [address]

## 2023-02-20 NOTE — PROGRESS NOTES
Writer at bedside for shift assessment . Patient is alert and oriented x4-calm and cooperative with assessment. Lung fields are clear in upper airways and diminished in bases. Patient remains sinus rhythm on telemetry - no adventitious sounds noted. Bowel sounds are active x4, no current reports of tenderness of GI upset. 1+ pitting edema noted in BLE- patient is able to follow commands with adequate range of motion in all extremities. Writer offered assistance to the bedside commode- pt denies needs for toileting at this time. Denying any additional needs, call light placed within reach , bed in lowest position and alarm engaged to promote patient safety. Writer encourages patient to call out for assistance. Will continue to monitor.

## 2023-02-21 LAB
ABSOLUTE EOS #: 0.36 K/UL (ref 0–0.44)
ABSOLUTE IMMATURE GRANULOCYTE: 0.55 K/UL (ref 0–0.3)
ABSOLUTE LYMPH #: 2.73 K/UL (ref 1.1–3.7)
ABSOLUTE MONO #: 0.55 K/UL (ref 0.1–1.2)
ALBUMIN SERPL-MCNC: 2.4 G/DL (ref 3.5–5.2)
ALBUMIN/GLOBULIN RATIO: 0.8 (ref 1–2.5)
ALP SERPL-CCNC: 136 U/L (ref 35–104)
ALT SERPL-CCNC: 23 U/L (ref 5–33)
ANION GAP SERPL CALCULATED.3IONS-SCNC: 8 MMOL/L (ref 9–17)
AST SERPL-CCNC: 19 U/L
BASOPHILS # BLD: 0 % (ref 0–2)
BASOPHILS ABSOLUTE: 0 K/UL (ref 0–0.2)
BILIRUB SERPL-MCNC: 0.2 MG/DL (ref 0.3–1.2)
BUN SERPL-MCNC: 12 MG/DL (ref 8–23)
BUN/CREAT BLD: 15 (ref 9–20)
CALCIUM SERPL-MCNC: 8.7 MG/DL (ref 8.6–10.4)
CHLORIDE SERPL-SCNC: 99 MMOL/L (ref 98–107)
CO2 SERPL-SCNC: 29 MMOL/L (ref 20–31)
CREAT SERPL-MCNC: 0.79 MG/DL (ref 0.5–0.9)
EOSINOPHILS RELATIVE PERCENT: 2 % (ref 1–4)
GFR SERPL CREATININE-BSD FRML MDRD: >60 ML/MIN/1.73M2
GLUCOSE SERPL-MCNC: 86 MG/DL (ref 70–99)
HCT VFR BLD AUTO: 22.2 % (ref 36.3–47.1)
HGB BLD-MCNC: 7.4 G/DL (ref 11.9–15.1)
IMMATURE GRANULOCYTES: 3 %
LYMPHOCYTES # BLD: 15 % (ref 24–43)
MCH RBC QN AUTO: 30.7 PG (ref 25.2–33.5)
MCHC RBC AUTO-ENTMCNC: 33.3 G/DL (ref 28.4–34.8)
MCV RBC AUTO: 92.1 FL (ref 82.6–102.9)
MICROORGANISM SPEC CULT: NORMAL
MICROORGANISM SPEC CULT: NORMAL
MONOCYTES # BLD: 3 % (ref 3–12)
MORPHOLOGY: NORMAL
NRBC AUTOMATED: 0.3 PER 100 WBC
NUCLEATED RED BLOOD CELLS: 1 PER 100 WBC (ref 0–5)
PATH REV BLD -IMP: NORMAL
PDW BLD-RTO: 14.6 % (ref 11.8–14.4)
PLATELET # BLD AUTO: 289 K/UL (ref 138–453)
PMV BLD AUTO: 10.4 FL (ref 8.1–13.5)
POTASSIUM SERPL-SCNC: 4.6 MMOL/L (ref 3.7–5.3)
PROT SERPL-MCNC: 5.4 G/DL (ref 6.4–8.3)
RBC # BLD: 2.41 M/UL (ref 3.95–5.11)
SEG NEUTROPHILS: 77 % (ref 36–65)
SEGMENTED NEUTROPHILS ABSOLUTE COUNT: 14.01 K/UL (ref 1.5–8.1)
SERVICE CMNT-IMP: NORMAL
SERVICE CMNT-IMP: NORMAL
SODIUM SERPL-SCNC: 136 MMOL/L (ref 135–144)
SPECIMEN DESCRIPTION: NORMAL
SPECIMEN DESCRIPTION: NORMAL
WBC # BLD AUTO: 18.2 K/UL (ref 3.5–11.3)

## 2023-02-21 PROCEDURE — 2700000000 HC OXYGEN THERAPY PER DAY

## 2023-02-21 PROCEDURE — 2580000003 HC RX 258: Performed by: NURSE PRACTITIONER

## 2023-02-21 PROCEDURE — 2580000003 HC RX 258: Performed by: UROLOGY

## 2023-02-21 PROCEDURE — 6370000000 HC RX 637 (ALT 250 FOR IP): Performed by: INTERNAL MEDICINE

## 2023-02-21 PROCEDURE — 80053 COMPREHEN METABOLIC PANEL: CPT

## 2023-02-21 PROCEDURE — 6370000000 HC RX 637 (ALT 250 FOR IP): Performed by: NURSE PRACTITIONER

## 2023-02-21 PROCEDURE — APPSS30 APP SPLIT SHARED TIME 16-30 MINUTES: Performed by: NURSE PRACTITIONER

## 2023-02-21 PROCEDURE — 85025 COMPLETE CBC W/AUTO DIFF WBC: CPT

## 2023-02-21 PROCEDURE — 94761 N-INVAS EAR/PLS OXIMETRY MLT: CPT

## 2023-02-21 PROCEDURE — 97110 THERAPEUTIC EXERCISES: CPT

## 2023-02-21 PROCEDURE — 99232 SBSQ HOSP IP/OBS MODERATE 35: CPT | Performed by: INTERNAL MEDICINE

## 2023-02-21 PROCEDURE — 1200000000 HC SEMI PRIVATE

## 2023-02-21 PROCEDURE — 6360000002 HC RX W HCPCS: Performed by: NURSE PRACTITIONER

## 2023-02-21 PROCEDURE — 2580000003 HC RX 258: Performed by: INTERNAL MEDICINE

## 2023-02-21 PROCEDURE — 97535 SELF CARE MNGMENT TRAINING: CPT

## 2023-02-21 PROCEDURE — 36415 COLL VENOUS BLD VENIPUNCTURE: CPT

## 2023-02-21 PROCEDURE — 97116 GAIT TRAINING THERAPY: CPT

## 2023-02-21 RX ADMIN — SODIUM CHLORIDE, PRESERVATIVE FREE 10 ML: 5 INJECTION INTRAVENOUS at 10:22

## 2023-02-21 RX ADMIN — POTASSIUM CHLORIDE 20 MEQ: 1500 TABLET, EXTENDED RELEASE ORAL at 09:01

## 2023-02-21 RX ADMIN — SODIUM CHLORIDE, PRESERVATIVE FREE 10 ML: 5 INJECTION INTRAVENOUS at 09:35

## 2023-02-21 RX ADMIN — FUROSEMIDE 40 MG: 10 INJECTION, SOLUTION INTRAMUSCULAR; INTRAVENOUS at 10:22

## 2023-02-21 RX ADMIN — NYSTATIN 500000 UNITS: 100000 SUSPENSION ORAL at 21:12

## 2023-02-21 RX ADMIN — HYDROCODONE BITARTRATE AND ACETAMINOPHEN 1 TABLET: 5; 325 TABLET ORAL at 21:15

## 2023-02-21 RX ADMIN — SODIUM CHLORIDE, PRESERVATIVE FREE 10 ML: 5 INJECTION INTRAVENOUS at 09:37

## 2023-02-21 RX ADMIN — POTASSIUM CHLORIDE 20 MEQ: 1500 TABLET, EXTENDED RELEASE ORAL at 21:12

## 2023-02-21 RX ADMIN — HYDROCODONE BITARTRATE AND ACETAMINOPHEN 1 TABLET: 5; 325 TABLET ORAL at 14:21

## 2023-02-21 RX ADMIN — NYSTATIN 500000 UNITS: 100000 SUSPENSION ORAL at 09:01

## 2023-02-21 RX ADMIN — HYDROCODONE BITARTRATE AND ACETAMINOPHEN 1 TABLET: 5; 325 TABLET ORAL at 07:19

## 2023-02-21 RX ADMIN — NYSTATIN 500000 UNITS: 100000 SUSPENSION ORAL at 12:20

## 2023-02-21 RX ADMIN — NYSTATIN 500000 UNITS: 100000 SUSPENSION ORAL at 16:55

## 2023-02-21 RX ADMIN — ATORVASTATIN CALCIUM 20 MG: 20 TABLET, FILM COATED ORAL at 21:15

## 2023-02-21 RX ADMIN — TRAZODONE HYDROCHLORIDE 50 MG: 50 TABLET ORAL at 21:12

## 2023-02-21 RX ADMIN — LEVOTHYROXINE SODIUM 75 MCG: 0.07 TABLET ORAL at 07:19

## 2023-02-21 RX ADMIN — SODIUM CHLORIDE, PRESERVATIVE FREE 10 ML: 5 INJECTION INTRAVENOUS at 21:13

## 2023-02-21 RX ADMIN — SODIUM CHLORIDE, PRESERVATIVE FREE 10 ML: 5 INJECTION INTRAVENOUS at 21:11

## 2023-02-21 RX ADMIN — TAMSULOSIN HYDROCHLORIDE 0.4 MG: 0.4 CAPSULE ORAL at 09:03

## 2023-02-21 RX ADMIN — LEVOFLOXACIN 500 MG: 5 INJECTION, SOLUTION INTRAVENOUS at 10:25

## 2023-02-21 ASSESSMENT — PAIN SCALES - GENERAL
PAINLEVEL_OUTOF10: 5
PAINLEVEL_OUTOF10: 4
PAINLEVEL_OUTOF10: 1
PAINLEVEL_OUTOF10: 4
PAINLEVEL_OUTOF10: 4
PAINLEVEL_OUTOF10: 0
PAINLEVEL_OUTOF10: 4
PAINLEVEL_OUTOF10: 0

## 2023-02-21 ASSESSMENT — PAIN DESCRIPTION - ORIENTATION
ORIENTATION: RIGHT;MID;LOWER
ORIENTATION: RIGHT;MID;LOWER
ORIENTATION: LOWER;MID;RIGHT
ORIENTATION: RIGHT;MID;LOWER
ORIENTATION: RIGHT;MID;LOWER

## 2023-02-21 ASSESSMENT — PAIN SCALES - WONG BAKER

## 2023-02-21 ASSESSMENT — PAIN DESCRIPTION - PAIN TYPE
TYPE: ACUTE PAIN

## 2023-02-21 ASSESSMENT — PAIN DESCRIPTION - DESCRIPTORS
DESCRIPTORS: ACHING
DESCRIPTORS: ACHING;DISCOMFORT
DESCRIPTORS: ACHING

## 2023-02-21 ASSESSMENT — PAIN - FUNCTIONAL ASSESSMENT
PAIN_FUNCTIONAL_ASSESSMENT: ACTIVITIES ARE NOT PREVENTED

## 2023-02-21 ASSESSMENT — PAIN DESCRIPTION - LOCATION
LOCATION: BACK;HEAD
LOCATION: BACK
LOCATION: BACK;HEAD

## 2023-02-21 ASSESSMENT — PAIN DESCRIPTION - FREQUENCY
FREQUENCY: CONTINUOUS
FREQUENCY: CONTINUOUS

## 2023-02-21 ASSESSMENT — PAIN DESCRIPTION - ONSET
ONSET: ON-GOING
ONSET: ON-GOING

## 2023-02-21 NOTE — PROGRESS NOTES
1900 - Assessment completed. Vital signs documented. Lung sounds clear/diminished t/o lung fields. Abdomen soft; non tender. Bowel sounds active x 4 quads. Patient ambulates to the BR with x 1 assist and tolerates well. Patient denies needs at this time. Call light within reach. Will continue to monitor.

## 2023-02-21 NOTE — PROGRESS NOTES
Physical Therapy  Facility/Department: [facility] AT THE [facility]  Daily Treatment Note  NAME: Merrick Hammer  : 1955  MRN: [id number]    Date of Service: 2023    Discharge Recommendations:  Continue to assess pending progress, Home with Home health PT, Patient would benefit from continued therapy after discharge     Patient Diagnosis(es): There were no encounter diagnoses. Assessment   Assessment: Pt able to progress gait training without AD with AM 180ft. Pt did have 3-4 imbalances during gait training but was able to self correct with reaching and stepping strategies. Mild SOB noted--sp02 dropped to 87% on room air, increased to 93% within ~1.5 minutes of seated rest break and instruction on pursed lip breathing. Will continue to progress as tolerated. Activity Tolerance: Patient tolerated treatment well     Plan    Physcial Therapy Plan  General Plan: 2 times a day 7 days a week (1x per day on weekends.)     Restrictions  Restrictions/Precautions  Restrictions/Precautions: General Precautions, Fall Risk     Subjective    Subjective  Subjective: Pt in chair upon arrival, pleasant and agreeable to therapy. States she is tired but feeling better. Pain: denies  Orientation  Overall Orientation Status: Within Functional Limits     Objective   Vitals     Bed Mobility Training  Bed Mobility Training: No  Transfer Training  Transfer Training: Yes  Overall Level of Assistance: Stand-by assistance;Assist X1  Interventions: Verbal cues; Safety awareness training  Stand to Sit: Assist X1;Stand-by assistance  Stand Pivot Transfers: Assist X1;Stand-by assistance  Toilet Transfer: Assist X1;Stand-by assistance  Gait Training  Gait Training: Yes  Gait  Overall Level of Assistance: Contact-guard assistance;Assist X1  Interventions: Verbal cues; Safety awareness training  Base of Support: Narrowed  Speed/Maggie: Fluctuations  Step Length: Left shortened;Right shortened  Gait Abnormalities: Decreased step clearance; Path deviations  Distance (ft): 180 Feet (Pt with imbalances noted but able to self correct each time with stepping and/or reaching strategies.)  Assistive Device: Other (comment) (No AD.)     PT Exercises  Exercise Treatment: Seated and supine B LE therex x 15 in all available planes of motion     Safety Devices  Type of Devices: All fall risk precautions in place;Call light within reach;Gait belt;Left in chair;Patient at risk for falls (No alarm present upon entering pt room.)       Goals  Short Term Goals  Time Frame for Short Term Goals: 20 days  Short Term Goal 1: Initiate HEP and progress as tolerated for balance and mobility. Short Term Goal 2: Pt bed mobility will be independent without rails to allow independence at home. Short Term Goal 3: Pt will transfer independently with least restrictive device and good safety to reduce fall risk. Short Term Goal 4: Pt will ambulate independently with least restrictive device for up to 250 feet to allow community ambulation. Short Term Goal 5: Negotiate 3 steps with handrail, independently to allow entry and exit of the home.   Patient Goals   Patient Goals : feel better    Education  Patient Education  Education Given To: Patient  Education Provided: Home Exercise Program;Fall Prevention Strategies;Transfer Training  Education Method: Verbal;Demonstration  Barriers to Learning: None  Education Outcome: Verbalized understanding;Demonstrated understanding    Therapy Time   Individual Concurrent Group Co-treatment   Time In 0956         Time Out 1020         Minutes [address] Kirk

## 2023-02-21 NOTE — PROGRESS NOTES
[facility] - Progress Note-Telemedicine  Today's Date and Time: 2/21/2023, 7:53 AM    Impression :   E. Coli septicemia 2-13-23 x 2  E. Coli UTI  Right hydroureteronephrosis  S/p right ureteral stent placement on 2/13/23  Leukocytosis  DINA  Improved  Hypotension-resolved  Chronic history of nephrolithiasis  Transaminase elevation  Thrombocytopenia  Anemia    Recommendations:   IV Levaquin 500 mg IV q day. Stop date 2-23-23  E. Coli on BC x 2  Repeat BC 2/16/23: No growth   E. Coli on urine culture  Monitor WBC, platelets, ProBNP, CRP    Medical Decision Making/Summary/Discussion:2/21/2023       Infection Control Recommendations   [place] Precautions    Antimicrobial Stewardship Recommendations     Simplification of therapy  Targeted therapy      Coordination of Outpatient Care:   Estimated Length of IV antimicrobials:2/23/23  Patient will need Midline Catheter Insertion: TBD  Patient will need PICC line Insertion:TBD  Patient will need: Home IV , [place],  SNF,  LTAC: TBD  Patient will need outpatient wound care:No    Chief complaint/reason for consultation:   Concern for urosepsis      History of Present Illness:   Sylvia Jaeger is a 79y.o.-year-old female who was initially admitted on 2/13/2023. Patient seen at the request of Dr. Nuria Downs:    This patient, with a chronic history of kidney stones, saw her urologist on 2/13/23 and had complaints of 2 days of right sided flank pain with associated nausea and vomiting. A CT revealed multiple stones in the right ureter with significant hydroureteronephrosis. There are also non-obstructing stones in both kidneys. Her WBC was 34.3 at that time and she had a creatinine of 3.8. Her baseline creatinine is 0.65. She underwent emergent right-sided ureteral stenting and was then admitted to [facility].     IV Levaquin was initiated.     A the time of this consult note, the patient was noted to be hypotensive and is requiring vasopressors. She is afebrile and oxygenation well on room air. There is no growth on blood cultures thus far and the urine culture is pending. Her last UTI was noted to have grown pan sensitive E. Coli in 2021. Abnormal labs include:  Cr:2.97  GFR:17  Trop:31  Alk phos:166  ALT:143  AST:134  WBC:33.0  Hgb:9.2  Plt: 81    Imaging of the chest also revealed increased interstitial opacities with mild subsegmental atelectasis in the posterior lung bases. CT abd/pelvis 2/13/23  Impression   1. 2 obstructing calculi in the distal right ureter about 3 and 4 mm in size   noted close to the uterovesical junction. There may be additional punctate   calculus or 2 in the same region. This causes moderate right hydronephrosis   and hydroureter. 2. Nonobstructing bilateral renal calculi. 5 mm calculus in the right kidney   and a few punctate and 2 mm calculi in the left kidney. 3. Mild subsegmental atelectasis posterior lung bases. CXR 2/13/23  Impression   Increased interstitial opacity. While much or all of this may be chronic,   please correlate with any clinical evidence of acute interstitial edema. A focal infiltrate is not detected           ID was consulted for antibiotic recommendations    CURRENT EVALUATION 2/21/2023  /60   Pulse 71   Temp 99.4 °F (37.4 °C) (Temporal)   Resp 16   Ht 5' 3\" (1.6 m)   Wt 124 lb 9.6 oz (56.5 kg)   LMP  (LMP Unknown)   SpO2 93%   BMI 22.07 kg/m²     Afebrile  VS stable    02 requirements improved  The patient is alert and oriented on room air  RR 19-->15  02 sat 96-->94    Renal function much better  Ambrose catheter removed    2/20/23: Repeat CT abd/pelvis showed stent in place but continued right hydronephrosis, moderate bilateral pleural effusions    Medications reviewed:   On IV Levaquin because of concern with thrombocytopenia  Thrombocytopenia likely secondary to Gram negative septicemia rather than medication induced. It is improving as sepsis improves  Leukocytosis is on a down-trend    Discharge planning is underway    Labs, X rays reviewed: 2/21/2023    BUN:13-->12  Cr:0.86-->0.79  Na 134-->132-->136    WBC:33.0-->29.8-->16.1-->19-->21-->19.5-->18.2  Hb:7.2-->7.4  Plat: 199-->289    CRP:217.4-->120.6  HYK:[id number]    Cultures:  Urine:  2/13/23: E. coli  Blood:  2/13/23: GNR x2  Sputum :    Wound:    MRSA Nares:      Imaging:    CT abd/pelvis 2/13/23              CXR 2/13/23      Discussed with patient, RN, family. I have personally reviewed the past medical history, past surgical history, medications, social history, and family history, and I have updated the database accordingly.   Past Medical History:     Past Medical History:   Diagnosis Date    Bacteremia due to Escherichia coli 3/57/7275    Complicated UTI (urinary tract infection) 2/13/2023    Fracture of wrist, unspecified laterality, closed, initial encounter     right    History of sepsis 2009    following lithotripsy    Hyperlipidemia     Hypertension     Hypothyroidism 2007    secondary to radioactive iodine 2007    Kidney stone     Osteoporosis     Primary localized osteoarthritis of right hip 3/18/2019    Sepsis due to Escherichia coli ([address] 2/16/2023    Septic shock due to urinary tract infection ([address] 2/13/2023    Severe sepsis ([address] 2/13/2023    Thrombocytopenia ([address] 2/15/2023       Past Surgical  History:     Past Surgical History:   Procedure Laterality Date    CHOLECYSTECTOMY, LAPAROSCOPIC  2009    COLONOSCOPY  07/17/2014    Dr. Zita Casillas - tubular adenomatous polyp removed    COLONOSCOPY N/A 08/01/2018    Dr. Sadie Morgan architectural distortion, suggestive of mucosal prolapse    CYSTOSCOPY  2015    stent removal and reinsert     CYSTOSCOPY Left     HLL with stent    CYSTOSCOPY Left 02/05/2021    CYSTOSCOPY URETEROSCOPY LASER-HLL performed by Sade Wright MD at [place] [id number] Right 2/13/2023    CYSTOSCOPY URETERAL STENT INSERTION performed by Froilan Pastor MD at [facility] / [address] / Nick Aguilera Left 02/05/2021    CYSTOSCOPY RETROGRADE PYELOGRAM STENT INSERTION performed by Froilan Pastor MD at [address] [facility] Right 02/13/2023    Dr. Zulema Wolf    LITHOTRIPSY  04/28/2015    left    LITHOTRIPSY Left 01/30/2018    cystoscopy- Dr. Keller Leventhal     LITHOTRIPSY Right 04/20/2021    LITHOTRIPSY Right 04/20/2021    ESWL EXTRACORPOREAL SHOCK WAVE LITHOTRIPSY performed by Froilan Pastor MD at [facility] [address]  2007    radioactive iodine procedure    [place] CYSTOURETHROSCOPY Left 01/30/2018    CYSTOSCOPY performed by Froilan Pastor MD at [address] Left 01/30/2018    ESWL [address] LITHOTRIPSY performed by Froilan Pastor MD at [address] Right 03/18/2019    Dr. Mae Warr Acres       Medications:      furosemide  40 mg IntraVENous Daily    nystatin  5 mL Oral 4x Daily    atorvastatin  20 mg Oral Daily    levothyroxine  75 mcg Oral Daily    levofloxacin  500 mg IntraVENous Q24H    potassium chloride  20 mEq Oral BID    tamsulosin  0.4 mg Oral Daily    traZODone  50 mg Oral Nightly    sodium chloride flush  5-40 mL IntraVENous 2 times per day    lidocaine 1 % injection  5 mL IntraDERmal Once    sodium chloride flush  5-40 mL IntraVENous 2 times per day       Social History:     Social History     Socioeconomic History    Marital status:      Spouse name: Not on file    Number of children: Not on file    Years of education: Not on file    Highest education level: Not on file   Occupational History    Not on file   Tobacco Use    Smoking status: Some Days     Packs/day: 0.25     Years: 30.00     Pack years: 7.50     Types: Cigarettes    Smokeless tobacco: Never   Vaping Use    Vaping Use: Never used   Substance and Sexual Activity    Alcohol use: Yes     Alcohol/week: 1.0 standard drink     Types: 1 Shots of liquor per week     Comment: socially    Drug use: No    Sexual activity: Yes     Partners: Male     Comment: postmeno   Other Topics Concern    Not on file   Social History Narrative    Not on file     Social Determinants of Health     Financial Resource Strain: Low Risk     Difficulty of Paying Living Expenses: Not hard at all   Food Insecurity: No Food Insecurity    Worried About Running Out of Food in the Last Year: Never true    Ran Out of Food in the Last Year: Never true   Transportation Needs: Not on file   Physical Activity: Insufficiently Active    Days of Exercise per Week: 2 days    Minutes of Exercise per Session: 10 min   Stress: Not on file   Social Connections: Not on file   Intimate Partner Violence: Not on file   Housing Stability: Not on file       Family History:     Family History   Problem Relation Age of Onset    Breast Cancer Maternal Grandmother     Stroke Father     Hypertension Father     Alzheimer's Disease Mother         Allergies:   Patient has no known allergies. Review of Systems:   Constitutional: No fevers or chills. Head: No headaches  Eyes: No double vision or blurry vision. No conjunctival inflammation. ENT: No sore throat or runny nose. . No hearing loss, tinnitus or vertigo. Cardiovascular: No chest pain or palpitations. No shortness of breath. No CARUSO  Lung: No shortness of breath or cough. No sputum production  Abdomen: nausea, no vomiting, no diarrhea, left sided flank pain. .   Genitourinary: Ambrose in place. Rt sided CVA pain  Musculoskeletal: No muscle aches or pains. No joint effusions, swelling or deformities  Hematologic: No bleeding or bruising. Neurologic: No headache, weakness, numbness, or tingling. Integument: No rash, no ulcers. Psychiatric: No depression. Endocrine: No polyuria, no polydipsia, no polyphagia.     Physical Examination :   Patient Vitals for the past 8 hrs:   BP Temp Temp src Pulse Resp SpO2 Weight   02/21/23 0719 -- -- -- -- 16 -- --   02/21/23 0700 107/60 99.4 °F (37.4 °C) Temporal 71 17 93 % --   02/21/23 0600 (!) 113/58 -- -- 73 19 (!) 87 % --   02/21/23 0559 (!) 113/58 -- -- 70 19 (!) 86 % --   02/21/23 0525 122/60 -- -- 88 18 92 % --   02/21/23 0500 (!) 104/52 -- -- 63 17 97 % 124 lb 9.6 oz (56.5 kg)   02/21/23 0425 (!) 97/58 -- -- 63 19 95 % --   02/21/23 0400 115/63 -- -- 71 17 96 % --   02/21/23 0325 (!) 96/55 -- -- 68 17 97 % --   02/21/23 0300 (!) 107/53 97.7 °F (36.5 °C) Temporal 64 17 95 % --   02/21/23 0125 99/61 -- -- 63 18 99 % --   02/21/23 0100 (!) 100/58 -- -- 62 17 99 % --     General Appearance: Awake, alert, and in no apparent distress  Head:  Normocephalic, no trauma  Eyes: Pupils equal, round, reactive to light and accommodation; extraocular movements intact; sclera anicteric; conjunctivae pink. No embolic phenomena. ENT: Oropharynx clear, without erythema, exudate, or thrush. No tenderness of sinuses. Mouth/throat: mucosa pink and moist. No lesions. Dentition in good repair. Neck:Supple, without lymphadenopathy. Thyroid normal, No bruits. Pulmonary/Chest: Diminished to auscultation, without wheezes, rales, or rhonchi. No dullness to percussion. Cardiovascular: Regular rate and rhythm without murmurs, rubs, or gallops. Abdomen: Soft,  tender. Bowel sounds normal. No organomegaly . Rt CVA pain  All four Extremities: No cyanosis, clubbing, edema, or effusions. Neurologic: No gross sensory or motor deficits. Skin: Warm and dry with good turgor. No signs of peripheral arterial or venous insufficiency. No ulcerations. No open wounds.     Medical Decision Making -Laboratory:   I have independently reviewed/ordered the following labs:    CBC with Differential:   Recent Labs     02/20/23  0514 02/21/23  0515   WBC 19.5* 18.2*   HGB 7.2* 7.4*   HCT 21.5* 22.2*    289   LYMPHOPCT 14* 15*   MONOPCT 5 3     BMP:   Recent Labs     02/20/23  0514 02/21/23 0515   * 136   K 4.6 4.6   CL 98 99   CO2 31 29   BUN 13 12   CREATININE 0.86 0.79     Hepatic Function Panel:   Recent Labs     02/20/23 0514 02/21/23 0515   PROT 5.1* 5.4*   LABALBU 2.1* 2.4*   BILITOT 0.3 0.2*   ALKPHOS 139* 136*   ALT 28 23   AST 16 19     No results for input(s): RPR in the last 72 hours.  No results for input(s): HIV in the last 72 hours.  No results for input(s): BC in the last 72 hours.  Lab Results   Component Value Date/Time    MUCUS TRACE 02/13/2023 05:23 PM    RBC 2.41 02/21/2023 05:15 AM    RBC 4.08 05/08/2012 08:10 AM    TRICHOMONAS NOT REPORTED 02/03/2021 02:30 PM    WBC 18.2 02/21/2023 05:15 AM    YEAST NOT REPORTED 02/03/2021 02:30 PM    TURBIDITY SLIGHTLY CLOUDY 02/13/2023 05:23 PM     Lab Results   Component Value Date/Time    CREATININE 0.79 02/21/2023 05:15 AM    GLUCOSE 86 02/21/2023 05:15 AM    GLUCOSE 102 05/08/2012 08:10 AM       Medical Decision Making-Imaging:     Narrative   EXAMINATION:   ONE XRAY VIEW OF THE CHEST       2/13/2023 9:25 am       COMPARISON:   11/07/2013       HISTORY:   ORDERING SYSTEM PROVIDED HISTORY: sepsis   TECHNOLOGIST PROVIDED HISTORY:   sepsis       FINDINGS:   Cardial pericardial silhouette is unremarkable.  Increased interstitial   opacities are seen throughout both lungs.  A focal infiltrate is not   detected.  No pneumothorax.  No free air.  No acute bony abnormality.           Impression   Increased interstitial opacity.  While much or all of this may be chronic,   please correlate with any clinical evidence of acute interstitial edema.       A focal infiltrate is not detected         Narrative   EXAMINATION:   CT OF THE ABDOMEN AND PELVIS WITHOUT CONTRAST 2/13/2023 9:50 am       TECHNIQUE:   CT of the abdomen and pelvis was performed without the administration of   intravenous contrast. Multiplanar reformatted images are provided for review.   Automated exposure control, iterative reconstruction, and/or  weight based   adjustment of the mA/kV was utilized to reduce the radiation dose to as low   as reasonably achievable. COMPARISON:   02/03/2021       HISTORY:   ORDERING SYSTEM PROVIDED HISTORY: Renal colic   TECHNOLOGIST PROVIDED HISTORY:           FINDINGS:   Lower Chest: Mild subsegmental atelectasis posterior lung bases. Organs: There is 5 mm calculus in the lower pole right kidney which is also   evident on the prior. Additional smaller calculi in the right kidney on   prior no longer present. There is moderate right hydronephrosis with   significant perinephric stranding. There is also moderate right hydroureter. There are 2 calculi measuring 3 mm and 4 mm in the distal right ureter a few   cm from the uterovesical junction accounting for the obstructive signs. There may be additional punctate calculi in the same region. Left kidney shows a few punctate calculi along with a 2 mm calculus in the   lower pole. No left hydronephrosis or hydroureter. No left renal calculus. The unenhanced liver shows a 1 cm cyst in the left lobe which is unchanged. Cholecystectomy. Spleen, pancreas, adrenal glands show no significant   abnormalities. GI/Bowel: There is limited evaluation due to absence of oral contrast.       The stomach shows no focal lesions. Small bowel loops normal in caliber   showing no focal abnormalities. Normal appendix. Evaluation of the colon shows no acute process. Pelvis: Streak artifact from right hip prosthesis obscures portions of the   pelvis. There are pelvic phleboliths. Uterus and urinary bladder grossly   normal.       Peritoneum/Retroperitoneum: No free fluid. No lymphadenopathy. Atherosclerotic disease. Bones/Soft Tissues: Degenerative changes in the spine. No acute abnormality   of the bones. The superficial soft tissues show no significant abnormalities.            Impression   1. 2 obstructing calculi in the distal right ureter about 3 and 4 mm in size   noted close to the uterovesical junction. There may be additional punctate   calculus or 2 in the same region. This causes moderate right hydronephrosis   and hydroureter. 2. Nonobstructing bilateral renal calculi. 5 mm calculus in the right kidney   and a few punctate and 2 mm calculi in the left kidney. 3. Mild subsegmental atelectasis posterior lung bases. Medical Decision Bqecql-Puqcbges-Utsmm:        Culture, Blood 1  Order: [id number]  Status: Final result    Visible to patient: Yes (not seen)    Next appt: 03/16/2023 at 03:00 PM in Radiology [facility])    Specimen Information: Blood   0 Result Notes  Component 2/13/23 1300  Resulting Agency   Specimen Description . BLOOD  [address] [facility]   Special Requests 20ML, [id number] Lab   Culture POSITIVE Blood Culture Abnormal   [address]   Culture DIRECT Bright [place] STAIN FROM BOTTLE: [address] COLI Abnormal   [address]   Culture (NOTE) Direct Gram Stain from bottle result called to and read back by:RAMON BARTLETT 2/14/2023 @[facility] [address] [place] [address]        Susceptibility    Escherichia coli (3)    Antibiotic Interpretation Microscan Method Status    ampicillin Sensitive 4 BACTERIAL SUSCEPTIBILITY PANEL RAMON Final    ceFAZolin Sensitive <=4 BACTERIAL SUSCEPTIBILITY PANEL RAMON Final    cefTRIAXone Sensitive <=0.25 BACTERIAL SUSCEPTIBILITY PANEL RAMON Final    Confirmatory Extended Spectrum Beta-Lactamase Sensitive NEGATIVE BACTERIAL SUSCEPTIBILITY PANEL RAMON Final    gentamicin Sensitive <=1 BACTERIAL SUSCEPTIBILITY PANEL RAMON Final    levofloxacin Sensitive <=0.12 BACTERIAL SUSCEPTIBILITY PANEL RAMON Final    piperacillin-tazobactam Sensitive <=4 BACTERIAL SUSCEPTIBILITY PANEL RAMON Final    tobramycin Sensitive <=1 BACTERIAL SUSCEPTIBILITY PANEL RAMON Final    trimethoprim-sulfamethoxazole Sensitive <=20 BACTERIAL SUSCEPTIBILITY PANEL RAMON Final          Specimen Collected: 02/13/23 13:00 EST Last Resulted: 02/16/23 00:28 EST           Contains abnormal data Culture, Blood 1  Order: [id number]  Status: Final result    Visible to patient: Yes (not seen)    Next appt: 03/16/2023 at 03:00 PM in Radiology [facility])    Specimen Information: Blood   0 Result Notes  Component 2/13/23 1300  Resulting Agency   Specimen Description . BLOOD  [address] [facility]   Special Requests 20ML, [id number] Lab   Culture POSITIVE Blood Culture Abnormal   [address]   Culture DIRECT GRAM STAIN FROM BOTTLE: [address] COLI Abnormal   [address]   Culture (NOTE) Direct Gram Stain from bottle result called to and read back by:RAMON BARTLETT 2/14/2023 @[facility] [address] [place] [address]        Susceptibility    Escherichia coli (3)    Antibiotic Interpretation Microscan Method Status    ampicillin Sensitive 4 BACTERIAL SUSCEPTIBILITY PANEL RAMON Final    ceFAZolin Sensitive <=4 BACTERIAL SUSCEPTIBILITY PANEL RAMON Final    cefTRIAXone Sensitive <=0.25 BACTERIAL SUSCEPTIBILITY PANEL RAMON Final    Confirmatory Extended Spectrum Beta-Lactamase Sensitive NEGATIVE BACTERIAL SUSCEPTIBILITY PANEL RAMON Final    gentamicin Sensitive <=1 BACTERIAL SUSCEPTIBILITY PANEL RAMON Final    levofloxacin Sensitive <=0.12 BACTERIAL SUSCEPTIBILITY PANEL RAMON Final    piperacillin-tazobactam Sensitive <=4 BACTERIAL SUSCEPTIBILITY PANEL RAMON Final    tobramycin Sensitive <=1 BACTERIAL SUSCEPTIBILITY PANEL RAMON Final    trimethoprim-sulfamethoxazole Sensitive <=20 BACTERIAL SUSCEPTIBILITY PANEL RAMON Final             Culture, Urine  Order: [id number]  Status: Final result    Visible to patient: Yes (not seen)    Next appt: 03/16/2023 at 03:00 PM in Radiology [facility])    Specimen Information: Urine, clean catch   0 Result Notes  Component 2/13/23 1317    Specimen Description . CLEAN CATCH URINE    Culture ESCHERICHIA COLI >[id number] CFU/ML Abnormal     Resulting [address]        Susceptibility    Escherichia coli (1)    Antibiotic Interpretation Microscan Method Status    ampicillin Sensitive 4 BACTERIAL SUSCEPTIBILITY PANEL RAMON Final    ceFAZolin Sensitive <=4 BACTERIAL SUSCEPTIBILITY PANEL RAMON Final     Cefazolin sensitivity results can be used to predict the effectiveness of oral   cephalosporins (eg. Cephalexin) in uncomplicated Urinary Tract Infections due to E. coli, K.    pneumoniae, and P. mirabilis        cefTRIAXone Sensitive <=0.25 BACTERIAL SUSCEPTIBILITY PANEL RAMON Final    Confirmatory Extended Spectrum Beta-Lactamase Sensitive NEGATIVE BACTERIAL SUSCEPTIBILITY PANEL RAMON Final    gentamicin Sensitive <=1 BACTERIAL SUSCEPTIBILITY PANEL RAMON Final    levofloxacin Sensitive <=0.12 BACTERIAL SUSCEPTIBILITY PANEL RAMON Final    nitrofurantoin Sensitive <=16 BACTERIAL SUSCEPTIBILITY PANEL RAMON Final    piperacillin-tazobactam Sensitive <=4 BACTERIAL SUSCEPTIBILITY PANEL RAMON Final    tobramycin Sensitive <=1 BACTERIAL SUSCEPTIBILITY PANEL RAMON Final    trimethoprim-sulfamethoxazole Sensitive <=20 BACTERIAL SUSCEPTIBILITY PANEL RAMON                  Medical Decision Making-Other:     Note:  Labs, medications, radiologic studies were reviewed with personal review of films  Large amounts of data were reviewed  Discussed with nursing Staff, Discharge planner  Infection Control and Prevention measures reviewed  All prior entries were reviewed  Administer medications as ordered  Prognosis: Fair  Discharge planning reviewed      Thank you for allowing us to participate in the care of this patient. Please call with questions. Jennifer Zhang, APRN - CNP    ATTESTATION:    I have discussed the case, including pertinent history and exam findings with the APRN.  I have evaluated the  History, physical findings and pictures of the patient and the key elements of the encounter have been performed by me. I have reviewed the laboratory data, other diagnostic studies and discussed them with the APRN. I have updated the medical record where necessary. I agree with the assessment, plan and orders as documented by the APRN.     Hollie Lynch MD.      Pager: [phone] - Office: [phone]

## 2023-02-21 NOTE — PROGRESS NOTES
Follow up with the patient regarding discharge plans. The plan is still home with no services.     JEANNA Duarte

## 2023-02-21 NOTE — PROGRESS NOTES
2300 - Reassessment completed. Vital signs documented. No changes noted from previous assessment. Patient resting quietly with no c/o voiced. Call light within reach. Will continue to monitor.

## 2023-02-21 NOTE — PLAN OF CARE
Problem: Discharge Planning  Goal: Discharge to home or other facility with appropriate resources  Outcome: Progressing  Flowsheets  Taken 2/20/2023 1953  Discharge to home or other facility with appropriate resources:   Identify barriers to discharge with patient and caregiver   Arrange for needed discharge resources and transportation as appropriate   Identify discharge learning needs (meds, wound care, etc)   Refer to discharge planning if patient needs post-hospital services based on physician order or complex needs related to functional status, cognitive ability or social support system  Taken 2/20/2023 1900  Discharge to home or other facility with appropriate resources:   Identify barriers to discharge with patient and caregiver   Arrange for needed discharge resources and transportation as appropriate   Identify discharge learning needs (meds, wound care, etc)   Refer to discharge planning if patient needs post-hospital services based on physician order or complex needs related to functional status, cognitive ability or social support system     Problem: Pain  Goal: Verbalizes/displays adequate comfort level or baseline comfort level  Outcome: Progressing  Flowsheets  Taken 2/20/2023 1953  Verbalizes/displays adequate comfort level or baseline comfort level:   Encourage patient to monitor pain and request assistance   Assess pain using appropriate pain scale   Administer analgesics based on type and severity of pain and evaluate response   Implement non-pharmacological measures as appropriate and evaluate response   Notify Licensed Independent Practitioner if interventions unsuccessful or patient reports new pain  Taken 2/20/2023 1900  Verbalizes/displays adequate comfort level or baseline comfort level:   Encourage patient to monitor pain and request assistance   Assess pain using appropriate pain scale   Administer analgesics based on type and severity of pain and evaluate response   Implement non-pharmacological measures as appropriate and evaluate response   Notify Licensed Independent Practitioner if interventions unsuccessful or patient reports new pain  Note: Medicate with PRN pain meds as needed.      Problem: Safety - Adult  Goal: Free from fall injury  Outcome: Progressing  Flowsheets (Taken 2/20/2023 1953)  Free From Fall Injury:   Instruct family/caregiver on patient safety   Based on caregiver fall risk screen, instruct family/caregiver to ask for assistance with transferring infant if caregiver noted to have fall risk factors     Problem: Infection - Adult  Goal: Absence of infection at discharge  Outcome: Progressing  Flowsheets  Taken 2/20/2023 1953  Absence of infection at discharge:   Assess and monitor for signs and symptoms of infection   Monitor lab/diagnostic results   Monitor all insertion sites i.e., indwelling lines, tubes and drains   Administer medications as ordered  Taken 2/20/2023 1900  Absence of infection at discharge:   Assess and monitor for signs and symptoms of infection   Monitor lab/diagnostic results   Monitor all insertion sites i.e., indwelling lines, tubes and drains   Elida appropriate cooling/warming therapies per order   Administer medications as ordered   Instruct and encourage patient and family to use good hand hygiene technique   Identify and instruct in appropriate isolation precautions for identified infection/condition     Problem: Nutrition Deficit:  Goal: Optimize nutritional status  Outcome: Progressing  Flowsheets (Taken 2/20/2023 1953)  Nutrient intake appropriate for improving, restoring, or maintaining nutritional needs:   Assess nutritional status and recommend course of action   Monitor oral intake, labs, and treatment plans   Recommend appropriate diets, oral nutritional supplements, and vitamin/mineral supplements   Order, calculate, and assess calorie counts as needed   Provide specific nutrition education to patient or family as appropriate  Note: Monitor intake and output of food and fluid during stay.      Health Care Proxy (HCP)

## 2023-02-21 NOTE — PROGRESS NOTES
Physical Therapy  Facility/Department: [facility] AT THE [facility]  Daily Treatment Note  NAME: Cynthia Phalen  : 1955  MRN: [id number]    Date of Service: 2023    Discharge Recommendations:  Continue to assess pending progress, Home with Home health PT, Patient would benefit from continued therapy after discharge      Patient Diagnosis(es): There were no encounter diagnoses. Assessment   Assessment: Seated BLE exercises x15-20 in all planes. Pt had increased LBP with R LE LAQ, limiting ROM. Transfers SBA. Pt ambulated 120' with no AD and CGA x1. Pts SpO2 ranged from 86-96% during ambulation. 2 standing RB's provided with cues on deep breathing with quick return of SpO2 to normal levels. Nursing in to dispence pain meds for pt post treatment. Activity Tolerance: Patient tolerated treatment well;Patient limited by endurance; Patient limited by fatigue     Plan    Physcial Therapy Plan  General Plan: 2 times a day 7 days a week (1x/day on weekends and holidays)  Current Treatment Recommendations: Strengthening;Balance training;Functional mobility training;Transfer training; Endurance training;Home exercise program;Safety education & training; Therapeutic activities     Restrictions  Restrictions/Precautions  Restrictions/Precautions: General Precautions, Fall Risk     Subjective    Subjective  Subjective: Pt in chair upon arrival and agreeable to therapy. Pain: c/o LBP during seated there ex, nursing in to dispence pain meds. No rating of pain level provided. Orientation  Overall Orientation Status: Within Functional Limits     Objective   Bed Mobility Training  Bed Mobility Training: No  Transfer Training  Transfer Training: Yes  Overall Level of Assistance: Stand-by assistance;Assist X1  Interventions: Verbal cues; Safety awareness training  Sit to Stand: Assist X1;Stand-by assistance  Stand to Sit: Assist X1;Stand-by assistance  Stand Pivot Transfers: Assist X1;Stand-by assistance  Toilet Transfer: Assist X1;Stand-by assistance  Gait Training  Gait Training: Yes  Gait  Overall Level of Assistance: Contact-guard assistance;Assist X1 (Sp)2 86-96% on room air during func mob.)  Interventions: Verbal cues; Safety awareness training  Base of Support: Narrowed  Speed/Maggie: Fluctuations  Step Length: Left shortened;Right shortened  Gait Abnormalities: Decreased step clearance; Path deviations (Pt reaching for hand rails during ambulation with out AD. Occasional path deviations, no LOB this afternoon.)  Distance (ft): 120 Feet  Assistive Device: Other (comment) (no device)  Neuromuscular Education  Neuromuscular Education: No  PT Exercises  Exercise Treatment: Seated BLE exercises x15-20 in all planes. Pt had increased LBP with R LE LAQ, limiting ROM     Safety Devices  Type of Devices: All fall risk precautions in place;Call light within reach;Gait belt;Left in chair;Nurse notified       Goals  Short Term Goals  Time Frame for Short Term Goals: 20 days  Short Term Goal 1: Initiate HEP and progress as tolerated for balance and mobility. Short Term Goal 2: Pt bed mobility will be independent without rails to allow independence at home. Short Term Goal 3: Pt will transfer independently with least restrictive device and good safety to reduce fall risk. Short Term Goal 4: Pt will ambulate independently with least restrictive device for up to 250 feet to allow community ambulation. Short Term Goal 5: Negotiate 3 steps with handrail, independently to allow entry and exit of the home. Patient Goals   Patient Goals : feel better    Education  Patient Education  Education Given To: Patient  Education Provided Comments: Education provided on benefits to using [address] during ambulation for improved stability due to pt reaching for handrails during gait.   Education Method: Verbal  Barriers to Learning: None  Education Outcome: Verbalized understanding;Demonstrated understanding    Therapy Time   Individual Concurrent Group Co-treatment   Time In [place] [address] [place] [address]

## 2023-02-21 NOTE — PROGRESS NOTES
Britney Shah M.D. Internal Medicine Progress Note    Patient: Cynthia Phalen  Date of Admission: 2/13/2023 10:56 AM  Hospital Day # 8  Date of Evaluation: 2/21/2023      SUBJECTIVE:    Ms. Calvin Wall  was seen and examined today for f/u of sepsis with septic shock due to E coli UTI and bacteremia, in the setting of ureterolithiasis. She is finally feeling better today. Her nausea has improved and she has no further abdominal pain. She states she is able to eat better currently as her thrush is doing better. She denies any chest pain but still gets a little SOB when getting up and around. Her LE edema is improving as well. ROS:   Constitutional: negative  for fevers, and negative for chills. Respiratory: positive for shortness of breath, negative for cough, and negative for wheezing  Cardiovascular: negative for chest pain, and negative for palpitations  Gastrointestinal: negative for abdominal pain, positive for nausea, negative for vomiting, negative for diarrhea, and negative for constipation     All other systems were reviewed with the patient and are negative unless otherwise stated in HPI    -----------------------------------------------------------------  OBJECTIVE:  Vitals:   Temp: 99.4 °F (37.4 °C)  BP: 107/60  Resp: 16  Heart Rate: 71  SpO2: 93 % on 3L nasal cannula    Weight  Wt Readings from Last 3 Encounters:   02/21/23 124 lb 9.6 oz (56.5 kg)   02/13/23 112 lb (50.8 kg)   02/02/23 113 lb (51.3 kg)     Body mass index is 22.07 kg/m². 24HR INTAKE/OUTPUT:      Intake/Output Summary (Last 24 hours) at 2/21/2023 0790  Last data filed at 2/21/2023 0530  Gross per 24 hour   Intake 550 ml   Output 2500 ml   Net -1950 ml       Exam:  GEN:    Awake, alert and oriented x 3. EYES:  EOMI, pupils equal   NECK: Supple. No lymphadenopathy.   No carotid bruit  CVS:    regular rate and rhythm, no audible murmur  PULM:  clear to auscultation without wheezing, rales or rhonchi, no acute respiratory distress  ABD:    Bowels sounds normal.  Abdomen is soft. No distention. suprapubic tenderness to palpation. EXT:   trace edema bilaterally . No calf tenderness. NEURO: Moves all extremities. Motor and sensory are grossly intact  SKIN:  No rashes. No skin lesions.    -----------------------------------------------------------------  DATA:  Complete Blood Count:   Recent Labs     02/19/23  0520 02/20/23  0514 02/21/23  0515   WBC 21.8* 19.5* 18.2*   RBC 2.59* 2.33* 2.41*   HGB 8.0* 7.2* 7.4*   HCT 23.5* 21.5* 22.2*   MCV 90.7 92.3 92.1   MCH 30.9 30.9 30.7   MCHC 34.0 33.5 33.3   RDW 14.5* 14.7* 14.6*   * 199 289   MPV 11.6 10.7 10.4         Reference Range  2/14/23 05:45 2/15/23 05:40 2/16/23 05:20 2/17/23 05:20   Platelet Count 791 - 453 k/uL 81 (L) 66 (L) 52 (L) 46 (L)        Reference Range  2/18/23 05:10 2/19/23 05:20 2/20/23 05:14 2/21/23 05:15   Platelet Count 200 - 453 k/uL 68 (L) 137 (L) 199 289     Blood Glucose:   Recent Labs     02/19/23  0520 02/20/23  0514 02/21/23  0515   GLUCOSE 115* 91 86        Comprehensive Metabolic Profile:   Recent Labs     02/19/23  0520 02/20/23  0514 02/21/23  0515   * 132* 136   K 4.2 4.6 4.6   CL 98 98 99   CO2 30 31 29   BUN 15 13 12   CREATININE 0.86 0.86 0.79   GLUCOSE 115* 91 86   CALCIUM 8.3* 8.2* 8.7   PROT 5.5* 5.1* 5.4*   LABALBU 2.2* 2.1* 2.4*   BILITOT 0.4 0.3 0.2*   ALKPHOS 164* 139* 136*   AST 18 16 19   ALT 40* 28 23        Urinalysis:    Latest Reference Range & Units 2/13/23 17:23   Color, UA Yellow  Yellow   Turbidity UA Clear  SLIGHTLY CLOUDY ! Glucose, UA NEGATIVE  NEGATIVE   Bilirubin, Urine NEGATIVE  NEGATIVE   Ketones, Urine NEGATIVE  TRACE ! Specific Gravity, UA 1.010 - 1.020  1.025 (H)   pH, UA 5.0 - 9.0  5.5   Protein, UA NEGATIVE  2+ ! Urobilinogen, Urine Normal  Normal   Nitrite, Urine NEGATIVE  NEGATIVE   Leukocyte Esterase, Urine NEGATIVE  LARGE !    Mucus, UA None  TRACE !   WBC, UA 0 - 5 /HPF 20 TO 50   RBC, UA 0 - 2 /HPF 2 TO 5   Epithelial Cells, UA 0 - 25 /HPF 0 TO 2   Renal Epithelial, UA 0 /HPF 0 TO 2   Bacteria, UA None  2+ ! Urine Hgb NEGATIVE  2+ ! Lactic Acid:   Lab Results   Component Value Date/Time    LACTA 1.8 02/14/2023 09:46 AM    LACTA 2.2 02/14/2023 07:45 AM    LACTA 2.8 02/14/2023 05:45 AM        Microbiology:  Urine culture 2/13/23 = E coli  Blood cultures 2/13/23 x 2 = E coli  Blood cultures 2/15/23 x 2 = no growth x 5 days     Latest Reference Range & Units 2/18/23 10:10   Adenovirus PCR Not Detected  Not Detected   B Pertussis by PCR Not Detected  Not Detected   Chlamydia pneumoniae By PCR Not Detected  Not Detected   Coronavirus 229E PCR Not Detected  Not Detected   Coronavirus HKU1 PCR Not Detected  Not Detected   Coronavirus NL63 PCR Not Detected  Not Detected   Coronavirus OC Not Detected  Not Detected   Human Metapneumovirus PCR Not Detected  Not Detected   Influenza A by PCR Not Detected  Not Detected   Influenza B by PCR Not Detected  Not Detected   Parainfluenza 1 PCR Not Detected  Not Detected   Parainfluenza 2 PCR Not Detected  Not Detected   Parainfluenza 3 PCR Not Detected  Not Detected   Parainfluenza 4 PCR Not Detected  Not Detected   Resp Syncytial Virus PCR Not Detected  Not Detected   Rhino/Enterovirus PCR Not Detected  DETECTED ! Mycoplasma pneumo by PCR Not Detected  Not Detected   SARS-CoV-2, PCR Not Detected  Not Detected   SARS-CoV-2, Rapid Not Detected  Not Detected       Radiology/Imaging:  CT ABDOMEN PELVIS WO CONTRAST Additional Contrast? None   Final Result   1. Interval placement of a double pigtail right ureteral stent. The   proximal end of the stent is in appropriate position. The distal end of the   stent is difficult to ascertain due to obscuring beam-hardening artifact. The distal end of the stent may be right at the UV junction partially within   the distal ureter. There is still persistent right hydronephrosis.       2.  Bilateral parenchymal nephroliths as above.      3.  No bowel obstruction. There is contrast enhancement of the lining of the   colon presumably from recent CT chest.      4.  Bilateral moderate pleural effusions. The patient has a small   pericardial effusion. RECOMMENDATIONS:   Advise plain film examination of the abdomen. XR CHEST PORTABLE   Final Result   No significant change from prior study. Small bilateral effusions in diffuse   interstitial opacities are again noted. CT CHEST PULMONARY EMBOLISM W CONTRAST   Final Result   1. Multifocal airspace disease throughout the lungs which can be seen with   atypical viral pneumonia. Correlate for any history of COVID. Moderate   bilateral pleural effusions. Compressive atelectasis in both lungs. Follow-up is recommended to document resolution. 2. Atrophic thyroid gland. 3. Atherosclerotic calcification and atheromatous plaque of the aorta. 4. Scarring and cortical thinning of the upper pole left kidney. Partially   visualized superior aspect of right ureteral stent in right renal collecting   system. 5. No clear evidence for central pulmonary embolus. 6. Moderate thoracic dextroscoliosis. XR CHEST PORTABLE   Final Result      Right PICC line distal tip is within the mid right atrium and needs to be   pulled back approximately 4 cm. Diffusely increased interstitial markings throughout both lungs with   associated ground-glass densities. Findings can be suggestive of multifocal   pneumonia. Consolidative pulmonary edema is another possibility. Small bilateral pleural effusion. FLUORO FOR SURGICAL PROCEDURES   Final Result      XR CHEST PORTABLE   Final Result   Increased interstitial opacity. While much or all of this may be chronic,   please correlate with any clinical evidence of acute interstitial edema.       A focal infiltrate is not detected                       MEDICAL DECISION MAKING:  Primary Problem(s): Severe sepsis with septic shock due to complicated UTI with ureterolithiasis and E coli bacteremia  Condition is improving however leukocytosis remains unchanged  Treatment plan:   Urology assistance appreciated  S/p cystoscopy with right ureteral stent insertion 2/13/23  ID consult appreciated - Dr. Michael High notes reviewed  Imaging:   Port CXR ordered today = bilateral interstitial infiltrates remain unchanged, no significant change, moderate bilateral pleural effusions  Repeat CT abd/pelvis ordered today = stent in place but continued hydronephrosis, moderate bilateral pleural effusions  Medications:   Continue Levofloxacin  Weaned off Levophed  - BPs stable  DC IVF's due to edema / SOB  Lasix 40 IV daily   Medication Monitoring / High Risk Medications: none     Multifocal pneumonia  Condition is stable  Treatment plan:   ID consult appreciated  Rapid Covid = negative  Viral respiratory panel = Rhino/enterovirus  Imaging: CTA chest reviewed  Medications: Continue IV levofloxacin - stop date 2/23/22    Acute kidney injury due to sepsis    Condition is resolved  Treatment plan:   Nephrology consult appreciated - Dr. Janice Alva notes reviewed  BP support  Monitor renal function, I/O's    Elevated LFT's due to shock liver    Condition is resolved  Treatment plan:   Monitor LFT's     Thrombocytopenia  Multifactorial and likely due to combination of sepsis, shock liver, possibly medications  Condition is resolved  Heme/Onc consult appreciated - Dr. Donny Marrufo note reviewed  Treatment plan: monitor CBC  Medications: heparin DC'd    Thrush  Condition is improving  Medications:   Nystatin S/S  Viscous lidocaine / oragel prn    Nutrition status:   at risk for malnutrition  Dietician consult initiated    Hospital Prophylaxis:   DVT: SCDs (heparin DCd due to thrombocytopenia)    MDM Data:   Test interpretation:  My independent EKG interpretation: NSR with normal axis and intervals, non-specific ST changes  Management and/or test interpretation discussed with Dr. Catherine Quinonez CNP  Consults and Nursing notes were personally reviewed, all current labs and imaging were personally reviewed, and tests ordered: CBC, CMP. Lactic acid      Disposition:  Shared decision making: All test results, treatment options and disposition options were discussed with the patient today  Social determinants of health that may impact management: none  Code status: Full Code   Disposition: Discharge plan is pending      Critical Care Time:  Total critical care time caring for this patient with life threatening, unstable organ failure, including direct patient contact, management of life support systems, review of data including imaging and labs, discussions with other team members and physicians at least 35 minutes so far today, excluding separately billable procedures.         Manny Mccartney MD , M.D.  2/21/2023  7:40 AM

## 2023-02-21 NOTE — PROGRESS NOTES
0300 - Reassessment completed. Vital signs documented. No changes noted from previous assessment. No c/o voiced. Will continue to monitor.

## 2023-02-21 NOTE — PROGRESS NOTES
Occupational Therapy  Facility/Department: [facility]  Daily Treatment Note  NAME: Cynthia Phalen  : [id number]  MRN: [id number]    Date of Service: 2023    Discharge Recommendations:  Continue to assess pending progress         Patient Diagnosis(es): There were no encounter diagnoses. Assessment    Activity Tolerance: Patient tolerated treatment well  Discharge Recommendations: Continue to assess pending progress      Plan   Occupational Therapy Plan  Times Per Week: 7x/wk  Times Per Day: Once a day  Current Treatment Recommendations: Strengthening;Balance training;Functional mobility training;Self-Care / ADL; Safety education & training     Restrictions  Restrictions/Precautions  Restrictions/Precautions: General Precautions; Fall Risk    Subjective   Subjective  Subjective: Pt sitting up in bedside chair upon arrival. Pt agreed to participate in therapy session. Pain: Pt reported 3/10 back pain this date. Orientation  Overall Orientation Status: Within Functional Limits  Pain: c/o LBP during seated there ex, nursing in to dispence pain meds. No rating of pain level provided. Objective    Vitals     Bed Mobility Training  Bed Mobility Training: No  Transfer Training  Transfer Training: Yes  Overall Level of Assistance: Stand-by assistance;Assist X1  Interventions: Verbal cues; Safety awareness training  Sit to Stand: Assist X1;Stand-by assistance  Stand to Sit: Assist X1;Stand-by assistance  Stand Pivot Transfers: Assist X1;Stand-by assistance  Toilet Transfer: Assist X1;Stand-by assistance  Gait Training  Gait Training: Yes  Gait  Overall Level of Assistance: Contact-guard assistance;Assist X1 (Sp)2 86-96% on room air during func mob.)  Interventions: Verbal cues; Safety awareness training  Assistive Device: Other (comment) (no device)     ADL  Toileting: Stand by assistance  OT Exercises  Exercise Treatment: Pt tolerated BUE ther ex with 1# dumbbell x 5 planes x 15 reps x 1 set to increase UE strength and endurance in order to ease completion of ADL tasks. Pt required RBs as needed secondary to fatigue. Safety Devices  Type of Devices: All fall risk precautions in place;Call light within reach;Gait belt;Left in chair;Patient at risk for falls     Patient Education  Education Given To: Patient  Education Provided: Role of Therapy;Plan of Care;Transfer Training  Education Method: Demonstration;Verbal  Barriers to Learning: None  Education Outcome: Verbalized understanding;Demonstrated understanding    Goals  Short Term Goals  Time Frame for Short Term Goals: 20 visits  Short Term Goal 1: Pt. will tolerate 15 mins of BUE ther ex/act to increase overall strength/activity tolerance for functional tasks. Short Term Goal 2: Pt. will demo standing tolerance x 5-7 mins w/o LOB to increase safety/participation with ADL's. Short Term Goal 3: Pt. will complete ADL routine with mod I and G safety. Short Term Goal 4: Pt. will complete ADL transfers/mobility with mod I and reduced risk for falls.        Therapy Time   Individual Concurrent Group Co-treatment   Time In 2005         Time Out 1426         Minutes 23                 KALI Pulido/VINH

## 2023-02-22 ENCOUNTER — TELEPHONE (OUTPATIENT)
Dept: UROLOGY | Age: 68
End: 2023-02-22

## 2023-02-22 LAB
ABSOLUTE EOS #: 0 K/UL (ref 0–0.44)
ABSOLUTE IMMATURE GRANULOCYTE: 0.34 K/UL (ref 0–0.3)
ABSOLUTE LYMPH #: 1.88 K/UL (ref 1.1–3.7)
ABSOLUTE MONO #: 0.68 K/UL (ref 0.1–1.2)
ALBUMIN SERPL-MCNC: 2.5 G/DL (ref 3.5–5.2)
ALBUMIN/GLOBULIN RATIO: 0.7 (ref 1–2.5)
ALP SERPL-CCNC: 124 U/L (ref 35–104)
ALT SERPL-CCNC: 23 U/L (ref 5–33)
ANION GAP SERPL CALCULATED.3IONS-SCNC: 7 MMOL/L (ref 9–17)
AST SERPL-CCNC: 18 U/L
BASOPHILS # BLD: 0 % (ref 0–2)
BASOPHILS ABSOLUTE: 0 K/UL (ref 0–0.2)
BILIRUB SERPL-MCNC: 0.2 MG/DL (ref 0.3–1.2)
BUN SERPL-MCNC: 13 MG/DL (ref 8–23)
BUN/CREAT BLD: 12 (ref 9–20)
CALCIUM SERPL-MCNC: 8.7 MG/DL (ref 8.6–10.4)
CHLORIDE SERPL-SCNC: 95 MMOL/L (ref 98–107)
CO2 SERPL-SCNC: 30 MMOL/L (ref 20–31)
CREAT SERPL-MCNC: 1.09 MG/DL (ref 0.5–0.9)
EOSINOPHILS RELATIVE PERCENT: 0 % (ref 1–4)
GFR SERPL CREATININE-BSD FRML MDRD: 56 ML/MIN/1.73M2
GLUCOSE SERPL-MCNC: 82 MG/DL (ref 70–99)
HCT VFR BLD AUTO: 23.3 % (ref 36.3–47.1)
HGB BLD-MCNC: 7.8 G/DL (ref 11.9–15.1)
IMMATURE GRANULOCYTES: 2 %
LYMPHOCYTES # BLD: 11 % (ref 24–43)
MCH RBC QN AUTO: 30.7 PG (ref 25.2–33.5)
MCHC RBC AUTO-ENTMCNC: 33.5 G/DL (ref 28.4–34.8)
MCV RBC AUTO: 91.7 FL (ref 82.6–102.9)
MONOCYTES # BLD: 4 % (ref 3–12)
MORPHOLOGY: NORMAL
NRBC AUTOMATED: 0.2 PER 100 WBC
PDW BLD-RTO: 14.8 % (ref 11.8–14.4)
PLATELET # BLD AUTO: 366 K/UL (ref 138–453)
PMV BLD AUTO: 9.9 FL (ref 8.1–13.5)
POTASSIUM SERPL-SCNC: 4.6 MMOL/L (ref 3.7–5.3)
PROT SERPL-MCNC: 6 G/DL (ref 6.4–8.3)
RBC # BLD: 2.54 M/UL (ref 3.95–5.11)
SEG NEUTROPHILS: 83 % (ref 36–65)
SEGMENTED NEUTROPHILS ABSOLUTE COUNT: 14.2 K/UL (ref 1.5–8.1)
SODIUM SERPL-SCNC: 132 MMOL/L (ref 135–144)
WBC # BLD AUTO: 17.1 K/UL (ref 3.5–11.3)

## 2023-02-22 PROCEDURE — 1200000000 HC SEMI PRIVATE

## 2023-02-22 PROCEDURE — 97116 GAIT TRAINING THERAPY: CPT

## 2023-02-22 PROCEDURE — 6370000000 HC RX 637 (ALT 250 FOR IP): Performed by: INTERNAL MEDICINE

## 2023-02-22 PROCEDURE — 97110 THERAPEUTIC EXERCISES: CPT

## 2023-02-22 PROCEDURE — 36415 COLL VENOUS BLD VENIPUNCTURE: CPT

## 2023-02-22 PROCEDURE — 6360000002 HC RX W HCPCS: Performed by: NURSE PRACTITIONER

## 2023-02-22 PROCEDURE — 6360000002 HC RX W HCPCS: Performed by: INTERNAL MEDICINE

## 2023-02-22 PROCEDURE — 2580000003 HC RX 258: Performed by: INTERNAL MEDICINE

## 2023-02-22 PROCEDURE — 85025 COMPLETE CBC W/AUTO DIFF WBC: CPT

## 2023-02-22 PROCEDURE — 94761 N-INVAS EAR/PLS OXIMETRY MLT: CPT

## 2023-02-22 PROCEDURE — 99232 SBSQ HOSP IP/OBS MODERATE 35: CPT | Performed by: INTERNAL MEDICINE

## 2023-02-22 PROCEDURE — 80053 COMPREHEN METABOLIC PANEL: CPT

## 2023-02-22 RX ORDER — LEVOFLOXACIN 500 MG/1
500 TABLET, FILM COATED ORAL DAILY
Status: DISCONTINUED | OUTPATIENT
Start: 2023-02-23 | End: 2023-02-22 | Stop reason: DRUGHIGH

## 2023-02-22 RX ORDER — LEVOFLOXACIN 750 MG/1
750 TABLET ORAL EVERY OTHER DAY
Status: DISCONTINUED | OUTPATIENT
Start: 2023-02-23 | End: 2023-02-23 | Stop reason: HOSPADM

## 2023-02-22 RX ADMIN — SODIUM CHLORIDE, PRESERVATIVE FREE 10 ML: 5 INJECTION INTRAVENOUS at 08:52

## 2023-02-22 RX ADMIN — NYSTATIN 500000 UNITS: 100000 SUSPENSION ORAL at 13:47

## 2023-02-22 RX ADMIN — TRAZODONE HYDROCHLORIDE 50 MG: 50 TABLET ORAL at 19:35

## 2023-02-22 RX ADMIN — SODIUM CHLORIDE, PRESERVATIVE FREE 10 ML: 5 INJECTION INTRAVENOUS at 19:37

## 2023-02-22 RX ADMIN — ATORVASTATIN CALCIUM 20 MG: 20 TABLET, FILM COATED ORAL at 19:36

## 2023-02-22 RX ADMIN — HYDROCODONE BITARTRATE AND ACETAMINOPHEN 1 TABLET: 5; 325 TABLET ORAL at 15:18

## 2023-02-22 RX ADMIN — POTASSIUM CHLORIDE 20 MEQ: 1500 TABLET, EXTENDED RELEASE ORAL at 19:36

## 2023-02-22 RX ADMIN — HYDROCODONE BITARTRATE AND ACETAMINOPHEN 1 TABLET: 5; 325 TABLET ORAL at 05:18

## 2023-02-22 RX ADMIN — LEVOTHYROXINE SODIUM 75 MCG: 0.07 TABLET ORAL at 08:48

## 2023-02-22 RX ADMIN — FUROSEMIDE 40 MG: 10 INJECTION, SOLUTION INTRAMUSCULAR; INTRAVENOUS at 08:48

## 2023-02-22 RX ADMIN — SODIUM CHLORIDE, PRESERVATIVE FREE 10 ML: 5 INJECTION INTRAVENOUS at 08:50

## 2023-02-22 RX ADMIN — POLYETHYLENE GLYCOL 3350 17 G: 17 POWDER, FOR SOLUTION ORAL at 08:48

## 2023-02-22 RX ADMIN — NYSTATIN 500000 UNITS: 100000 SUSPENSION ORAL at 19:35

## 2023-02-22 RX ADMIN — LEVOFLOXACIN 500 MG: 5 INJECTION, SOLUTION INTRAVENOUS at 11:09

## 2023-02-22 RX ADMIN — NYSTATIN 500000 UNITS: 100000 SUSPENSION ORAL at 08:48

## 2023-02-22 RX ADMIN — TAMSULOSIN HYDROCHLORIDE 0.4 MG: 0.4 CAPSULE ORAL at 19:35

## 2023-02-22 RX ADMIN — HYDROCODONE BITARTRATE AND ACETAMINOPHEN 1 TABLET: 5; 325 TABLET ORAL at 19:35

## 2023-02-22 RX ADMIN — HYDROCODONE BITARTRATE AND ACETAMINOPHEN 1 TABLET: 5; 325 TABLET ORAL at 09:21

## 2023-02-22 RX ADMIN — POTASSIUM CHLORIDE 20 MEQ: 1500 TABLET, EXTENDED RELEASE ORAL at 08:48

## 2023-02-22 RX ADMIN — NYSTATIN 500000 UNITS: 100000 SUSPENSION ORAL at 16:53

## 2023-02-22 ASSESSMENT — PAIN DESCRIPTION - ONSET: ONSET: ON-GOING

## 2023-02-22 ASSESSMENT — PAIN DESCRIPTION - PAIN TYPE: TYPE: ACUTE PAIN

## 2023-02-22 ASSESSMENT — PAIN SCALES - WONG BAKER
WONGBAKER_NUMERICALRESPONSE: 6
WONGBAKER_NUMERICALRESPONSE: 0

## 2023-02-22 ASSESSMENT — PAIN SCALES - GENERAL
PAINLEVEL_OUTOF10: 2
PAINLEVEL_OUTOF10: 4
PAINLEVEL_OUTOF10: 0
PAINLEVEL_OUTOF10: 2
PAINLEVEL_OUTOF10: 2
PAINLEVEL_OUTOF10: 4
PAINLEVEL_OUTOF10: 1
PAINLEVEL_OUTOF10: 5
PAINLEVEL_OUTOF10: 4
PAINLEVEL_OUTOF10: 2

## 2023-02-22 ASSESSMENT — PAIN - FUNCTIONAL ASSESSMENT
PAIN_FUNCTIONAL_ASSESSMENT: ACTIVITIES ARE NOT PREVENTED

## 2023-02-22 ASSESSMENT — PAIN DESCRIPTION - ORIENTATION
ORIENTATION: LEFT;LOWER
ORIENTATION: RIGHT;LEFT
ORIENTATION: RIGHT;LEFT

## 2023-02-22 ASSESSMENT — PAIN DESCRIPTION - DESCRIPTORS
DESCRIPTORS: ACHING

## 2023-02-22 ASSESSMENT — PAIN DESCRIPTION - LOCATION
LOCATION: BACK
LOCATION: BACK;HIP
LOCATION: BACK;HIP
LOCATION: BACK
LOCATION: BACK

## 2023-02-22 ASSESSMENT — PAIN DESCRIPTION - FREQUENCY: FREQUENCY: CONTINUOUS

## 2023-02-22 NOTE — PROGRESS NOTES
Urology Progress Note  CC: rightflank pain    Subjective: AFVSS    Patient Vitals for the past 24 hrs:   BP Temp Temp src Pulse Resp SpO2 Weight   02/22/23 0715 106/60 97.9 °F (36.6 °C) Temporal 65 16 92 % --   02/22/23 0315 -- -- -- -- -- -- 116 lb (52.6 kg)   02/22/23 0015 (!) 110/58 97.8 °F (36.6 °C) Temporal 68 18 95 % --   02/21/23 2145 -- -- -- -- 18 -- --   02/21/23 1844 (!) 109/53 98.7 °F (37.1 °C) Temporal 79 18 94 % --   02/21/23 1451 -- -- -- -- 18 -- --   02/21/23 1421 -- -- -- -- 18 -- --   02/21/23 1300 (!) 87/46 98.5 °F (36.9 °C) Temporal 94 17 90 % --   02/21/23 1138 (!) 92/48 -- -- 77 22 94 % --   02/21/23 0900 (!) 93/49 -- -- 75 14 96 % --   02/21/23 0800 110/63 -- -- 78 20 94 % --   02/21/23 0749 -- -- -- -- 16 -- --       Intake/Output Summary (Last 24 hours) at 2/22/2023 0737  Last data filed at 2/22/2023 0419  Gross per 24 hour   Intake 360 ml   Output 2850 ml   Net -2490 ml       Recent Labs     02/20/23  0514 02/21/23  0515 02/22/23  0515   WBC 19.5* 18.2* 17.1*   HGB 7.2* 7.4* 7.8*   HCT 21.5* 22.2* 23.3*   MCV 92.3 92.1 91.7    289 366     Recent Labs     02/20/23  0514 02/21/23  0515 02/22/23  0515   * 136 132*   K 4.6 4.6 4.6   CL 98 99 95*   CO2 31 29 30   BUN 13 12 13   CREATININE 0.86 0.79 1.09*       No results for input(s): COLORU, PHUR, LABCAST, WBCUA, RBCUA, MUCUS, TRICHOMONAS, YEAST, BACTERIA, CLARITYU, SPECGRAV, LEUKOCYTESUR, UROBILINOGEN, BILIRUBINUR, BLOODU in the last 72 hours. Invalid input(s): NITRATE, GLUCOSEUKETONESUAMORPHOUS    Additional Lab/culture results:     ROS:  Constitutional:  Negative for appetite change, chills and fever. Eyes:  Negative for redness and visual disturbance. Respiratory:  Negative for cough, shortness of breath and wheezing. Cardiovascular:  Negative for chest pain and leg swelling. Gastrointestinal:  Negative for abdominal pain, constipation, nausea and vomiting.    Genitourinary:  Negative for decreased urine volume, difficulty urinating, dysuria, enuresis, flank pain, frequency, hematuria, penile discharge, penile pain, scrotal swelling, testicular pain and urgency. Musculoskeletal:  Negative for back pain, joint swelling and myalgias. Skin:  Negative for color change, rash and wound. Neurological:  Negative for dizziness, tremors and numbness. Hematological:  Negative for adenopathy. Does not bruise/bleed easily.       Physical Exam:   Resting/ NAD  CTA  RRR  Soft nt/nd  Urine clear  No lower extremity swelling or pain      Interval Imaging Findings:    Impression:    Patient Active Problem List   Diagnosis    Ureteral calculus    Renal lithiasis lythotripsy 4/28/15    Hypothyroidism    Osteoporosis    Melanocytic nevus    History of colon polyps    Mixed hyperlipidemia    Hypertension    Renal colic on right side    Ureterolithiasis    Septic shock due to urinary tract infection ([address]    Complicated UTI (urinary tract infection)    Acute unilateral obstructive uropathy    E. 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

## 2023-02-22 NOTE — TELEPHONE ENCOUNTER
Cherise from [facility] called and the CT needs a peer to peer by 7:00pm central time today or it will be closed. Meenakshi Boyer needs to call [phone]. Patient has [employer]. You have to reference her name, , and name of insurance.

## 2023-02-22 NOTE — PROGRESS NOTES
Pt resting in bed, assessment and vitals complete, see flow sheet for documentation, denies pain at this time, all needs met, call light within reach.

## 2023-02-22 NOTE — PLAN OF CARE
Problem: Pain  Goal: Verbalizes/displays adequate comfort level or baseline comfort level  Outcome: Progressing  Note: Pt is able to verbalize when in pain. Pt given Norco as needed. Will continue to monitor.       Problem: Safety - Adult  Goal: Free from fall injury  Outcome: Progressing  Note: Bed in low position. Wheels locked. Bed alarm on. 2/4 side rails are up. Fall band on. Call light within reach.      Problem: Infection - Adult  Goal: Absence of infection at discharge  Outcome: Progressing  Note: Temperature WNL, Pt is getting Levaquin, will continue to monitor.

## 2023-02-22 NOTE — PROGRESS NOTES
Physical Therapy  Facility/Department: [facility] AT THE [facility] MED SURG  Daily Treatment Note  NAME: Frederic Young  : 1955  MRN: [id number]    Date of Service: 2023    Discharge Recommendations:  Continue to assess pending progress, Home with Home health PT, Patient would benefit from continued therapy after discharge        Patient Diagnosis(es): There were no encounter diagnoses. Assessment   Assessment: Progressed gait distance with good tolerance. Ambulated 225 ft with no AD and SUP. Mild ath deviations, no LOB during gait or directional changes. Activity Tolerance: Patient tolerated treatment well     Plan    Physcial Therapy Plan  General Plan: 2 times a day 7 days a week     Restrictions  Restrictions/Precautions  Restrictions/Precautions: General Precautions, Fall Risk     Subjective    Subjective  Subjective: In bed upon arrival having vitals checked by nursing. Pt agreeable to ambulate. She received pain medication at beginning of session  Pain: 4/10 LBP and headache  Orientation  Overall Orientation Status: Within Functional Limits     Objective   Vitals     Bed Mobility Training  Bed Mobility Training: Yes  Overall Level of Assistance: Supervision  Supine to Sit: Supervision  Sit to Supine: Supervision  Scooting: Supervision  Transfer Training  Transfer Training: Yes  Overall Level of Assistance: Supervision;Assist X1  Sit to Stand: Supervision;Assist X1  Stand to Sit: Supervision;Assist X1  Toilet Transfer: Independent;Assist X1 (Ind with pericare.)  Gait Training  Gait Training: Yes  Gait  Overall Level of Assistance: Stand-by assistance;Assist X1  Interventions: Verbal cues; Safety awareness training  Base of Support: Narrowed  Speed/Maggie: Fluctuations  Gait Abnormalities: Decreased step clearance; Path deviations  Distance (ft): 225 Feet  Assistive Device: Other (comment) (No AD)     PT Exercises  Exercise Treatment: Seated BLE ex x15. Increased R hip pain during exercises.      Safety Devices  Type of Devices: All fall risk precautions in place; Left in bed;Call light within reach       Goals  Short Term Goals  Time Frame for Short Term Goals: 20 days  Short Term Goal 1: Initiate HEP and progress as tolerated for balance and mobility. Short Term Goal 2: Pt bed mobility will be independent without rails to allow independence at home. Short Term Goal 3: Pt will transfer independently with least restrictive device and good safety to reduce fall risk. Short Term Goal 4: Pt will ambulate independently with least restrictive device for up to 250 feet to allow community ambulation. Short Term Goal 5: Negotiate 3 steps with handrail, independently to allow entry and exit of the home.   Patient Goals   Patient Goals : feel better    Education  Patient Education  Education Given To: Patient  Education Provided: Home Exercise Program;Fall Prevention Strategies;Transfer Training  Education Provided Comments: Continue strengthening/endurance ; daily ambulation/ex once home; discharge folder issued and reviewed  Education Method: Verbal  Barriers to Learning: None  Education Outcome: Verbalized understanding;Demonstrated understanding    Therapy Time   Individual Concurrent Group Co-treatment   Time In 1515         Time Out 1530         Minutes [address] [place] [address]

## 2023-02-22 NOTE — PROGRESS NOTES
Occupational Therapy  Facility/Department: [facility] AT THE [facility] MED SURG  Daily Treatment Note  NAME: Mounika Robb  : [birth year]  MRN: [id number]    Date of Service: 2023    Discharge Recommendations:  Continue to assess pending progress         Patient Diagnosis(es): There were no encounter diagnoses. Assessment    Activity Tolerance: Patient tolerated treatment well  Discharge Recommendations: Continue to assess pending progress      Plan   Occupational Therapy Plan  Times Per Week: 7x/wk  Times Per Day: Once a day  Current Treatment Recommendations: Strengthening;Balance training;Functional mobility training;Self-Care / ADL; Safety education & training     Restrictions   Fall risk    Subjective   Subjective  Subjective: Pt in bed post lunch upon [facility]ASS arrival, agreeable to therex in bed. Pt declined ADLs at this time. Pain: denied  Orientation  Overall Orientation Status: Within Functional Limits  Pain: mild R hip pain. No numerical value given. Objective    Vitals  Pt declined fxl mob/transfer training and ADLs. OT Exercises  Exercise Treatment: Pt completed BUE therex to increase strength/endurance for ease of fxl tasks. Red digiflex x 20, yellow flex bar bends x 20 1# free weight x all planes shoulder elbow and wrist with 0 RBs for a total of ~ 20 min seated BUE therex. Safety Devices  Type of Devices: All fall risk precautions in place; Left in bed;Call light within reach ( present)     Patient Education  Education Given To: Patient  Education Provided: Role of Therapy;Plan of Care;Home Exercise Program  Education Method: Demonstration;Verbal  Barriers to Learning: None  Education Outcome: Verbalized understanding;Demonstrated understanding    Goals  Short Term Goals  Time Frame for Short Term Goals: 20 visits  Short Term Goal 1: Pt. will tolerate 15 mins of BUE ther ex/act to increase overall strength/activity tolerance for functional tasks.   Short Term Goal 2: Pt. will demo standing tolerance x 5-7 mins w/o LOB to increase safety/participation with ADL's. Short Term Goal 3: Pt. will complete ADL routine with mod I and G safety. Short Term Goal 4: Pt. will complete ADL transfers/mobility with mod I and reduced risk for falls.        Therapy Time   Individual Concurrent Group Co-treatment   Time In 1315         Time Out 1340         Minutes 25                 Daniella Sanchez MAHOGANY

## 2023-02-22 NOTE — PLAN OF CARE
Problem: Discharge Planning  Goal: Discharge to home or other facility with appropriate resources  Outcome: Progressing  Flowsheets (Taken 2/22/2023 0715)  Discharge to home or other facility with appropriate resources: Identify barriers to discharge with patient and caregiver     Problem: Pain  Goal: Verbalizes/displays adequate comfort level or baseline comfort level  2/22/2023 1013 by Antonia Hastings RN  Outcome: Progressing  Flowsheets (Taken 2/22/2023 0715)  Verbalizes/displays adequate comfort level or baseline comfort level:   Encourage patient to monitor pain and request assistance   Assess pain using appropriate pain scale   Administer analgesics based on type and severity of pain and evaluate response   Implement non-pharmacological measures as appropriate and evaluate response  2/21/2023 2222 by Jian Ferguson RN  Outcome: Progressing  Note: Pt is able to verbalize when in pain. Pt given Norco as needed. Will continue to monitor. Problem: Safety - Adult  Goal: Free from fall injury  2/22/2023 1013 by Antonia Hastings RN  Outcome: Progressing  Flowsheets (Taken 2/22/2023 1013)  Free From Fall Injury: Instruct family/caregiver on patient safety  2/21/2023 2222 by Jian Ferguson RN  Outcome: Progressing  Note: Bed in low position. Wheels locked. Bed alarm on. 2/4 side rails are up. Fall band on. Call light within reach. Problem: Infection - Adult  Goal: Absence of infection at discharge  2/22/2023 1013 by Antonia Hastings RN  Outcome: Progressing  Flowsheets (Taken 2/22/2023 0715)  Absence of infection at discharge: Assess and monitor for signs and symptoms of infection  2/21/2023 2222 by Jian Ferguson RN  Outcome: Progressing  Note: Temperature WNL, Pt is getting Levaquin, will continue to monitor.       Problem: Nutrition Deficit:  Goal: Optimize nutritional status  Outcome: Progressing

## 2023-02-22 NOTE — PROGRESS NOTES
Pt sitting up in the chair when writer entered the room. Pt is A&O x4. Vitals and assessment as charted. Pt rated pain a 4 out of 10 in her head and right sided back pain. Pt denies any further needs at this time. Call light within reach.

## 2023-02-22 NOTE — PROGRESS NOTES
Physical Therapy  Facility/Department: [facility] AT THE [facility] MED SURG  Daily Treatment Note  NAME: Stephanie Horton  : 1955  MRN: [id number]    Date of Service: 2023    Discharge Recommendations:  Continue to assess pending progress, Home with Home health PT, Patient would benefit from continued therapy after discharge        Patient Diagnosis(es): There were no encounter diagnoses. Assessment   Assessment: Pt displays improved tolerance to functional mobility and strengthening. SHe ambulated 200 ft with no AD and SBA, very Mild path deviations but no LOB. Activity Tolerance: Patient tolerated treatment well     Plan    Physcial Therapy Plan  General Plan: 2 times a day 7 days a week     Restrictions  Restrictions/Precautions  Restrictions/Precautions: General Precautions, Fall Risk     Subjective    Subjective  Subjective: Pt sitting EOB upon arrival. She states she needed to use bathroom. She is agreeable to PT. Pain: mild R hip pain. No numerical value given. Orientation  Overall Orientation Status: Within Functional Limits     Objective   Vitals     Bed Mobility Training  Bed Mobility Training: No  Transfer Training  Transfer Training: Yes  Overall Level of Assistance: Stand-by assistance;Assist X1  Sit to Stand: Assist X1;Stand-by assistance  Stand to Sit: Assist X1;Stand-by assistance  Toilet Transfer: Supervision;Assist X1  Gait Training  Gait Training: Yes  Gait  Overall Level of Assistance: Stand-by assistance;Assist X1  Base of Support: Narrowed  Speed/Maggie: Fluctuations  Gait Abnormalities: Decreased step clearance; Path deviations  Distance (ft): 200 Feet  Assistive Device: Other (comment) (no device)     PT Exercises  Exercise Treatment: Seated BLE ex x15. Increased R hip pain during exercises. Safety Devices  Type of Devices:  All fall risk precautions in place;Call light within reach;Gait belt;Left in chair;Nurse notified       Goals  Short Term Goals  Time Frame for Short Term Goals: 20 days  Short Term Goal 1: Initiate HEP and progress as tolerated for balance and mobility. Short Term Goal 2: Pt bed mobility will be independent without rails to allow independence at home. Short Term Goal 3: Pt will transfer independently with least restrictive device and good safety to reduce fall risk. Short Term Goal 4: Pt will ambulate independently with least restrictive device for up to 250 feet to allow community ambulation. Short Term Goal 5: Negotiate 3 steps with handrail, independently to allow entry and exit of the home.   Patient Goals   Patient Goals : feel better    Education  Patient Education  Education Given To: Patient  Education Provided: Home Exercise Program;Fall Prevention Strategies;Transfer Training  Education Provided Comments: Continue strengthening/endurance ; daily ambulation/ex once home; discharge folder issued and reviewed  Education Method: Verbal  Barriers to Learning: None  Education Outcome: Verbalized understanding;Demonstrated understanding    Therapy Time   Individual Concurrent Group Co-treatment   Time In 0743         Time Out 0806         Minutes [address] [facility]

## 2023-02-22 NOTE — TELEPHONE ENCOUNTER
right-sided flank pain, right lower quadrant pain, nausea and vomiting. WBC was 34.3. Creatinine was 3.83.      Auth # [id number]    Valid 2/13/2022

## 2023-02-22 NOTE — PROGRESS NOTES
Pt sitting up in bed, re-assessment and vitals complete, see flow sheet for documentation, complains of hip and back pain, requests and given prn pain medication, all needs met, call light within reach.

## 2023-02-22 NOTE — PROGRESS NOTES
Pt resting in bed with eyes closed. VS and assessment complete. SBA to toilet and back to bed. Pt denies any current pain or needs. Bed alarm on and call light in reach.

## 2023-02-22 NOTE — PROGRESS NOTES
[facility] - Progress Note-Telemedicine  Today's Date and Time: 2/22/2023, 11:32 AM    Impression :   E. Coli septicemia 2-13-23 x 2  E. Coli UTI  Right hydroureteronephrosis  S/p right ureteral stent placement on 2/13/23  Leukocytosis  DINA  Improved  Hypotension-resolved  Chronic history of nephrolithiasis  Transaminase elevation  Thrombocytopenia  Anemia    Recommendations:   IV Levaquin 500 mg IV q day. Stop date 2-22-23  E. Coli on BC x 2  Repeat BC 2/16/23: No growth   E. Coli on urine culture  Switch to po Levaquin 500 mg a day starting 2-23-23. Stop date 3-2-23  Monitor WBC, platelets, ProBNP, CRP    Medical Decision Making/Summary/Discussion:2/22/2023       Infection Control Recommendations   [place] Precautions    Antimicrobial Stewardship Recommendations     Simplification of therapy  Targeted therapy      Coordination of Outpatient Care:   Estimated Length of IV antimicrobials:2/23/23  Patient will need Midline Catheter Insertion: TBD  Patient will need PICC line Insertion:TBD  Patient will need: Home IV , [place],  SNF,  LTAC: TBD  Patient will need outpatient wound care:No    Chief complaint/reason for consultation:   Concern for urosepsis      History of Present Illness:   Jodie Johnson is a 79y.o.-year-old female who was initially admitted on 2/13/2023. Patient seen at the request of Dr. Romana Lefevre:    This patient, with a chronic history of kidney stones, saw her urologist on 2/13/23 and had complaints of 2 days of right sided flank pain with associated nausea and vomiting. A CT revealed multiple stones in the right ureter with significant hydroureteronephrosis. There are also non-obstructing stones in both kidneys. Her WBC was 34.3 at that time and she had a creatinine of 3.8. Her baseline creatinine is 0.65.     She underwent emergent right-sided ureteral stenting and was then admitted to [facility].     IV Levaquin was initiated. A the time of this consult note, the patient was noted to be hypotensive and is requiring vasopressors. She is afebrile and oxygenation well on room air. There is no growth on blood cultures thus far and the urine culture is pending. Her last UTI was noted to have grown pan sensitive E. Coli in 2021. Abnormal labs include:  Cr:2.97  GFR:17  Trop:31  Alk phos:166  ALT:143  AST:134  WBC:33.0  Hgb:9.2  Plt: 81    Imaging of the chest also revealed increased interstitial opacities with mild subsegmental atelectasis in the posterior lung bases. CT abd/pelvis 2/13/23  Impression   1. 2 obstructing calculi in the distal right ureter about 3 and 4 mm in size   noted close to the uterovesical junction. There may be additional punctate   calculus or 2 in the same region. This causes moderate right hydronephrosis   and hydroureter. 2. Nonobstructing bilateral renal calculi. 5 mm calculus in the right kidney   and a few punctate and 2 mm calculi in the left kidney. 3. Mild subsegmental atelectasis posterior lung bases. CXR 2/13/23  Impression   Increased interstitial opacity. While much or all of this may be chronic,   please correlate with any clinical evidence of acute interstitial edema. A focal infiltrate is not detected           ID was consulted for antibiotic recommendations    CURRENT EVALUATION 2/22/2023  /60   Pulse 65   Temp 97.9 °F (36.6 °C) (Temporal)   Resp 16   Ht 5' 3\" (1.6 m)   Wt 116 lb (52.6 kg) Comment: bed was re-zeroed  LMP  (LMP Unknown)   SpO2 92%   BMI 20.55 kg/m²     Afebrile  VS stable    Patient feels better  No complaints  No new issues per RN  Ambulating. No further nausea    Medications reviewed:  Levaquin switched to po route to complete treatment.     The patient is alert and oriented on room air  RR 19-->15  02 sat 96-->94    Renal function much better  Ambrose catheter removed    2/20/23: Repeat CT abd/pelvis showed stent in place but continued right hydronephrosis, moderate bilateral pleural effusions    Discharge planning is underway    Labs, X rays reviewed: 2/22/2023    BUN:13-->12-->13  Cr:0.86-->0.79-->1.09  Na 134-->132-->136-->132    WBC:33.0-->21-->19.5-->18.2-->17.1  Hb:7.2-->7.8  Plat: 199-->289-->366    CRP:217.4-->120.6  ADAN:[id number]    Cultures:  Urine:  2/13/23: E. coli  Blood:  2/13/23: E coli x 2  Sputum :    Wound:    MRSA Nares:      Imaging:    CT abd/pelvis 2/13/23              CXR 2/13/23      Discussed with patient, RN, family. I have personally reviewed the past medical history, past surgical history, medications, social history, and family history, and I have updated the database accordingly.   Past Medical History:     Past Medical History:   Diagnosis Date    Bacteremia due to Escherichia coli 0/95/8032    Complicated UTI (urinary tract infection) 2/13/2023    Fracture of wrist, unspecified laterality, closed, initial encounter     right    History of sepsis 2009    following lithotripsy    Hyperlipidemia     Hypertension     Hypothyroidism 2007    secondary to radioactive iodine 2007    Kidney stone     Osteoporosis     Primary localized osteoarthritis of right hip 3/18/2019    Sepsis due to Escherichia coli ([address] 2/16/2023    Septic shock due to urinary tract infection ([address] 2/13/2023    Severe sepsis ([address] 2/13/2023    Thrombocytopenia ([address] 2/15/2023       Past Surgical  History:     Past Surgical History:   Procedure Laterality Date    CHOLECYSTECTOMY, LAPAROSCOPIC  2009    COLONOSCOPY  07/17/2014    Dr. Lilliam Dick - tubular adenomatous polyp removed    COLONOSCOPY N/A 08/01/2018    Dr. Chapin Loud architectural distortion, suggestive of mucosal prolapse    CYSTOSCOPY  2015    stent removal and reinsert     CYSTOSCOPY Left     HLL with stent    CYSTOSCOPY Left 02/05/2021    CYSTOSCOPY URETEROSCOPY LASER-HLL performed by Carlton Lyman MD at [address] Right 2/13/2023 CYSTOSCOPY URETERAL STENT INSERTION performed by Juan Hand MD at [facility] / [address] / [facility] [address] Left 02/05/2021    CYSTOSCOPY RETROGRADE PYELOGRAM STENT INSERTION performed by Juan Hand MD at [address] [facility] Right 02/13/2023    Dr. Miranda Ours    LITHOTRIPSY  04/28/2015    left    LITHOTRIPSY Left 01/30/2018    cystoscopy- Dr. Rocha Prophet     LITHOTRIPSY Right 04/20/2021    LITHOTRIPSY Right 04/20/2021    ESWL EXTRACORPOREAL SHOCK WAVE LITHOTRIPSY performed by Juan Hand MD at [address] [facility]  2007    radioactive iodine procedure    AR CYSTOURETHROSCOPY Left 01/30/2018    CYSTOSCOPY performed by Juan Hand MD at [address] Left 01/30/2018    ESWL [address] LITHOTRIPSY performed by Juan Hand MD at [address] [employer] Right 03/18/2019    Dr. Blake Hazard       Medications:      furosemide  40 mg IntraVENous Daily    nystatin  5 mL Oral 4x Daily    atorvastatin  20 mg Oral Daily    levothyroxine  75 mcg Oral Daily    levofloxacin  500 mg IntraVENous Q24H    potassium chloride  20 mEq Oral BID    tamsulosin  0.4 mg Oral Daily    traZODone  50 mg Oral Nightly    sodium chloride flush  5-40 mL IntraVENous 2 times per day    lidocaine 1 % injection  5 mL IntraDERmal Once    sodium chloride flush  5-40 mL IntraVENous 2 times per day       Social History:     Social History     Socioeconomic History    Marital status:      Spouse name: Not on file    Number of children: Not on file    Years of education: Not on file    Highest education level: Not on file   Occupational History    Not on file   Tobacco Use    Smoking status: Some Days     Packs/day: 0.25     Years: 30.00     Pack years: 7.50     Types: Cigarettes    Smokeless tobacco: Never   Vaping Use    Vaping Use: Never used Substance and Sexual Activity    Alcohol use: Yes     Alcohol/week: 1.0 standard drink     Types: 1 Shots of liquor per week     Comment: socially    Drug use: No    Sexual activity: Yes     Partners: Male     Comment: postmeno   Other Topics Concern    Not on file   Social History Narrative    Not on file     Social Determinants of Health     Financial Resource Strain: Low Risk     Difficulty of Paying Living Expenses: Not hard at all   Food Insecurity: No Food Insecurity    Worried About Running Out of Food in the Last Year: Never true    Ran Out of Food in the Last Year: Never true   Transportation Needs: Not on file   Physical Activity: Insufficiently Active    Days of Exercise per Week: 2 days    Minutes of Exercise per Session: 10 min   Stress: Not on file   Social Connections: Not on file   Intimate Partner Violence: Not on file   Housing Stability: Not on file       Family History:     Family History   Problem Relation Age of Onset    Breast Cancer Maternal Grandmother     Stroke Father     Hypertension Father     Alzheimer's Disease Mother         Allergies:   Patient has no known allergies. Review of Systems:   Constitutional: No fevers or chills. Head: No headaches  Eyes: No double vision or blurry vision. No conjunctival inflammation. ENT: No sore throat or runny nose. . No hearing loss, tinnitus or vertigo. Cardiovascular: No chest pain or palpitations. No shortness of breath. No CARUSO  Lung: No shortness of breath or cough. No sputum production  Abdomen: nausea, no vomiting, no diarrhea, left sided flank pain. .   Genitourinary: Ambrose in place. Rt sided CVA pain  Musculoskeletal: No muscle aches or pains. No joint effusions, swelling or deformities  Hematologic: No bleeding or bruising. Neurologic: No headache, weakness, numbness, or tingling. Integument: No rash, no ulcers. Psychiatric: No depression. Endocrine: No polyuria, no polydipsia, no polyphagia.     Physical Examination :   Patient Vitals for the past 8 hrs:   BP Temp Temp src Pulse Resp SpO2   02/22/23 0951 -- -- -- -- 16 --   02/22/23 0921 -- -- -- -- 16 --   02/22/23 0715 106/60 97.9 °F (36.6 °C) Temporal 65 16 92 %       General Appearance: Awake, alert, and in no apparent distress  Head:  Normocephalic, no trauma  Eyes: Pupils equal, round, reactive to light and accommodation; extraocular movements intact; sclera anicteric; conjunctivae pink. No embolic phenomena. ENT: Oropharynx clear, without erythema, exudate, or thrush. No tenderness of sinuses. Mouth/throat: mucosa pink and moist. No lesions. Dentition in good repair. Neck:Supple, without lymphadenopathy. Thyroid normal, No bruits. Pulmonary/Chest: Diminished to auscultation, without wheezes, rales, or rhonchi. No dullness to percussion. Cardiovascular: Regular rate and rhythm without murmurs, rubs, or gallops. Abdomen: Soft,  tender. Bowel sounds normal. No organomegaly . Rt CVA pain  All four Extremities: No cyanosis, clubbing, edema, or effusions. Neurologic: No gross sensory or motor deficits. Skin: Warm and dry with good turgor. No signs of peripheral arterial or venous insufficiency. No ulcerations. No open wounds. Medical Decision Making -Laboratory:   I have independently reviewed/ordered the following labs:    CBC with Differential:   Recent Labs     02/21/23  0515 02/22/23 0515   WBC 18.2* 17.1*   HGB 7.4* 7.8*   HCT 22.2* 23.3*    366   LYMPHOPCT 15* 11*   MONOPCT 3 4       BMP:   Recent Labs     02/21/23  0515 02/22/23  0515    132*   K 4.6 4.6   CL 99 95*   CO2 29 30   BUN 12 13   CREATININE 0.79 1.09*       Hepatic Function Panel:   Recent Labs     02/21/23  0515 02/22/23  0515   PROT 5.4* 6.0*   LABALBU 2.4* 2.5*   BILITOT 0.2* 0.2*   ALKPHOS 136* 124*   ALT 23 23   AST 19 18       No results for input(s): RPR in the last 72 hours. No results for input(s): HIV in the last 72 hours. No results for input(s): BC in the last 72 hours.   Lab Results   Component Value Date/Time    MUCUS TRACE 02/13/2023 05:23 PM    RBC 2.54 02/22/2023 05:15 AM    RBC 4.08 05/08/2012 08:10 AM    TRICHOMONAS NOT REPORTED 02/03/2021 02:30 PM    WBC 17.1 02/22/2023 05:15 AM    YEAST NOT REPORTED 02/03/2021 02:30 PM    TURBIDITY SLIGHTLY CLOUDY 02/13/2023 05:23 PM     Lab Results   Component Value Date/Time    CREATININE 1.09 02/22/2023 05:15 AM    GLUCOSE 82 02/22/2023 05:15 AM    GLUCOSE 102 05/08/2012 08:10 AM       Medical Decision Making-Imaging:     Narrative   EXAMINATION:   ONE XRAY VIEW OF THE CHEST       2/13/2023 9:25 am       COMPARISON:   11/07/2013       HISTORY:   ORDERING SYSTEM PROVIDED HISTORY: sepsis   TECHNOLOGIST PROVIDED HISTORY:   sepsis       FINDINGS:   Cardial pericardial silhouette is unremarkable. Increased interstitial   opacities are seen throughout both lungs. A focal infiltrate is not   detected. No pneumothorax. No free air. No acute bony abnormality. Impression   Increased interstitial opacity. While much or all of this may be chronic,   please correlate with any clinical evidence of acute interstitial edema. A focal infiltrate is not detected         Narrative   EXAMINATION:   CT OF THE ABDOMEN AND PELVIS WITHOUT CONTRAST 2/13/2023 9:50 am       TECHNIQUE:   CT of the abdomen and pelvis was performed without the administration of   intravenous contrast. Multiplanar reformatted images are provided for review. Automated exposure control, iterative reconstruction, and/or weight based   adjustment of the mA/kV was utilized to reduce the radiation dose to as low   as reasonably achievable. COMPARISON:   02/03/2021       HISTORY:   ORDERING SYSTEM PROVIDED HISTORY: Renal colic   TECHNOLOGIST PROVIDED HISTORY:           FINDINGS:   Lower Chest: Mild subsegmental atelectasis posterior lung bases. Organs: There is 5 mm calculus in the lower pole right kidney which is also   evident on the prior.   Additional smaller calculi in the right kidney on   prior no longer present. There is moderate right hydronephrosis with   significant perinephric stranding. There is also moderate right hydroureter. There are 2 calculi measuring 3 mm and 4 mm in the distal right ureter a few   cm from the uterovesical junction accounting for the obstructive signs. There may be additional punctate calculi in the same region. Left kidney shows a few punctate calculi along with a 2 mm calculus in the   lower pole. No left hydronephrosis or hydroureter. No left renal calculus. The unenhanced liver shows a 1 cm cyst in the left lobe which is unchanged. Cholecystectomy. Spleen, pancreas, adrenal glands show no significant   abnormalities. GI/Bowel: There is limited evaluation due to absence of oral contrast.       The stomach shows no focal lesions. Small bowel loops normal in caliber   showing no focal abnormalities. Normal appendix. Evaluation of the colon shows no acute process. Pelvis: Streak artifact from right hip prosthesis obscures portions of the   pelvis. There are pelvic phleboliths. Uterus and urinary bladder grossly   normal.       Peritoneum/Retroperitoneum: No free fluid. No lymphadenopathy. Atherosclerotic disease. Bones/Soft Tissues: Degenerative changes in the spine. No acute abnormality   of the bones. The superficial soft tissues show no significant abnormalities. Impression   1. 2 obstructing calculi in the distal right ureter about 3 and 4 mm in size   noted close to the uterovesical junction. There may be additional punctate   calculus or 2 in the same region. This causes moderate right hydronephrosis   and hydroureter. 2. Nonobstructing bilateral renal calculi. 5 mm calculus in the right kidney   and a few punctate and 2 mm calculi in the left kidney. 3. Mild subsegmental atelectasis posterior lung bases.          Medical Decision [id number]: Culture, Blood 1  Order: [id number]  Status: Final result    Visible to patient: Yes (not seen)    Next appt: 03/16/2023 at 03:00 PM in Radiology [facility])    Specimen Information: Blood   0 Result Notes  Component 2/13/23 1300  Resulting Agency   Specimen Description . BLOOD  [address] [facility]   Special Requests 20ML, [id number]   Culture POSITIVE Blood Culture Abnormal   [address]   Culture DIRECT Chai Amaris STAIN FROM BOTTLE: [address] COLI Abnormal   [address]   Culture (NOTE) Direct Gram Stain from bottle result called to and read back by:RAMON BARTLETT 2/14/2023 @0249 [address] [place] [address]        Susceptibility    Escherichia coli (3)    Antibiotic Interpretation Microscan Method Status    ampicillin Sensitive 4 BACTERIAL SUSCEPTIBILITY PANEL RAMON Final    ceFAZolin Sensitive <=4 BACTERIAL SUSCEPTIBILITY PANEL RAMON Final    cefTRIAXone Sensitive <=0.25 BACTERIAL SUSCEPTIBILITY PANEL RAMON Final    Confirmatory Extended Spectrum Beta-Lactamase Sensitive NEGATIVE BACTERIAL SUSCEPTIBILITY PANEL RAMON Final    gentamicin Sensitive <=1 BACTERIAL SUSCEPTIBILITY PANEL RAMON Final    levofloxacin Sensitive <=0.12 BACTERIAL SUSCEPTIBILITY PANEL RAMON Final    piperacillin-tazobactam Sensitive <=4 BACTERIAL SUSCEPTIBILITY PANEL RAMON Final    tobramycin Sensitive <=1 BACTERIAL SUSCEPTIBILITY PANEL RAMON Final    trimethoprim-sulfamethoxazole Sensitive <=20 BACTERIAL SUSCEPTIBILITY PANEL RAMON Final          Specimen Collected: 02/13/23 13:00 EST Last Resulted: 02/16/23 00:28 EST           Contains abnormal data Culture, Blood 1  Order: [id number]  Status: Final result    Visible to patient: Yes (not seen)    Next appt: 03/16/2023 at 03:00 PM in Radiology [facility])    Specimen Information: Blood   0 Result Notes  Component 2/13/23 1300  Resulting Agency   Specimen Description . BLOOD  [address] [facility] Special Requests 20ML, [id number] Lab   Culture POSITIVE Blood Culture Abnormal   [address]   Culture DIRECT GRAM STAIN FROM BOTTLE: [address] COLI Abnormal   [address]   Culture (NOTE) Direct Gram Stain from bottle result called to and read back by:RAMON BARTLETT 2/14/2023 @0249 [facility]        Susceptibility    Escherichia coli (3)    Antibiotic Interpretation Microscan Method Status    ampicillin Sensitive 4 BACTERIAL SUSCEPTIBILITY PANEL RAMON Final    ceFAZolin Sensitive <=4 BACTERIAL SUSCEPTIBILITY PANEL RAMON Final    cefTRIAXone Sensitive <=0.25 BACTERIAL SUSCEPTIBILITY PANEL RAMON Final    Confirmatory Extended Spectrum Beta-Lactamase Sensitive NEGATIVE BACTERIAL SUSCEPTIBILITY PANEL RAMON Final    gentamicin Sensitive <=1 BACTERIAL SUSCEPTIBILITY PANEL RAMON Final    levofloxacin Sensitive <=0.12 BACTERIAL SUSCEPTIBILITY PANEL RAMON Final    piperacillin-tazobactam Sensitive <=4 BACTERIAL SUSCEPTIBILITY PANEL RAMON Final    tobramycin Sensitive <=1 BACTERIAL SUSCEPTIBILITY PANEL RAMON Final    trimethoprim-sulfamethoxazole Sensitive <=20 BACTERIAL SUSCEPTIBILITY PANEL RAMON Final             Culture, Urine  Order: [id number]  Status: Final result    Visible to patient: Yes (not seen)    Next appt: 03/16/2023 at 03:00 PM in Radiology [facility])    Specimen Information: Urine, clean catch   0 Result Notes  Component 2/13/23 1317    Specimen Description . CLEAN CATCH URINE    Culture ESCHERICHIA COLI >[id number] CFU/ML Abnormal     Resulting [address]        Susceptibility    Escherichia coli (1)    Antibiotic Interpretation Microscan Method Status    ampicillin Sensitive 4 BACTERIAL SUSCEPTIBILITY PANEL RAMON Final    ceFAZolin Sensitive <=4 BACTERIAL SUSCEPTIBILITY PANEL RAMON Final     Cefazolin sensitivity results can be used to predict the effectiveness of oral cephalosporins (eg. Cephalexin) in uncomplicated Urinary Tract Infections due to E. coli, K.    pneumoniae, and P. mirabilis        cefTRIAXone Sensitive <=0.25 BACTERIAL SUSCEPTIBILITY PANEL RAMON Final    Confirmatory Extended Spectrum Beta-Lactamase Sensitive NEGATIVE BACTERIAL SUSCEPTIBILITY PANEL RAMON Final    gentamicin Sensitive <=1 BACTERIAL SUSCEPTIBILITY PANEL RAMON Final    levofloxacin Sensitive <=0.12 BACTERIAL SUSCEPTIBILITY PANEL RAMON Final    nitrofurantoin Sensitive <=16 BACTERIAL SUSCEPTIBILITY PANEL RAMON Final    piperacillin-tazobactam Sensitive <=4 BACTERIAL SUSCEPTIBILITY PANEL RAMON Final    tobramycin Sensitive <=1 BACTERIAL SUSCEPTIBILITY PANEL RAMON Final    trimethoprim-sulfamethoxazole Sensitive <=20 BACTERIAL SUSCEPTIBILITY PANEL RAMON                  Medical Decision Making-Other:     Note:  Labs, medications, radiologic studies were reviewed with personal review of films  Large amounts of data were reviewed  Discussed with nursing Staff, Discharge planner  Infection Control and Prevention measures reviewed  All prior entries were reviewed  Administer medications as ordered  Prognosis: Fair  Discharge planning reviewed      Thank you for allowing us to participate in the care of this patient. Please call with questions.     Jos Lopez MD        Pager: [phone] - Office: [phone]

## 2023-02-23 VITALS
WEIGHT: 116 LBS | TEMPERATURE: 97.9 F | HEIGHT: 63 IN | HEART RATE: 81 BPM | BODY MASS INDEX: 20.55 KG/M2 | OXYGEN SATURATION: 94 % | SYSTOLIC BLOOD PRESSURE: 102 MMHG | DIASTOLIC BLOOD PRESSURE: 56 MMHG | RESPIRATION RATE: 16 BRPM

## 2023-02-23 LAB
ABSOLUTE EOS #: 0.17 K/UL (ref 0–0.44)
ABSOLUTE IMMATURE GRANULOCYTE: 0 K/UL (ref 0–0.3)
ABSOLUTE LYMPH #: 2.18 K/UL (ref 1.1–3.7)
ABSOLUTE MONO #: 0.67 K/UL (ref 0.1–1.2)
ALBUMIN SERPL-MCNC: 2.7 G/DL (ref 3.5–5.2)
ALBUMIN/GLOBULIN RATIO: 0.7 (ref 1–2.5)
ALP SERPL-CCNC: 133 U/L (ref 35–104)
ALT SERPL-CCNC: 22 U/L (ref 5–33)
ANION GAP SERPL CALCULATED.3IONS-SCNC: 9 MMOL/L (ref 9–17)
AST SERPL-CCNC: 19 U/L
BASOPHILS # BLD: 0 % (ref 0–2)
BASOPHILS ABSOLUTE: 0 K/UL (ref 0–0.2)
BILIRUB SERPL-MCNC: 0.2 MG/DL (ref 0.3–1.2)
BUN SERPL-MCNC: 21 MG/DL (ref 8–23)
BUN/CREAT BLD: 19 (ref 9–20)
CALCIUM SERPL-MCNC: 9.6 MG/DL (ref 8.6–10.4)
CHLORIDE SERPL-SCNC: 96 MMOL/L (ref 98–107)
CO2 SERPL-SCNC: 28 MMOL/L (ref 20–31)
CREAT SERPL-MCNC: 1.09 MG/DL (ref 0.5–0.9)
EOSINOPHILS RELATIVE PERCENT: 1 % (ref 1–4)
GFR SERPL CREATININE-BSD FRML MDRD: 56 ML/MIN/1.73M2
GLUCOSE SERPL-MCNC: 95 MG/DL (ref 70–99)
HCT VFR BLD AUTO: 25 % (ref 36.3–47.1)
HGB BLD-MCNC: 8.4 G/DL (ref 11.9–15.1)
IMMATURE GRANULOCYTES: 0 %
LYMPHOCYTES # BLD: 13 % (ref 24–43)
MCH RBC QN AUTO: 30.8 PG (ref 25.2–33.5)
MCHC RBC AUTO-ENTMCNC: 33.6 G/DL (ref 28.4–34.8)
MCV RBC AUTO: 91.6 FL (ref 82.6–102.9)
MONOCYTES # BLD: 4 % (ref 3–12)
MORPHOLOGY: NORMAL
NRBC AUTOMATED: 0.1 PER 100 WBC
PDW BLD-RTO: 15 % (ref 11.8–14.4)
PLATELET # BLD AUTO: 470 K/UL (ref 138–453)
PMV BLD AUTO: 9.8 FL (ref 8.1–13.5)
POTASSIUM SERPL-SCNC: 5.1 MMOL/L (ref 3.7–5.3)
PROT SERPL-MCNC: 6.6 G/DL (ref 6.4–8.3)
RBC # BLD: 2.73 M/UL (ref 3.95–5.11)
SEG NEUTROPHILS: 82 % (ref 36–65)
SEGMENTED NEUTROPHILS ABSOLUTE COUNT: 13.78 K/UL (ref 1.5–8.1)
SODIUM SERPL-SCNC: 133 MMOL/L (ref 135–144)
WBC # BLD AUTO: 16.8 K/UL (ref 3.5–11.3)

## 2023-02-23 PROCEDURE — 80053 COMPREHEN METABOLIC PANEL: CPT

## 2023-02-23 PROCEDURE — 6360000002 HC RX W HCPCS: Performed by: INTERNAL MEDICINE

## 2023-02-23 PROCEDURE — 36415 COLL VENOUS BLD VENIPUNCTURE: CPT

## 2023-02-23 PROCEDURE — 6370000000 HC RX 637 (ALT 250 FOR IP): Performed by: INTERNAL MEDICINE

## 2023-02-23 PROCEDURE — 2580000003 HC RX 258: Performed by: INTERNAL MEDICINE

## 2023-02-23 PROCEDURE — 94761 N-INVAS EAR/PLS OXIMETRY MLT: CPT

## 2023-02-23 PROCEDURE — 99232 SBSQ HOSP IP/OBS MODERATE 35: CPT | Performed by: INTERNAL MEDICINE

## 2023-02-23 PROCEDURE — 85025 COMPLETE CBC W/AUTO DIFF WBC: CPT

## 2023-02-23 PROCEDURE — 97116 GAIT TRAINING THERAPY: CPT

## 2023-02-23 PROCEDURE — 97110 THERAPEUTIC EXERCISES: CPT

## 2023-02-23 RX ORDER — POTASSIUM CHLORIDE 20 MEQ/1
20 TABLET, EXTENDED RELEASE ORAL 2 TIMES DAILY
Qty: 60 TABLET | Refills: 0 | Status: SHIPPED | OUTPATIENT
Start: 2023-02-23

## 2023-02-23 RX ORDER — ONDANSETRON 4 MG/1
4 TABLET, ORALLY DISINTEGRATING ORAL EVERY 8 HOURS PRN
Qty: 20 TABLET | Refills: 0 | Status: SHIPPED | OUTPATIENT
Start: 2023-02-23

## 2023-02-23 RX ORDER — FUROSEMIDE 20 MG/1
20 TABLET ORAL DAILY
Qty: 30 TABLET | Refills: 0 | Status: SHIPPED | OUTPATIENT
Start: 2023-02-23

## 2023-02-23 RX ORDER — HYDROCODONE BITARTRATE AND ACETAMINOPHEN 5; 325 MG/1; MG/1
1 TABLET ORAL EVERY 4 HOURS PRN
Qty: 18 TABLET | Refills: 0 | Status: SHIPPED | OUTPATIENT
Start: 2023-02-23 | End: 2023-02-26

## 2023-02-23 RX ORDER — LEVOFLOXACIN 500 MG/1
500 TABLET, FILM COATED ORAL DAILY
Qty: 7 TABLET | Refills: 0 | Status: SHIPPED | OUTPATIENT
Start: 2023-02-23 | End: 2023-03-02 | Stop reason: ALTCHOICE

## 2023-02-23 RX ORDER — TRAZODONE HYDROCHLORIDE 50 MG/1
50 TABLET ORAL NIGHTLY
Qty: 30 TABLET | Refills: 2 | Status: SHIPPED | OUTPATIENT
Start: 2023-02-23

## 2023-02-23 RX ADMIN — POTASSIUM CHLORIDE 20 MEQ: 1500 TABLET, EXTENDED RELEASE ORAL at 08:02

## 2023-02-23 RX ADMIN — NYSTATIN 500000 UNITS: 100000 SUSPENSION ORAL at 08:02

## 2023-02-23 RX ADMIN — SODIUM CHLORIDE, PRESERVATIVE FREE 10 ML: 5 INJECTION INTRAVENOUS at 08:03

## 2023-02-23 RX ADMIN — LEVOFLOXACIN 750 MG: 750 TABLET, FILM COATED ORAL at 08:02

## 2023-02-23 RX ADMIN — HYDROCODONE BITARTRATE AND ACETAMINOPHEN 1 TABLET: 5; 325 TABLET ORAL at 06:49

## 2023-02-23 RX ADMIN — SODIUM CHLORIDE, PRESERVATIVE FREE 10 ML: 5 INJECTION INTRAVENOUS at 08:02

## 2023-02-23 RX ADMIN — FUROSEMIDE 40 MG: 10 INJECTION, SOLUTION INTRAMUSCULAR; INTRAVENOUS at 08:02

## 2023-02-23 RX ADMIN — LEVOTHYROXINE SODIUM 75 MCG: 0.07 TABLET ORAL at 05:13

## 2023-02-23 ASSESSMENT — PAIN SCALES - GENERAL: PAINLEVEL_OUTOF10: 5

## 2023-02-23 NOTE — PROGRESS NOTES
Progress Note    SUBJECTIVE:    Patient seen for f/u of Bacteremia due to Escherichia coli. She sitting up in chair no complaints. Feels better. ROS:   Constitutional: negative  for fevers, and negative for chills. Respiratory: negative for shortness of breath, negative for cough, and negative for wheezing  Cardiovascular: negative for chest pain, and negative for palpitations  Gastrointestinal: negative for abdominal pain, negative for nausea,negative for vomiting, negative for diarrhea, and negative for constipation     All other systems were reviewed with the patient and are negative unless otherwise stated in HPI      OBJECTIVE:      Vitals:   Vitals:    02/23/23 0651   BP: (!) 102/56   Pulse: 81   Resp: 16   Temp: 97.9 °F (36.6 °C)   SpO2: 94%     Weight: 116 lb (52.6 kg) (bed was re-zeroed)   Height: 5' 3\" (160 cm)     Weight  Wt Readings from Last 3 Encounters:   02/22/23 116 lb (52.6 kg)   02/13/23 112 lb (50.8 kg)   02/02/23 113 lb (51.3 kg)     Body mass index is 20.55 kg/m². 24HR INTAKE/OUTPUT:      Intake/Output Summary (Last 24 hours) at 2/23/2023 0832  Last data filed at 2/23/2023 0514  Gross per 24 hour   Intake 1120 ml   Output 1550 ml   Net -430 ml     -----------------------------------------------------------------  Exam:    GEN:    Awake, alert and oriented x3. EYES:  EOMI, pupils equal   NECK: Supple. No lymphadenopathy. No carotid bruit  CVS:    regular rate and rhythm, no audible murmur  PULM:  CTA, no wheezes, rales or rhonchi, no acute respiratory distress  ABD:    Bowels sounds normal.  Abdomen is soft. No distention. no tenderness to palpation. EXT:   trace edema bilaterally . No calf tenderness. NEURO: Moves all extremities. Motor and sensory are grossly intact  SKIN:  No rashes.   No skin lesions.    -----------------------------------------------------------------    Diagnostic Data:      Complete Blood Count:   Recent Labs     02/21/23 0515 02/22/23 0515 02/23/23  0510   WBC 18.2* 17.1* 16.8*   RBC 2.41* 2.54* 2.73*   HGB 7.4* 7.8* 8.4*   HCT 22.2* 23.3* 25.0*   MCV 92.1 91.7 91.6   MCH 30.7 30.7 30.8   MCHC 33.3 33.5 33.6   RDW 14.6* 14.8* 15.0*    366 470*   MPV 10.4 9.9 9.8        Last 3 Blood Glucose:   Recent Labs     02/21/23  0515 02/22/23  0515 02/23/23  0510   GLUCOSE 86 82 95        Comprehensive Metabolic Profile:   Recent Labs     02/21/23  0515 02/22/23  0515 02/23/23  0510    132* 133*   K 4.6 4.6 5.1   CL 99 95* 96*   CO2 29 30 28   BUN 12 13 21   CREATININE 0.79 1.09* 1.09*   GLUCOSE 86 82 95   CALCIUM 8.7 8.7 9.6   PROT 5.4* 6.0* 6.6   LABALBU 2.4* 2.5* 2.7*   BILITOT 0.2* 0.2* 0.2*   ALKPHOS 136* 124* 133*   AST 19 18 19   ALT 23 23 22        Urinalysis:   Lab Results   Component Value Date/Time    NITRU NEGATIVE 02/13/2023 05:23 PM    COLORU Yellow 02/13/2023 05:23 PM    PHUR 5.5 02/13/2023 05:23 PM    WBCUA 20 TO 50 02/13/2023 05:23 PM    RBCUA 2 TO 5 02/13/2023 05:23 PM    MUCUS TRACE 02/13/2023 05:23 PM    TRICHOMONAS NOT REPORTED 02/03/2021 02:30 PM    YEAST NOT REPORTED 02/03/2021 02:30 PM    BACTERIA 2+ 02/13/2023 05:23 PM    CLARITYU clear 05/18/2017 04:21 PM    SPECGRAV 1.025 02/13/2023 05:23 PM    LEUKOCYTESUR LARGE 02/13/2023 05:23 PM    UROBILINOGEN Normal 02/13/2023 05:23 PM    BILIRUBINUR NEGATIVE 02/13/2023 05:23 PM    BILIRUBINUR 0 05/18/2017 04:21 PM    BLOODU 1+ 05/18/2017 04:21 PM    GLUCOSEU NEGATIVE 02/13/2023 05:23 PM    KETUA TRACE 02/13/2023 05:23 PM    AMORPHOUS NOT REPORTED 02/03/2021 02:30 PM       HgBA1c:    Lab Results   Component Value Date/Time    LABA1C 5.9 10/04/2022 07:31 AM       Lactic Acid:   Lab Results   Component Value Date/Time    LACTA 1.8 02/14/2023 09:46 AM    LACTA 2.2 02/14/2023 07:45 AM    LACTA 2.8 02/14/2023 05:45 AM        Troponin: No results for input(s): TROPONINI in the last 72 hours. CRP:    No results for input(s): CRP in the last 72 hours.         Radiology/Imaging:  CT ABDOMEN PELVIS WO CONTRAST Additional Contrast? None   Final Result   1. Interval placement of a double pigtail right ureteral stent. The   proximal end of the stent is in appropriate position. The distal end of the   stent is difficult to ascertain due to obscuring beam-hardening artifact. The distal end of the stent may be right at the UV junction partially within   the distal ureter. There is still persistent right hydronephrosis. 2.  Bilateral parenchymal nephroliths as above. 3.  No bowel obstruction. There is contrast enhancement of the lining of the   colon presumably from recent CT chest.      4.  Bilateral moderate pleural effusions. The patient has a small   pericardial effusion. RECOMMENDATIONS:   Advise plain film examination of the abdomen. XR CHEST PORTABLE   Final Result   No significant change from prior study. Small bilateral effusions in diffuse   interstitial opacities are again noted. CT CHEST PULMONARY EMBOLISM W CONTRAST   Final Result   1. Multifocal airspace disease throughout the lungs which can be seen with   atypical viral pneumonia. Correlate for any history of COVID. Moderate   bilateral pleural effusions. Compressive atelectasis in both lungs. Follow-up is recommended to document resolution. 2. Atrophic thyroid gland. 3. Atherosclerotic calcification and atheromatous plaque of the aorta. 4. Scarring and cortical thinning of the upper pole left kidney. Partially   visualized superior aspect of right ureteral stent in right renal collecting   system. 5. No clear evidence for central pulmonary embolus. 6. Moderate thoracic dextroscoliosis. XR CHEST PORTABLE   Final Result      Right PICC line distal tip is within the mid right atrium and needs to be   pulled back approximately 4 cm. Diffusely increased interstitial markings throughout both lungs with   associated ground-glass densities.  Findings can be suggestive of multifocal pneumonia. Consolidative pulmonary edema is another possibility. Small bilateral pleural effusion. FLUORO FOR SURGICAL PROCEDURES   Final Result      XR CHEST PORTABLE   Final Result   Increased interstitial opacity. While much or all of this may be chronic,   please correlate with any clinical evidence of acute interstitial edema. A focal infiltrate is not detected               ASSESSMENT / PLAN:    MEDICAL DECISION MAKING:    Primary Problem(s):  Bacteremia due to Escherichia coli  Condition is improving however leukocytosis remains unchanged  Treatment plan:   Urology assistance appreciated  S/p cystoscopy with right ureteral stent insertion 2/13/23  ID consult appreciated - Dr. Deejay Mckeon notes reviewed  Imaging:   Port CXR ordered 2/21= bilateral interstitial infiltrates remain unchanged, no significant change, moderate bilateral pleural effusions  Repeat CT abd/pelvis ordered 2/21 = stent in place but continued hydronephrosis, moderate bilateral pleural effusions  Medications:   Change levofloxacin to 500 mg daily with last dose 3/2  Weaned off Levophed  - BPs stable  Remains off IVF  Lasix 40 IV daily   Medication Monitoring / High Risk Medications: none     Multifocal pneumonia  Condition is stable  Treatment plan:   ID consult appreciated  Rapid Covid = negative  Viral respiratory panel = Rhino/enterovirus  Imaging: CTA chest reviewed  Medications: Continue IV levofloxacin      Acute kidney injury due to sepsis    Condition is resolved  Treatment plan:   Nephrology consult appreciated - Dr. Marimar Saenz notes reviewed  BP support  Monitor renal function, I/O's--stable today     Elevated LFT's due to shock liver    Condition is resolved  Treatment plan:   Monitor LFT's      Thrombocytopenia  Multifactorial and likely due to combination of sepsis, shock liver, possibly medications  Condition is resolved  Heme/Onc consult appreciated - Dr. Bon Patel note reviewed  Treatment plan: monitor CBC  Medications: heparin DC'd     Thrush  Condition is improving  Medications:   Nystatin S/S  Viscous lidocaine / oragel prn     Nutrition status:   at risk for malnutrition  Dietician consult initiated     Hospital Prophylaxis:   DVT: SCDs (heparin DCd due to thrombocytopenia)    Disposition:  Shared decision making: All test results, treatment options and disposition options were discussed with the patient today  Social determinants of health that may impact management: none  Code status: Full Code   Disposition: Discharge plan is home today    MIPS Advanced Care Planning documentation:  [x] I have confirmed that the patient's Advance Care Plan is present, Code Status is documented, or surrogate decision maker is listed in the patient's medical record  [If \"yes\", STOP HERE]     [] The patient's Advance Care Plan is NOT present because:    []  I confirmed today that the patient does not wish or was not able to name a   surrogate decision maker or provide and advance care plan.    [] Hospice care is currently being provided or has been provided within the   calendar year. []  I did NOT confirm today the presence of an [address] or surrogate   decision maker documented within the patient's medical record.    [DOES NOT SATISFY SAL Walters - CNP , SAL, NP-C  Hospital[facility]        2/23/2023, 8:32 AM

## 2023-02-23 NOTE — PROGRESS NOTES
Physical Therapy  Facility/Department: [facility] AT THE [facility] MED SURG  Daily Treatment Note  NAME: Sugey Lozada  : 1955  MRN: [id number]    Date of Service: 2023    Discharge Recommendations:  Continue to assess pending progress, Home with Home health PT, Patient would benefit from continued therapy after discharge      Patient Diagnosis(es): There were no encounter diagnoses. Assessment   Assessment: Transfers Sup/SBA x1. Gait x 100' and additional 48' no AD and SBA x1. Pt ambulated 8 stairs ascending/descending 2 steps x2 sets with use of L handrail to mimic home set up, CGA/SBA provided. No LOB or difficulty demoed. Standing BLE exercises x 10 ea Marches, heel raises, HS curls, Hip abd/add/flex/ext with CGA and single to Darrell UE support. No increased SOB noted with ambulation or standing there ex. Activity Tolerance: Patient tolerated treatment well     Plan    Physcial Therapy Plan  General Plan: 2 times a day 7 days a week (1x/day on weekends and holidays)  Current Treatment Recommendations: Strengthening;Balance training;Functional mobility training;Transfer training; Endurance training;Home exercise program;Safety education & training; Therapeutic activities     Restrictions  Restrictions/Precautions  Restrictions/Precautions: General Precautions, Fall Risk     Subjective    Subjective  Subjective: Pt in recliner upon arrival agreeable to therapy  Pain: 4/10 LBPand 2/10 HA. Orientation  Overall Orientation Status: Within Functional Limits     Objective   Bed Mobility Training  Bed Mobility Training: No  Transfer Training  Transfer Training: Yes  Overall Level of Assistance: Supervision;Assist X1  Interventions: Verbal cues; Safety awareness training  Sit to Stand: Supervision;Assist X1  Stand to Sit: Supervision;Assist X1  Stand Pivot Transfers: Assist X1;Stand-by assistance  Gait Training  Gait Training: Yes  Gait  Overall Level of Assistance: Stand-by assistance;Assist X1  Interventions: Verbal cues; Safety awareness training  Base of Support: Narrowed  Speed/Maggie: Fluctuations  Swing Pattern: Left symmetrical;Right symmetrical  Gait Abnormalities: Decreased step clearance  Distance (ft): 150 Feet  Assistive Device: Other (comment) (No AD)  Stairs - Level of Assistance: Stand-by assistance;Contact-guard assistance;Assist X1;Other (comment) (L hand rail utilized to mimic home setup)  Number of Stairs Trained: 8  Uneven Terrain - Level of Assistance: Stand-by assistance;Contact-guard assistance;Assist X1  Neuromuscular Education  Neuromuscular Education: No  PT Exercises  Exercise Treatment: Standing BLE exercises x 10 ea Marches, heel raises, HS curls, Hip abd/add/flex/ext with CGA and single to Darrell UE support. Safety Devices  Type of Devices: All fall risk precautions in place;Call light within reach; Left in chair       Goals  Short Term Goals  Time Frame for Short Term Goals: 20 days  Short Term Goal 1: Initiate HEP and progress as tolerated for balance and mobility. Short Term Goal 2: Pt bed mobility will be independent without rails to allow independence at home. Short Term Goal 3: Pt will transfer independently with least restrictive device and good safety to reduce fall risk. Short Term Goal 4: Pt will ambulate independently with least restrictive device for up to 250 feet to allow community ambulation. Short Term Goal 5: Negotiate 3 steps with handrail, independently to allow entry and exit of the home.   Patient Goals   Patient Goals : feel better    Education  Patient Education  Education Given To: Patient  Education Provided Comments: HEP reviewed with patient with education in standing there ex and importance of following HEP upon d/c.  Education Method: Verbal;Demonstration  Barriers to Learning: None  Education Outcome: Verbalized understanding;Demonstrated understanding    Therapy Time   Individual Concurrent Group Co-treatment   Time In 0736         Time Out 0800         Minutes 24 Lorene Shankar, PTA

## 2023-02-23 NOTE — PLAN OF CARE
Problem: Discharge Planning  Goal: Discharge to home or other facility with appropriate resources  2/23/2023 1225 by Angy Palmer RN  Outcome: Completed  2/23/2023 0028 by Tori Garcia RN  Outcome: Progressing  Flowsheets (Taken 2/22/2023 1530 by Nora Costa, RN)  Discharge to home or other facility with appropriate resources: Identify barriers to discharge with patient and caregiver     Problem: Pain  Goal: Verbalizes/displays adequate comfort level or baseline comfort level  2/23/2023 1225 by Angy Palmer RN  Outcome: Completed  2/23/2023 0028 by Tori Garcia RN  Outcome: Progressing     Problem: Safety - Adult  Goal: Free from fall injury  2/23/2023 1225 by Angy Palmer RN  Outcome: Completed  2/23/2023 0028 by Tori Garcia RN  Outcome: Progressing     Problem: Infection - Adult  Goal: Absence of infection at discharge  2/23/2023 1225 by Angy Palmer RN  Outcome: Completed  2/23/2023 0028 by Tori Garcia RN  Outcome: Progressing  [address] (Taken 2/22/2023 1530 by Nora Costa RN)  Absence of infection at discharge: Assess and monitor for signs and symptoms of infection     Problem: Nutrition Deficit:  Goal: Optimize nutritional status  2/23/2023 1225 by Angy Palmer RN  Outcome: Completed  2/23/2023 0028 by Tori Garcia RN  Outcome: Progressing     Problem: Discharge Planning  Goal: Discharge to home or other facility with appropriate resources  2/23/2023 1225 by Angy Palmer RN  Outcome: Completed  2/23/2023 0028 by Tori Garcia RN  Outcome: Progressing  Flowsheets (Taken 2/22/2023 1530 by Nora Costa, RN)  Discharge to home or other facility with appropriate resources: Identify barriers to discharge with patient and caregiver     Problem: Pain  Goal: Verbalizes/displays adequate comfort level or baseline comfort level  2/23/2023 1225 by Angy Palmer RN  Outcome: Completed  2/23/2023 0028 by Tori Garcia RN  Outcome: Progressing     Problem: Safety - Adult  Goal: Free from fall injury  2/23/2023 1225 by Hafsa Godinez RN  Outcome: Completed  2/23/2023 0028 by Justin Talavera RN  Outcome: Progressing     Problem: Infection - Adult  Goal: Absence of infection at discharge  2/23/2023 1225 by Hafsa Godinez RN  Outcome: Completed  2/23/2023 0028 by Justin Talavera RN  Outcome: Progressing  [address] (Taken 2/22/2023 1530 by Tommy Montgomery RN)  Absence of infection at discharge: Assess and monitor for signs and symptoms of infection     Problem: Nutrition Deficit:  Goal: Optimize nutritional status  2/23/2023 1225 by Hafsa Godinez RN  Outcome: Completed  2/23/2023 0028 by Justin Talavera RN  Outcome: Progressing

## 2023-02-23 NOTE — DISCHARGE SUMMARY
Discharge Summary    Josué Valle  :  1955  MRN:  [id number]    Admit date:  2023      Discharge date: 2023     Admitting Physician:  Chirstiano Bone MD    Discharge Diagnoses:    Principal Problem:    Bacteremia due to Escherichia coli  Active Problems:    Ureterolithiasis    Septic shock due to urinary tract infection ([address]    Complicated UTI (urinary tract infection)    Acute unilateral obstructive uropathy    E. coli infection    DINA (acute kidney injury) ([address]    Hydronephrosis of right kidney    Metabolic acidosis    Hypocalcemia    Thrombocytopenia (HCC)    Sepsis due to Escherichia coli (HCC)    Anemia due to bone marrow failure ([address]    Hypomagnesemia    Ureteral calculus    Hypertension  Resolved Problems:    * No resolved hospital problems. *      Hospital Course:   Josué Valle is a 79 y.o. female admitted with ureteral lithiasis with septic shock due to UTI and bacteremia. She presented as a direct admission from Dr. Jeannine Foster office. Patient stated on Saturday late evening into early  she developed right lower quadrant abdominal pain and right CVA tenderness. Patient denied fever but complained of chills. She complained of associated nausea and vomiting. She stated she had decreased urination. She does states she has history of kidney stones in the past with last 1 in 2022. During patient's initial lab work she was found to have significant elevation in her CBC with a WBC of 34.3 with a left shift of 27.40. Hemoglobin was stable at 11.5 with a platelet count of 940. She was found to have acute renal failure with a BUN of 45 with a baseline of 12-17. Her initial creatinine was 3.83 with a baseline of 0.65-0.74. Upon arrival to the floor her systolic blood pressure was 81. Patient was also tachycardic with a pulse of 90. CT scan was completed and reviewed by urology which showed multiple stones in the right ureter causing significant hydroureteronephrosis.   She also had nonobstructing stones in the kidney as well. She complained of her pain is 6 out of 10. Patient was taken immediately to surgery for cystoscopy and stent placement. Upon return to the floor despite fluid bolus and Trace-Synephrine patient remains hypotensive. Patient remained in ICU for several days. She required additional pressor of Levophed and Trace-Synephrine was stopped. Cardiology and infectious disease were both consulted during patient's hospitalization. Echocardiogram was completed. Patient did develop some acute congestive heart failure due to fluid overload. Patient was diuresed and tolerated this well. Patient's renal function is back to her baseline as well as her LFTs have resolved and are back to normal.  Patient did have some multifocal pneumonia viral panel showed rhinovirus enterovirus. Patient was placed on levofloxacin due to UTI and bacteremia. Dose was changed to 500 mg orally daily and will continue this through March 2. Patient will continue on oral Lasix as well as oral potassium on discharge as well. She was given a prescription for trazodone for sleep. Patient will remain off her losartan until her blood pressure comes up but we will keep on her med list.  Plan will be to follow-up with her primary care next week. She is scheduled for stone therapy on Tuesday, February 24. I will have her get lab work on Wednesday which includes a CBC with differential and a CMP. She will continue with nystatin as well for oral thrush. She is tolerating diet and activity well. Patient will be discharged home today. Consultants:   Dr. Meg Qureshi, [facility]; Dr. Luis Rodrigues, Cardiology    Procedures: none    Complications:  CHF, Rhinovirus    Discharge Condition: fair    Exam:  GEN:    Awake, alert and oriented x3. EYES:   EOMI, pupils equal   NECK: Supple. No lymphadenopathy.   No carotid bruit  CVS:     regular rate and rhythm, no audible murmur  PULM:  CTA, no wheezes, rales or rhonchi, no acute respiratory distress  ABD:     Bowels sounds normal.  Abdomen is soft. No distention. no tenderness to palpation. EXT:     trace edema bilaterally . No calf tenderness. NEURO: Moves all extremities. Motor and sensory are grossly intact  SKIN:    No rashes. No skin lesions. Significant Diagnostic Studies:   Lab Results   Component Value Date    WBC 16.8 (H) 02/23/2023    HGB 8.4 (L) 02/23/2023     (H) 02/23/2023       Lab Results   Component Value Date    BUN 21 02/23/2023    CREATININE 1.09 (H) 02/23/2023     (L) 02/23/2023    K 5.1 02/23/2023    CALCIUM 9.6 02/23/2023    CL 96 (L) 02/23/2023    CO2 28 02/23/2023    LABGLOM 56 (L) 02/23/2023       Lab Results   Component Value Date    WBCUA 20 TO 50 02/13/2023    RBCUA 2 TO 5 02/13/2023    EPITHUA 0 TO 2 02/13/2023    LEUKOCYTESUR LARGE (A) 02/13/2023    SPECGRAV 1.025 (H) 02/13/2023    GLUCOSEU NEGATIVE 02/13/2023    KETUA TRACE (A) 02/13/2023    PROTEINU 2+ (A) 02/13/2023    HGBUR 2+ (A) 02/13/2023    CASTUA NOT REPORTED 02/03/2021    CRYSTUA NOT REPORTED 02/03/2021    BACTERIA 2+ (A) 02/13/2023    YEAST NOT REPORTED 02/03/2021       CT ABDOMEN PELVIS WO CONTRAST Additional Contrast? None    Result Date: 2/13/2023  EXAMINATION: CT OF THE ABDOMEN AND PELVIS WITHOUT CONTRAST 2/13/2023 9:50 am TECHNIQUE: CT of the abdomen and pelvis was performed without the administration of intravenous contrast. Multiplanar reformatted images are provided for review. Automated exposure control, iterative reconstruction, and/or weight based adjustment of the mA/kV was utilized to reduce the radiation dose to as low as reasonably achievable. COMPARISON: 02/03/2021 HISTORY: ORDERING SYSTEM PROVIDED HISTORY: Renal colic TECHNOLOGIST PROVIDED HISTORY: FINDINGS: Lower Chest: Mild subsegmental atelectasis posterior lung bases. Organs: There is 5 mm calculus in the lower pole right kidney which is also evident on the prior.   Additional smaller calculi in the right kidney on prior no longer present. There is moderate right hydronephrosis with significant perinephric stranding. There is also moderate right hydroureter. There are 2 calculi measuring 3 mm and 4 mm in the distal right ureter a few cm from the uterovesical junction accounting for the obstructive signs. There may be additional punctate calculi in the same region. Left kidney shows a few punctate calculi along with a 2 mm calculus in the lower pole. No left hydronephrosis or hydroureter. No left renal calculus. The unenhanced liver shows a 1 cm cyst in the left lobe which is unchanged. Cholecystectomy. Spleen, pancreas, adrenal glands show no significant abnormalities. GI/Bowel: There is limited evaluation due to absence of oral contrast. The stomach shows no focal lesions. Small bowel loops normal in caliber showing no focal abnormalities. Normal appendix. Evaluation of the colon shows no acute process. Pelvis: Streak artifact from right hip prosthesis obscures portions of the pelvis. There are pelvic phleboliths. Uterus and urinary bladder grossly normal. Peritoneum/Retroperitoneum: No free fluid. No lymphadenopathy. Atherosclerotic disease. Bones/Soft Tissues: Degenerative changes in the spine. No acute abnormality of the bones. The superficial soft tissues show no significant abnormalities. 1. 2 obstructing calculi in the distal right ureter about 3 and 4 mm in size noted close to the uterovesical junction. There may be additional punctate calculus or 2 in the same region. This causes moderate right hydronephrosis and hydroureter. 2. Nonobstructing bilateral renal calculi. 5 mm calculus in the right kidney and a few punctate and 2 mm calculi in the left kidney. 3. Mild subsegmental atelectasis posterior lung bases.      XR CHEST PORTABLE    Result Date: 2/13/2023  EXAMINATION: ONE XRAY VIEW OF THE CHEST 2/13/2023 9:25 am COMPARISON: 11/07/2013 HISTORY: ORDERING SYSTEM PROVIDED HISTORY: sepsis TECHNOLOGIST PROVIDED HISTORY: sepsis FINDINGS: Cardial pericardial silhouette is unremarkable. Increased interstitial opacities are seen throughout both lungs. A focal infiltrate is not detected. No pneumothorax. No free air. No acute bony abnormality. Increased interstitial opacity. While much or all of this may be chronic, please correlate with any clinical evidence of acute interstitial edema. A focal infiltrate is not detected     FLUORO FOR SURGICAL PROCEDURES    Result Date: 2/13/2023  Radiology exam is complete. No Radiologist dictation. Please follow up with ordering provider. Assessment and Plan:  Patient Active Problem List    Diagnosis Date Noted    Hypomagnesemia 02/17/2023    Bacteremia due to Escherichia coli 02/16/2023    Sepsis due to Escherichia coli ([address] 02/16/2023    Anemia due to bone marrow failure ([address] 02/16/2023    Thrombocytopenia ([address] 02/15/2023    Acute unilateral obstructive uropathy 02/14/2023    E. coli infection 02/14/2023    DINA (acute kidney injury) ([address] 02/14/2023    Hydronephrosis of right kidney 59/90/7752    Metabolic acidosis 62/20/4129    Hypocalcemia 04/61/0104    Renal colic on right side 60/97/8865    Ureterolithiasis 02/13/2023    Septic shock due to urinary tract infection ([address] 92/86/4703    Complicated UTI (urinary tract infection) 02/13/2023    Mixed hyperlipidemia 10/24/2019    Hypertension     History of colon polyps 07/08/2018    Osteoporosis     Melanocytic nevus 08/30/2016    Renal lithiasis lythotripsy 4/28/15 07/16/2014    Ureteral calculus 10/11/2013    Hypothyroidism 01/01/2007        Discharge Medications:         Medication List        START taking these medications      furosemide 20 MG tablet  Commonly known as: Lasix  Take 1 tablet by mouth daily     HYDROcodone-acetaminophen 5-325 MG per tablet  Commonly known as: NORCO  Take 1 tablet by mouth every 4 hours as needed for Pain for up to 3 days.  Max Daily Amount: 6 tablets levoFLOXacin 500 MG tablet  Commonly known as: LEVAQUIN  Take 1 tablet by mouth daily for 7 days     nystatin 442371 UNIT/ML suspension  Commonly known as: MYCOSTATIN  Take 5 mLs by mouth 4 times daily Swish about the mouth and retain in the mouth for as long as possible before swallowing. ondansetron 4 MG disintegrating tablet  Commonly known as: ZOFRAN-ODT  Take 1 tablet by mouth every 8 hours as needed for Nausea or Vomiting     potassium chloride 20 MEQ extended release tablet  Commonly known as: KLOR-CON M  Take 1 tablet by mouth in the morning and at bedtime Do not crush, chew, or suck on tablet. Tablet may also be broken in half and each half swallowed separately.      tamsulosin 0.4 MG capsule  Commonly known as: FLOMAX  Take 1 capsule by mouth daily     traZODone 50 MG tablet  Commonly known as: DESYREL  Take 1 tablet by mouth nightly            CHANGE how you take these medications      alendronate 70 MG tablet  Commonly known as: FOSAMAX  Take 1 tablet by mouth every 7 days  What changed: additional instructions            CONTINUE taking these medications      acetaminophen 500 MG tablet  Commonly known as: TYLENOL     atorvastatin 20 MG tablet  Commonly known as: LIPITOR  Take 1 tablet by mouth daily     CALCIUM PO     GLUCOSAMINE CHONDR COMPLEX PO     levothyroxine 75 MCG tablet  Commonly known as: SYNTHROID  Take 1 tablet by mouth Daily     losartan 50 MG tablet  Commonly known as: COZAAR  Take 1 tablet by mouth daily     vitamin C 1000 MG tablet     vitamin D 400 units Caps  Commonly known as: ERGOCALCIFEROL     ZINC 15 PO            STOP taking these medications      clindamycin 150 MG capsule  Commonly known as: CLEOCIN     ibuprofen 400 MG tablet  Commonly known as: ADVIL;MOTRIN               Where to Get Your Medications        These medications were sent to [facility] [id number] - [place], [place] - [address]  [place], [facility]      Phone: [phone]   furosemide 20 MG tablet  HYDROcodone-acetaminophen 5-325 MG per tablet  levoFLOXacin 500 MG tablet  nystatin 360220 UNIT/ML suspension  ondansetron 4 MG disintegrating tablet  potassium chloride 20 MEQ extended release tablet  traZODone 50 MG tablet         Patient Instructions:    Activity: activity as tolerated  Diet: regular diet, encourage fluids  Wound Care: none needed  Other: CBC with differential and CMP on 2/28/2023    Disposition:   Discharge to Home    Follow up:  Patient will be followed by Brenda Tello MD in 1-2 weeks    [facility] on Discharge (if applicable)  ACE/ARB in CHF: NA  Statin in [place]: NA  ASA in [place]: NA  Statin in CVA: NA  Antiplatelet in CVA: NA    Total time spent on discharge services: 45 minutes    Including the following activities:  Evaluation and Management of patient  Discussion with patient and/or surrogate about current care plan  Coordination with Case Management and/or   Coordination of care with Consultants (if applicable)   Coordination of care with Receiving Facility Physician (if applicable)  Completion of DME forms (if applicable)  Preparation of Discharge Summary  Preparation of Medication Reconciliation  Preparation of Discharge Prescriptions    Signed:  SAL Boo - CNP, SAL, NP-C  2/23/2023, 11:58 AM

## 2023-02-23 NOTE — PROGRESS NOTES
[address] [facility]  Inpatient/Observation/Outpatient Rehabilitation    Date: 2023  Patient Name: Sylvia Jaeger       [x] Inpatient Acute/Observation       []  Outpatient  : 1955       [] Pt no showed for scheduled appointment    [x] Pt refused/declined therapy at this time due to:    pt stating \" I'm sorry but I am going home soon, so i'll pass on therapy\"       [] Pt cancelled due to:  [] No Reason Given   [] Sick/ill   [] Other:    Therapist/Assistant will attempt to see this patient, at our earliest opportunity.        Gabrielle MONTENEGRO Date: 2023

## 2023-02-23 NOTE — FLOWSHEET NOTE
PICC line IV removed from right upper arm. Pressure dressing applied. Tolerated well. Reviewed discharge instructions with patient and her . Voices understanding. To front entrance per w/c for discharge home.

## 2023-02-23 NOTE — PLAN OF CARE
Problem: Discharge Planning  Goal: Discharge to home or other facility with appropriate resources  Outcome: Progressing  Flowsheets (Taken 2/22/2023 1530 by Saman Florez, RN)  Discharge to home or other facility with appropriate resources: Identify barriers to discharge with patient and caregiver     Problem: Pain  Goal: Verbalizes/displays adequate comfort level or baseline comfort level  Outcome: Progressing     Problem: Safety - Adult  Goal: Free from fall injury  Outcome: Progressing     Problem: Infection - Adult  Goal: Absence of infection at discharge  Outcome: Progressing  Flowsheets (Taken 2/22/2023 1530 by Saman Florez, RN)  Absence of infection at discharge: Assess and monitor for signs and symptoms of infection     Problem: Nutrition Deficit:  Goal: Optimize nutritional status  Outcome: Progressing

## 2023-02-23 NOTE — PROGRESS NOTES
[facility] - Progress Note-Telemedicine  Today's Date and Time: 2/23/2023, 6:57 AM    Impression :   E. Coli septicemia 2-13-23 x 2  E. Coli UTI  Right hydroureteronephrosis  S/p right ureteral stent placement on 2/13/23  Leukocytosis  DINA  Improved  Hypotension-resolved  Chronic history of nephrolithiasis  Transaminase elevation  Thrombocytopenia  Anemia    Recommendations:   Switch to po Levaquin 500 mg a day starting 2-23-23. Stop date 3-2-23  Completed IV Levaquin 500 mg IV q day. Stop date 2-22-23  E. Coli on BC x 2  Repeat BC 2/16/23: No growth   E. Coli on urine culture  Will need Urology follow up for stent  Medical Decision Making/Summary/Discussion:2/23/2023       Infection Control Recommendations   [place] Precautions    Antimicrobial Stewardship Recommendations     Simplification of therapy  Targeted therapy      Coordination of Outpatient Care:   Estimated Length of IV antimicrobials:2/23/23  Patient will need Midline Catheter Insertion: TBD  Patient will need PICC line Insertion:TBD  Patient will need: Home IV , [place],  SNF,  LTAC: TBD  Patient will need outpatient wound care:No    Chief complaint/reason for consultation:   Concern for urosepsis      History of Present Illness:   Jaylan Barajas is a 79y.o.-year-old female who was initially admitted on 2/13/2023. Patient seen at the request of Dr. Cherelle Mccoy:    This patient, with a chronic history of kidney stones, saw her urologist on 2/13/23 and had complaints of 2 days of right sided flank pain with associated nausea and vomiting. A CT revealed multiple stones in the right ureter with significant hydroureteronephrosis. There are also non-obstructing stones in both kidneys. Her WBC was 34.3 at that time and she had a creatinine of 3.8. Her baseline creatinine is 0.65.     She underwent emergent right-sided ureteral stenting and was then admitted to [facility]. IV Levaquin was initiated. A the time of this consult note, the patient was noted to be hypotensive and is requiring vasopressors. She is afebrile and oxygenation well on room air. There is no growth on blood cultures thus far and the urine culture is pending. Her last UTI was noted to have grown pan sensitive E. Coli in 2021. Abnormal labs include:  Cr:2.97  GFR:17  Trop:31  Alk phos:166  ALT:143  AST:134  WBC:33.0  Hgb:9.2  Plt: 81    Imaging of the chest also revealed increased interstitial opacities with mild subsegmental atelectasis in the posterior lung bases. CT abd/pelvis 2/13/23  Impression   1. 2 obstructing calculi in the distal right ureter about 3 and 4 mm in size   noted close to the uterovesical junction. There may be additional punctate   calculus or 2 in the same region. This causes moderate right hydronephrosis   and hydroureter. 2. Nonobstructing bilateral renal calculi. 5 mm calculus in the right kidney   and a few punctate and 2 mm calculi in the left kidney. 3. Mild subsegmental atelectasis posterior lung bases. CXR 2/13/23  Impression   Increased interstitial opacity. While much or all of this may be chronic,   please correlate with any clinical evidence of acute interstitial edema. A focal infiltrate is not detected           ID was consulted for antibiotic recommendations    CURRENT EVALUATION 2/23/2023  BP (!) 102/56   Pulse 81   Temp 97.9 °F (36.6 °C) (Temporal)   Resp 16   Ht 5' 3\" (1.6 m)   Wt 116 lb (52.6 kg) Comment: bed was re-zeroed  LMP  (LMP Unknown)   SpO2 94%   BMI 20.55 kg/m²     Afebrile  VS stable    Patient feels better  No complaints  No new issues per RN  Overall improving slowly  Ambulating short distances    Medications reviewed:  Levaquin switched to po route to complete treatment.     The patient is alert and oriented on room air  RR 19-->15  02 sat 96-->94    Renal function much better  Ambrose catheter removed    2/20/23: Repeat CT abd/pelvis showed stent in place but continued right hydronephrosis, moderate bilateral pleural effusions    Discharge planning is underway    Labs, X rays reviewed: 2/23/2023    BUN  12-->13-->21  Cr:0.86-->0.79-->1.09  Na 134-->132-->136-->132    WBC:33.0-->21-->18.2-->17.1-->16.8  Hb:7.2-->7.8-->8.4  Plat: 199-->289-->366-->470    CRP:217.4-->120.6  OCX:[id number]    Cultures:  Urine:  2/13/23: E. coli  Blood:  2/13/23: E coli x 2  2-16-23: No growth   Sputum :    Wound:    MRSA Nares:      Imaging:    CT abd/pelvis 2/13/23              CXR 2/13/23      Discussed with  RN, BRIAN. I have personally reviewed the past medical history, past surgical history, medications, social history, and family history, and I have updated the database accordingly.   Past Medical History:     Past Medical History:   Diagnosis Date    Bacteremia due to Escherichia coli 2/87/1152    Complicated UTI (urinary tract infection) 2/13/2023    Fracture of wrist, unspecified laterality, closed, initial encounter     right    History of sepsis 2009    following lithotripsy    Hyperlipidemia     Hypertension     Hypothyroidism 2007    secondary to radioactive iodine 2007    Kidney stone     Osteoporosis     Primary localized osteoarthritis of right hip 3/18/2019    Sepsis due to Escherichia coli ([address] 2/16/2023    Septic shock due to urinary tract infection ([address] 2/13/2023    Severe sepsis ([address] 2/13/2023    Thrombocytopenia ([address] 2/15/2023       Past Surgical  History:     Past Surgical History:   Procedure Laterality Date    CHOLECYSTECTOMY, LAPAROSCOPIC  2009    COLONOSCOPY  07/17/2014    Dr. Aurora Barrios - tubular adenomatous polyp removed    COLONOSCOPY N/A 08/01/2018    Dr. Fannie Guillermo architectural distortion, suggestive of mucosal prolapse    CYSTOSCOPY  2015    stent removal and reinsert     CYSTOSCOPY Left     HLL with stent    CYSTOSCOPY Left 02/05/2021    CYSTOSCOPY URETEROSCOPY LASER-HLL performed by Carmine Saini MD at [address] Right 2/13/2023    CYSTOSCOPY URETERAL STENT INSERTION performed by Carmine Saini MD at [facility] / [address] [place] [address] / Diane Gao Left 02/05/2021    CYSTOSCOPY RETROGRADE PYELOGRAM STENT INSERTION performed by Carmine Saini MD at [facility] Right 02/13/2023    Dr. Jean Claude Medrano    LITHOTRIPSY  04/28/2015    left    LITHOTRIPSY Left 01/30/2018    cystoscopy- Dr. Laquita Barksdale     LITHOTRIPSY Right 04/20/2021    LITHOTRIPSY Right 04/20/2021    ESWL EXTRACORPOREAL SHOCK WAVE LITHOTRIPSY performed by Carmine Saini MD at [address]  2007    radioactive iodine procedure    [place] CYSTOURETHROSCOPY Left 01/30/2018    CYSTOSCOPY performed by Carmine Saini MD at [address] Left 01/30/2018    ESWL [address] LITHOTRIPSY performed by Carmine Saini MD at [address] Right 03/18/2019    Dr. Mike Orosco       Medications:      levoFLOXacin  750 mg Oral Every Other Day    furosemide  40 mg IntraVENous Daily    nystatin  5 mL Oral 4x Daily    atorvastatin  20 mg Oral Daily    levothyroxine  75 mcg Oral Daily    potassium chloride  20 mEq Oral BID    tamsulosin  0.4 mg Oral Daily    traZODone  50 mg Oral Nightly    sodium chloride flush  5-40 mL IntraVENous 2 times per day    lidocaine 1 % injection  5 mL IntraDERmal Once    sodium chloride flush  5-40 mL IntraVENous 2 times per day       Social History:     Social History     Socioeconomic History    Marital status:      Spouse name: Not on file    Number of children: Not on file    Years of education: Not on file    Highest education level: Not on file   Occupational History    Not on file   Tobacco Use    Smoking status: Some Days     Packs/day: 0.25     Years: 30.00     Pack years: 7.50     Types: Cigarettes    Smokeless tobacco: Never   Vaping Use    Vaping Use: Never used   Substance and Sexual Activity    Alcohol use: Yes     Alcohol/week: 1.0 standard drink     Types: 1 Shots of liquor per week     Comment: socially    Drug use: No    Sexual activity: Yes     Partners: Male     Comment: postmeno   Other Topics Concern    Not on file   Social History Narrative    Not on file     Social Determinants of Health     Financial Resource Strain: Low Risk     Difficulty of Paying Living Expenses: Not hard at all   Food Insecurity: No Food Insecurity    Worried About Running Out of Food in the Last Year: Never true    Ran Out of Food in the Last Year: Never true   Transportation Needs: Not on file   Physical Activity: Insufficiently Active    Days of Exercise per Week: 2 days    Minutes of Exercise per Session: 10 min   Stress: Not on file   Social Connections: Not on file   Intimate Partner Violence: Not on file   Housing Stability: Not on file       Family History:     Family History   Problem Relation Age of Onset    Breast Cancer Maternal Grandmother     Stroke Father     Hypertension Father     Alzheimer's Disease Mother         Allergies:   Patient has no known allergies. Review of Systems:   Constitutional: No fevers or chills. Head: No headaches  Eyes: No double vision or blurry vision. No conjunctival inflammation. ENT: No sore throat or runny nose. . No hearing loss, tinnitus or vertigo. Cardiovascular: No chest pain or palpitations. No shortness of breath. No CARUSO  Lung: No shortness of breath or cough. No sputum production  Abdomen: nausea, no vomiting, no diarrhea, left sided flank pain. .   Genitourinary: Ambrose in place. Rt sided CVA pain  Musculoskeletal: No muscle aches or pains. No joint effusions, swelling or deformities  Hematologic: No bleeding or bruising. Neurologic: No headache, weakness, numbness, or tingling. Integument: No rash, no ulcers. Psychiatric: No depression.    Endocrine: No polyuria, no polydipsia, no polyphagia. Physical Examination :   Patient Vitals for the past 8 hrs:   BP Temp Temp src Pulse Resp SpO2   02/23/23 0651 (!) 102/56 97.9 °F (36.6 °C) Temporal 81 16 94 %   02/23/23 0000 106/65 98.5 °F (36.9 °C) Temporal 69 16 --       General Appearance: Awake, alert, and in no apparent distress  Head:  Normocephalic, no trauma  Eyes: Pupils equal, round, reactive to light and accommodation; extraocular movements intact; sclera anicteric; conjunctivae pink. No embolic phenomena. ENT: Oropharynx clear, without erythema, exudate, or thrush. No tenderness of sinuses. Mouth/throat: mucosa pink and moist. No lesions. Dentition in good repair. Neck:Supple, without lymphadenopathy. Thyroid normal, No bruits. Pulmonary/Chest: Diminished to auscultation, without wheezes, rales, or rhonchi. No dullness to percussion. Cardiovascular: Regular rate and rhythm without murmurs, rubs, or gallops. Abdomen: Soft,  tender. Bowel sounds normal. No organomegaly . Rt CVA pain  All four Extremities: No cyanosis, clubbing, edema, or effusions. Neurologic: No gross sensory or motor deficits. Skin: Warm and dry with good turgor. No signs of peripheral arterial or venous insufficiency. No ulcerations. No open wounds. Medical Decision Making -Laboratory:   I have independently reviewed/ordered the following labs:    CBC with Differential:   Recent Labs     02/22/23  0515 02/23/23  0510   WBC 17.1* 16.8*   HGB 7.8* 8.4*   HCT 23.3* 25.0*    470*   LYMPHOPCT 11* 13*   MONOPCT 4 4       BMP:   Recent Labs     02/22/23  0515 02/23/23  0510   * 133*   K 4.6 5.1   CL 95* 96*   CO2 30 28   BUN 13 21   CREATININE 1.09* 1.09*       Hepatic Function Panel:   Recent Labs     02/22/23  0515 02/23/23  0510   PROT 6.0* 6.6   LABALBU 2.5* 2.7*   BILITOT 0.2* 0.2*   ALKPHOS 124* 133*   ALT 23 22   AST 18 19       No results for input(s): RPR in the last 72 hours.   No results for input(s): HIV in the last 72 hours. No results for input(s): BC in the last 72 hours. Lab Results   Component Value Date/Time    MUCUS TRACE 02/13/2023 05:23 PM    RBC 2.73 02/23/2023 05:10 AM    RBC 4.08 05/08/2012 08:10 AM    TRICHOMONAS NOT REPORTED 02/03/2021 02:30 PM    WBC 16.8 02/23/2023 05:10 AM    YEAST NOT REPORTED 02/03/2021 02:30 PM    TURBIDITY SLIGHTLY CLOUDY 02/13/2023 05:23 PM     Lab Results   Component Value Date/Time    CREATININE 1.09 02/23/2023 05:10 AM    GLUCOSE 95 02/23/2023 05:10 AM    GLUCOSE 102 05/08/2012 08:10 AM       Medical Decision Making-Imaging:     Narrative   EXAMINATION:   ONE XRAY VIEW OF THE CHEST       2/13/2023 9:25 am       COMPARISON:   11/07/2013       HISTORY:   ORDERING SYSTEM PROVIDED HISTORY: sepsis   TECHNOLOGIST PROVIDED HISTORY:   sepsis       FINDINGS:   Cardial pericardial silhouette is unremarkable. Increased interstitial   opacities are seen throughout both lungs. A focal infiltrate is not   detected. No pneumothorax. No free air. No acute bony abnormality. Impression   Increased interstitial opacity. While much or all of this may be chronic,   please correlate with any clinical evidence of acute interstitial edema. A focal infiltrate is not detected         Narrative   EXAMINATION:   CT OF THE ABDOMEN AND PELVIS WITHOUT CONTRAST 2/13/2023 9:50 am       TECHNIQUE:   CT of the abdomen and pelvis was performed without the administration of   intravenous contrast. Multiplanar reformatted images are provided for review. Automated exposure control, iterative reconstruction, and/or weight based   adjustment of the mA/kV was utilized to reduce the radiation dose to as low   as reasonably achievable. COMPARISON:   02/03/2021       HISTORY:   ORDERING SYSTEM PROVIDED HISTORY: Renal colic   TECHNOLOGIST PROVIDED HISTORY:           FINDINGS:   Lower Chest: Mild subsegmental atelectasis posterior lung bases. Organs:  There is 5 mm calculus in the lower pole right kidney which is also   evident on the prior. Additional smaller calculi in the right kidney on   prior no longer present. There is moderate right hydronephrosis with   significant perinephric stranding. There is also moderate right hydroureter. There are 2 calculi measuring 3 mm and 4 mm in the distal right ureter a few   cm from the uterovesical junction accounting for the obstructive signs. There may be additional punctate calculi in the same region. Left kidney shows a few punctate calculi along with a 2 mm calculus in the   lower pole. No left hydronephrosis or hydroureter. No left renal calculus. The unenhanced liver shows a 1 cm cyst in the left lobe which is unchanged. Cholecystectomy. Spleen, pancreas, adrenal glands show no significant   abnormalities. GI/Bowel: There is limited evaluation due to absence of oral contrast.       The stomach shows no focal lesions. Small bowel loops normal in caliber   showing no focal abnormalities. Normal appendix. Evaluation of the colon shows no acute process. Pelvis: Streak artifact from right hip prosthesis obscures portions of the   pelvis. There are pelvic phleboliths. Uterus and urinary bladder grossly   normal.       Peritoneum/Retroperitoneum: No free fluid. No lymphadenopathy. Atherosclerotic disease. Bones/Soft Tissues: Degenerative changes in the spine. No acute abnormality   of the bones. The superficial soft tissues show no significant abnormalities. Impression   1. 2 obstructing calculi in the distal right ureter about 3 and 4 mm in size   noted close to the uterovesical junction. There may be additional punctate   calculus or 2 in the same region. This causes moderate right hydronephrosis   and hydroureter. 2. Nonobstructing bilateral renal calculi. 5 mm calculus in the right kidney   and a few punctate and 2 mm calculi in the left kidney.    3. Mild subsegmental atelectasis posterior lung bases. Medical Decision [id number]-[id number]:        Culture, Blood 1  Order: [id number]  Status: Final result    Visible to patient: Yes (not seen)    Next appt: 03/16/2023 at 03:00 PM in Radiology [facility])    Specimen Information: Blood   0 Result Notes  Component 2/13/23 1300  Resulting Agency   Specimen Description . BLOOD  [address]   Special Requests 20ML, [id number] Lab   Culture POSITIVE Blood Culture Abnormal   [address]   Culture DIRECT Lynder Salon STAIN FROM BOTTLE: [address] COLI Abnormal   [address]   Culture (NOTE) Direct Gram Stain from bottle result called to and read back by:RAMON BARTLETT 2/14/2023 @0249 [address] [place] [address]        Susceptibility    Escherichia coli (3)    Antibiotic Interpretation Microscan Method Status    ampicillin Sensitive 4 BACTERIAL SUSCEPTIBILITY PANEL RAMON Final    ceFAZolin Sensitive <=4 BACTERIAL SUSCEPTIBILITY PANEL RAMON Final    cefTRIAXone Sensitive <=0.25 BACTERIAL SUSCEPTIBILITY PANEL RAMON Final    Confirmatory Extended Spectrum Beta-Lactamase Sensitive NEGATIVE BACTERIAL SUSCEPTIBILITY PANEL RAMON Final    gentamicin Sensitive <=1 BACTERIAL SUSCEPTIBILITY PANEL RAMON Final    levofloxacin Sensitive <=0.12 BACTERIAL SUSCEPTIBILITY PANEL RAMON Final    piperacillin-tazobactam Sensitive <=4 BACTERIAL SUSCEPTIBILITY PANEL RAMON Final    tobramycin Sensitive <=1 BACTERIAL SUSCEPTIBILITY PANEL RAMON Final    trimethoprim-sulfamethoxazole Sensitive <=20 BACTERIAL SUSCEPTIBILITY PANEL RAMON Final          Specimen Collected: 02/13/23 13:00 EST Last Resulted: 02/16/23 00:28 EST           Contains abnormal data Culture, Blood 1  Order: [id number]  Status: Final result    Visible to patient: Yes (not seen)    Next appt: 03/16/2023 at 03:00 PM in Radiology [facility])    Specimen Information: Blood   0 Result Notes  Component 2/13/23 1300 Resulting Agency   Specimen Description . BLOOD  [address] [facility]   Special Requests 20ML, [address] Lab   Culture POSITIVE Blood Culture Abnormal   [address]   Culture DIRECT GRAM STAIN FROM BOTTLE: [address] COLI Abnormal   [address]   Culture (NOTE) Direct Gram Stain from bottle result called to and read back by:RAMON BARTLETT 2/14/2023 @0249 [facility]        Susceptibility    Escherichia coli (3)    Antibiotic Interpretation Microscan Method Status    ampicillin Sensitive 4 BACTERIAL SUSCEPTIBILITY PANEL RAMON Final    ceFAZolin Sensitive <=4 BACTERIAL SUSCEPTIBILITY PANEL RAMON Final    cefTRIAXone Sensitive <=0.25 BACTERIAL SUSCEPTIBILITY PANEL RAMON Final    Confirmatory Extended Spectrum Beta-Lactamase Sensitive NEGATIVE BACTERIAL SUSCEPTIBILITY PANEL RAMON Final    gentamicin Sensitive <=1 BACTERIAL SUSCEPTIBILITY PANEL RAMON Final    levofloxacin Sensitive <=0.12 BACTERIAL SUSCEPTIBILITY PANEL RAMON Final    piperacillin-tazobactam Sensitive <=4 BACTERIAL SUSCEPTIBILITY PANEL RAMON Final    tobramycin Sensitive <=1 BACTERIAL SUSCEPTIBILITY PANEL RAMON Final    trimethoprim-sulfamethoxazole Sensitive <=20 BACTERIAL SUSCEPTIBILITY PANEL RAMON Final             Culture, Urine  Order: [id number]  Status: Final result    Visible to patient: Yes (not seen)    Next appt: 03/16/2023 at 03:00 PM in Radiology [facility])    Specimen Information: Urine, clean catch   0 Result Notes  Component 2/13/23 1317    Specimen Description . CLEAN CATCH URINE    Culture ESCHERICHIA COLI >[id number] CFU/ML Abnormal     Resulting [address]        Susceptibility    Escherichia coli (1)    Antibiotic Interpretation Microscan Method Status    ampicillin Sensitive 4 BACTERIAL SUSCEPTIBILITY PANEL RAMON Final    ceFAZolin Sensitive <=4 BACTERIAL SUSCEPTIBILITY PANEL RAMON Final     Cefazolin sensitivity results can be used to predict the effectiveness of oral   cephalosporins (eg. Cephalexin) in uncomplicated Urinary Tract Infections due to E. coli, K.    pneumoniae, and P. mirabilis        cefTRIAXone Sensitive <=0.25 BACTERIAL SUSCEPTIBILITY PANEL RAMON Final    Confirmatory Extended Spectrum Beta-Lactamase Sensitive NEGATIVE BACTERIAL SUSCEPTIBILITY PANEL RAMON Final    gentamicin Sensitive <=1 BACTERIAL SUSCEPTIBILITY PANEL RAMON Final    levofloxacin Sensitive <=0.12 BACTERIAL SUSCEPTIBILITY PANEL RAMON Final    nitrofurantoin Sensitive <=16 BACTERIAL SUSCEPTIBILITY PANEL RAMON Final    piperacillin-tazobactam Sensitive <=4 BACTERIAL SUSCEPTIBILITY PANEL RAMON Final    tobramycin Sensitive <=1 BACTERIAL SUSCEPTIBILITY PANEL RAMON Final    trimethoprim-sulfamethoxazole Sensitive <=20 BACTERIAL SUSCEPTIBILITY PANEL RAMON                  Medical Decision Making-Other:     Note:  Labs, medications, radiologic studies were reviewed with personal review of films  Large amounts of data were reviewed  Discussed with nursing Staff, Discharge planner  Infection Control and Prevention measures reviewed  All prior entries were reviewed  Administer medications as ordered  Prognosis: Fair  Discharge planning reviewed      Thank you for allowing us to participate in the care of this patient. Please call with questions.     Velasquez Jon MD        Pager: [phone] - Office: [phone]

## 2023-02-23 NOTE — DISCHARGE INSTR - DIET
Good nutrition is important when healing from an illness, injury, or surgery. Follow any nutrition recommendations given to you during your hospital stay. If you were given an oral nutrition supplement while in the hospital, continue to take this supplement at home. You can take it with meals, in-between meals, and/or before bedtime. These supplements can be purchased at most local grocery stores, pharmacies, and chain [employer]-stores. If you have any questions about your diet or nutrition, call the hospital and ask for the dietitian.             General diet / Increase fluid intake

## 2023-02-23 NOTE — FLOWSHEET NOTE
Sitting on edge of bed. Vitals checked and assessment completed. C/O back and head pain. Norco po given for pain per request. No other needs at present time. Call light within reach.  Continue to monitor

## 2023-02-24 ENCOUNTER — CARE COORDINATION (OUTPATIENT)
Dept: CASE MANAGEMENT | Age: 68
End: 2023-02-24

## 2023-02-24 ENCOUNTER — TELEPHONE (OUTPATIENT)
Dept: UROLOGY | Age: 68
End: 2023-02-24

## 2023-02-24 DIAGNOSIS — R78.81 BACTEREMIA DUE TO ESCHERICHIA COLI: Primary | ICD-10-CM

## 2023-02-24 DIAGNOSIS — B96.20 BACTEREMIA DUE TO ESCHERICHIA COLI: Primary | ICD-10-CM

## 2023-02-24 PROCEDURE — 1111F DSCHRG MED/CURRENT MED MERGE: CPT | Performed by: INTERNAL MEDICINE

## 2023-02-24 NOTE — CARE COORDINATION
[facility] Care Transitions Initial Follow Up Call    Call within 2 business days of discharge: Yes    Patient Current Location: [address] Transition Nurse contacted the patient by telephone to perform post hospital discharge assessment. Verified name and  with patient as identifiers. Provided introduction to self, and explanation of the Care Transition Nurse role. Patient: Stephanie Horton Patient : 1955   MRN: <[id number]>  Reason for Admission: Ureteral lithiasis/Bacteremia d/t E coli  Discharge Date: 23 RARS: Readmission Risk Score: 14.8      Last Discharge  Street       Date Complaint Diagnosis Description Type Department Provider    23  Ureteral calculus . .. Admission (Discharged) Josephine Peter MD            Was this an external facility discharge? No Discharge Facility: [address] to be reviewed by the provider   Additional needs identified to be addressed with provider: No  none               Method of communication with provider: none. Was able to contact [facility] for initial transitional outreach. She stated that she was tired and weak. She stated that she has some Rt flank pain, nausea yesterday, no fever/chills, no dizziness, no difficulties with urinating, has some shortness of breath due to a sinus infection, appetite is improving some and is constipated, but is taking Miralax. She stated that she had all her medications and is holding Losartan currently due to low BP. She will be going into the hospital for cysto and with stone therapy. She is aware of lab work ordered and will have it done on . PCP appointment is scheduled for 3/2  She had no questions/concerns or needs at this time. Care Transition Nurse reviewed discharge instructions with patient who verbalized understanding. The patient was given an opportunity to ask questions and does not have any further questions or concerns at this time.  Were discharge instructions available to patient? Yes. Reviewed appropriate site of care based on symptoms and resources available to patient including: PCP  Specialist  When to call 911. The patient agrees to contact the PCP office for questions related to their healthcare. Advance Care Planning:   Does patient have an Advance Directive: reviewed and current. Medication reconciliation was performed with patient, who verbalizes understanding of administration of home medications. Medications reviewed, 1111F entered: yes    Was patient discharged with a pulse oximeter? no    Non-face-to-face services provided:  Obtained and reviewed discharge summary and/or continuity of care documents  Reviewed and followed up on pending diagnostic tests and treatments-2/28 cysto  BMP/CBC 3/1    Offered patient enrollment in the Remote Patient Monitoring (RPM) program for in-home monitoring:  did not address at this time. .    Care Transitions 24 Hour Call    Do you have a copy of your discharge instructions?: Yes  Do you have all of your prescriptions and are they filled?: Yes  Have you been contacted by a [address]?: No  Have you scheduled your follow up appointment?: Yes  How are you going to get to your appointment?: Car - family or friend to transport  Do you feel like you have everything you need to keep you well at home?: Yes  Care Transitions Interventions         Discussed follow-up appointments. If no appointment was previously scheduled, appointment scheduling offered: Yes. Is follow up appointment scheduled within 7 days of discharge? Yes. Follow Up  Future Appointments   Date Time Provider Leticia Li   3/2/2023 10:45 AM Park Perking, MD Frankey Gutta C.D. Meg Lock   3/16/2023  3:00 PM [facility] ROOM AT [facility]   4/14/2023  9:00 AM Marley Hurst   7/21/2023  9:00 AM SCHEDULE, [facility] TIF UROLOGY GREEN PT SCHEDULE TIFF UROLOGY [facility]   10/13/2023  9:00 AM Ashley Falcon MD Antwan Talley C.D. [facility] Transition Nurse provided contact information. Plan for follow-up call in 5-7 days based on severity of symptoms and risk factors. Plan for next call:  follow up on cysto.  S/s of bacteremia/ RPM    Patrizia Fabian RN

## 2023-02-24 NOTE — TELEPHONE ENCOUNTER
Spoke with doctor regarding patient scheduled for surgery 2/28/23. Patient called and notified of surgery moved to 3/14/23. Surgery scheduling and PAT notified of change.

## 2023-02-28 ENCOUNTER — HOSPITAL ENCOUNTER (OUTPATIENT)
Age: 68
Discharge: HOME | End: 2023-02-28
Payer: MEDICARE

## 2023-02-28 LAB
ABSOLUTE EOS #: 0.11 K/UL (ref 0–0.44)
ABSOLUTE IMMATURE GRANULOCYTE: 0.07 K/UL (ref 0–0.3)
ABSOLUTE LYMPH #: 1.72 K/UL (ref 1.1–3.7)
ABSOLUTE MONO #: 0.93 K/UL (ref 0.1–1.2)
ALBUMIN SERPL-MCNC: 3.5 G/DL (ref 3.5–5.2)
ALBUMIN/GLOBULIN RATIO: 0.9 (ref 1–2.5)
ALP SERPL-CCNC: 130 U/L (ref 35–104)
ALT SERPL-CCNC: 42 U/L (ref 5–33)
ANION GAP SERPL CALCULATED.3IONS-SCNC: 11 MMOL/L (ref 9–17)
AST SERPL-CCNC: 62 U/L
BASOPHILS # BLD: 1 % (ref 0–2)
BASOPHILS ABSOLUTE: 0.06 K/UL (ref 0–0.2)
BILIRUB SERPL-MCNC: 0.3 MG/DL (ref 0.3–1.2)
BUN SERPL-MCNC: 20 MG/DL (ref 8–23)
BUN/CREAT BLD: 20 (ref 9–20)
CALCIUM SERPL-MCNC: 9.6 MG/DL (ref 8.6–10.4)
CHLORIDE SERPL-SCNC: 101 MMOL/L (ref 98–107)
CO2 SERPL-SCNC: 25 MMOL/L (ref 20–31)
CREAT SERPL-MCNC: 1 MG/DL (ref 0.5–0.9)
EOSINOPHILS RELATIVE PERCENT: 1 % (ref 1–4)
GFR SERPL CREATININE-BSD FRML MDRD: >60 ML/MIN/1.73M2
GLUCOSE SERPL-MCNC: 97 MG/DL (ref 70–99)
HCT VFR BLD AUTO: 28.6 % (ref 36.3–47.1)
HGB BLD-MCNC: 8.8 G/DL (ref 11.9–15.1)
IMMATURE GRANULOCYTES: 1 %
LYMPHOCYTES # BLD: 18 % (ref 24–43)
MCH RBC QN AUTO: 30.1 PG (ref 25.2–33.5)
MCHC RBC AUTO-ENTMCNC: 30.8 G/DL (ref 28.4–34.8)
MCV RBC AUTO: 97.9 FL (ref 82.6–102.9)
MONOCYTES # BLD: 10 % (ref 3–12)
NRBC AUTOMATED: 0 PER 100 WBC
PDW BLD-RTO: 15.8 % (ref 11.8–14.4)
PLATELET # BLD AUTO: 860 K/UL (ref 138–453)
PMV BLD AUTO: 9.1 FL (ref 8.1–13.5)
POTASSIUM SERPL-SCNC: 3.9 MMOL/L (ref 3.7–5.3)
PROT SERPL-MCNC: 7.5 G/DL (ref 6.4–8.3)
RBC # BLD: 2.92 M/UL (ref 3.95–5.11)
SEG NEUTROPHILS: 69 % (ref 36–65)
SEGMENTED NEUTROPHILS ABSOLUTE COUNT: 6.49 K/UL (ref 1.5–8.1)
SODIUM SERPL-SCNC: 137 MMOL/L (ref 135–144)
WBC # BLD AUTO: 9.4 K/UL (ref 3.5–11.3)

## 2023-02-28 PROCEDURE — 36415 COLL VENOUS BLD VENIPUNCTURE: CPT

## 2023-02-28 PROCEDURE — 85025 COMPLETE CBC W/AUTO DIFF WBC: CPT

## 2023-02-28 PROCEDURE — 80053 COMPREHEN METABOLIC PANEL: CPT

## 2023-03-01 ENCOUNTER — CARE COORDINATION (OUTPATIENT)
Dept: CASE MANAGEMENT | Age: 68
End: 2023-03-01

## 2023-03-01 NOTE — CARE COORDINATION
[facility] Care Transitions Follow Up Call    Patient Current Location: [address] Transition Nurse contacted the patient by telephone to follow up after admission on 23. Verified name and  with patient as identifiers. Patient: Canelo Lawrence  Patient : 1955   MRN: <[id number]>  Reason for Admission: Ureteral lithiasis/Bacteremia d/t E coli  Discharge Date: 23 RARS: Readmission Risk Score: 14.8      Needs to be reviewed by the provider   Additional needs identified to be addressed with provider: No  none             Method of communication with provider: none. Was able to contact [facility] for transitional outreach. She said that she wasdoing all right. She continues with the fatigue, but denies Rt flank pain, had nausea for a day, no fever/chills, no dizziness and has had a bowel movement. She said that she still has the head cold and is difficult to breath due her nasal plugging. She said that her oxygen level has been in the upper 90's and her blood pressures have been in the 110-110's/60's. She poe have follow up with PCP tomorrow. She had no further questions/concerns or needs at this time. 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 She stated that she will reach out if she needs anything. Will end episode as requested. Addressed changes since last contact:  none  Discussed follow-up appointments. If no appointment was previously scheduled, appointment scheduling offered: Yes. Is follow up appointment scheduled within 7 days of discharge? Yes. Follow Up  Future Appointments   Date Time Provider Leticia Li   3/2/2023 10:45 AM MD Rajesh Hernandez C.D.   3/16/2023  3:00 PM [facility] MAMMOGRAPHY ROOM AT [facility]   2023  9:00 AM Marley Ruiz   2023  9:00 AM SCHEDULE, [facility] TIF UROLOGY GREEN PT SCHEDULE TIF UROLOGY [facility]   10/13/2023 9:00 AM MD Mirna Morris C.D.     [facility] follow up appointment(s):     Care Transition Nurse reviewed medical action plan with patient and discussed any barriers to care and/or understanding of plan of care after discharge. Discussed appropriate site of care based on symptoms and resources available to patient including: PCP  Specialist  When to call 911. The patient agrees to contact the PCP office for questions related to their healthcare. Patients top risk factors for readmission: medical condition-Bacteremia  Interventions to address risk factors: Obtained and reviewed discharge summary and/or continuity of care documents    Offered patient enrollment in the Remote Patient Monitoring (RPM) program for in-home monitoring: NA.     Care Transitions Subsequent and Final Call    Subsequent and Final Calls  Do you have any ongoing symptoms?: Yes  Onset of Patient-reported symptoms: In the past 7 days  Patient-reported symptoms: Weakness, Fatigue  Have your medications changed?: No  Do you have any questions related to your medications?: No  Do you currently have any active services?: No  Do you have any needs or concerns that I can assist you with?: No  Care Transitions Interventions  Other Interventions:             Care Transition Nurse provided contact information for future needs. No further follow-up call indicated based on severity of symptoms and risk factors.   Plan for next call:  final call as requested     Ledy Millard RN

## 2023-03-02 NOTE — PROGRESS NOTES
Patient instructed on the pre-operative, intra-operative, and post-operative process. Patient instructed on NPO status. Medication instructions and pre operative instruction sheet reviewed with the patient. Instructed pt to stop taking OTC vitamins 7 days prior to surgery and to take synthroid with a small sip of water piror to arriving to the hospital the day of surgery.

## 2023-03-13 ENCOUNTER — ANESTHESIA EVENT (OUTPATIENT)
Dept: OPERATING ROOM | Age: 68
End: 2023-03-13
Payer: MEDICARE

## 2023-03-13 ENCOUNTER — HOSPITAL ENCOUNTER (OUTPATIENT)
Age: 68
Discharge: HOME | End: 2023-03-13
Payer: MEDICARE

## 2023-03-13 DIAGNOSIS — D69.6 THROMBOCYTOPENIA (HCC): ICD-10-CM

## 2023-03-13 DIAGNOSIS — N17.9 SEPSIS DUE TO ESCHERICHIA COLI WITH ACUTE RENAL FAILURE AND SEPTIC SHOCK, UNSPECIFIED ACUTE RENAL FAILURE TYPE (HCC): ICD-10-CM

## 2023-03-13 DIAGNOSIS — R65.21 SEPSIS DUE TO ESCHERICHIA COLI WITH ACUTE RENAL FAILURE AND SEPTIC SHOCK, UNSPECIFIED ACUTE RENAL FAILURE TYPE (HCC): ICD-10-CM

## 2023-03-13 DIAGNOSIS — B96.20 BACTEREMIA DUE TO ESCHERICHIA COLI: ICD-10-CM

## 2023-03-13 DIAGNOSIS — A41.51 SEPSIS DUE TO ESCHERICHIA COLI WITH ACUTE RENAL FAILURE AND SEPTIC SHOCK, UNSPECIFIED ACUTE RENAL FAILURE TYPE (HCC): ICD-10-CM

## 2023-03-13 DIAGNOSIS — D61.9 ANEMIA DUE TO BONE MARROW FAILURE, UNSPECIFIED BONE MARROW FAILURE TYPE (HCC): ICD-10-CM

## 2023-03-13 DIAGNOSIS — R78.81 BACTEREMIA DUE TO ESCHERICHIA COLI: ICD-10-CM

## 2023-03-13 LAB
ABSOLUTE EOS #: 0.18 K/UL (ref 0–0.44)
ABSOLUTE IMMATURE GRANULOCYTE: <0.03 K/UL (ref 0–0.3)
ABSOLUTE LYMPH #: 1.69 K/UL (ref 1.1–3.7)
ABSOLUTE MONO #: 0.51 K/UL (ref 0.1–1.2)
ALBUMIN SERPL-MCNC: 3.8 G/DL (ref 3.5–5.2)
ALBUMIN/GLOBULIN RATIO: 1.2 (ref 1–2.5)
ALP SERPL-CCNC: 87 U/L (ref 35–104)
ALT SERPL-CCNC: 51 U/L (ref 5–33)
ANION GAP SERPL CALCULATED.3IONS-SCNC: 8 MMOL/L (ref 9–17)
AST SERPL-CCNC: 44 U/L
BASOPHILS # BLD: 1 % (ref 0–2)
BASOPHILS ABSOLUTE: 0.06 K/UL (ref 0–0.2)
BILIRUB SERPL-MCNC: 0.5 MG/DL (ref 0.3–1.2)
BUN SERPL-MCNC: 17 MG/DL (ref 8–23)
BUN/CREAT BLD: 20 (ref 9–20)
CALCIUM SERPL-MCNC: 9 MG/DL (ref 8.6–10.4)
CHLORIDE SERPL-SCNC: 105 MMOL/L (ref 98–107)
CO2 SERPL-SCNC: 26 MMOL/L (ref 20–31)
CREAT SERPL-MCNC: 0.83 MG/DL (ref 0.5–0.9)
EOSINOPHILS RELATIVE PERCENT: 3 % (ref 1–4)
GFR SERPL CREATININE-BSD FRML MDRD: >60 ML/MIN/1.73M2
GLUCOSE SERPL-MCNC: 93 MG/DL (ref 70–99)
HCT VFR BLD AUTO: 29.8 % (ref 36.3–47.1)
HGB BLD-MCNC: 9.3 G/DL (ref 11.9–15.1)
IMMATURE GRANULOCYTES: 0 %
IRON SERPL-MCNC: 35 UG/DL (ref 37–145)
LYMPHOCYTES # BLD: 29 % (ref 24–43)
MCH RBC QN AUTO: 29.4 PG (ref 25.2–33.5)
MCHC RBC AUTO-ENTMCNC: 31.2 G/DL (ref 28.4–34.8)
MCV RBC AUTO: 94.3 FL (ref 82.6–102.9)
MONOCYTES # BLD: 9 % (ref 3–12)
NRBC AUTOMATED: 0 PER 100 WBC
PDW BLD-RTO: 14.8 % (ref 11.8–14.4)
PLATELET # BLD AUTO: 291 K/UL (ref 138–453)
PMV BLD AUTO: 9.5 FL (ref 8.1–13.5)
POTASSIUM SERPL-SCNC: 4.5 MMOL/L (ref 3.7–5.3)
PROT SERPL-MCNC: 7.1 G/DL (ref 6.4–8.3)
RBC # BLD: 3.16 M/UL (ref 3.95–5.11)
SEG NEUTROPHILS: 58 % (ref 36–65)
SEGMENTED NEUTROPHILS ABSOLUTE COUNT: 3.36 K/UL (ref 1.5–8.1)
SODIUM SERPL-SCNC: 139 MMOL/L (ref 135–144)
WBC # BLD AUTO: 5.8 K/UL (ref 3.5–11.3)

## 2023-03-13 PROCEDURE — 83540 ASSAY OF IRON: CPT

## 2023-03-13 PROCEDURE — 80053 COMPREHEN METABOLIC PANEL: CPT

## 2023-03-13 PROCEDURE — 85025 COMPLETE CBC W/AUTO DIFF WBC: CPT

## 2023-03-13 PROCEDURE — 36415 COLL VENOUS BLD VENIPUNCTURE: CPT

## 2023-03-14 ENCOUNTER — APPOINTMENT (OUTPATIENT)
Dept: GENERAL RADIOLOGY | Age: 68
End: 2023-03-14
Attending: UROLOGY
Payer: MEDICARE

## 2023-03-14 ENCOUNTER — HOSPITAL ENCOUNTER (OUTPATIENT)
Age: 68
Setting detail: OUTPATIENT SURGERY
Discharge: HOME | End: 2023-03-14
Attending: UROLOGY | Admitting: UROLOGY
Payer: MEDICARE

## 2023-03-14 ENCOUNTER — ANESTHESIA (OUTPATIENT)
Dept: OPERATING ROOM | Age: 68
End: 2023-03-14
Payer: MEDICARE

## 2023-03-14 ENCOUNTER — TELEPHONE (OUTPATIENT)
Dept: UROLOGY | Age: 68
End: 2023-03-14

## 2023-03-14 VITALS
DIASTOLIC BLOOD PRESSURE: 59 MMHG | HEART RATE: 67 BPM | TEMPERATURE: 96.8 F | WEIGHT: 110.5 LBS | SYSTOLIC BLOOD PRESSURE: 102 MMHG | OXYGEN SATURATION: 97 % | RESPIRATION RATE: 16 BRPM | BODY MASS INDEX: 19.58 KG/M2 | HEIGHT: 63 IN

## 2023-03-14 DIAGNOSIS — N20.1 URETERAL CALCULUS: Primary | ICD-10-CM

## 2023-03-14 DIAGNOSIS — N20.0 RENAL CALCULUS: ICD-10-CM

## 2023-03-14 PROCEDURE — C1769 GUIDE WIRE: HCPCS | Performed by: UROLOGY

## 2023-03-14 PROCEDURE — 2720000010 HC SURG SUPPLY STERILE: Performed by: UROLOGY

## 2023-03-14 PROCEDURE — 7100000001 HC PACU RECOVERY - ADDTL 15 MIN: Performed by: UROLOGY

## 2023-03-14 PROCEDURE — 2580000003 HC RX 258: Performed by: UROLOGY

## 2023-03-14 PROCEDURE — 6370000000 HC RX 637 (ALT 250 FOR IP): Performed by: NURSE ANESTHETIST, CERTIFIED REGISTERED

## 2023-03-14 PROCEDURE — 6360000002 HC RX W HCPCS: Performed by: UROLOGY

## 2023-03-14 PROCEDURE — 3700000001 HC ADD 15 MINUTES (ANESTHESIA): Performed by: UROLOGY

## 2023-03-14 PROCEDURE — 6370000000 HC RX 637 (ALT 250 FOR IP): Performed by: UROLOGY

## 2023-03-14 PROCEDURE — 7100000000 HC PACU RECOVERY - FIRST 15 MIN: Performed by: UROLOGY

## 2023-03-14 PROCEDURE — 3209999900 FLUORO FOR SURGICAL PROCEDURES

## 2023-03-14 PROCEDURE — 2500000003 HC RX 250 WO HCPCS: Performed by: NURSE ANESTHETIST, CERTIFIED REGISTERED

## 2023-03-14 PROCEDURE — 3600000014 HC SURGERY LEVEL 4 ADDTL 15MIN: Performed by: UROLOGY

## 2023-03-14 PROCEDURE — 3600000004 HC SURGERY LEVEL 4 BASE: Performed by: UROLOGY

## 2023-03-14 PROCEDURE — 2580000003 HC RX 258: Performed by: NURSE ANESTHETIST, CERTIFIED REGISTERED

## 2023-03-14 PROCEDURE — 2709999900 HC NON-CHARGEABLE SUPPLY: Performed by: UROLOGY

## 2023-03-14 PROCEDURE — 7100000011 HC PHASE II RECOVERY - ADDTL 15 MIN: Performed by: UROLOGY

## 2023-03-14 PROCEDURE — 3700000000 HC ANESTHESIA ATTENDED CARE: Performed by: UROLOGY

## 2023-03-14 PROCEDURE — 6360000002 HC RX W HCPCS: Performed by: NURSE ANESTHETIST, CERTIFIED REGISTERED

## 2023-03-14 PROCEDURE — 7100000010 HC PHASE II RECOVERY - FIRST 15 MIN: Performed by: UROLOGY

## 2023-03-14 PROCEDURE — C2617 STENT, NON-COR, TEM W/O DEL: HCPCS | Performed by: UROLOGY

## 2023-03-14 DEVICE — URETERAL STENT
Type: IMPLANTABLE DEVICE | Site: URETER | Status: FUNCTIONAL
Brand: PERCUFLEX™ PLUS

## 2023-03-14 RX ORDER — FENTANYL CITRATE 50 UG/ML
50 INJECTION, SOLUTION INTRAMUSCULAR; INTRAVENOUS EVERY 5 MIN PRN
Status: DISCONTINUED | OUTPATIENT
Start: 2023-03-14 | End: 2023-03-14 | Stop reason: HOSPADM

## 2023-03-14 RX ORDER — SODIUM CHLORIDE, SODIUM LACTATE, POTASSIUM CHLORIDE, CALCIUM CHLORIDE 600; 310; 30; 20 MG/100ML; MG/100ML; MG/100ML; MG/100ML
INJECTION, SOLUTION INTRAVENOUS ONCE
Status: COMPLETED | OUTPATIENT
Start: 2023-03-14 | End: 2023-03-14

## 2023-03-14 RX ORDER — DIMENHYDRINATE 50 MG/1
50 TABLET ORAL ONCE
Status: COMPLETED | OUTPATIENT
Start: 2023-03-14 | End: 2023-03-14

## 2023-03-14 RX ORDER — SODIUM CHLORIDE, SODIUM LACTATE, POTASSIUM CHLORIDE, CALCIUM CHLORIDE 600; 310; 30; 20 MG/100ML; MG/100ML; MG/100ML; MG/100ML
INJECTION, SOLUTION INTRAVENOUS CONTINUOUS PRN
Status: DISCONTINUED | OUTPATIENT
Start: 2023-03-14 | End: 2023-03-14 | Stop reason: SDUPTHER

## 2023-03-14 RX ORDER — DEXAMETHASONE SODIUM PHOSPHATE 4 MG/ML
INJECTION, SOLUTION INTRA-ARTICULAR; INTRALESIONAL; INTRAMUSCULAR; INTRAVENOUS; SOFT TISSUE PRN
Status: DISCONTINUED | OUTPATIENT
Start: 2023-03-14 | End: 2023-03-14 | Stop reason: SDUPTHER

## 2023-03-14 RX ORDER — ACETAMINOPHEN 325 MG/1
650 TABLET ORAL ONCE
Status: COMPLETED | OUTPATIENT
Start: 2023-03-14 | End: 2023-03-14

## 2023-03-14 RX ORDER — SODIUM CHLORIDE 0.9 % (FLUSH) 0.9 %
5-40 SYRINGE (ML) INJECTION PRN
Status: DISCONTINUED | OUTPATIENT
Start: 2023-03-14 | End: 2023-03-14 | Stop reason: HOSPADM

## 2023-03-14 RX ORDER — SODIUM CHLORIDE 9 MG/ML
INJECTION, SOLUTION INTRAVENOUS PRN
Status: DISCONTINUED | OUTPATIENT
Start: 2023-03-14 | End: 2023-03-14 | Stop reason: HOSPADM

## 2023-03-14 RX ORDER — FENTANYL CITRATE 50 UG/ML
INJECTION, SOLUTION INTRAMUSCULAR; INTRAVENOUS PRN
Status: DISCONTINUED | OUTPATIENT
Start: 2023-03-14 | End: 2023-03-14 | Stop reason: SDUPTHER

## 2023-03-14 RX ORDER — EPHEDRINE SULFATE/0.9% NACL/PF 25 MG/5 ML
SYRINGE (ML) INTRAVENOUS PRN
Status: DISCONTINUED | OUTPATIENT
Start: 2023-03-14 | End: 2023-03-14 | Stop reason: SDUPTHER

## 2023-03-14 RX ORDER — FENTANYL CITRATE 50 UG/ML
25 INJECTION, SOLUTION INTRAMUSCULAR; INTRAVENOUS EVERY 5 MIN PRN
Status: DISCONTINUED | OUTPATIENT
Start: 2023-03-14 | End: 2023-03-14 | Stop reason: HOSPADM

## 2023-03-14 RX ORDER — SODIUM CHLORIDE 0.9 % (FLUSH) 0.9 %
5-40 SYRINGE (ML) INJECTION EVERY 12 HOURS SCHEDULED
Status: DISCONTINUED | OUTPATIENT
Start: 2023-03-14 | End: 2023-03-14 | Stop reason: HOSPADM

## 2023-03-14 RX ORDER — METOCLOPRAMIDE HYDROCHLORIDE 5 MG/ML
10 INJECTION INTRAMUSCULAR; INTRAVENOUS
Status: DISCONTINUED | OUTPATIENT
Start: 2023-03-14 | End: 2023-03-14 | Stop reason: HOSPADM

## 2023-03-14 RX ORDER — ONDANSETRON 2 MG/ML
4 INJECTION INTRAMUSCULAR; INTRAVENOUS
Status: DISCONTINUED | OUTPATIENT
Start: 2023-03-14 | End: 2023-03-14 | Stop reason: HOSPADM

## 2023-03-14 RX ORDER — LIDOCAINE HYDROCHLORIDE 20 MG/ML
INJECTION, SOLUTION EPIDURAL; INFILTRATION; INTRACAUDAL; PERINEURAL PRN
Status: DISCONTINUED | OUTPATIENT
Start: 2023-03-14 | End: 2023-03-14 | Stop reason: SDUPTHER

## 2023-03-14 RX ORDER — ONDANSETRON 2 MG/ML
INJECTION INTRAMUSCULAR; INTRAVENOUS PRN
Status: DISCONTINUED | OUTPATIENT
Start: 2023-03-14 | End: 2023-03-14 | Stop reason: SDUPTHER

## 2023-03-14 RX ORDER — PROPOFOL 10 MG/ML
INJECTION, EMULSION INTRAVENOUS PRN
Status: DISCONTINUED | OUTPATIENT
Start: 2023-03-14 | End: 2023-03-14 | Stop reason: SDUPTHER

## 2023-03-14 RX ORDER — LIDOCAINE HYDROCHLORIDE 20 MG/ML
JELLY TOPICAL PRN
Status: DISCONTINUED | OUTPATIENT
Start: 2023-03-14 | End: 2023-03-14 | Stop reason: ALTCHOICE

## 2023-03-14 RX ADMIN — ONDANSETRON 4 MG: 2 INJECTION INTRAMUSCULAR; INTRAVENOUS at 08:59

## 2023-03-14 RX ADMIN — SODIUM CHLORIDE, POTASSIUM CHLORIDE, SODIUM LACTATE AND CALCIUM CHLORIDE: 600; 310; 30; 20 INJECTION, SOLUTION INTRAVENOUS at 07:56

## 2023-03-14 RX ADMIN — PROPOFOL 100 MG: 10 INJECTION, EMULSION INTRAVENOUS at 08:52

## 2023-03-14 RX ADMIN — SODIUM CHLORIDE, POTASSIUM CHLORIDE, SODIUM LACTATE AND CALCIUM CHLORIDE: 600; 310; 30; 20 INJECTION, SOLUTION INTRAVENOUS at 08:44

## 2023-03-14 RX ADMIN — EPHEDRINE SULFATE 15 MG: 5 INJECTION INTRAVENOUS at 09:05

## 2023-03-14 RX ADMIN — DEXAMETHASONE SODIUM PHOSPHATE 4 MG: 4 INJECTION, SOLUTION INTRAMUSCULAR; INTRAVENOUS at 08:59

## 2023-03-14 RX ADMIN — LIDOCAINE HYDROCHLORIDE 4 ML: 20 INJECTION, SOLUTION EPIDURAL; INFILTRATION; INTRACAUDAL; PERINEURAL at 08:50

## 2023-03-14 RX ADMIN — ACETAMINOPHEN 650 MG: 325 TABLET ORAL at 07:46

## 2023-03-14 RX ADMIN — DIMENHYDRINATE 50 MG: 50 TABLET ORAL at 07:46

## 2023-03-14 RX ADMIN — EPHEDRINE SULFATE 10 MG: 5 INJECTION INTRAVENOUS at 09:12

## 2023-03-14 RX ADMIN — CEFAZOLIN 1000 MG: 1 INJECTION, POWDER, FOR SOLUTION INTRAMUSCULAR; INTRAVENOUS at 08:43

## 2023-03-14 RX ADMIN — FENTANYL CITRATE 50 MCG: 50 INJECTION INTRAMUSCULAR; INTRAVENOUS at 08:49

## 2023-03-14 ASSESSMENT — LIFESTYLE VARIABLES: SMOKING_STATUS: 1

## 2023-03-14 ASSESSMENT — PAIN - FUNCTIONAL ASSESSMENT: PAIN_FUNCTIONAL_ASSESSMENT: NONE - DENIES PAIN

## 2023-03-14 ASSESSMENT — PAIN SCALES - GENERAL
PAINLEVEL_OUTOF10: 0
PAINLEVEL_OUTOF10: 0

## 2023-03-14 NOTE — DISCHARGE INSTRUCTIONS
SAME DAY SURGERY DISCHARGE INSTRUCTIONS    1. Do not drive or operate hazardous machinery for 24 hours. 2.  Do not make important personal or business decisions for 24 hours. 3.  Do not drink alcoholic beverages for 24 hours. 4.  Do not smoke tobacco products for 24 hours. 5.  Eat light foods (Jell-O, soups, etc....) and drink plenty of fluids (water, Sprite, etc...) up to 8 glasses per day, as you can tolerate. 6.  Limit your activities for 24 hours. Do not engage in heavy work until your surgeon gives you permission. 7.  Patient should not be left alone for 12-24 hours following surgical procedure. 8.  Wash hands before and after incision care. It is important to practice good personal hygiene during the post op period. 9.  Call your surgeon for any questions regarding your surgery. CYSTOSCOPY DISCHARGE INSTRUCTIONS    Possible burning during urination and/or blood tinged urine. Drink 6-8 glasses of water for the next day or so. (This helps to flush the urinary tract.)    Call Dr. Hany Nichols ([phone]) if you develop:    Fever over 100 degrees  Prolonged soreness/pain  Unusual bleeding/bruising  Unable to urinate or if urine is bloody  You cannot pass urine 8 hours after the test.  You have pain in your belly or your back just below your rib cage. (This is called flank pain.)  You have frequent urge to urinate but can pass only small amounts of urine. Call Dr. Hany Nichols office for follow-up appointment ([phone]).      May pull stent either Thursday or Friday of this week

## 2023-03-14 NOTE — BRIEF OP NOTE
Brief Postoperative Note      Patient: Waleska Bishop  YOB: 1955  MRN: [id number]    Date of Procedure: 3/14/2023    Pre-Op Diagnosis: OBSTRUCTING CALCULI DISTAL RIGHT URETER    Post-Op Diagnosis: Same       Procedure(s):  CYSTOSCOPY URETEROSCOPY LASER-WITH HLL  CYSTOSCOPY URETERAL STENT INSERTION/EXCHANGE    Surgeon(s):  Anh Martinez MD    Assistant:  * No surgical staff found *    Anesthesia: General    Estimated Blood Loss (mL): Minimal    Complications: None    Specimens:   * No specimens in log *    Implants:  Implant Name Type Inv. Item Serial No.  Lot No. LRB No. Used Action   STENT URET 6FR L24CM HYDR+ GRAD CIRCUMFERENTIAL [facility]und [facility] 6FR L24CM HYDR+ GRAD CIRCUMFERENTIAL MRK LO PROF  [facility] UROLOGY- [id number] Right 1 Implanted         Drains:   [REMOVED] Urinary Catheter 02/13/23 [place] (Removed)   $ Urethral catheter insertion $ Not inserted for procedure 02/19/23 0300   Catheter Indications Need for fluid volume management of the critically ill patient in a critical care setting 02/19/23 0727   Site Assessment Pink;Urethral drainage 02/19/23 0727   Urine Color Yellow; Evelyn 02/19/23 0727   Urine Appearance Sediment 02/19/23 0727   Collection Container Standard 02/19/23 0727   Securement Method Securing device (Describe) 02/19/23 0727   Catheter Care  Soap and water 02/18/23 1900   Catheter Best Practices  Drainage tube clipped to bed;Catheter secured to thigh; Tamper seal intact; Bag below bladder;Bag not on floor; Lack of dependent loop in tubing;Drainage bag less than half full 02/19/23 0727   Status Draining;Leaking 02/19/23 0727   Output (mL) 100 mL 02/19/23 0500       Findings: right ureteral/ renal calculus    Electronically signed by Anh Martinez MD on 3/14/2023 at 9:40 AM

## 2023-03-14 NOTE — PROGRESS NOTES
Discharge instructions given to patient and  with understanding voiced. Questions answered. Instructed ok to Discontinue taking Flomax per Dr. Quinten Good.

## 2023-03-14 NOTE — PROGRESS NOTES

## 2023-03-14 NOTE — OP NOTE
[address] [place], [place] [address]                                OPERATIVE REPORT    PATIENT NAME: Collin Mendoza                       :        1955  MED REC NO:   [id number]                              ROOM:  ACCOUNT NO:   [de-identified]                           ADMIT DATE: 2023  PROVIDER:     London Johnson    DATE OF PROCEDURE:  2023    SURGEON:  Dr. London Johnson. ASSISTANT:  None. PREOPERATIVE DIAGNOSES:  1. Right ureteral calculus. 2.  Right renal calculus. POSTOPERATIVE DIAGNOSES:  1. Right ureteral calculus. 2.  Right renal calculus. PROCEDURES PERFORMED:  Cystoscopy, right ureteroscopy, right holmium  laser lithotripsy, right ureteral stent exchange. ANESTHESIA:  General.    COMPLICATIONS:  None. ESTIMATED BLOOD LOSS:  Minimal.    SPECIMENS:  None. PROSTHESIS:  A 6-Martiniquais x 24-cm double-J ureteral stent. DISPOSITION:  Stable. FINDINGS:  1.  Large distal right ureteral stone. 2.  Several small right renal stones. INDICATIONS:  The patient is a 66-year-old female who had a previous  ureteral stone stent in, here now for definitive therapy. DESCRIPTION OF PROCEDURE:  The patient was taken back to the operating  room after informed consent including all risks, benefits, and  alternatives were obtained. The patient was transferred from the [facility]  onto the operating table, where she was induced under general  anesthesia, given IV Ancef for preoperative antibiotic prophylaxis. To  begin the case, prepped and draped in normal sterile fashion, placed in  dorsal lithotomy. She had a 22-Martiniquais sheath with a 30-degree lens  passed through the urethra into the bladder. Once in the bladder,  identified the right ureteral stent. This was grasped and removed  through the meatus. A 0.035-inch wire was passed up.   We then removed  the stent, used a dual-lumen catheter, placed an additional Glidewire  up. We then placed ureteroscope up, identified the stone in the distal  ureter. We were able to use 270 micron laser fiber and ablated the  stone adequately. Then we went up into the kidney and ablated the  stones further in the lower pole. We then watched all the pieces come  out to the distal ureter and we then removed the ureteroscope leaving  the Glidewire in place, placed the cystoscope over the Glidewire and  placed a 6-Jordanian x 24-cm double-J ureteral stent over the Glidewire  into the kidney. Glidewire was removed. Proximal curl was confirmed by  fluoroscopy. Distal curl was confirmed via visualization. At this  point in time, stent string was attached to the right thigh with Steris  and benzoin. She was awoken from general anesthesia, transferred to the  [facility], and taken to the PACU in satisfactory condition by Nursing and  Anesthesia teams. PLAN:  The patient will be discharged home per PACU criteria and follow  up with us in two days for stent removal via string.         Sylvia Barnes    D: 03/14/2023 9:49:50       T: 03/14/2023 10:23:04     [email or web address]  Job#: [id number]     Doc#: [id number]    CC:

## 2023-03-14 NOTE — ANESTHESIA POSTPROCEDURE EVALUATION
Department of Anesthesiology  Postprocedure Note    Patient: Mckayla Sosa  MRN: [id number]  YOB: 1955  Date of evaluation: 3/14/2023      Procedure Summary     Date: 03/14/23 Room / Location: [facility]    Anesthesia Start: 4997 Anesthesia Stop: 0515    Procedures:       CYSTOSCOPY URETEROSCOPY LASER-WITH HLL (Right)      CYSTOSCOPY URETERAL STENT INSERTION/EXCHANGE (Right) Diagnosis:       Multiple calculi of biliary tract with obstruction      (OBSTRUCTING CALCULI DISTAL RIGHT URETER)    Surgeons: Debbie Roper MD Responsible Provider: SAL Lopez CRNA    Anesthesia Type: general ASA Status: 3          Anesthesia Type: No value filed.     Hannah Phase I: Hannah Score: 10    Hannah Phase II: Hannah Score: 10      Anesthesia Post Evaluation    Patient location during evaluation: PACU  Patient participation: complete - patient participated  Level of consciousness: awake and alert  Airway patency: patent  Nausea & Vomiting: no nausea and no vomiting  Complications: no  Cardiovascular status: hemodynamically stable  Respiratory status: acceptable, spontaneous ventilation and room air  Hydration status: euvolemic  Multimodal analgesia pain management approach

## 2023-03-14 NOTE — INTERVAL H&P NOTE
Here for right HLL      History and Physical reviewed  I have examined the patient and no changes    Carmine Saini MD

## 2023-03-14 NOTE — ANESTHESIA PRE PROCEDURE
Department of Anesthesiology  Preprocedure Note       Name:  Artem Grider   Age:  79 y.o.  :  1955                                          MRN:  [id number]         Date:  3/14/2023      Surgeon: Moriah Mccoy):  Arlene Hernandez MD    Procedure: Procedure(s):  CYSTOSCOPY URETEROSCOPY LASER-WITH HLL  CYSTOSCOPY URETERAL STENT INSERTION/EXCHANGE    Medications prior to admission:   Prior to Admission medications    Medication Sig Start Date End Date Taking? Authorizing Provider   tamsulosin (FLOMAX) 0.4 MG capsule Take 1 capsule by mouth daily 3/2/23   Miguel Ángel Hodge MD   traZODone (DESYREL) 50 MG tablet Take 1 tablet by mouth nightly 23   SAL Okeefe CNP   ondansetron (ZOFRAN-ODT) 4 MG disintegrating tablet Take 1 tablet by mouth every 8 hours as needed for Nausea or Vomiting 23   SAL Okeefe CNP   potassium chloride (KLOR-CON M) 20 MEQ extended release tablet Take 1 tablet by mouth in the morning and at bedtime Do not crush, chew, or suck on tablet. Tablet may also be broken in half and each half swallowed separately. Patient not taking: Reported on 3/14/2023 2/23/23   SAL Keita CNP   nystatin (MYCOSTATIN) 346225 UNIT/ML suspension Take 5 mLs by mouth 4 times daily Swish about the mouth and retain in the mouth for as long as possible before swallowing.   Patient not taking: Reported on 3/14/2023 2/23/23   SAL Okeefe CNP   furosemide (LASIX) 20 MG tablet Take 1 tablet by mouth daily  Patient not taking: Reported on 3/14/2023 2/23/23   SAL Okeefe CNP   levothyroxine (SYNTHROID) 75 MCG tablet Take 1 tablet by mouth Daily 22   Miguel Ángel Hodge MD   atorvastatin (LIPITOR) 20 MG tablet Take 1 tablet by mouth daily 22   Miguel Ángel Hodge MD   alendronate (FOSAMAX) 70 MG tablet Take 1 tablet by mouth every 7 days 22   Miguel Ángel Hodge MD   CALCIUM PO Take by mouth 3 times a week    Historical Provider, MD   Zinc Sulfate (ZINC 15 PO) Take 30 mg by mouth daily     Historical Provider, MD   Ascorbic Acid (VITAMIN C) 1000 MG tablet Take 500 mg by mouth daily     Historical Provider, MD   acetaminophen (TYLENOL) 500 MG tablet Take 500 mg by mouth every 6 hours as needed for Pain    Historical Provider, MD   Glucosamine-Chondroitin (GLUCOSAMINE CHONDR COMPLEX PO) Take by mouth daily    Historical Provider, MD   vitamin D (ERGOCALCIFEROL) 400 UNITS CAPS Take 2,000 Units by mouth daily     Historical Provider, MD       Current medications:    No current facility-administered medications for this visit. No current outpatient medications on file.      Facility-Administered Medications Ordered in Other Visits   Medication Dose Route Frequency Provider Last Rate Last Admin    ceFAZolin (ANCEF) 1,000 mg in sodium chloride 0.9 % 50 mL IVPB (mini-bag)  1,000 mg IntraVENous On Call to OR Anh Martinez MD           Allergies:  No Known Allergies    Problem List:    Patient Active Problem List   Diagnosis Code    Ureteral calculus N20.1    Renal lithiasis lythotripsy 4/28/15 N20.0    Hypothyroidism E03.9    Osteoporosis M81.0    Melanocytic nevus D22.9    History of colon polyps Z86.010    Mixed hyperlipidemia E78.2    Hypertension I67    Renal colic on right side A41    Ureterolithiasis N20.1    Septic shock due to urinary tract infection ([facility] A41.9, O78.01, U85.8    Complicated UTI (urinary tract infection) N39.0    Acute unilateral obstructive uropathy N13.9    E. coli infection A49.8    DINA (acute kidney injury) ([facility] N17.9    Hydronephrosis of right kidney V60.82    Metabolic acidosis Y30.59    Hypocalcemia E83.51    Thrombocytopenia (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    Hypomagnesemia E83.42       Past Medical History:        Diagnosis Date  Bacteremia due to Escherichia coli 2/66/3006    Complicated UTI (urinary tract infection) 2/13/2023    Fracture of wrist, unspecified laterality, closed, initial encounter     right    History of sepsis 2009    following lithotripsy    Hyperlipidemia     Hypertension     Hypothyroidism 2007    secondary to radioactive iodine 2007    Kidney stone     Osteoporosis     Primary localized osteoarthritis of right hip 3/18/2019    Sepsis due to Escherichia coli ([address] 2/16/2023    Septic shock due to urinary tract infection ([address] 2/13/2023    Severe sepsis ([address] 2/13/2023    Thrombocytopenia ([address] 2/15/2023       Past Surgical History:        Procedure Laterality Date    CATARACT REMOVAL WITH IMPLANT Right     CHOLECYSTECTOMY, LAPAROSCOPIC  2009    COLONOSCOPY  07/17/2014    Dr. Lilliam Dick - tubular adenomatous polyp removed    COLONOSCOPY N/A 08/01/2018    Dr. Chapin Loud architectural distortion, suggestive of mucosal prolapse    CYSTOSCOPY  2015    stent removal and reinsert     CYSTOSCOPY Left     HLL with stent    CYSTOSCOPY Left 02/05/2021    CYSTOSCOPY URETEROSCOPY LASER-HLL performed by Carlton Lyman MD at [address] Right 02/13/2023    CYSTOSCOPY URETERAL STENT INSERTION performed by Carlton Lyman MD at [address] / [address] / STONE Left 02/05/2021    CYSTOSCOPY RETROGRADE PYELOGRAM STENT INSERTION performed by Carlton Lyman MD at [address] [facility] Right 02/13/2023    Dr. Mary Bowman LITHOTRIPSY  04/28/2015    left    LITHOTRIPSY Left 01/30/2018    cystoscopy- Dr. Kirsty Owens LITHOTRIPSY Right 04/20/2021    LITHOTRIPSY Right 04/20/2021    ESWL EXTRACORPOREAL SHOCK WAVE LITHOTRIPSY performed by Carlton Lyman MD at [place] [address]  2007    radioactive iodine procedure    [place] CYSTOURETHROSCOPY Left 01/30/2018    CYSTOSCOPY performed by Kristin Lanza MD at [address] Left 01/30/2018    ESWL [address] LITHOTRIPSY performed by Kristin Lanza MD at [address] Right 03/18/2019    Dr. Sally Rose History:    Social History     Tobacco Use    Smoking status: Former     Packs/day: 0.25     Years: 30.00     Pack years: 7.50     Types: Cigarettes    Smokeless tobacco: Never   Substance Use Topics    Alcohol use: Yes     Alcohol/week: 1.0 standard drink     Types: 1 Shots of liquor per week     Comment: socially                                Counseling given: Not Answered      Vital Signs (Current): There were no vitals filed for this visit.                                            BP Readings from Last 3 Encounters:   03/14/23 120/67   03/02/23 (!) 107/59   02/23/23 (!) 102/56       NPO Status:                                                                                 BMI:   Wt Readings from Last 3 Encounters:   03/14/23 110 lb 8 oz (50.1 kg)   03/02/23 109 lb 12.8 oz (49.8 kg)   02/22/23 116 lb (52.6 kg)     There is no height or weight on file to calculate BMI.    CBC:   Lab Results   Component Value Date/Time    WBC 5.8 03/13/2023 09:33 AM    RBC 3.16 03/13/2023 09:33 AM    RBC 4.08 05/08/2012 08:10 AM    HGB 9.3 03/13/2023 09:33 AM    HCT 29.8 03/13/2023 09:33 AM    MCV 94.3 03/13/2023 09:33 AM    RDW 14.8 03/13/2023 09:33 AM     03/13/2023 09:33 AM     05/08/2012 08:10 AM       CMP:   Lab Results   Component Value Date/Time     03/13/2023 09:33 AM    K 4.5 03/13/2023 09:33 AM     03/13/2023 09:33 AM    CO2 26 03/13/2023 09:33 AM    BUN 17 03/13/2023 09:33 AM    CREATININE 0.83 03/13/2023 09:33 AM    GFRAA >60 04/01/2022 08:05 AM    LABGLOM >60 03/13/2023 09:33 AM    GLUCOSE 93 03/13/2023 09:33 AM    GLUCOSE 102 05/08/2012 08:10 AM    PROT 7.1 03/13/2023 09:33 AM    CALCIUM 9.0 03/13/2023 09:33 AM    BILITOT 0.5 03/13/2023 09:33 AM    ALKPHOS 87 03/13/2023 09:33 AM    AST 44 03/13/2023 09:33 AM    ALT 51 03/13/2023 09:33 AM       POC Tests: No results for input(s): POCGLU, POCNA, POCK, POCCL, POCBUN, POCHEMO, POCHCT in the last 72 hours.    Coags:   Lab Results   Component Value Date/Time    PROTIME 14.2 02/16/2023 07:00 PM    INR 1.1 02/16/2023 07:00 PM    APTT 31.8 02/16/2023 07:00 PM       HCG (If Applicable): No results found for: PREGTESTUR, PREGSERUM, HCG, HCGQUANT     ABGs: No results found for: PHART, PO2ART, XOB1QOB, HLF9ACK, BEART, E6CHXDVZ     Type & Screen (If Applicable):  No results found for: LABABO, LABRH    Drug/Infectious Status (If Applicable):  No results found for: HIV, HEPCAB    COVID-19 Screening (If Applicable):   Lab Results   Component Value Date/Time    COVID19 Not Detected 02/18/2023 10:10 AM    COVID19 Not Detected 02/18/2023 10:10 AM           Anesthesia Evaluation  Patient summary reviewed and Nursing notes reviewed no history of anesthetic complications:   Airway: Mallampati: II  TM distance: >3 FB   Neck ROM: full  Mouth opening: > = 3 FB   Dental:    (+) poor dentition  Comment: Pt has crowns, denies anything loose     Pulmonary: breath sounds clear to auscultation  (+) pneumonia: resolved,  current smoker    (-) sleep apnea          Patient did not smoke on day of surgery.                 Cardiovascular:  Exercise tolerance: good (>4 METS),   (+) hypertension:, murmur, hyperlipidemia    (-) CAD and CABG/stent    ECG reviewed  Rhythm: regular  Rate: normal  Echocardiogram reviewed         Beta Blocker:  Not on Beta Blocker      ROS comment: 02/17/2023 09:31 AM  Dx: dyspnea  Hx: HTN, hyperlipidemia  Notified Dr. Beltran of findings at 9:52am.  History / Tech. Comments:  Height: 63 inches Weight: 140 pounds BSA: 1.66 m²  BP: 135/56 mmHg  CONCLUSIONS  Summary  Global left ventricular systolic function appears preserved with an estimated ejection fraction of  65%.  The left ventricular cavity size is within normal limits and the left ventricular wall thickness is within normal limits. No significant valvular disease was seen. Evidence of mild diastolic dysfunction is seen. Normal aortic root dimension. Dilated descending aorta. No previous studies were available for comparison. Report Status: [address] [facility], [address] [place], [address] [place] [address] [phone]  [facility] [address], [place], [address] [facility] [phone]  [facility] FACILITY [place] [address] [facility], [address] [facility] [phone]  [facility] [address] [facility] [phone] [address] [place] [address] [phone]  [facility] [address] [place] [phone]  [facility] [address], [facility], [address]  Patient Name: Radha Chapin CI #: [de-identified] Date of study: 02/17/2023 09:31 AM  Page: 1 of 3  Signature  Electronically signed by Elenita Newman(Sonographer) on 02/17/2023 01:50 PM  Electronically signed by Arturo Beltran(Interpreting physician) on 02/17/2023 05:21 PM  FINDINGS  Left Atrium  Left atrium is normal in size. Left Ventricle  Global left ventricular systolic function appears preserved with an estimated ejection fraction of 65%. The left ventricular cavity size is within normal limits and the left ventricular wall thickness is within normal limits. Right Atrium  Right atrium is normal in size. Right Ventricle  Normal right ventricular size and function. Mitral Valve  Normal mitral valve structure and function. Aortic Valve  Normal aortic valve structure and function without stenosis or regurgitation. Tricuspid Valve  Normal tricuspid valve structure and function. Pulmonic Valve  The pulmonic valve is normal in structure.   Pericardial Effusion  No significant pericardial effusion is seen. Pleural Effusion  A pleural effusion was seen. Miscellaneous  Evidence of mild diastolic dysfunction is seen. Normal aortic root dimension     Neuro/Psych:   (+) headaches (occassional ):,             GI/Hepatic/Renal:   (+) renal disease: kidney stones,           Endo/Other:    (+) hypothyroidism: arthritis: OA., electrolyte abnormalities, . ROS comment: Uro-sepsis workup. Hypotensive. Will initiate fluid resusitation. Abdominal:       Abdomen: soft. Vascular: negative vascular ROS. Other Findings:             Anesthesia Plan      general     ASA 3       Induction: intravenous. Anesthetic plan and risks discussed with patient and spouse. Use of blood products discussed with patient whom consented to blood products. Plan discussed with CRNA.                     SAL Jones - CRNA   3/14/2023

## 2023-03-16 ENCOUNTER — HOSPITAL ENCOUNTER (OUTPATIENT)
Dept: WOMENS IMAGING | Age: 68
Discharge: HOME | End: 2023-03-18
Payer: MEDICARE

## 2023-03-16 DIAGNOSIS — Z12.31 ENCOUNTER FOR SCREENING MAMMOGRAM FOR MALIGNANT NEOPLASM OF BREAST: ICD-10-CM

## 2023-03-16 PROCEDURE — 77067 SCR MAMMO BI INCL CAD: CPT

## 2023-04-21 ENCOUNTER — HOSPITAL ENCOUNTER (OUTPATIENT)
Dept: GENERAL RADIOLOGY | Age: 68
End: 2023-04-21
Payer: MEDICARE

## 2023-04-21 ENCOUNTER — HOSPITAL ENCOUNTER (OUTPATIENT)
Age: 68
End: 2023-04-21
Payer: MEDICARE

## 2023-04-21 DIAGNOSIS — N20.0 RENAL CALCULUS: ICD-10-CM

## 2023-04-21 DIAGNOSIS — N20.1 URETERAL CALCULUS: ICD-10-CM

## 2023-04-21 PROCEDURE — 74018 RADEX ABDOMEN 1 VIEW: CPT

## 2023-04-27 ENCOUNTER — OFFICE VISIT (OUTPATIENT)
Dept: UROLOGY | Age: 68
End: 2023-04-27
Payer: MEDICARE

## 2023-04-27 VITALS
TEMPERATURE: 97.8 F | BODY MASS INDEX: 19.8 KG/M2 | DIASTOLIC BLOOD PRESSURE: 83 MMHG | HEART RATE: 75 BPM | WEIGHT: 111.8 LBS | SYSTOLIC BLOOD PRESSURE: 140 MMHG

## 2023-04-27 DIAGNOSIS — N20.0 RENAL CALCULUS: Primary | ICD-10-CM

## 2023-04-27 PROCEDURE — 3077F SYST BP >= 140 MM HG: CPT | Performed by: NURSE PRACTITIONER

## 2023-04-27 PROCEDURE — 1123F ACP DISCUSS/DSCN MKR DOCD: CPT | Performed by: NURSE PRACTITIONER

## 2023-04-27 PROCEDURE — 99214 OFFICE O/P EST MOD 30 MIN: CPT | Performed by: NURSE PRACTITIONER

## 2023-04-27 PROCEDURE — 3079F DIAST BP 80-89 MM HG: CPT | Performed by: NURSE PRACTITIONER

## 2023-04-27 ASSESSMENT — ENCOUNTER SYMPTOMS
APNEA: 0
ABDOMINAL PAIN: 0
BACK PAIN: 0
CONSTIPATION: 0
VOMITING: 0
SHORTNESS OF BREATH: 0
WHEEZING: 0
COUGH: 0
NAUSEA: 0
EYE REDNESS: 0
COLOR CHANGE: 0

## 2023-04-27 NOTE — PATIENT INSTRUCTIONS
To prevent future stone formation:  1) drink around 80 ounces of water per day  2) Avoid/minimize intake of \"bad fluids\" (soda pop, coffee, tea, alcohol, energy drinks)  3) reduce consumption of sodium/salt  4) reduce consumption of fatty animal protein (full-fat cheese/milk/dairy, red meats, pork). Limit protein intake to low-fat/lean options (low-fat dairy, fish, chicken, turkey)      SURVEY:    You may be receiving a survey from [employer] regarding your visit today. Please complete the survey to enable us to provide the highest quality of care to you and your family. If you cannot score us a very good on any question, please call the office to discuss how we could have made your experience a very good one. Thank you.

## 2023-04-27 NOTE — PROGRESS NOTES
Smoking Status Former    Packs/day: 0.25    Years: 30.00    Pack years: 7.50    Types: Cigarettes   Smokeless Tobacco Never       Social History     Substance and Sexual Activity   Alcohol Use Yes    Alcohol/week: 1.0 standard drink    Types: 1 Shots of liquor per week    Comment: socially       Review of Systems   Constitutional:  Negative for appetite change, chills and fever. Eyes:  Negative for redness and visual disturbance. Respiratory:  Negative for apnea, cough, shortness of breath and wheezing. Cardiovascular:  Negative for chest pain and leg swelling. Gastrointestinal:  Negative for abdominal pain, constipation, nausea and vomiting. Genitourinary:  Negative for difficulty urinating, dyspareunia, dysuria, enuresis, flank pain, frequency, hematuria, pelvic pain, urgency, vaginal bleeding and vaginal discharge. Musculoskeletal:  Negative for back pain, joint swelling and myalgias. Skin:  Negative for color change, rash and wound. Neurological:  Negative for dizziness, tremors and numbness. Hematological:  Negative for adenopathy. Does not bruise/bleed easily. Psychiatric/Behavioral:  Negative for sleep disturbance. BP (!) 140/83 (Site: Left Upper Arm, Position: Sitting, Cuff Size: Medium Adult)   Pulse 75   Temp 97.8 °F (36.6 °C)   Wt 111 lb 12.8 oz (50.7 kg)   LMP  (LMP Unknown)   BMI 19.80 kg/m²       PHYSICAL EXAM:  Constitutional: Patient resting comfortably, in no acute distress. Neuro: Alert and oriented to person place and time. Cranial nerves grossly intact. Psych: Mood and affect normal.  Skin: Warm, dry  HEENT: normocephalic, atraumatic  Lymphatics: No palpable lymphadenopathy  Lungs: Respiratory effort normal, unlabored  Cardiovascular:  Normal peripheral pulses  Abdomen: Soft, non-tender, non-distended with no organomegaly or palpable masses. : No CVA tenderness. Bladder non-tender and not distended.       Lab Results   Component Value Date    BUN 11

## 2023-05-17 ENCOUNTER — OFFICE VISIT (OUTPATIENT)
Dept: ONCOLOGY | Age: 68
End: 2023-05-17
Payer: MEDICARE

## 2023-05-17 VITALS
RESPIRATION RATE: 18 BRPM | DIASTOLIC BLOOD PRESSURE: 77 MMHG | HEART RATE: 69 BPM | BODY MASS INDEX: 19.66 KG/M2 | WEIGHT: 111 LBS | TEMPERATURE: 98 F | SYSTOLIC BLOOD PRESSURE: 120 MMHG

## 2023-05-17 DIAGNOSIS — D50.9 IRON DEFICIENCY ANEMIA, UNSPECIFIED IRON DEFICIENCY ANEMIA TYPE: ICD-10-CM

## 2023-05-17 DIAGNOSIS — D69.6 THROMBOCYTOPENIA (HCC): Primary | ICD-10-CM

## 2023-05-17 PROCEDURE — 3078F DIAST BP <80 MM HG: CPT | Performed by: INTERNAL MEDICINE

## 2023-05-17 PROCEDURE — 99203 OFFICE O/P NEW LOW 30 MIN: CPT | Performed by: INTERNAL MEDICINE

## 2023-05-17 PROCEDURE — 3074F SYST BP LT 130 MM HG: CPT | Performed by: INTERNAL MEDICINE

## 2023-05-17 NOTE — PROGRESS NOTES
DIAGNOSIS:   Iron deficiency anemia  History of sepsis likely from urinary source  CURRENT THERAPY:  Started on oral iron  BRIEF CASE HISTORY:   Katheryn Aden is a very pleasant 79 y.o. female who is referred to us for iron deficiency. She was admitted to the hospital with urosepsis. At that time, her hemoglobin dipped from a baseline of 12 down to about 7. She was also thrombocytopenic. With supportive care, condition improved significantly and she was discharged home. After recovery, her hemoglobin started improving slowly. Iron studies showed severe iron deficiency with ferritin about 10. She was started on oral iron  She is sent to us for a consultation, she continues to feel tired. No bleeding or bruising. No PICA symptoms. PAST MEDICAL HISTORY: has a past medical history of Bacteremia due to Escherichia coli, Complicated UTI (urinary tract infection), Fracture of wrist, unspecified laterality, closed, initial encounter, History of sepsis, Hyperlipidemia, Hypertension, Hypothyroidism, Kidney stone, Osteoporosis, Primary localized osteoarthritis of right hip, Sepsis due to Escherichia coli [facility]), Septic shock due to urinary tract infection ([facility] [address] Severe sepsis ([facility] [address] and Thrombocytopenia ([facility] [address] PAST SURGICAL HISTORY: has a past surgical history that includes Dilation & curettage (1980); Cystoscopy (2015); Lithotripsy (04/28/2015); Cholecystectomy, laparoscopic (2009); Dilation and curettage of uterus (1984); Lithotripsy (Left, 01/30/2018); pr cystourethroscopy (Left, 01/30/2018); pr lithotripsy xtrcorp shock wave (Left, 01/30/2018); Colonoscopy (07/17/2014); Colonoscopy (N/A, 08/01/2018); other surgical history (2007); Total hip arthroplasty (Right, 03/18/2019); Cystoscopy (Left); Cystoscopy (Left, 02/05/2021); CYSTOSCOPY INSERTION / REMOVAL STENT / STONE (Left, 02/05/2021); Lithotripsy (Right, 04/20/2021); Lithotripsy (Right, 04/20/2021);  Cystoscopy w/ ureteral stent placement (Right,

## 2023-05-25 ENCOUNTER — OFFICE VISIT (OUTPATIENT)
Dept: GASTROENTEROLOGY | Age: 68
End: 2023-05-25
Payer: MEDICARE

## 2023-05-25 VITALS
SYSTOLIC BLOOD PRESSURE: 145 MMHG | HEIGHT: 63 IN | WEIGHT: 109.8 LBS | HEART RATE: 59 BPM | DIASTOLIC BLOOD PRESSURE: 75 MMHG | OXYGEN SATURATION: 98 % | BODY MASS INDEX: 19.45 KG/M2 | RESPIRATION RATE: 18 BRPM

## 2023-05-25 DIAGNOSIS — D64.9 ANEMIA, UNSPECIFIED TYPE: ICD-10-CM

## 2023-05-25 DIAGNOSIS — Z12.11 COLON CANCER SCREENING: Primary | ICD-10-CM

## 2023-05-25 PROBLEM — E78.5 DYSLIPIDEMIA: Status: ACTIVE | Noted: 2023-05-25

## 2023-05-25 PROBLEM — Z96.1 PSEUDOPHAKIA OF LEFT EYE: Status: ACTIVE | Noted: 2023-03-23

## 2023-05-25 PROBLEM — N20.0 KIDNEY STONE: Status: ACTIVE | Noted: 2023-05-25

## 2023-05-25 PROBLEM — M85.80 OSTEOPENIA: Status: ACTIVE | Noted: 2023-05-25

## 2023-05-25 PROCEDURE — 99202 OFFICE O/P NEW SF 15 MIN: CPT | Performed by: NURSE PRACTITIONER

## 2023-05-25 PROCEDURE — 3074F SYST BP LT 130 MM HG: CPT | Performed by: NURSE PRACTITIONER

## 2023-05-25 PROCEDURE — 3078F DIAST BP <80 MM HG: CPT | Performed by: NURSE PRACTITIONER

## 2023-05-25 PROCEDURE — 1123F ACP DISCUSS/DSCN MKR DOCD: CPT | Performed by: NURSE PRACTITIONER

## 2023-05-25 RX ORDER — POLYETHYLENE GLYCOL 3350, SODIUM CHLORIDE, SODIUM BICARBONATE, POTASSIUM CHLORIDE 420; 11.2; 5.72; 1.48 G/4L; G/4L; G/4L; G/4L
4000 POWDER, FOR SOLUTION ORAL ONCE
Qty: 4000 ML | Refills: 0 | Status: SHIPPED | OUTPATIENT
Start: 2023-05-25 | End: 2023-05-25

## 2023-05-25 RX ORDER — LOSARTAN POTASSIUM 50 MG/1
50 TABLET ORAL DAILY
COMMUNITY

## 2023-05-25 ASSESSMENT — ENCOUNTER SYMPTOMS
ALLERGIC/IMMUNOLOGIC NEGATIVE: 1
GASTROINTESTINAL NEGATIVE: 1
RESPIRATORY NEGATIVE: 1

## 2023-05-25 NOTE — PROGRESS NOTES
[facility]    Chief Complaint   Patient presents with    New Patient     Patient is new patient being referred by PCP to discuss colonoscopy screening. Patient has had colonoscopy in the past.  Patient denies family history of colon cancer, Patient reports daily bowel movements. Patient reports issues with constipation. Denies blood in stools or rectal bleeding. DENNY Lawrence is a 79 y.o. old female who has a past medical history of hypothyroidism, hyperlipidemia, hypertension, kidney stones,  and most recently sepsis with hospitalization March 2023 presenting for her interval colon cancer screening colonoscopy and concerns with new onset anemia. According to Nathanael Yoder, she was recently hospitalized, March 2023 with sepsis that was thought related to a urinary tract infection. She is now following up with hematology/oncology for new onset of anemia. Family history of colon cancer: No  Blood in stool: No  Unintentional weight loss: Yes: Feels it was due to sepsis  Abdominal pain: No  Prior colonoscopy: Yes  Constipation history: Yes has always had, treated with Miralax. Number of bowel movements a day: 3-4 per week  Change in stool caliber: No  History of GERD or Acid Reflux: No  Use of PPI's. No    Colonoscopy August for 2018:  Postoperative diagnosis:  - Polyps #1, 2 to 3 mm in size, located in the rectum removed by cold snare and retrieved for pathology. Findings:  Cecum/Ascending colon: normal   Transverse colon: normal   Descending/Sigmoid colon: normal   Rectum/Anus: examined in normal and retroflexed positions and was abnormal: Multiple hyperplastic-appearing proximal rectal polyps removed with cold snare and recovered for histology  Diagnosis:  Rectum, biopsies:  - Slight crypt architectural distortion, with features suggestive of mucosal prolapse. July 17, 2014 colonoscopy:  Final diagnosis:  Descending colon polyp:  - Tubular adenoma.     Last known

## 2023-05-25 NOTE — PATIENT INSTRUCTIONS
SURVEY:    You may be receiving a survey from [employer] regarding your visit today. Please complete the survey to enable us to provide the highest quality of care to you and your family. If you cannot score us a very good on any question, please call the office to discuss how we could have made your experience a very good one. Thank you.   Candance Becket, LPN

## 2023-07-13 ENCOUNTER — HOSPITAL ENCOUNTER (OUTPATIENT)
Age: 68
Discharge: HOME | End: 2023-07-13
Payer: MEDICARE

## 2023-07-13 DIAGNOSIS — D50.9 IRON DEFICIENCY ANEMIA, UNSPECIFIED IRON DEFICIENCY ANEMIA TYPE: ICD-10-CM

## 2023-07-13 DIAGNOSIS — D69.6 THROMBOCYTOPENIA (HCC): ICD-10-CM

## 2023-07-13 LAB
BASOPHILS # BLD: 0.04 K/UL (ref 0–0.2)
BASOPHILS NFR BLD: 1 % (ref 0–2)
EOSINOPHIL # BLD: 0.1 K/UL (ref 0–0.44)
EOSINOPHILS RELATIVE PERCENT: 1 % (ref 1–4)
ERYTHROCYTE [DISTWIDTH] IN BLOOD BY AUTOMATED COUNT: 18.8 % (ref 11.8–14.4)
FERRITIN SERPL-MCNC: 45 NG/ML (ref 13–150)
HCT VFR BLD AUTO: 42.4 % (ref 36.3–47.1)
HGB BLD-MCNC: 13.5 G/DL (ref 11.9–15.1)
IMM GRANULOCYTES # BLD AUTO: <0.03 K/UL (ref 0–0.3)
IMM GRANULOCYTES NFR BLD: 0 %
IRON SATN MFR SERPL: 40 % (ref 20–55)
IRON SERPL-MCNC: 108 UG/DL (ref 37–145)
LYMPHOCYTES # BLD: 25 % (ref 24–43)
LYMPHOCYTES NFR BLD: 1.83 K/UL (ref 1.1–3.7)
MCH RBC QN AUTO: 29.9 PG (ref 25.2–33.5)
MCHC RBC AUTO-ENTMCNC: 31.8 G/DL (ref 28.4–34.8)
MCV RBC AUTO: 94 FL (ref 82.6–102.9)
MONOCYTES NFR BLD: 0.63 K/UL (ref 0.1–1.2)
MONOCYTES NFR BLD: 9 % (ref 3–12)
NEUTROPHILS NFR BLD: 64 % (ref 36–65)
NEUTS SEG NFR BLD: 4.72 K/UL (ref 1.5–8.1)
NRBC BLD-RTO: 0 PER 100 WBC
PLATELET # BLD AUTO: 274 K/UL (ref 138–453)
PMV BLD AUTO: 9.2 FL (ref 8.1–13.5)
RBC # BLD AUTO: 4.51 M/UL (ref 3.95–5.11)
TIBC SERPL-MCNC: 270 UG/DL (ref 250–450)
UNSATURATED IRON BINDING CAPACITY: 162 UG/DL (ref 112–347)
WBC OTHER # BLD: 7.3 K/UL (ref 3.5–11.3)

## 2023-07-13 PROCEDURE — 82728 ASSAY OF FERRITIN: CPT

## 2023-07-13 PROCEDURE — 36415 COLL VENOUS BLD VENIPUNCTURE: CPT

## 2023-07-13 PROCEDURE — 83540 ASSAY OF IRON: CPT

## 2023-07-13 PROCEDURE — 83550 IRON BINDING TEST: CPT

## 2023-07-13 PROCEDURE — 85027 COMPLETE CBC AUTOMATED: CPT

## 2023-07-20 ENCOUNTER — OFFICE VISIT (OUTPATIENT)
Dept: ONCOLOGY | Age: 68
End: 2023-07-20
Payer: MEDICARE

## 2023-07-20 VITALS
TEMPERATURE: 97.5 F | WEIGHT: 111.5 LBS | SYSTOLIC BLOOD PRESSURE: 138 MMHG | BODY MASS INDEX: 19.75 KG/M2 | RESPIRATION RATE: 14 BRPM | HEART RATE: 66 BPM | DIASTOLIC BLOOD PRESSURE: 75 MMHG

## 2023-07-20 DIAGNOSIS — D61.89 ANEMIA DUE TO OTHER BONE MARROW FAILURE (HCC): Primary | ICD-10-CM

## 2023-07-20 PROCEDURE — 99213 OFFICE O/P EST LOW 20 MIN: CPT | Performed by: INTERNAL MEDICINE

## 2023-07-20 PROCEDURE — 3075F SYST BP GE 130 - 139MM HG: CPT | Performed by: INTERNAL MEDICINE

## 2023-07-20 PROCEDURE — 1123F ACP DISCUSS/DSCN MKR DOCD: CPT | Performed by: INTERNAL MEDICINE

## 2023-07-20 PROCEDURE — 3078F DIAST BP <80 MM HG: CPT | Performed by: INTERNAL MEDICINE

## 2023-07-20 RX ORDER — FERROUS SULFATE 325(65) MG
325 TABLET ORAL
Qty: 90 TABLET | Refills: 1 | Status: SHIPPED | OUTPATIENT
Start: 2023-07-20

## 2023-07-20 NOTE — PROGRESS NOTES
/ STONE (Left, 02/05/2021); Lithotripsy (Right, 04/20/2021); Lithotripsy (Right, 04/20/2021); Cystoscopy w/ ureteral stent placement (Right, 02/13/2023); Cystoscopy (Right, 02/13/2023); Cataract removal with implant (Right); Cystocopy (Right, 03/14/2023); Cystoscopy (Right, 3/14/2023); and Cystoscopy (Right, 3/14/2023). CURRENT MEDICATIONS:  has a current medication list which includes the following prescription(s): losartan, magnesium hydroxide, trazodone, ondansetron, levothyroxine, atorvastatin, alendronate, calcium, zinc sulfate, vitamin c, acetaminophen, glucosamine-chondroitin, and vitamin d. ALLERGIES:  has No Known Allergies. FAMILY HISTORY: Negative for any hematological or oncological conditions. SOCIAL HISTORY:  reports that she has quit smoking. Her smoking use included cigarettes. She has a 7.50 pack-year smoking history. She has never used smokeless tobacco. She reports current alcohol use of about 1.0 standard drink per week. She reports that she does not use drugs. REVIEW OF SYSTEMS:   General: no fever or night sweats, Weight is stable. Mild fatigue  ENT: No double or blurred vision, no tinnitus or hearing problem, no dysphagia or sore throat   Respiratory: No chest pain, no shortness of breath, no cough or hemoptysis. Cardiovascular: Denies chest pain, PND or orthopnea. No L E swelling or palpitations. Gastrointestinal:    No nausea or vomiting, abdominal pain, diarrhea or constipation. Genitourinary: Denies dysuria, hematuria, frequency, urgency or incontinence. Neurological: Denies headaches, decreased LOC, no sensory or motor focal deficits. Musculoskeletal:  No arthralgia no back pain or joint swelling. Skin: There are no rashes or bleeding. Psychiatric:  No anxiety, no depression. Endocrine: no diabetes or thyroid disease. Hematologic: no bleeding , no adenopathy.     PHYSICAL EXAM: Shows a well appearing 79y.o.-year-old female who is not in pain or

## 2023-07-28 ENCOUNTER — TELEPHONE (OUTPATIENT)
Dept: GASTROENTEROLOGY | Age: 68
End: 2023-07-28

## 2023-07-28 NOTE — TELEPHONE ENCOUNTER
Call placed to patient to offer sooner Colonoscopy/EGD procedure date of 08/02/23. 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

## 2023-08-22 NOTE — PLAN OF CARE
Called pt to schedule. Pt did not answer. Vm left with call back number. Pt needs an appointment as it has been over a year since she has been seen.    Problem: Discharge Planning  Goal: Discharge to home or other facility with appropriate resources  Outcome: Progressing  Flowsheets (Taken 2/17/2023 0800)  Discharge to home or other facility with appropriate resources:   Identify barriers to discharge with patient and caregiver   Identify discharge learning needs (meds, wound care, etc)     Problem: Pain  Goal: Verbalizes/displays adequate comfort level or baseline comfort level  Outcome: Progressing  Flowsheets (Taken 2/17/2023 0800)  Verbalizes/displays adequate comfort level or baseline comfort level:   Encourage patient to monitor pain and request assistance   Assess pain using appropriate pain scale   Implement non-pharmacological measures as appropriate and evaluate response     Problem: Safety - Adult  Goal: Free from fall injury  Outcome: Progressing     Problem: Infection - Adult  Goal: Absence of infection at discharge  Outcome: Progressing  Flowsheets (Taken 2/17/2023 0800)  Absence of infection at discharge:   Assess and monitor for signs and symptoms of infection   Monitor lab/diagnostic results   Monitor all insertion sites i.e., indwelling lines, tubes and drains   Administer medications as ordered   Instruct and encourage patient and family to use good hand hygiene technique     Problem: Nutrition Deficit:  Goal: Optimize nutritional status  Outcome: Progressing

## 2023-09-28 ENCOUNTER — HOSPITAL ENCOUNTER (OUTPATIENT)
Age: 68
Discharge: HOME | End: 2023-09-28
Payer: MEDICARE

## 2023-09-28 DIAGNOSIS — E78.2 MIXED HYPERLIPIDEMIA: ICD-10-CM

## 2023-09-28 DIAGNOSIS — E61.1 IRON DEFICIENCY: ICD-10-CM

## 2023-09-28 DIAGNOSIS — I10 PRIMARY HYPERTENSION: ICD-10-CM

## 2023-09-28 DIAGNOSIS — E03.9 HYPOTHYROIDISM, UNSPECIFIED TYPE: ICD-10-CM

## 2023-09-28 DIAGNOSIS — R73.01 IFG (IMPAIRED FASTING GLUCOSE): ICD-10-CM

## 2023-09-28 LAB
ALBUMIN SERPL-MCNC: 4.3 G/DL (ref 3.5–5.2)
ALBUMIN/GLOB SERPL: 1.3 {RATIO} (ref 1–2.5)
ALP SERPL-CCNC: 91 U/L (ref 35–104)
ALT SERPL-CCNC: 36 U/L (ref 5–33)
ANION GAP SERPL CALCULATED.3IONS-SCNC: 9 MMOL/L (ref 9–17)
AST SERPL-CCNC: 33 U/L
BASOPHILS # BLD: 0.05 K/UL (ref 0–0.2)
BASOPHILS NFR BLD: 1 % (ref 0–2)
BILIRUB SERPL-MCNC: 1 MG/DL (ref 0.3–1.2)
BUN SERPL-MCNC: 16 MG/DL (ref 8–23)
BUN/CREAT SERPL: 20 (ref 9–20)
CALCIUM SERPL-MCNC: 10.1 MG/DL (ref 8.6–10.4)
CHLORIDE SERPL-SCNC: 105 MMOL/L (ref 98–107)
CHOLEST SERPL-MCNC: 155 MG/DL
CHOLESTEROL/HDL RATIO: 2.4
CO2 SERPL-SCNC: 28 MMOL/L (ref 20–31)
CREAT SERPL-MCNC: 0.8 MG/DL (ref 0.5–0.9)
EOSINOPHIL # BLD: 0.09 K/UL (ref 0–0.44)
EOSINOPHILS RELATIVE PERCENT: 1 % (ref 1–4)
ERYTHROCYTE [DISTWIDTH] IN BLOOD BY AUTOMATED COUNT: 13.2 % (ref 11.8–14.4)
EST. AVERAGE GLUCOSE BLD GHB EST-MCNC: 94 MG/DL
FERRITIN SERPL-MCNC: 63 NG/ML (ref 13–150)
GFR SERPL CREATININE-BSD FRML MDRD: >60 ML/MIN/1.73M2
GLUCOSE SERPL-MCNC: 93 MG/DL (ref 70–99)
HBA1C MFR BLD: 4.9 % (ref 4–6)
HCT VFR BLD AUTO: 42.9 % (ref 36.3–47.1)
HDLC SERPL-MCNC: 64 MG/DL
HGB BLD-MCNC: 14 G/DL (ref 11.9–15.1)
IMM GRANULOCYTES # BLD AUTO: <0.03 K/UL (ref 0–0.3)
IMM GRANULOCYTES NFR BLD: 0 %
IRON SATN MFR SERPL: 65 % (ref 20–55)
IRON SERPL-MCNC: 164 UG/DL (ref 37–145)
LDLC SERPL CALC-MCNC: 71 MG/DL (ref 0–130)
LYMPHOCYTES NFR BLD: 1.51 K/UL (ref 1.1–3.7)
LYMPHOCYTES RELATIVE PERCENT: 22 % (ref 24–43)
MCH RBC QN AUTO: 33 PG (ref 25.2–33.5)
MCHC RBC AUTO-ENTMCNC: 32.6 G/DL (ref 28.4–34.8)
MCV RBC AUTO: 101.2 FL (ref 82.6–102.9)
MONOCYTES NFR BLD: 0.41 K/UL (ref 0.1–1.2)
MONOCYTES NFR BLD: 6 % (ref 3–12)
NEUTROPHILS NFR BLD: 70 % (ref 36–65)
NEUTS SEG NFR BLD: 4.94 K/UL (ref 1.5–8.1)
NRBC BLD-RTO: 0 PER 100 WBC
PLATELET # BLD AUTO: 247 K/UL (ref 138–453)
PMV BLD AUTO: 9.4 FL (ref 8.1–13.5)
POTASSIUM SERPL-SCNC: 5 MMOL/L (ref 3.7–5.3)
PROT SERPL-MCNC: 7.5 G/DL (ref 6.4–8.3)
RBC # BLD AUTO: 4.24 M/UL (ref 3.95–5.11)
SODIUM SERPL-SCNC: 142 MMOL/L (ref 135–144)
T3FREE SERPL-MCNC: 2.32 PG/ML (ref 2.02–4.43)
T4 FREE SERPL-MCNC: 1.4 NG/DL (ref 0.9–1.7)
TIBC SERPL-MCNC: 254 UG/DL (ref 250–450)
TRIGL SERPL-MCNC: 99 MG/DL
TSH SERPL DL<=0.05 MIU/L-ACNC: 1.39 UIU/ML (ref 0.3–5)
UNSATURATED IRON BINDING CAPACITY: 90 UG/DL (ref 112–347)
WBC OTHER # BLD: 7 K/UL (ref 3.5–11.3)

## 2023-09-28 PROCEDURE — 84439 ASSAY OF FREE THYROXINE: CPT

## 2023-09-28 PROCEDURE — 85025 COMPLETE CBC W/AUTO DIFF WBC: CPT

## 2023-09-28 PROCEDURE — 36415 COLL VENOUS BLD VENIPUNCTURE: CPT

## 2023-09-28 PROCEDURE — 83550 IRON BINDING TEST: CPT

## 2023-09-28 PROCEDURE — 82728 ASSAY OF FERRITIN: CPT

## 2023-09-28 PROCEDURE — 84443 ASSAY THYROID STIM HORMONE: CPT

## 2023-09-28 PROCEDURE — 83540 ASSAY OF IRON: CPT

## 2023-09-28 PROCEDURE — 80053 COMPREHEN METABOLIC PANEL: CPT

## 2023-09-28 PROCEDURE — 83036 HEMOGLOBIN GLYCOSYLATED A1C: CPT

## 2023-09-28 PROCEDURE — 84481 FREE ASSAY (FT-3): CPT

## 2023-09-28 PROCEDURE — 80061 LIPID PANEL: CPT

## 2023-09-29 NOTE — PROGRESS NOTES
Patient states they received their endoscopy prep instructions from the physician's office. Patient states they understand medications to be taken with sip of water only the a.m. of procedure. Results of bowel prep reviewed with patient, appearance should be clear, yellow, liquid prior to arrival at facility. Patient verbalizes understanding and denies any questions at this time. Patient instructed to complete EKG asap. Patient states she will not be having EGD done on day of procedure but she will call Dr Lerner's office next week to notify them.

## 2023-10-02 ENCOUNTER — TELEPHONE (OUTPATIENT)
Dept: GASTROENTEROLOGY | Age: 68
End: 2023-10-02

## 2023-10-02 ENCOUNTER — HOSPITAL ENCOUNTER (OUTPATIENT)
Age: 68
Discharge: HOME | End: 2023-10-02

## 2023-10-02 DIAGNOSIS — Z01.818 PREOP TESTING: ICD-10-CM

## 2023-10-02 LAB
EKG ATRIAL RATE: 61 BPM
EKG P AXIS: 54 DEGREES
EKG P-R INTERVAL: 144 MS
EKG Q-T INTERVAL: 424 MS
EKG QRS DURATION: 82 MS
EKG QTC CALCULATION (BAZETT): 426 MS
EKG R AXIS: -23 DEGREES
EKG T AXIS: 41 DEGREES
EKG VENTRICULAR RATE: 61 BPM

## 2023-10-02 NOTE — TELEPHONE ENCOUNTER
Patient calling office to request to cxl EGD procedure on 10/11/23. Patient is electing to only have colonoscopy procedure this day. Scheduling sheet sent to surgery dept.

## 2023-10-04 NOTE — TELEPHONE ENCOUNTER
Patient had Right ESWL on 04/20/2021. She reports ever since this procedure she has had 4-5 episode of \"very loose, floating, fuzzy-mucous like stool each day\". She only has abdominal cramping during the bowel movement, no other pain or symptoms. She reports she did increase water intake to replace loss of fluids. She believes this is a side effect from procedure and questioning what she should do. Protopic Counseling: Patient may experience a mild burning sensation during topical application. Protopic is not approved in children less than 2 years of age. There have been case reports of hematologic and skin malignancies in patients using topical calcineurin inhibitors although causality is questionable.

## 2023-10-09 ENCOUNTER — TELEPHONE (OUTPATIENT)
Dept: GASTROENTEROLOGY | Age: 68
End: 2023-10-09

## 2023-10-10 ENCOUNTER — ANESTHESIA EVENT (OUTPATIENT)
Dept: OPERATING ROOM | Age: 68
End: 2023-10-10
Payer: MEDICARE

## 2023-10-11 ENCOUNTER — HOSPITAL ENCOUNTER (OUTPATIENT)
Age: 68
Setting detail: OUTPATIENT SURGERY
Discharge: HOME | End: 2023-10-11
Attending: INTERNAL MEDICINE | Admitting: INTERNAL MEDICINE
Payer: MEDICARE

## 2023-10-11 ENCOUNTER — ANESTHESIA (OUTPATIENT)
Dept: OPERATING ROOM | Age: 68
End: 2023-10-11
Payer: MEDICARE

## 2023-10-11 VITALS
TEMPERATURE: 97.5 F | BODY MASS INDEX: 20.02 KG/M2 | DIASTOLIC BLOOD PRESSURE: 53 MMHG | HEART RATE: 51 BPM | OXYGEN SATURATION: 100 % | WEIGHT: 113 LBS | RESPIRATION RATE: 16 BRPM | HEIGHT: 63 IN | SYSTOLIC BLOOD PRESSURE: 143 MMHG

## 2023-10-11 DIAGNOSIS — Z01.818 PREOP TESTING: Primary | ICD-10-CM

## 2023-10-11 DIAGNOSIS — Z12.11 COLON CANCER SCREENING: ICD-10-CM

## 2023-10-11 DIAGNOSIS — D64.9 ANEMIA, UNSPECIFIED TYPE: ICD-10-CM

## 2023-10-11 PROCEDURE — 6360000002 HC RX W HCPCS: Performed by: NURSE ANESTHETIST, CERTIFIED REGISTERED

## 2023-10-11 PROCEDURE — 7100000011 HC PHASE II RECOVERY - ADDTL 15 MIN: Performed by: INTERNAL MEDICINE

## 2023-10-11 PROCEDURE — 88305 TISSUE EXAM BY PATHOLOGIST: CPT

## 2023-10-11 PROCEDURE — 7100000010 HC PHASE II RECOVERY - FIRST 15 MIN: Performed by: INTERNAL MEDICINE

## 2023-10-11 PROCEDURE — 2580000003 HC RX 258: Performed by: NURSE ANESTHETIST, CERTIFIED REGISTERED

## 2023-10-11 PROCEDURE — 2709999900 HC NON-CHARGEABLE SUPPLY: Performed by: INTERNAL MEDICINE

## 2023-10-11 PROCEDURE — 3700000001 HC ADD 15 MINUTES (ANESTHESIA): Performed by: INTERNAL MEDICINE

## 2023-10-11 PROCEDURE — 45385 COLONOSCOPY W/LESION REMOVAL: CPT | Performed by: INTERNAL MEDICINE

## 2023-10-11 PROCEDURE — 3609010600 HC COLONOSCOPY POLYPECTOMY SNARE/COLD BIOPSY: Performed by: INTERNAL MEDICINE

## 2023-10-11 PROCEDURE — 3700000000 HC ANESTHESIA ATTENDED CARE: Performed by: INTERNAL MEDICINE

## 2023-10-11 PROCEDURE — 2500000003 HC RX 250 WO HCPCS: Performed by: NURSE ANESTHETIST, CERTIFIED REGISTERED

## 2023-10-11 RX ORDER — SODIUM CHLORIDE 0.9 % (FLUSH) 0.9 %
5-40 SYRINGE (ML) INJECTION PRN
Status: CANCELLED | OUTPATIENT
Start: 2023-10-11

## 2023-10-11 RX ORDER — SODIUM CHLORIDE 0.9 % (FLUSH) 0.9 %
5-40 SYRINGE (ML) INJECTION EVERY 12 HOURS SCHEDULED
Status: CANCELLED | OUTPATIENT
Start: 2023-10-11

## 2023-10-11 RX ORDER — SODIUM CHLORIDE, SODIUM LACTATE, POTASSIUM CHLORIDE, CALCIUM CHLORIDE 600; 310; 30; 20 MG/100ML; MG/100ML; MG/100ML; MG/100ML
INJECTION, SOLUTION INTRAVENOUS CONTINUOUS
Status: DISCONTINUED | OUTPATIENT
Start: 2023-10-11 | End: 2023-10-11 | Stop reason: HOSPADM

## 2023-10-11 RX ORDER — SODIUM CHLORIDE 9 MG/ML
INJECTION, SOLUTION INTRAVENOUS PRN
Status: CANCELLED | OUTPATIENT
Start: 2023-10-11

## 2023-10-11 RX ORDER — PROPOFOL 10 MG/ML
INJECTION, EMULSION INTRAVENOUS CONTINUOUS PRN
Status: DISCONTINUED | OUTPATIENT
Start: 2023-10-11 | End: 2023-10-11 | Stop reason: SDUPTHER

## 2023-10-11 RX ORDER — LIDOCAINE HYDROCHLORIDE 20 MG/ML
INJECTION, SOLUTION EPIDURAL; INFILTRATION; INTRACAUDAL; PERINEURAL PRN
Status: DISCONTINUED | OUTPATIENT
Start: 2023-10-11 | End: 2023-10-11 | Stop reason: SDUPTHER

## 2023-10-11 RX ADMIN — SODIUM CHLORIDE, POTASSIUM CHLORIDE, SODIUM LACTATE AND CALCIUM CHLORIDE: 600; 310; 30; 20 INJECTION, SOLUTION INTRAVENOUS at 09:43

## 2023-10-11 RX ADMIN — LIDOCAINE HYDROCHLORIDE 5 ML: 20 INJECTION, SOLUTION EPIDURAL; INFILTRATION; INTRACAUDAL; PERINEURAL at 10:29

## 2023-10-11 RX ADMIN — PROPOFOL 200 MCG/KG/MIN: 10 INJECTION, EMULSION INTRAVENOUS at 10:29

## 2023-10-11 ASSESSMENT — PAIN SCALES - GENERAL
PAINLEVEL_OUTOF10: 3
PAINLEVEL_OUTOF10: 0

## 2023-10-11 ASSESSMENT — PAIN DESCRIPTION - LOCATION: LOCATION: ABDOMEN

## 2023-10-11 ASSESSMENT — LIFESTYLE VARIABLES: SMOKING_STATUS: 1

## 2023-10-11 ASSESSMENT — PAIN DESCRIPTION - DESCRIPTORS: DESCRIPTORS: OTHER (COMMENT)

## 2023-10-11 NOTE — FLOWSHEET NOTE
Discharge Criteria    Inpatients must meet Criteria 1 through 7. All other patients are either YES or N/A. If a NO is chosen then Anesthesia or Surgeon must be notified. 1.  Minimum 30 minutes after last dose of sedative medication. Yes      2. Systolic BP between 90 - 924. Diastolic BP between 60 - 90. Yes      3. Pulse between 60 - 120    Yes      4. Respirations between 8 - 25. Yes      5. SpO2 92% - 100%. Yes      6. Able to cough and swallow or return to baseline function. Yes      7. Alert and oriented or return to baseline mental status. Yes      8. Demonstrates controlled, coordinated movements, ambulates with steady gait, or return to baseline activity function. Yes      9. Minimal or no pain or nausea, or at a level tolerable and acceptable to patient. Yes      10. Takes and retains oral fluids as allowed. Yes      11. Procedural / perioperative site stable. Minimal or no bleeding. Yes          12. If GI endoscopy procedure, minimal or no abdominal distention or passing flatus. Yes      13. Written discharge instructions and emergency telephone number provided. Yes      14. Accompanied by a responsible adult.     Yes

## 2023-10-11 NOTE — H&P
EXTRACORPOREAL SHOCK WAVE LITHOTRIPSY performed by Glenn Mariscal MD at [id number] [place] [address]    radioactive iodine procedure    CO CYSTOURETHROSCOPY Left 01/30/2018    CYSTOSCOPY performed by Glenn Mariscal MD at [facility] Left 01/30/2018    ESWL [address] LITHOTRIPSY performed by Glenn Mariscal MD at [address] [place] [address] Right 03/18/2019    Dr. Saima Muro     Current Facility-Administered Medications   Medication Dose Route Frequency Provider Last Rate Last Admin    lactated ringers IV soln infusion   IntraVENous Continuous Ai Muse APRN - CRNA         No Known Allergies  Family History   Problem Relation Age of Onset    Breast Cancer Maternal Grandmother     Stroke Father     Hypertension Father     Alzheimer's Disease Mother      Social History     Socioeconomic History    Marital status:      Spouse name: Not on file    Number of children: Not on file    Years of education: Not on file    Highest education level: Not on file   Occupational History    Not on file   Tobacco Use    Smoking status: Some Days     Packs/day: 0.25     Years: 30.00     Additional pack years: 0.00     Total pack years: 7.50     Types: Cigarettes    Smokeless tobacco: Never   Vaping Use    Vaping Use: Never used   Substance and Sexual Activity    Alcohol use:  Yes     Alcohol/week: 1.0 standard drink of alcohol     Types: 1 Shots of liquor per week     Comment: socially    Drug use: No    Sexual activity: Yes     Partners: Male     Comment: postmeno   Other Topics Concern    Not on file   Social History Narrative    Not on file     Social Determinants of Health     Financial Resource Strain: Low Risk  (4/14/2023)    Overall Financial Resource Strain (CARDIA)     Difficulty of Paying Living Expenses: Not hard at all   Food Insecurity: No Food Insecurity (4/14/2023)    Hunger Vital Sign     Worried About Running Out of Food in the Last

## 2023-10-11 NOTE — OP NOTE
[facility]    COLONOSCOPY    PROCEDURE DATE: 10/11/23    REFERRING PHYSICIAN: No ref. provider found     PRIMARY CARE PROVIDER: Chilo Hartman MD    ATTENDING PHYSICIAN: Nicanor Haddad MD     HISTORY: Ms. Sun Young is a 76 y.o. female who presents to the  endoscopy unit for colonoscopy. The patient's clinical history is remarkable for colon polyp, UTI. She is currently medically stable and appropriate for the planned procedure. PREOPERATIVE DIAGNOSIS: screening for colon cancer. PROCEDURES:   Transanal Colonoscopy --screening. POSTPROCEDURE DIAGNOSIS:  Colon polyp  Tortuous colon    MEDICATIONS: MAC per anesthesia      EBL 2 ml      INSTRUMENT: Olympus CF-H190 AL Pediatric flexible Colonoscope. PREPARATION: The nature and character of the procedure as well as risks, benefits, and alternatives were discussed with the patient and informed consent was obtained. Complications were said to include, but were not limited to: medication allergy, medication reaction, cardiovascular and respiratory problems, bleeding, perforation, infection, and/or missed diagnosis. Following arrival in the endoscopy room, the patient was placed in the left lateral decubitus position and final time-out accomplished in the presence of the nursing staff. Baseline vital signs were obtained and reviewed, and IV sedation was subsequently initiated. FINDINGS: Rectal examination demonstrated no significant visible external abnormality and digital palpation was unremarkable. Following adequate conscious sedation the colonoscope was introduced and advanced under direct visualization to the cecum, which was identified by the ileocecal valve and appendiceal orifice. The bowel preparation was felt to be adequate. Cecal intubation time was 7 minutes. Once maximally inserted, the endoscope was withdrawn, fluid as well as gas was suctioned and the mucosa was carefully inspected.  The mucosal exam revealed 7mm sessile polyp removed from rectum with a cold snare. Retroflexion was performed in the rectum and mild internal hemorrhoids were noted. Withdrawal time was 12 minutes. IMPRESSION:   Colon polyp  Tortuous colon     RECOMMENDATIONS:   1) Follow up with referring provider, as previously scheduled.    2) Repeat Colonoscopy based on pathology         Electronically signed by Oliver Alexander MD on 10/11/2023 at 11:13 AM

## 2023-10-11 NOTE — ANESTHESIA POSTPROCEDURE EVALUATION
Department of Anesthesiology  Postprocedure Note    Patient: Philomena Garzon  MRN: [id number]  YOB: 1955  Date of evaluation: 10/11/2023      Procedure Summary     Date: 10/11/23 Room / Location: [address] [place] [address]    Anesthesia Start: 1027 Anesthesia Stop: 1056    Procedure: COLONOSCOPY POLYPECTOMY SNARE/COLD BIOPSY Diagnosis:       Colon cancer screening      Anemia, unspecified type      (Colon cancer screening [Z12.11])      (Anemia, unspecified type [D64.9])    Surgeons: Corinne Russell, MD Responsible Provider: SAL Randolph CRNA    Anesthesia Type: general, TIVA ASA Status: 3          Anesthesia Type: No value filed.     Hannah Phase I: Hannah Score: 10    Hannah Phase II: Hannah Score: 10      Anesthesia Post Evaluation    Patient location during evaluation: PACU  Patient participation: complete - patient participated  Level of consciousness: awake and alert  Airway patency: patent  Nausea & Vomiting: no nausea and no vomiting  Complications: no  Cardiovascular status: blood pressure returned to baseline  Respiratory status: acceptable  Hydration status: euvolemic  Pain management: adequate and satisfactory to patient

## 2023-10-13 LAB — SURGICAL PATHOLOGY REPORT: NORMAL

## 2023-10-19 ENCOUNTER — OFFICE VISIT (OUTPATIENT)
Dept: ONCOLOGY | Age: 68
End: 2023-10-19
Payer: MEDICARE

## 2023-10-19 VITALS
HEIGHT: 63 IN | WEIGHT: 113 LBS | TEMPERATURE: 98.6 F | DIASTOLIC BLOOD PRESSURE: 79 MMHG | HEART RATE: 75 BPM | RESPIRATION RATE: 18 BRPM | SYSTOLIC BLOOD PRESSURE: 133 MMHG | BODY MASS INDEX: 20.02 KG/M2

## 2023-10-19 DIAGNOSIS — D61.9 ANEMIA DUE TO BONE MARROW FAILURE, UNSPECIFIED BONE MARROW FAILURE TYPE (HCC): Primary | ICD-10-CM

## 2023-10-19 DIAGNOSIS — K59.03 DRUG-INDUCED CONSTIPATION: ICD-10-CM

## 2023-10-19 DIAGNOSIS — D50.0 IRON DEFICIENCY ANEMIA DUE TO CHRONIC BLOOD LOSS: ICD-10-CM

## 2023-10-19 PROCEDURE — 3075F SYST BP GE 130 - 139MM HG: CPT | Performed by: INTERNAL MEDICINE

## 2023-10-19 PROCEDURE — 3078F DIAST BP <80 MM HG: CPT | Performed by: INTERNAL MEDICINE

## 2023-10-19 PROCEDURE — 99213 OFFICE O/P EST LOW 20 MIN: CPT | Performed by: INTERNAL MEDICINE

## 2023-10-19 PROCEDURE — 1123F ACP DISCUSS/DSCN MKR DOCD: CPT | Performed by: INTERNAL MEDICINE

## 2023-11-03 ENCOUNTER — HOSPITAL ENCOUNTER (OUTPATIENT)
Age: 68
End: 2023-11-03
Payer: MEDICARE

## 2023-11-03 ENCOUNTER — HOSPITAL ENCOUNTER (OUTPATIENT)
Dept: GENERAL RADIOLOGY | Age: 68
End: 2023-11-03
Payer: MEDICARE

## 2023-11-03 DIAGNOSIS — N20.0 RENAL CALCULUS: ICD-10-CM

## 2023-11-03 PROCEDURE — 74018 RADEX ABDOMEN 1 VIEW: CPT

## 2023-11-06 ENCOUNTER — TELEPHONE (OUTPATIENT)
Dept: GASTROENTEROLOGY | Age: 68
End: 2023-11-06

## 2023-11-06 NOTE — TELEPHONE ENCOUNTER
----- Message from Kayla Manzanares MD sent at 11/5/2023  6:15 PM EST -----  Please notify patient: Hyperplastic features on a biopsy are suggestive of early hyperplastic polyp which is not on a pre-cancerous spectrum. Repeat interval for colonoscopy is 10 years or sooner. If there are interval changes such as rectal bleeding, abdominal pain, unintentional weight loss, change in bowel habits, family history of colon cancer in a  first degree relative - please notify your primary care provider for endoscopic evaluation as needed.

## 2023-11-06 NOTE — TELEPHONE ENCOUNTER
Patient called back to the office and was informed of the recent phone message. Patient voiced understanding.

## 2023-11-09 ENCOUNTER — OFFICE VISIT (OUTPATIENT)
Dept: UROLOGY | Age: 68
End: 2023-11-09
Payer: MEDICARE

## 2023-11-09 VITALS
WEIGHT: 113 LBS | DIASTOLIC BLOOD PRESSURE: 81 MMHG | TEMPERATURE: 98.4 F | HEART RATE: 71 BPM | BODY MASS INDEX: 20.54 KG/M2 | SYSTOLIC BLOOD PRESSURE: 134 MMHG

## 2023-11-09 DIAGNOSIS — N20.0 RENAL CALCULUS: Primary | ICD-10-CM

## 2023-11-09 DIAGNOSIS — N20.1 URETERAL CALCULUS: ICD-10-CM

## 2023-11-09 PROCEDURE — 3079F DIAST BP 80-89 MM HG: CPT | Performed by: UROLOGY

## 2023-11-09 PROCEDURE — 3075F SYST BP GE 130 - 139MM HG: CPT | Performed by: UROLOGY

## 2023-11-09 PROCEDURE — 1123F ACP DISCUSS/DSCN MKR DOCD: CPT | Performed by: UROLOGY

## 2023-11-09 PROCEDURE — 99214 OFFICE O/P EST MOD 30 MIN: CPT | Performed by: UROLOGY

## 2023-11-09 RX ORDER — POTASSIUM CITRATE 10 MEQ/1
10 TABLET, EXTENDED RELEASE ORAL 2 TIMES DAILY
Qty: 60 TABLET | Refills: 5 | Status: SHIPPED | OUTPATIENT
Start: 2023-11-09

## 2023-11-09 ASSESSMENT — ENCOUNTER SYMPTOMS
APNEA: 0
COUGH: 0
VOMITING: 0
EYE REDNESS: 0
CONSTIPATION: 0
COLOR CHANGE: 0
WHEEZING: 0
ABDOMINAL PAIN: 0
SHORTNESS OF BREATH: 0
BACK PAIN: 0
NAUSEA: 0

## 2023-11-09 NOTE — PROGRESS NOTES
atorvastatin (LIPITOR) 20 MG tablet Take 1 tablet by mouth daily 90 tablet 3    levothyroxine (SYNTHROID) 75 MCG tablet Take 1 tablet by mouth Daily 90 tablet 3    traZODone (DESYREL) 50 MG tablet Take 1 tablet by mouth nightly 90 tablet 3    CALCIUM PO Take by mouth 3 times a week      Zinc Sulfate (ZINC 15 PO) Take 30 mg by mouth daily       Ascorbic Acid (VITAMIN C) 1000 MG tablet Take 0.5 tablets by mouth daily      acetaminophen (TYLENOL) 500 MG tablet Take 1 tablet by mouth every 6 hours as needed for Pain      Glucosamine-Chondroitin (GLUCOSAMINE CHONDR COMPLEX PO) Take by mouth daily      vitamin D (ERGOCALCIFEROL) 400 UNITS CAPS Take 5 capsules by mouth daily      ferrous sulfate (IRON 325) 325 (65 Fe) MG tablet Take 1 tablet by mouth daily (with breakfast) (Patient not taking: Reported on 10/20/2023) 90 tablet 1    losartan (COZAAR) 50 MG tablet Take 1 tablet by mouth daily (Patient not taking: Reported on 11/9/2023)      Magnesium Hydroxide (DULCOLAX PO) Take 1 tablet by mouth as needed (Patient not taking: Reported on 10/19/2023)      ondansetron (ZOFRAN-ODT) 4 MG disintegrating tablet Take 1 tablet by mouth every 8 hours as needed for Nausea or Vomiting (Patient not taking: Reported on 11/9/2023) 20 tablet 0     No facility-administered encounter medications on file as of 11/9/2023.       Current Outpatient Medications on File Prior to Visit   Medication Sig Dispense Refill    alendronate (FOSAMAX) 70 MG tablet Take 1 tablet by mouth every 7 days 12 tablet 3    atorvastatin (LIPITOR) 20 MG tablet Take 1 tablet by mouth daily 90 tablet 3    levothyroxine (SYNTHROID) 75 MCG tablet Take 1 tablet by mouth Daily 90 tablet 3    traZODone (DESYREL) 50 MG tablet Take 1 tablet by mouth nightly 90 tablet 3    CALCIUM PO Take by mouth 3 times a week      Zinc Sulfate (ZINC 15 PO) Take 30 mg by mouth daily       Ascorbic Acid (VITAMIN C) 1000 MG tablet Take 0.5 tablets by mouth daily      acetaminophen (TYLENOL) 500

## 2023-11-09 NOTE — PATIENT INSTRUCTIONS
SURVEY:    You may be receiving a survey from [employer] regarding your visit today. Please complete the survey to enable us to provide the highest quality of care to you and your family. If you cannot score us a very good on any question, please call the office to discuss how we could have made your experience a very good one. Thank you.

## 2024-01-05 ENCOUNTER — HOSPITAL ENCOUNTER (OUTPATIENT)
Age: 69
Discharge: HOME | End: 2024-01-05
Payer: MEDICARE

## 2024-01-05 ENCOUNTER — TELEPHONE (OUTPATIENT)
Dept: UROLOGY | Age: 69
End: 2024-01-05

## 2024-01-05 DIAGNOSIS — D61.9 ANEMIA DUE TO BONE MARROW FAILURE, UNSPECIFIED BONE MARROW FAILURE TYPE (HCC): ICD-10-CM

## 2024-01-05 DIAGNOSIS — N20.1 URETERAL CALCULUS: ICD-10-CM

## 2024-01-05 DIAGNOSIS — D50.0 IRON DEFICIENCY ANEMIA DUE TO CHRONIC BLOOD LOSS: ICD-10-CM

## 2024-01-05 DIAGNOSIS — N20.0 RENAL CALCULUS: ICD-10-CM

## 2024-01-05 LAB
ANION GAP SERPL CALCULATED.3IONS-SCNC: 9 MMOL/L (ref 9–17)
BASOPHILS # BLD: 0.05 K/UL (ref 0–0.2)
BASOPHILS NFR BLD: 1 % (ref 0–2)
BUN SERPL-MCNC: 20 MG/DL (ref 8–23)
BUN/CREAT SERPL: 25 (ref 9–20)
CALCIUM SERPL-MCNC: 9.7 MG/DL (ref 8.6–10.4)
CHLORIDE SERPL-SCNC: 104 MMOL/L (ref 98–107)
CO2 SERPL-SCNC: 29 MMOL/L (ref 20–31)
CREAT SERPL-MCNC: 0.8 MG/DL (ref 0.5–0.9)
EOSINOPHIL # BLD: 0.14 K/UL (ref 0–0.44)
EOSINOPHILS RELATIVE PERCENT: 2 % (ref 1–4)
ERYTHROCYTE [DISTWIDTH] IN BLOOD BY AUTOMATED COUNT: 12.7 % (ref 11.8–14.4)
FERRITIN SERPL-MCNC: 56 NG/ML (ref 13–150)
GFR SERPL CREATININE-BSD FRML MDRD: >60 ML/MIN/1.73M2
GLUCOSE SERPL-MCNC: 107 MG/DL (ref 70–99)
HCT VFR BLD AUTO: 43.1 % (ref 36.3–47.1)
HGB BLD-MCNC: 14.1 G/DL (ref 11.9–15.1)
IMM GRANULOCYTES # BLD AUTO: <0.03 K/UL (ref 0–0.3)
IMM GRANULOCYTES NFR BLD: 0 %
IRON SATN MFR SERPL: 35 % (ref 20–55)
IRON SERPL-MCNC: 91 UG/DL (ref 37–145)
LYMPHOCYTES NFR BLD: 2.27 K/UL (ref 1.1–3.7)
LYMPHOCYTES RELATIVE PERCENT: 31 % (ref 24–43)
MCH RBC QN AUTO: 32.6 PG (ref 25.2–33.5)
MCHC RBC AUTO-ENTMCNC: 32.7 G/DL (ref 28.4–34.8)
MCV RBC AUTO: 99.8 FL (ref 82.6–102.9)
MONOCYTES NFR BLD: 0.61 K/UL (ref 0.1–1.2)
MONOCYTES NFR BLD: 8 % (ref 3–12)
NEUTROPHILS NFR BLD: 58 % (ref 36–65)
NEUTS SEG NFR BLD: 4.19 K/UL (ref 1.5–8.1)
NRBC BLD-RTO: 0 PER 100 WBC
PLATELET # BLD AUTO: 283 K/UL (ref 138–453)
PMV BLD AUTO: 9.4 FL (ref 8.1–13.5)
POTASSIUM SERPL-SCNC: 4.7 MMOL/L (ref 3.7–5.3)
RBC # BLD AUTO: 4.32 M/UL (ref 3.95–5.11)
SODIUM SERPL-SCNC: 142 MMOL/L (ref 135–144)
TIBC SERPL-MCNC: 262 UG/DL (ref 250–450)
UNSATURATED IRON BINDING CAPACITY: 171 UG/DL (ref 112–347)
WBC OTHER # BLD: 7.3 K/UL (ref 3.5–11.3)

## 2024-01-05 PROCEDURE — 85025 COMPLETE CBC W/AUTO DIFF WBC: CPT

## 2024-01-05 PROCEDURE — 83540 ASSAY OF IRON: CPT

## 2024-01-05 PROCEDURE — 36415 COLL VENOUS BLD VENIPUNCTURE: CPT

## 2024-01-05 PROCEDURE — 80048 BASIC METABOLIC PNL TOTAL CA: CPT

## 2024-01-05 PROCEDURE — 83550 IRON BINDING TEST: CPT

## 2024-01-05 PROCEDURE — 82728 ASSAY OF FERRITIN: CPT

## 2024-01-05 NOTE — TELEPHONE ENCOUNTER
Received a PA request from [employer] for Potassium Citrate ER 10 MEQ.    [facility] will cover medication at local pharmacy for cost of $18-24 for a 30 day supply.      Left a message the patient to call the office to discuss the cost of Potassium Citrate.

## 2024-01-08 NOTE — TELEPHONE ENCOUNTER
Patient called office. Writer advised her of response. Writer looked up GoodRx coupon. [facility] had the lowest fatima. Writer gave patient coupon information. She does not need a refill at this time. She wanted to see if insurance will accept tier exception.

## 2024-01-11 ENCOUNTER — OFFICE VISIT (OUTPATIENT)
Dept: UROLOGY | Age: 69
End: 2024-01-11

## 2024-01-11 ENCOUNTER — TELEPHONE (OUTPATIENT)
Dept: UROLOGY | Age: 69
End: 2024-01-11

## 2024-01-11 VITALS
BODY MASS INDEX: 20.68 KG/M2 | WEIGHT: 113.8 LBS | DIASTOLIC BLOOD PRESSURE: 81 MMHG | TEMPERATURE: 98.3 F | HEART RATE: 63 BPM | SYSTOLIC BLOOD PRESSURE: 144 MMHG

## 2024-01-11 DIAGNOSIS — N20.0 RENAL CALCULUS: Primary | ICD-10-CM

## 2024-01-11 ASSESSMENT — ENCOUNTER SYMPTOMS
NAUSEA: 0
ABDOMINAL PAIN: 0
VOMITING: 0
COLOR CHANGE: 0
EYE REDNESS: 0
WHEEZING: 0
SHORTNESS OF BREATH: 0
CONSTIPATION: 0
BACK PAIN: 0
APNEA: 0
COUGH: 0

## 2024-01-11 NOTE — TELEPHONE ENCOUNTER
PA request faxed to [facility] with supporting office visit notes.    Patient informed that the PA was sent today.

## 2024-01-11 NOTE — TELEPHONE ENCOUNTER
Patient's insurance recently denied potassiums citrate.  Patient seems to think that this was because they were looking for a diagnosis.  Please call the insurance and see why this was denied.    If they will not cover this medication we will need to use the [facility] prescription discount program or good Rx and notify patient

## 2024-01-11 NOTE — PROGRESS NOTES
HPI:        Patient is a 68 y.o. female in no acute distress.  She is alert and oriented to person, place, and time.        History  10/2013 Left HLL      11/2013 Right ESWL      4/2015 Left ESWL      1/2018 Left ESWL      4/2021 Right ESWL     2/2023 severe leukocytosis, acute kidney injury (creatinine 3.83), sepsis, right ureteral stent placed      3/2023 right HLL and stent exchange    11/2023 started potassium citrate 10mEq    Today  Here today to follow-up for stones.  At her last visit we did start potassiums citrate for stone prevention.  She is taking 10 mEq twice per day.  She denies any issues with taking this medication.  She denies any GI upset.  Recent renal labs are normal.  Potassium is normal.  She is no longer consuming polar pops.    Past Medical History:   Diagnosis Date    Bacteremia due to Escherichia coli 2/16/2023    Complicated UTI (urinary tract infection) 2/13/2023    Fracture of wrist, unspecified laterality, closed, initial encounter     right    History of sepsis 2009    following lithotripsy    Hyperlipidemia     Hypertension     Hypothyroidism 2007    secondary to radioactive iodine 2007    Kidney stone     Osteoporosis     Primary localized osteoarthritis of right hip 3/18/2019    Sepsis due to Escherichia coli (HCC) 2/16/2023    Septic shock due to urinary tract infection (HCC) 2/13/2023    Severe sepsis (HCC) 2/13/2023    Thrombocytopenia (HCC) 2/15/2023     Past Surgical History:   Procedure Laterality Date    CATARACT REMOVAL WITH IMPLANT Right     CHOLECYSTECTOMY, LAPAROSCOPIC  2009    COLONOSCOPY  07/17/2014    Dr. Schulte - tubular adenomatous polyp removed    COLONOSCOPY N/A 08/01/2018    Dr. Schulte-polyps(slight crypt architectural distortion, suggestive of mucosal prolapse    COLONOSCOPY N/A 10/11/2023    COLONOSCOPY POLYPECTOMY SNARE/COLD BIOPSY performed by Ashlyn Lerner MD at [facility] OR    COLONOSCOPY  10/11/2023    -polyp(lymphoid aggregate)tortuous colon

## 2024-01-17 RX ORDER — POTASSIUM CITRATE 10 MEQ/1
10 TABLET, EXTENDED RELEASE ORAL 2 TIMES DAILY
Qty: 180 TABLET | Refills: 3 | Status: SHIPPED | OUTPATIENT
Start: 2024-01-17

## 2024-01-17 NOTE — TELEPHONE ENCOUNTER
Find out how much she is being charged for this medication by her insurance.  We can send it to a local pharmacy with good Rx and she will pay approximately 62-60 $5 for a 90-day supply    Insurance statement about alternative drugs does not make any sense scientifically because allopurinol and hydrochlorothiazide are not indicated for her type of stones

## 2024-01-17 NOTE — TELEPHONE ENCOUNTER
The Rx needs sent to [facility] pharmacy.  Let's check the price and go from there. I spoke to [facility]  and they do not even have the script.

## 2024-01-18 NOTE — TELEPHONE ENCOUNTER
Allopurinol, HCTZ, and metolazone are not indicated in the guidelines for the treatment of calcium oxylate stones. In addition, he metabolic stone work-up and 24- hour urine showed low citrate. The treatment for calcium oxylate stones and low urine citrate is potassium citrate according to evidence based guidelines.

## 2024-01-18 NOTE — TELEPHONE ENCOUNTER
It is 68.90 with her pharmacy.  I will try one more time for an appeal.  The paper stated they need to know why she can't take the other meds.  I will send a letter.

## 2024-02-20 ENCOUNTER — TELEPHONE (OUTPATIENT)
Dept: ONCOLOGY | Age: 69
End: 2024-02-20

## 2024-02-20 NOTE — TELEPHONE ENCOUNTER
Contacted the patient about scheduling with Dr. Olson that we do not have her down for F/U and she had stated that she does not want to reschedule at this time her blood work and everything came back just fine and she stated that only see him back if she needs to.

## 2024-04-25 ENCOUNTER — HOSPITAL ENCOUNTER (OUTPATIENT)
Age: 69
Discharge: HOME | End: 2024-04-25
Payer: MEDICARE

## 2024-04-25 DIAGNOSIS — E03.9 HYPOTHYROIDISM, UNSPECIFIED TYPE: ICD-10-CM

## 2024-04-25 DIAGNOSIS — I10 PRIMARY HYPERTENSION: ICD-10-CM

## 2024-04-25 DIAGNOSIS — E78.2 MIXED HYPERLIPIDEMIA: ICD-10-CM

## 2024-04-25 DIAGNOSIS — R73.01 IFG (IMPAIRED FASTING GLUCOSE): ICD-10-CM

## 2024-04-25 LAB
ALT SERPL-CCNC: 23 U/L (ref 5–33)
ANION GAP SERPL CALCULATED.3IONS-SCNC: 9 MMOL/L (ref 9–17)
AST SERPL-CCNC: 25 U/L
BUN SERPL-MCNC: 18 MG/DL (ref 8–23)
BUN/CREAT SERPL: 23 (ref 9–20)
CALCIUM SERPL-MCNC: 9.4 MG/DL (ref 8.6–10.4)
CHLORIDE SERPL-SCNC: 104 MMOL/L (ref 98–107)
CHOLEST SERPL-MCNC: 141 MG/DL (ref 0–199)
CHOLESTEROL/HDL RATIO: 2
CO2 SERPL-SCNC: 26 MMOL/L (ref 20–31)
CREAT SERPL-MCNC: 0.8 MG/DL (ref 0.5–0.9)
ERYTHROCYTE [DISTWIDTH] IN BLOOD BY AUTOMATED COUNT: 12.8 % (ref 11.8–14.4)
EST. AVERAGE GLUCOSE BLD GHB EST-MCNC: 108 MG/DL
GFR SERPL CREATININE-BSD FRML MDRD: 80 ML/MIN/1.73M2
GLUCOSE SERPL-MCNC: 99 MG/DL (ref 70–99)
HBA1C MFR BLD: 5.4 % (ref 4–6)
HCT VFR BLD AUTO: 43.2 % (ref 36.3–47.1)
HDLC SERPL-MCNC: 57 MG/DL
HGB BLD-MCNC: 14 G/DL (ref 11.9–15.1)
LDLC SERPL CALC-MCNC: 58 MG/DL (ref 0–100)
MCH RBC QN AUTO: 32.1 PG (ref 25.2–33.5)
MCHC RBC AUTO-ENTMCNC: 32.4 G/DL (ref 28.4–34.8)
MCV RBC AUTO: 99.1 FL (ref 82.6–102.9)
NRBC BLD-RTO: 0 PER 100 WBC
PLATELET # BLD AUTO: 225 K/UL (ref 138–453)
PMV BLD AUTO: 9.8 FL (ref 8.1–13.5)
POTASSIUM SERPL-SCNC: 4.3 MMOL/L (ref 3.7–5.3)
RBC # BLD AUTO: 4.36 M/UL (ref 3.95–5.11)
SODIUM SERPL-SCNC: 139 MMOL/L (ref 135–144)
T3FREE SERPL-MCNC: 2.6 PG/ML (ref 2–4.4)
T4 FREE SERPL-MCNC: 1.6 NG/DL (ref 0.92–1.68)
TRIGL SERPL-MCNC: 128 MG/DL
TSH SERPL DL<=0.05 MIU/L-ACNC: 0.6 UIU/ML (ref 0.3–5)
VLDLC SERPL CALC-MCNC: 26 MG/DL
WBC OTHER # BLD: 7.2 K/UL (ref 3.5–11.3)

## 2024-04-25 PROCEDURE — 80048 BASIC METABOLIC PNL TOTAL CA: CPT

## 2024-04-25 PROCEDURE — 84443 ASSAY THYROID STIM HORMONE: CPT

## 2024-04-25 PROCEDURE — 83036 HEMOGLOBIN GLYCOSYLATED A1C: CPT

## 2024-04-25 PROCEDURE — 80061 LIPID PANEL: CPT

## 2024-04-25 PROCEDURE — 36415 COLL VENOUS BLD VENIPUNCTURE: CPT

## 2024-04-25 PROCEDURE — 85027 COMPLETE CBC AUTOMATED: CPT

## 2024-04-25 PROCEDURE — 84450 TRANSFERASE (AST) (SGOT): CPT

## 2024-04-25 PROCEDURE — 84481 FREE ASSAY (FT-3): CPT

## 2024-04-25 PROCEDURE — 84439 ASSAY OF FREE THYROXINE: CPT

## 2024-04-25 PROCEDURE — 84460 ALANINE AMINO (ALT) (SGPT): CPT

## 2024-06-27 ENCOUNTER — HOSPITAL ENCOUNTER (OUTPATIENT)
Dept: WOMENS IMAGING | Age: 69
Discharge: HOME | End: 2024-06-29
Attending: INTERNAL MEDICINE
Payer: MEDICARE

## 2024-06-27 DIAGNOSIS — Z12.31 VISIT FOR SCREENING MAMMOGRAM: ICD-10-CM

## 2024-06-27 PROCEDURE — 77063 BREAST TOMOSYNTHESIS BI: CPT

## 2024-07-19 ENCOUNTER — HOSPITAL ENCOUNTER (OUTPATIENT)
Dept: WOMENS IMAGING | Age: 69
End: 2024-07-19
Attending: INTERNAL MEDICINE
Payer: MEDICARE

## 2024-07-19 ENCOUNTER — APPOINTMENT (OUTPATIENT)
Dept: ULTRASOUND IMAGING | Age: 69
End: 2024-07-19
Attending: INTERNAL MEDICINE
Payer: MEDICARE

## 2024-07-19 DIAGNOSIS — R92.1 CALCIFICATION OF RIGHT BREAST: ICD-10-CM

## 2024-07-19 DIAGNOSIS — R92.8 ABNORMAL MAMMOGRAM OF RIGHT BREAST: ICD-10-CM

## 2024-07-19 PROCEDURE — G0279 TOMOSYNTHESIS, MAMMO: HCPCS

## 2024-08-02 ENCOUNTER — HOSPITAL ENCOUNTER (OUTPATIENT)
Dept: WOMENS IMAGING | Age: 69
End: 2024-08-02
Payer: MEDICARE

## 2024-08-02 VITALS — DIASTOLIC BLOOD PRESSURE: 77 MMHG | HEART RATE: 62 BPM | SYSTOLIC BLOOD PRESSURE: 138 MMHG

## 2024-08-02 DIAGNOSIS — R92.8 ABNORMAL MAMMOGRAM: ICD-10-CM

## 2024-08-02 PROCEDURE — 19081 BX BREAST 1ST LESION STRTCTC: CPT

## 2024-08-02 PROCEDURE — 77065 DX MAMMO INCL CAD UNI: CPT

## 2024-08-02 PROCEDURE — 88305 TISSUE EXAM BY PATHOLOGIST: CPT

## 2024-08-05 LAB — SURGICAL PATHOLOGY REPORT: NORMAL

## 2024-08-06 ENCOUNTER — TELEPHONE (OUTPATIENT)
Dept: WOMENS IMAGING | Age: 69
End: 2024-08-06

## 2024-08-06 NOTE — TELEPHONE ENCOUNTER
Attempted to contact patient post biopsy of the right breast.  Left message offering assistance if she should need any and also reminded her to obtain the results of her biopsy from the office of Jaya Cook. / Electronically signed by IRINA Gallo CBIN on 8/6/2024 at 10:23 AM

## 2024-09-23 RX ORDER — POTASSIUM CITRATE 10 MEQ/1
TABLET, EXTENDED RELEASE ORAL
Qty: 180 TABLET | Refills: 0 | Status: SHIPPED | OUTPATIENT
Start: 2024-09-23

## 2024-10-24 ENCOUNTER — HOSPITAL ENCOUNTER (OUTPATIENT)
Age: 69
Discharge: HOME | End: 2024-10-24
Payer: MEDICARE

## 2024-10-24 DIAGNOSIS — D61.9 ANEMIA DUE TO BONE MARROW FAILURE, UNSPECIFIED BONE MARROW FAILURE TYPE (HCC): ICD-10-CM

## 2024-10-24 DIAGNOSIS — I10 PRIMARY HYPERTENSION: ICD-10-CM

## 2024-10-24 DIAGNOSIS — R73.01 IFG (IMPAIRED FASTING GLUCOSE): ICD-10-CM

## 2024-10-24 DIAGNOSIS — E78.2 MIXED HYPERLIPIDEMIA: ICD-10-CM

## 2024-10-24 DIAGNOSIS — E03.9 HYPOTHYROIDISM, UNSPECIFIED TYPE: ICD-10-CM

## 2024-10-24 LAB
ALT SERPL-CCNC: 23 U/L (ref 10–35)
ANION GAP SERPL CALCULATED.3IONS-SCNC: 8 MMOL/L (ref 9–16)
AST SERPL-CCNC: 30 U/L (ref 10–35)
BUN SERPL-MCNC: 13 MG/DL (ref 8–23)
BUN/CREAT SERPL: 13 (ref 9–20)
CALCIUM SERPL-MCNC: 10.5 MG/DL (ref 8.6–10.4)
CHLORIDE SERPL-SCNC: 103 MMOL/L (ref 98–107)
CHOLEST SERPL-MCNC: 154 MG/DL (ref 0–199)
CHOLESTEROL/HDL RATIO: 3
CO2 SERPL-SCNC: 29 MMOL/L (ref 20–31)
CREAT SERPL-MCNC: 1 MG/DL (ref 0.5–0.9)
ERYTHROCYTE [DISTWIDTH] IN BLOOD BY AUTOMATED COUNT: 13.1 % (ref 11.8–14.4)
EST. AVERAGE GLUCOSE BLD GHB EST-MCNC: 111 MG/DL
GFR, ESTIMATED: 60 ML/MIN/1.73M2
GLUCOSE SERPL-MCNC: 92 MG/DL (ref 74–99)
HBA1C MFR BLD: 5.5 % (ref 4–6)
HCT VFR BLD AUTO: 42.4 % (ref 36.3–47.1)
HDLC SERPL-MCNC: 60 MG/DL
HGB BLD-MCNC: 13.8 G/DL (ref 11.9–15.1)
LDLC SERPL CALC-MCNC: 65 MG/DL (ref 0–100)
MCH RBC QN AUTO: 32.3 PG (ref 25.2–33.5)
MCHC RBC AUTO-ENTMCNC: 32.5 G/DL (ref 28.4–34.8)
MCV RBC AUTO: 99.3 FL (ref 82.6–102.9)
NRBC BLD-RTO: 0 PER 100 WBC
PLATELET # BLD AUTO: 232 K/UL (ref 138–453)
PMV BLD AUTO: 10.2 FL (ref 8.1–13.5)
POTASSIUM SERPL-SCNC: 4.5 MMOL/L (ref 3.7–5.3)
RBC # BLD AUTO: 4.27 M/UL (ref 3.95–5.11)
SODIUM SERPL-SCNC: 140 MMOL/L (ref 136–145)
T3FREE SERPL-MCNC: 2.3 PG/ML (ref 2–4.4)
T4 FREE SERPL-MCNC: 1.6 NG/DL (ref 0.92–1.68)
TRIGL SERPL-MCNC: 147 MG/DL
TSH SERPL DL<=0.05 MIU/L-ACNC: 1.83 UIU/ML (ref 0.27–4.2)
VLDLC SERPL CALC-MCNC: 29 MG/DL
WBC OTHER # BLD: 11.4 K/UL (ref 3.5–11.3)

## 2024-10-24 PROCEDURE — 83036 HEMOGLOBIN GLYCOSYLATED A1C: CPT

## 2024-10-24 PROCEDURE — 84443 ASSAY THYROID STIM HORMONE: CPT

## 2024-10-24 PROCEDURE — 84481 FREE ASSAY (FT-3): CPT

## 2024-10-24 PROCEDURE — 80061 LIPID PANEL: CPT

## 2024-10-24 PROCEDURE — 84439 ASSAY OF FREE THYROXINE: CPT

## 2024-10-24 PROCEDURE — 85027 COMPLETE CBC AUTOMATED: CPT

## 2024-10-24 PROCEDURE — 36415 COLL VENOUS BLD VENIPUNCTURE: CPT

## 2024-10-24 PROCEDURE — 84460 ALANINE AMINO (ALT) (SGPT): CPT

## 2024-10-24 PROCEDURE — 80048 BASIC METABOLIC PNL TOTAL CA: CPT

## 2024-10-24 PROCEDURE — 84450 TRANSFERASE (AST) (SGOT): CPT

## 2024-11-08 ENCOUNTER — HOSPITAL ENCOUNTER (OUTPATIENT)
Age: 69
Discharge: HOME | End: 2024-11-10
Payer: MEDICARE

## 2024-11-08 ENCOUNTER — HOSPITAL ENCOUNTER (OUTPATIENT)
Dept: GENERAL RADIOLOGY | Age: 69
Discharge: HOME | End: 2024-11-10
Payer: MEDICARE

## 2024-11-08 DIAGNOSIS — N20.1 URETERAL CALCULUS: ICD-10-CM

## 2024-11-08 DIAGNOSIS — N20.0 RENAL CALCULUS: ICD-10-CM

## 2024-11-08 PROCEDURE — 74018 RADEX ABDOMEN 1 VIEW: CPT

## 2024-11-14 ENCOUNTER — OFFICE VISIT (OUTPATIENT)
Dept: UROLOGY | Age: 69
End: 2024-11-14
Payer: MEDICARE

## 2024-11-14 ENCOUNTER — HOSPITAL ENCOUNTER (OUTPATIENT)
Dept: CT IMAGING | Age: 69
Discharge: HOME | End: 2024-11-16
Payer: MEDICARE

## 2024-11-14 ENCOUNTER — TELEPHONE (OUTPATIENT)
Dept: UROLOGY | Age: 69
End: 2024-11-14

## 2024-11-14 VITALS
DIASTOLIC BLOOD PRESSURE: 78 MMHG | SYSTOLIC BLOOD PRESSURE: 120 MMHG | TEMPERATURE: 97.7 F | BODY MASS INDEX: 19.66 KG/M2 | WEIGHT: 111 LBS

## 2024-11-14 DIAGNOSIS — N20.0 RENAL CALCULUS: ICD-10-CM

## 2024-11-14 DIAGNOSIS — N20.0 RENAL CALCULUS: Primary | ICD-10-CM

## 2024-11-14 DIAGNOSIS — Z01.818 PRE-OP TESTING: ICD-10-CM

## 2024-11-14 PROCEDURE — 1090F PRES/ABSN URINE INCON ASSESS: CPT | Performed by: NURSE PRACTITIONER

## 2024-11-14 PROCEDURE — 3074F SYST BP LT 130 MM HG: CPT | Performed by: NURSE PRACTITIONER

## 2024-11-14 PROCEDURE — G8399 PT W/DXA RESULTS DOCUMENT: HCPCS | Performed by: NURSE PRACTITIONER

## 2024-11-14 PROCEDURE — 74176 CT ABD & PELVIS W/O CONTRAST: CPT

## 2024-11-14 PROCEDURE — G8482 FLU IMMUNIZE ORDER/ADMIN: HCPCS | Performed by: NURSE PRACTITIONER

## 2024-11-14 PROCEDURE — G8420 CALC BMI NORM PARAMETERS: HCPCS | Performed by: NURSE PRACTITIONER

## 2024-11-14 PROCEDURE — 1159F MED LIST DOCD IN RCRD: CPT | Performed by: NURSE PRACTITIONER

## 2024-11-14 PROCEDURE — 1123F ACP DISCUSS/DSCN MKR DOCD: CPT | Performed by: NURSE PRACTITIONER

## 2024-11-14 PROCEDURE — 4004F PT TOBACCO SCREEN RCVD TLK: CPT | Performed by: NURSE PRACTITIONER

## 2024-11-14 PROCEDURE — G8427 DOCREV CUR MEDS BY ELIG CLIN: HCPCS | Performed by: NURSE PRACTITIONER

## 2024-11-14 PROCEDURE — 3017F COLORECTAL CA SCREEN DOC REV: CPT | Performed by: NURSE PRACTITIONER

## 2024-11-14 PROCEDURE — 99214 OFFICE O/P EST MOD 30 MIN: CPT | Performed by: NURSE PRACTITIONER

## 2024-11-14 PROCEDURE — 3078F DIAST BP <80 MM HG: CPT | Performed by: NURSE PRACTITIONER

## 2024-11-14 NOTE — PROGRESS NOTES
History  10/2013 Left HLL      11/2013 Right ESWL      4/2015 Left ESWL      1/2018 Left ESWL      4/2021 Right ESWL     2/2023 severe leukocytosis, acute kidney injury (creatinine 3.83), sepsis, right ureteral stent placed      3/2023 right HLL and stent exchange    11/2023 started potassium citrate 10mEq    Today  Here today to follow-up for stones.  At her last visit we did start potassiums citrate for stone prevention.  She is taking 10 mEq twice per day.  She denies any issues with taking this medication.  She denies any GI upset.  Renal labs reviewed: BUN 13, creatinine 1.0, GFR 60, potassium 4.5.  Repeat Litholink did show a low volume of 1.8 L.  She also had elevated urine pH, 7.57.  She did have a KUB completed prior to today's visit.  This film was independently reviewed and shows 3 stones in her left kidney.  This is new from prior imaging.    Plan  Due to the significant stone history and sepsis due to prior stone episode we will obtain a CT scan for definitive surgical planning.  She understands that we will offer a cello versus ESWL.    Postoperatively we will need to increase her potassium citrate    I did stressed to her the importance of increasing her fluid intake

## 2024-11-14 NOTE — TELEPHONE ENCOUNTER
----- Message from SAL Ordoñez CNP sent at 11/14/2024  2:07 PM EST -----  CT reviewed and does show 3 larger stones in the left.  There is also a large stone in the right that we cannot see on the x-ray.  We recommend treating the left with laser lithotripsy and stent placement.  Then we should treat the right side also due to the size of the stone.  These procedures can be scheduled in the next several weeks or even after the first of the year if she chooses to wait.

## 2024-11-14 NOTE — TELEPHONE ENCOUNTER
Writer called patient advised her of CT results/providers response. Patient voiced understanding. She agreed to schedule initial surgery for LHLL for 12-3-24. CBC,BMP completed 10-31-24 and last EKG 10-02-23. Does patient need EKG or clearance? Please Advise

## 2024-11-15 ENCOUNTER — HOSPITAL ENCOUNTER (OUTPATIENT)
Age: 69
Discharge: HOME | End: 2024-11-15
Payer: MEDICARE

## 2024-11-15 DIAGNOSIS — Z01.818 PRE-OP TESTING: ICD-10-CM

## 2024-11-15 DIAGNOSIS — N20.0 RENAL CALCULUS: ICD-10-CM

## 2024-11-15 PROCEDURE — 93005 ELECTROCARDIOGRAM TRACING: CPT

## 2024-11-16 LAB
EKG ATRIAL RATE: 69 BPM
EKG P AXIS: 58 DEGREES
EKG P-R INTERVAL: 142 MS
EKG Q-T INTERVAL: 390 MS
EKG QRS DURATION: 80 MS
EKG QTC CALCULATION (BAZETT): 417 MS
EKG R AXIS: -13 DEGREES
EKG T AXIS: 51 DEGREES
EKG VENTRICULAR RATE: 69 BPM

## 2024-11-18 ENCOUNTER — TELEPHONE (OUTPATIENT)
Dept: UROLOGY | Age: 69
End: 2024-11-18

## 2024-11-18 ENCOUNTER — HOSPITAL ENCOUNTER (OUTPATIENT)
Age: 69
Discharge: HOME | End: 2024-11-18
Payer: MEDICARE

## 2024-11-18 DIAGNOSIS — R11.0 NAUSEA: Primary | ICD-10-CM

## 2024-11-18 DIAGNOSIS — R30.0 DYSURIA: ICD-10-CM

## 2024-11-18 DIAGNOSIS — R11.0 NAUSEA: ICD-10-CM

## 2024-11-18 PROCEDURE — 87086 URINE CULTURE/COLONY COUNT: CPT

## 2024-11-18 NOTE — TELEPHONE ENCOUNTER
Patient called office. She states she has surgery coming up. She wants to make sure she doesn't have a UTI. Saturday she started having nausea,diarrhea. She denies fever,vomiting,headache,flank pain,suprapubic pain,abdominal pain,urinary retention,dysuria. She states water intake has decreased due to nausea. Please Advise

## 2024-11-19 LAB
MICROORGANISM SPEC CULT: NORMAL
SERVICE CMNT-IMP: NORMAL
SPECIMEN DESCRIPTION: NORMAL

## 2024-11-20 ENCOUNTER — TELEPHONE (OUTPATIENT)
Dept: UROLOGY | Age: 69
End: 2024-11-20

## 2024-11-20 NOTE — TELEPHONE ENCOUNTER
----- Message from SAL Ordoñez - CNP sent at 11/20/2024  8:46 AM EST -----  Call pt - urine cx reviewed and negative for UTI

## 2024-11-26 NOTE — PROGRESS NOTES
Patient instructed on the pre-operative, intra-operative, and post-operative process. Patient instructed on NPO status. Medication instructions and pre operative instruction sheet reviewed with the patient. CHG skin prep instructions reviewed with patient. Patient has stopped taking her OTC vitamins and was instructed to take her synthroid with a small sip of water on the morning of her procedure. NPO status (including no gum, hard candy, mints, water, coffee, or smoking) was reviewed and patient verbalizes understanding. Patient denies any fever related illnesses or antibiotic use in the last 4 weeks.

## 2024-12-02 ENCOUNTER — ANESTHESIA EVENT (OUTPATIENT)
Dept: OPERATING ROOM | Age: 69
End: 2024-12-02
Payer: MEDICARE

## 2024-12-03 ENCOUNTER — TELEPHONE (OUTPATIENT)
Dept: UROLOGY | Age: 69
End: 2024-12-03

## 2024-12-03 ENCOUNTER — APPOINTMENT (OUTPATIENT)
Dept: GENERAL RADIOLOGY | Age: 69
End: 2024-12-03
Attending: UROLOGY
Payer: MEDICARE

## 2024-12-03 ENCOUNTER — HOSPITAL ENCOUNTER (OUTPATIENT)
Age: 69
Setting detail: OUTPATIENT SURGERY
Discharge: HOME | End: 2024-12-03
Attending: UROLOGY | Admitting: UROLOGY
Payer: MEDICARE

## 2024-12-03 ENCOUNTER — ANESTHESIA (OUTPATIENT)
Dept: OPERATING ROOM | Age: 69
End: 2024-12-03
Payer: MEDICARE

## 2024-12-03 VITALS
SYSTOLIC BLOOD PRESSURE: 128 MMHG | BODY MASS INDEX: 18.64 KG/M2 | DIASTOLIC BLOOD PRESSURE: 59 MMHG | HEIGHT: 63 IN | RESPIRATION RATE: 18 BRPM | HEART RATE: 58 BPM | OXYGEN SATURATION: 94 % | TEMPERATURE: 97.9 F | WEIGHT: 105.2 LBS

## 2024-12-03 DIAGNOSIS — N20.0 RENAL CALCULUS: ICD-10-CM

## 2024-12-03 DIAGNOSIS — N20.1 URETERAL CALCULUS: Primary | ICD-10-CM

## 2024-12-03 PROCEDURE — 3700000001 HC ADD 15 MINUTES (ANESTHESIA): Performed by: UROLOGY

## 2024-12-03 PROCEDURE — 6370000000 HC RX 637 (ALT 250 FOR IP): Performed by: UROLOGY

## 2024-12-03 PROCEDURE — 7100000000 HC PACU RECOVERY - FIRST 15 MIN: Performed by: UROLOGY

## 2024-12-03 PROCEDURE — C1769 GUIDE WIRE: HCPCS | Performed by: UROLOGY

## 2024-12-03 PROCEDURE — 3700000000 HC ANESTHESIA ATTENDED CARE: Performed by: UROLOGY

## 2024-12-03 PROCEDURE — C1747 HC ENDOSCOPE, SINGLE, URINARY TRACT: HCPCS | Performed by: UROLOGY

## 2024-12-03 PROCEDURE — 6360000002 HC RX W HCPCS: Performed by: UROLOGY

## 2024-12-03 PROCEDURE — 2720000010 HC SURG SUPPLY STERILE: Performed by: UROLOGY

## 2024-12-03 PROCEDURE — 2709999900 HC NON-CHARGEABLE SUPPLY: Performed by: UROLOGY

## 2024-12-03 PROCEDURE — 7100000001 HC PACU RECOVERY - ADDTL 15 MIN: Performed by: UROLOGY

## 2024-12-03 PROCEDURE — 3600000014 HC SURGERY LEVEL 4 ADDTL 15MIN: Performed by: UROLOGY

## 2024-12-03 PROCEDURE — C2617 STENT, NON-COR, TEM W/O DEL: HCPCS | Performed by: UROLOGY

## 2024-12-03 PROCEDURE — 7100000011 HC PHASE II RECOVERY - ADDTL 15 MIN: Performed by: UROLOGY

## 2024-12-03 PROCEDURE — 7100000010 HC PHASE II RECOVERY - FIRST 15 MIN: Performed by: UROLOGY

## 2024-12-03 PROCEDURE — 3600000004 HC SURGERY LEVEL 4 BASE: Performed by: UROLOGY

## 2024-12-03 PROCEDURE — 6360000002 HC RX W HCPCS: Performed by: NURSE ANESTHETIST, CERTIFIED REGISTERED

## 2024-12-03 PROCEDURE — 6370000000 HC RX 637 (ALT 250 FOR IP)

## 2024-12-03 DEVICE — URETERAL STENT
Type: IMPLANTABLE DEVICE | Site: URETER | Status: FUNCTIONAL
Brand: PERCUFLEX™ PLUS

## 2024-12-03 RX ORDER — NALOXONE HYDROCHLORIDE 0.4 MG/ML
INJECTION, SOLUTION INTRAMUSCULAR; INTRAVENOUS; SUBCUTANEOUS PRN
Status: DISCONTINUED | OUTPATIENT
Start: 2024-12-03 | End: 2024-12-03 | Stop reason: HOSPADM

## 2024-12-03 RX ORDER — METOCLOPRAMIDE HYDROCHLORIDE 5 MG/ML
10 INJECTION INTRAMUSCULAR; INTRAVENOUS
Status: DISCONTINUED | OUTPATIENT
Start: 2024-12-03 | End: 2024-12-03 | Stop reason: HOSPADM

## 2024-12-03 RX ORDER — ONDANSETRON 2 MG/ML
INJECTION INTRAMUSCULAR; INTRAVENOUS
Status: DISCONTINUED | OUTPATIENT
Start: 2024-12-03 | End: 2024-12-03 | Stop reason: SDUPTHER

## 2024-12-03 RX ORDER — CEFAZOLIN SODIUM/WATER 2 G/20 ML
2000 SYRINGE (ML) INTRAVENOUS
Status: COMPLETED | OUTPATIENT
Start: 2024-12-03 | End: 2024-12-03

## 2024-12-03 RX ORDER — DEXAMETHASONE SODIUM PHOSPHATE 4 MG/ML
INJECTION, SOLUTION INTRA-ARTICULAR; INTRALESIONAL; INTRAMUSCULAR; INTRAVENOUS; SOFT TISSUE
Status: DISCONTINUED | OUTPATIENT
Start: 2024-12-03 | End: 2024-12-03 | Stop reason: SDUPTHER

## 2024-12-03 RX ORDER — ACETAMINOPHEN 325 MG/1
650 TABLET ORAL ONCE
Status: COMPLETED | OUTPATIENT
Start: 2024-12-03 | End: 2024-12-03

## 2024-12-03 RX ORDER — SODIUM CHLORIDE, SODIUM LACTATE, POTASSIUM CHLORIDE, CALCIUM CHLORIDE 600; 310; 30; 20 MG/100ML; MG/100ML; MG/100ML; MG/100ML
INJECTION, SOLUTION INTRAVENOUS CONTINUOUS
Status: DISCONTINUED | OUTPATIENT
Start: 2024-12-03 | End: 2024-12-03 | Stop reason: HOSPADM

## 2024-12-03 RX ORDER — FENTANYL CITRATE 50 UG/ML
25 INJECTION, SOLUTION INTRAMUSCULAR; INTRAVENOUS EVERY 5 MIN PRN
Status: DISCONTINUED | OUTPATIENT
Start: 2024-12-03 | End: 2024-12-03 | Stop reason: HOSPADM

## 2024-12-03 RX ORDER — DIMENHYDRINATE 50 MG
50 TABLET ORAL ONCE
Status: COMPLETED | OUTPATIENT
Start: 2024-12-03 | End: 2024-12-03

## 2024-12-03 RX ORDER — FENTANYL CITRATE 50 UG/ML
INJECTION, SOLUTION INTRAMUSCULAR; INTRAVENOUS
Status: DISCONTINUED | OUTPATIENT
Start: 2024-12-03 | End: 2024-12-03 | Stop reason: SDUPTHER

## 2024-12-03 RX ORDER — ONDANSETRON 2 MG/ML
4 INJECTION INTRAMUSCULAR; INTRAVENOUS
Status: DISCONTINUED | OUTPATIENT
Start: 2024-12-03 | End: 2024-12-03 | Stop reason: HOSPADM

## 2024-12-03 RX ORDER — PROPOFOL 10 MG/ML
INJECTION, EMULSION INTRAVENOUS
Status: DISCONTINUED | OUTPATIENT
Start: 2024-12-03 | End: 2024-12-03 | Stop reason: SDUPTHER

## 2024-12-03 RX ORDER — LIDOCAINE HYDROCHLORIDE 20 MG/ML
INJECTION, SOLUTION EPIDURAL; INFILTRATION; INTRACAUDAL; PERINEURAL
Status: DISCONTINUED | OUTPATIENT
Start: 2024-12-03 | End: 2024-12-03 | Stop reason: SDUPTHER

## 2024-12-03 RX ORDER — LIDOCAINE HYDROCHLORIDE 20 MG/ML
JELLY TOPICAL PRN
Status: DISCONTINUED | OUTPATIENT
Start: 2024-12-03 | End: 2024-12-03 | Stop reason: ALTCHOICE

## 2024-12-03 RX ORDER — HYDROMORPHONE HYDROCHLORIDE 1 MG/ML
0.5 INJECTION, SOLUTION INTRAMUSCULAR; INTRAVENOUS; SUBCUTANEOUS EVERY 5 MIN PRN
Status: DISCONTINUED | OUTPATIENT
Start: 2024-12-03 | End: 2024-12-03 | Stop reason: HOSPADM

## 2024-12-03 RX ADMIN — FENTANYL CITRATE 25 MCG: 50 INJECTION INTRAMUSCULAR; INTRAVENOUS at 13:49

## 2024-12-03 RX ADMIN — ONDANSETRON 4 MG: 2 INJECTION, SOLUTION INTRAMUSCULAR; INTRAVENOUS at 13:53

## 2024-12-03 RX ADMIN — Medication 2000 MG: at 13:45

## 2024-12-03 RX ADMIN — DEXAMETHASONE SODIUM PHOSPHATE 4 MG: 4 INJECTION INTRA-ARTICULAR; INTRALESIONAL; INTRAMUSCULAR; INTRAVENOUS; SOFT TISSUE at 13:53

## 2024-12-03 RX ADMIN — PROPOFOL 200 MG: 10 INJECTION, EMULSION INTRAVENOUS at 13:49

## 2024-12-03 RX ADMIN — DIMENHYDRINATE 50 MG: 50 TABLET ORAL at 13:31

## 2024-12-03 RX ADMIN — ACETAMINOPHEN 650 MG: 325 TABLET ORAL at 13:31

## 2024-12-03 RX ADMIN — LIDOCAINE HYDROCHLORIDE 100 MG: 20 INJECTION, SOLUTION EPIDURAL; INFILTRATION; INTRACAUDAL; PERINEURAL at 13:49

## 2024-12-03 ASSESSMENT — PAIN SCALES - GENERAL
PAINLEVEL_OUTOF10: 0

## 2024-12-03 ASSESSMENT — PAIN - FUNCTIONAL ASSESSMENT: PAIN_FUNCTIONAL_ASSESSMENT: 0-10

## 2024-12-03 ASSESSMENT — LIFESTYLE VARIABLES: SMOKING_STATUS: 1

## 2024-12-03 NOTE — ANESTHESIA PRE PROCEDURE
Date/Time     10/24/2024 07:42 AM    K 4.5 10/24/2024 07:42 AM     10/24/2024 07:42 AM    CO2 29 10/24/2024 07:42 AM    BUN 13 10/24/2024 07:42 AM    CREATININE 1.0 10/24/2024 07:42 AM    GFRAA >60 04/01/2022 08:05 AM    LABGLOM 60 10/24/2024 07:42 AM    LABGLOM 80 04/25/2024 08:12 AM    GLUCOSE 92 10/24/2024 07:42 AM    GLUCOSE 102 05/08/2012 08:10 AM    CALCIUM 10.5 10/24/2024 07:42 AM    BILITOT 1.0 09/28/2023 09:57 AM    ALKPHOS 91 09/28/2023 09:57 AM    AST 30 10/24/2024 07:42 AM    ALT 23 10/24/2024 07:42 AM       POC Tests: No results for input(s): \"POCGLU\", \"POCNA\", \"POCK\", \"POCCL\", \"POCBUN\", \"POCHEMO\", \"POCHCT\" in the last 72 hours.    Coags:   Lab Results   Component Value Date/Time    PROTIME 14.2 02/16/2023 07:00 PM    INR 1.1 02/16/2023 07:00 PM    APTT 31.8 02/16/2023 07:00 PM       HCG (If Applicable): No results found for: \"PREGTESTUR\", \"PREGSERUM\", \"HCG\", \"HCGQUANT\"     ABGs: No results found for: \"PHART\", \"PO2ART\", \"KIQ5JVO\", \"MUH4DQB\", \"BEART\", \"N7QVEKKN\"     Type & Screen (If Applicable):  No results found for: \"LABABO\"    Drug/Infectious Status (If Applicable):  No results found for: \"HIV\", \"HEPCAB\"    COVID-19 Screening (If Applicable):   Lab Results   Component Value Date/Time    COVID19 Not Detected 02/18/2023 10:10 AM    COVID19 Not Detected 02/18/2023 10:10 AM           Anesthesia Evaluation  Patient summary reviewed and Nursing notes reviewed   no history of anesthetic complications:   Airway: Mallampati: II  TM distance: >3 FB   Neck ROM: full  Mouth opening: > = 3 FB   Dental:    (+) poor dentition  Comment: Pt has crowns, denies anything loose     Pulmonary: breath sounds clear to auscultation  (+)           current smoker    (-) pneumonia and sleep apnea          Patient smoked on day of surgery.                 Cardiovascular:  Exercise tolerance: good (>4 METS)  (+) hypertension:, hyperlipidemia    (-) CAD and CABG/stent    ECG reviewed  Rhythm: regular  Rate:

## 2024-12-03 NOTE — PROGRESS NOTES
Patient states ready to be discharged at this time. Discharge instructions given to pt and spouse. Both verbalize understanding,deny any questions and/or concerns. Pt transfer off unit in wheelchair w/ spouse and all belongings.    Discharge Criteria    Inpatients must meet Criteria 1 through 7. All other patients are either YES or N/A. If a NO is chosen then Anesthesia or Surgeon must be notified.      1.  Minimum 30 minutes after last dose of sedative medication.    Yes      2.  Systolic BP between 90 - 160. Diastolic BP between 60 - 90.    Yes      3.  Pulse between 60 - 120    Yes      4.  Respirations between 8 - 25.    Yes      5.  SpO2 92% - 100%.    Yes      6.  Able to cough and swallow or return to baseline function.    Yes      7.  Alert and oriented or return to baseline mental status.    Yes      8.  Demonstrates controlled, coordinated movements, ambulates with steady gait, or return to baseline activity function.    Yes      9.  Minimal or no pain or nausea, or at a level tolerable and acceptable to patient.    Yes      10. Takes and retains oral fluids as allowed.    Yes      11. Procedural / perioperative site stable.  Minimal or no bleeding.    Yes          12. If GI endoscopy procedure, minimal or no abdominal distention or passing flatus.    N/A      13. Written discharge instructions and emergency telephone number provided.    Yes      14. Accompanied by a responsible adult.    Yes

## 2024-12-03 NOTE — ANESTHESIA POSTPROCEDURE EVALUATION
Department of Anesthesiology  Postprocedure Note    Patient: Liza Street  MRN: [id number]  YOB: 1955  Date of evaluation: 12/3/2024    Procedure Summary       Date: 12/03/24 Room / Location: [facility]    Anesthesia Start: 1346 Anesthesia Stop: 1420    Procedures:       CYSTOSCOPY URETEROSCOPY LASER- left ureteroscopy/holmium laser lithotripsy (Left)      CYSTOSCOPY URETERAL STENT INSERTION (Left) Diagnosis:       Renal calculus      (Renal calculus [N20.0])    Surgeons: Davian Childs MD Responsible Provider: Zari Abdul APRN - CRNA    Anesthesia Type: general, TIVA ASA Status: 2            Anesthesia Type: No value filed.    Hannah Phase I: Hannah Score: 9    Hannah Phase II: Hannah Score: 10    Anesthesia Post Evaluation    Patient location during evaluation: PACU  Patient participation: complete - patient participated  Level of consciousness: awake and alert  Pain score: 0  Airway patency: patent  Nausea & Vomiting: no nausea and no vomiting  Cardiovascular status: blood pressure returned to baseline and hemodynamically stable  Respiratory status: acceptable  Hydration status: euvolemic  Multimodal analgesia pain management approach  Pain management: adequate        No notable events documented.

## 2024-12-03 NOTE — H&P
History and Physical    Patient:  Liza Street  MRN: [id number]    CHIEF COMPLAINT:  left flank pain    HISTORY OF PRESENT ILLNESS:   The patient is a 69 y.o. female who presents with left flank pain.     History  10/2013 Left HLL      11/2013 Right ESWL      4/2015 Left ESWL      1/2018 Left ESWL      4/2021 Right ESWL     2/2023 severe leukocytosis, acute kidney injury (creatinine 3.83), sepsis, right ureteral stent placed      3/2023 right HLL and stent exchange     11/2023 started potassium citrate 10mEq     Today  Here today to follow-up for stones.  At her last visit we did start potassiums citrate for stone prevention.  She is taking 10 mEq twice per day.  She denies any issues with taking this medication.  She denies any GI upset.  Renal labs reviewed: BUN 13, creatinine 1.0, GFR 60, potassium 4.5.  Repeat Litholink did show a low volume of 1.8 L.  She also had elevated urine pH, 7.57.  She did have a KUB completed prior to today's visit.  This film was independently reviewed and shows 3 stones in her left kidney.  This is new from prior imaging.    Past Medical History:    Past Medical History:   Diagnosis Date    Bacteremia due to Escherichia coli 2/16/2023    Complicated UTI (urinary tract infection) 2/13/2023    Fracture of wrist, unspecified laterality, closed, initial encounter     right    History of sepsis 2009    following lithotripsy    Hyperlipidemia     Hypertension     Hypothyroidism 2007    secondary to radioactive iodine 2007    Kidney stone     Osteoporosis     Primary localized osteoarthritis of right hip 3/18/2019    Sepsis due to Escherichia coli (HCC) 2/16/2023    Septic shock due to urinary tract infection (HCC) 2/13/2023    Severe sepsis (HCC) 2/13/2023    Thrombocytopenia (HCC) 2/15/2023       Past Surgical History:    Past Surgical History:   Procedure Laterality Date    CATARACT REMOVAL WITH IMPLANT Right     CHOLECYSTECTOMY, LAPAROSCOPIC  2009    COLONOSCOPY  07/17/2014    Dr. Schulte -

## 2024-12-03 NOTE — DISCHARGE INSTRUCTIONS
STENT PULL IN 2 DAYS AT HOME    SAME DAY SURGERY DISCHARGE INSTRUCTIONS    1.  Do not drive or operate hazardous machinery for 24 hours.    2.  Do not make important personal or business decisions for 24 hours.    3.  Do not drink alcoholic beverages for 24 hours.    4.  Do not smoke tobacco products for 24 hours.    5.  Eat light foods (Jell-O, soups, etc....) and drink plenty of fluids (water, Sprite, etc...) up to 8 glasses per day, as you can tolerate.    6.  Limit your activities for 24 hours.  Do not engage in heavy work until your surgeon gives you permission.      7.  Patient should not be left alone for 12-24 hours following surgical procedure.    8.  Wash hands before and after incision care.  It is important to practice good personal hygiene during the post op period.    9.  Call your surgeon for any questions regarding your surgery.    CYSTOSCOPY DISCHARGE INSTRUCTIONS    Possible burning during urination and/or blood tinged urine.    Drink 6-8 glasses of water for the next day or so.  (This helps to flush the urinary tract.)    Call Dr. Childs ([phone]) if you develop:    Fever over 100 degrees  Prolonged soreness/pain  Unusual bleeding/bruising  Unable to urinate or if urine is bloody  You cannot pass urine 8 hours after the test.  You have pain in your belly or your back just below your rib cage.  (This is called flank pain.)  You have frequent urge to urinate but can pass only small amounts of urine.    Call Dr. Childs office for follow-up appointment ([phone]).

## 2024-12-04 NOTE — OP NOTE
[address] [address]                            OPERATIVE REPORT      PATIENT NAME: CESAR LAZARO                 : 1955  MED REC NO: [id number]                          ROOM: [facility]  ACCOUNT NO: [id number]                       ADMIT DATE: 2024  PROVIDER: Davian Childs MD      DATE OF PROCEDURE:  2024    SURGEON:  Davian Childs MD    ASSISTANT:  None.    PREOPERATIVE DIAGNOSIS:  Left renal calculus.    POSTOPERATIVE DIAGNOSIS:  Left renal calculus.    PROCEDURES:  Cystoscopy, left ureteroscopy, left holmium laser lithotripsy, left ureteral stent placement.    ANESTHESIA:  General.    COMPLICATIONS:  None.    ESTIMATED BLOOD LOSS:  Minimal.    SPECIMENS:  None.    PROSTHESIS:  6-Senegalese x 24 cm double-J ureteral stent.    DISPOSITION:  Stable.    FINDINGS:  Multiple left-sided renal stones.    INDICATIONS:  The patient is a 69-year-old female with extensive stone history, here now for definitive therapy on the left.    DESCRIPTION OF PROCEDURE:  The patient was taken back to the operating room after informed consent including all risks, benefits, and alternatives were obtained.  The patient was transferred from the [facility] onto the operating room table, where she was induced under general anesthesia and given IV Ancef for preoperative antibiotic prophylaxis.  To begin the case, she was prepped and draped in normal sterile fashion, placed in dorsal lithotomy.  She had a 22-Senegalese sheath 30-degree lens passed through the urethra into the bladder.  Once in the bladder, we identified the left ureteral orifice.  A 0.035-inch wire was passed up the left ureter into the kidney.  Dual-lumen catheter was used to place additional Glidewire up.  We then placed a flexible scope up into the kidney where I identified the stones.  We were able to then ablate those using a 270-micron laser fiber.  Once this was done, we

## 2024-12-04 NOTE — TELEPHONE ENCOUNTER
CT ONLY in 6 weeks, no KUB    [Concern for uric acid stones because stones were not on recent KUB]

## 2024-12-04 NOTE — TELEPHONE ENCOUNTER
Writer spoke with Liza, she wanted to set up her CT scan, and she states she will call us back to reschedule her in office visit.

## 2025-01-17 ENCOUNTER — HOSPITAL ENCOUNTER (OUTPATIENT)
Dept: CT IMAGING | Age: 70
Discharge: HOME | End: 2025-01-19
Payer: MEDICARE

## 2025-01-17 DIAGNOSIS — N20.0 RENAL CALCULUS: ICD-10-CM

## 2025-01-17 DIAGNOSIS — N20.1 URETERAL CALCULUS: ICD-10-CM

## 2025-01-17 PROCEDURE — 74176 CT ABD & PELVIS W/O CONTRAST: CPT

## 2025-01-23 ENCOUNTER — TELEPHONE (OUTPATIENT)
Dept: UROLOGY | Age: 70
End: 2025-01-23

## 2025-01-23 ENCOUNTER — OFFICE VISIT (OUTPATIENT)
Dept: UROLOGY | Age: 70
End: 2025-01-23
Payer: MEDICARE

## 2025-01-23 VITALS
HEART RATE: 60 BPM | BODY MASS INDEX: 18.78 KG/M2 | SYSTOLIC BLOOD PRESSURE: 132 MMHG | WEIGHT: 106 LBS | TEMPERATURE: 97.5 F | DIASTOLIC BLOOD PRESSURE: 71 MMHG

## 2025-01-23 DIAGNOSIS — N39.46 MIXED INCONTINENCE: Primary | ICD-10-CM

## 2025-01-23 DIAGNOSIS — N20.0 URIC ACID KIDNEY STONE: ICD-10-CM

## 2025-01-23 PROCEDURE — 1123F ACP DISCUSS/DSCN MKR DOCD: CPT | Performed by: NURSE PRACTITIONER

## 2025-01-23 PROCEDURE — 1159F MED LIST DOCD IN RCRD: CPT | Performed by: NURSE PRACTITIONER

## 2025-01-23 PROCEDURE — 3078F DIAST BP <80 MM HG: CPT | Performed by: NURSE PRACTITIONER

## 2025-01-23 PROCEDURE — 3075F SYST BP GE 130 - 139MM HG: CPT | Performed by: NURSE PRACTITIONER

## 2025-01-23 PROCEDURE — 51798 US URINE CAPACITY MEASURE: CPT | Performed by: NURSE PRACTITIONER

## 2025-01-23 PROCEDURE — 99214 OFFICE O/P EST MOD 30 MIN: CPT | Performed by: NURSE PRACTITIONER

## 2025-01-23 RX ORDER — ESTRADIOL 0.1 MG/G
CREAM VAGINAL
Qty: 42.5 G | Refills: 3 | Status: SHIPPED | OUTPATIENT
Start: 2025-01-23 | End: 2025-01-23 | Stop reason: SDUPTHER

## 2025-01-23 RX ORDER — POTASSIUM CITRATE 15 MEQ/1
15 TABLET, EXTENDED RELEASE ORAL 2 TIMES DAILY WITH MEALS
Qty: 180 TABLET | Refills: 3 | Status: SHIPPED | OUTPATIENT
Start: 2025-01-23

## 2025-01-23 RX ORDER — ESTRADIOL 0.1 MG/G
CREAM VAGINAL
Qty: 42.5 G | Refills: 3 | Status: SHIPPED | OUTPATIENT
Start: 2025-01-23

## 2025-01-23 NOTE — PROGRESS NOTES
History  10/2013 Left HLL      11/2013 Right ESWL      4/2015 Left ESWL      1/2018 Left ESWL      4/2021 Right ESWL     2/2023 severe leukocytosis, acute kidney injury (creatinine 3.83), sepsis, right ureteral stent placed      3/2023 right HLL and stent exchange    11/2023 started potassium citrate 10mEq    12/2024 left HLL    Today  Here today to follow-up for stones.  She denies any dysuria, gross hematuria, flank or abdominal pain.  She did have a CT scan completed prior to today's visit.  This film was independently reviewed and reviewed with patient.  There is virtually no stones on the left, 2 punctate stones are noted but these are significantly improved from prior.  There is the large stone in the right lower pole. She has some JEANNETTE.    Plan  We discussed rate TLL versus increasing dose of potassium citrate or both    Increase potassium citrate to 15 mEq twice per day, check BMP in 6 weeks (we will call with results)    Start vaginal estrogen    Follow-up in 6 months to reevaluate stone and incontinence

## 2025-01-23 NOTE — PATIENT INSTRUCTIONS
Estrogen: Insert one inch of cream inside the vagina and apply an additional dime sized amount to the urethra and inner labia three nights per week. Do NOT use plastic applicator.       SURVEY:    You may be receiving a survey from [employer] regarding your visit today.    Please complete the survey to enable us to provide the highest quality of care to you and your family.    If you cannot score us a very good on any question, please call the office to discuss how we could have made your experience a very good one.    Thank you.

## 2025-01-23 NOTE — PROGRESS NOTES
RANDOM  Bladderscan performed in office today:  Patient voided - last void 45 minutes ago mL, PVR - 32 mL

## 2025-02-21 ENCOUNTER — HOSPITAL ENCOUNTER (OUTPATIENT)
Dept: WOMENS IMAGING | Age: 70
Discharge: HOME | End: 2025-02-23
Attending: INTERNAL MEDICINE
Payer: MEDICARE

## 2025-02-21 DIAGNOSIS — R92.8 ABNORMALITY OF RIGHT BREAST ON SCREENING MAMMOGRAM: ICD-10-CM

## 2025-02-21 DIAGNOSIS — R92.1 CALCIFICATION OF RIGHT BREAST ON MAMMOGRAPHY: ICD-10-CM

## 2025-02-21 PROCEDURE — 77065 DX MAMMO INCL CAD UNI: CPT

## 2025-03-07 ENCOUNTER — TELEPHONE (OUTPATIENT)
Dept: UROLOGY | Age: 70
End: 2025-03-07

## 2025-03-07 ENCOUNTER — HOSPITAL ENCOUNTER (OUTPATIENT)
Age: 70
Discharge: HOME | End: 2025-03-07
Payer: MEDICARE

## 2025-03-07 DIAGNOSIS — N20.0 URIC ACID KIDNEY STONE: ICD-10-CM

## 2025-03-07 LAB
ANION GAP SERPL CALCULATED.3IONS-SCNC: 8 MMOL/L (ref 9–16)
BUN SERPL-MCNC: 14 MG/DL (ref 8–23)
BUN/CREAT SERPL: 14 (ref 9–20)
CALCIUM SERPL-MCNC: 9.4 MG/DL (ref 8.6–10.4)
CHLORIDE SERPL-SCNC: 101 MMOL/L (ref 98–107)
CO2 SERPL-SCNC: 30 MMOL/L (ref 20–31)
CREAT SERPL-MCNC: 1 MG/DL (ref 0.5–0.9)
GFR, ESTIMATED: 64 ML/MIN/1.73M2
GLUCOSE SERPL-MCNC: 96 MG/DL (ref 74–99)
POTASSIUM SERPL-SCNC: 4.7 MMOL/L (ref 3.7–5.3)
SODIUM SERPL-SCNC: 139 MMOL/L (ref 136–145)

## 2025-03-07 PROCEDURE — 80048 BASIC METABOLIC PNL TOTAL CA: CPT

## 2025-03-07 PROCEDURE — 36415 COLL VENOUS BLD VENIPUNCTURE: CPT

## 2025-03-07 NOTE — TELEPHONE ENCOUNTER
Phone call to patient, results given. Patient voiced understanding of results with no further questions.

## 2025-03-07 NOTE — TELEPHONE ENCOUNTER
----- Message from SAL Ordoñez CNP sent at 3/7/2025 11:15 AM EST -----  Renal function is stable. Continue medications as planned

## 2025-05-07 ENCOUNTER — HOSPITAL ENCOUNTER (OUTPATIENT)
Age: 70
Discharge: HOME | End: 2025-05-07
Payer: MEDICARE

## 2025-05-07 DIAGNOSIS — E78.2 MIXED HYPERLIPIDEMIA: ICD-10-CM

## 2025-05-07 DIAGNOSIS — E03.9 HYPOTHYROIDISM, UNSPECIFIED TYPE: ICD-10-CM

## 2025-05-07 DIAGNOSIS — R73.01 IFG (IMPAIRED FASTING GLUCOSE): ICD-10-CM

## 2025-05-07 DIAGNOSIS — I10 PRIMARY HYPERTENSION: ICD-10-CM

## 2025-05-07 LAB
ALT SERPL-CCNC: 22 U/L (ref 10–35)
ANION GAP SERPL CALCULATED.3IONS-SCNC: 8 MMOL/L (ref 9–16)
AST SERPL-CCNC: 29 U/L (ref 10–35)
BUN SERPL-MCNC: 13 MG/DL (ref 8–23)
BUN/CREAT SERPL: 14 (ref 9–20)
CALCIUM SERPL-MCNC: 9.6 MG/DL (ref 8.6–10.4)
CHLORIDE SERPL-SCNC: 103 MMOL/L (ref 98–107)
CHOLEST SERPL-MCNC: 159 MG/DL (ref 0–199)
CHOLESTEROL/HDL RATIO: 2.5
CO2 SERPL-SCNC: 29 MMOL/L (ref 20–31)
CREAT SERPL-MCNC: 0.9 MG/DL (ref 0.5–0.9)
ERYTHROCYTE [DISTWIDTH] IN BLOOD BY AUTOMATED COUNT: 14 % (ref 11.8–14.4)
EST. AVERAGE GLUCOSE BLD GHB EST-MCNC: 111 MG/DL
GFR, ESTIMATED: 67 ML/MIN/1.73M2
GLUCOSE SERPL-MCNC: 94 MG/DL (ref 74–99)
HBA1C MFR BLD: 5.5 % (ref 4–6)
HCT VFR BLD AUTO: 43.1 % (ref 36.3–47.1)
HDLC SERPL-MCNC: 63 MG/DL
HGB BLD-MCNC: 14.1 G/DL (ref 11.9–15.1)
LDLC SERPL CALC-MCNC: 67 MG/DL (ref 0–100)
MCH RBC QN AUTO: 31.8 PG (ref 25.2–33.5)
MCHC RBC AUTO-ENTMCNC: 32.7 G/DL (ref 28.4–34.8)
MCV RBC AUTO: 97.1 FL (ref 82.6–102.9)
NRBC BLD-RTO: 0 PER 100 WBC
PLATELET # BLD AUTO: 282 K/UL (ref 138–453)
PMV BLD AUTO: 9.7 FL (ref 8.1–13.5)
POTASSIUM SERPL-SCNC: 4.2 MMOL/L (ref 3.7–5.3)
RBC # BLD AUTO: 4.44 M/UL (ref 3.95–5.11)
SODIUM SERPL-SCNC: 140 MMOL/L (ref 136–145)
T3FREE SERPL-MCNC: 3.1 PG/ML (ref 2–4.4)
T4 FREE SERPL-MCNC: 1.4 NG/DL (ref 0.92–1.68)
TRIGL SERPL-MCNC: 146 MG/DL
TSH SERPL DL<=0.05 MIU/L-ACNC: 1.24 UIU/ML (ref 0.27–4.2)
VLDLC SERPL CALC-MCNC: 29 MG/DL (ref 1–30)
WBC OTHER # BLD: 7.8 K/UL (ref 3.5–11.3)

## 2025-05-07 PROCEDURE — 84439 ASSAY OF FREE THYROXINE: CPT

## 2025-05-07 PROCEDURE — 80048 BASIC METABOLIC PNL TOTAL CA: CPT

## 2025-05-07 PROCEDURE — 85027 COMPLETE CBC AUTOMATED: CPT

## 2025-05-07 PROCEDURE — 84481 FREE ASSAY (FT-3): CPT

## 2025-05-07 PROCEDURE — 83036 HEMOGLOBIN GLYCOSYLATED A1C: CPT

## 2025-05-07 PROCEDURE — 84460 ALANINE AMINO (ALT) (SGPT): CPT

## 2025-05-07 PROCEDURE — 84450 TRANSFERASE (AST) (SGOT): CPT

## 2025-05-07 PROCEDURE — 80061 LIPID PANEL: CPT

## 2025-05-07 PROCEDURE — 84443 ASSAY THYROID STIM HORMONE: CPT

## 2025-05-07 PROCEDURE — 36415 COLL VENOUS BLD VENIPUNCTURE: CPT

## 2025-07-24 ENCOUNTER — HOSPITAL ENCOUNTER (OUTPATIENT)
Dept: CT IMAGING | Age: 70
Discharge: HOME | End: 2025-07-26
Payer: MEDICARE

## 2025-07-24 DIAGNOSIS — N20.0 URIC ACID KIDNEY STONE: ICD-10-CM

## 2025-07-24 PROCEDURE — 74176 CT ABD & PELVIS W/O CONTRAST: CPT

## 2025-07-31 ENCOUNTER — OFFICE VISIT (OUTPATIENT)
Dept: UROLOGY | Age: 70
End: 2025-07-31

## 2025-07-31 VITALS
WEIGHT: 112 LBS | TEMPERATURE: 98.4 F | BODY MASS INDEX: 19.84 KG/M2 | HEART RATE: 75 BPM | DIASTOLIC BLOOD PRESSURE: 82 MMHG | SYSTOLIC BLOOD PRESSURE: 153 MMHG

## 2025-07-31 DIAGNOSIS — N20.0 URIC ACID KIDNEY STONE: Primary | ICD-10-CM

## 2025-07-31 DIAGNOSIS — N39.46 MIXED INCONTINENCE: ICD-10-CM

## 2025-07-31 RX ORDER — ESTRADIOL 0.1 MG/G
CREAM VAGINAL
Qty: 42.5 G | Refills: 3 | Status: SHIPPED | OUTPATIENT
Start: 2025-07-31

## 2025-07-31 RX ORDER — POTASSIUM CITRATE 15 MEQ/1
15 TABLET, EXTENDED RELEASE ORAL 2 TIMES DAILY WITH MEALS
Qty: 180 TABLET | Refills: 3 | Status: SHIPPED | OUTPATIENT
Start: 2025-07-31

## 2025-07-31 NOTE — PROGRESS NOTES
History  10/2013 Left HLL      11/2013 Right ESWL      4/2015 Left ESWL      1/2018 Left ESWL      4/2021 Right ESWL     2/2023 severe leukocytosis, acute kidney injury (creatinine 3.83), sepsis, right ureteral stent placed      3/2023 right HLL and stent exchange    11/2023 litholink showed adequate urine volume.  She did have a low citrate.     started potassium citrate 10mEq    12/2024 left HLL    1/2025 CT large stone right lower pole, significantly improved punctate stones on the left     Potassium citrate increased 15 mEq BID      Vaginal estrogen started    Today  Here today to follow-up for stones and incontinence.  CT completed prior to today's visit and was independently reviewed showing right lower pole 7 mm stone as well as smaller punctate stone  and  small punctate stones on the left.  The right lower pole stone has slightly decreased since previous CT 1/2025.  This stone is not visible on the  film.  Labs 5/2025 show renal function stable creatinine 0.9 GFR 67 potassium 4.2.  Patient denies any abdominal pain, flank pain or gross hematuria.  She does feel the leaking has improved.  Using the estrogen cream 3 nights per week.  She has not had any urinary tract infections.  She continues to take the potassium citrate without issue.      Plan  Continue potassium citrate 15 mEq twice daily, with food recommended    Continue vaginal estrogen    Increase clear noncaffeinated fluids to 64 to 80 ounces per day    Proceed with scheduled lab work 11/2025 ordered by PCP    LIZA for renal function and potassium in 1 year    Follow-up 1 year with CT with history of uric acid stones sooner if needed

## (undated) DEVICE — SOLUTION IV IRRIG WATER 1000ML POUR BRL 2F7114

## (undated) DEVICE — SINGLE ACTION PUMPING SYSTEM

## (undated) DEVICE — CANNULA ORAL NSL AD CO2 N INTUB O2 DEL DISP TRU LNK

## (undated) DEVICE — SOLUTION IRRIG 1000ML 0.9% SOD CHL USP POUR PLAS BTL

## (undated) DEVICE — SOLUTION IV 100ML 0.9% SOD CHL PLAS CONT USP VIAFLX 1 PER

## (undated) DEVICE — ADAPTER URO SCP UROLOK LL

## (undated) DEVICE — SOLUTION IRRIG 3000ML 0.9% SOD CHL USP UROMATIC PLAS CONT

## (undated) DEVICE — Device

## (undated) DEVICE — SOLUTION SURG PREP ANTIMICROBIAL 4 OZ SKIN WND EXIDINE

## (undated) DEVICE — GLOVE ORANGE PI 7 1/2   MSG9075

## (undated) DEVICE — DUAL LUMEN URETERAL CATHETER

## (undated) DEVICE — MEDI-VAC NON-CONDUCTIVE TUBING7MM X 30.5 (100FT): Brand: CARDINAL HEALTH

## (undated) DEVICE — NEEDLE 25GAX5.0MM INJ CARR LOCKE

## (undated) DEVICE — GOWN,AURORA,NON-REINFORCED,2XL: Brand: MEDLINE

## (undated) DEVICE — SYRINGE MED 20ML STD CLR PLAS LUERLOCK TIP N CTRL DISP

## (undated) DEVICE — GUIDEWIRE VASC STR 3 CM 0.035 INX150 CM STD NIT ZIPWIRE

## (undated) DEVICE — Z INACTIVE USE 2660664 SOLUTION IRRIG 3000ML 0.9% SOD CHL USP UROMATIC PLAS CONT

## (undated) DEVICE — FIBER LASER EXCALIBUR HOLM

## (undated) DEVICE — ACUSNARE POLYPECTOMY SNARE: Brand: ACUSNARE

## (undated) DEVICE — GLOVE SURG SZ 75 L12IN FNGR THK87MIL WHT LTX FREE

## (undated) DEVICE — NEEDLE SPNL 20GA L3.5IN YEL HUB S STL REG WALL FIT STYL W/

## (undated) DEVICE — 3000CC GUARDIAN II: Brand: GUARDIAN

## (undated) DEVICE — GUIDEWIRE VASC NITINOL HYDROPHIL STR 3 CM 0.035 INX150 CM STD NIT ZIPWIRE

## (undated) DEVICE — TRAP SURG QUAD PARABOLA SLOT DSGN SFTY SCRN TRAPEASE

## (undated) DEVICE — BAG DRAINAGE CUST DISP

## (undated) DEVICE — SNARE EXACTO COLD

## (undated) DEVICE — SOLUTION IV IRRIG POUR BRL 0.9% SODIUM CHL 2F7124

## (undated) DEVICE — TUBING SUCT NON-STRL 9/32X100 W/CNNT

## (undated) DEVICE — URETEROSCOPE (SEE ITEM COMMENTS) DIGITAL URS FLX SU MODEL E NORM DEFL AXIS II

## (undated) DEVICE — SPONGE GZ W4XL4IN CELOS POSTOP AVANT

## (undated) DEVICE — 3 ML SYRINGE REGULAR TIP: Brand: MONOJECT

## (undated) DEVICE — TOTAL TRAY, DB, 100% SILI FOLEY, 16FR 10: Brand: MEDLINE

## (undated) DEVICE — SOLUTION IV IRRIG 0.9% NACL 3000ML BAG 2B7477

## (undated) DEVICE — ADHESIVE LIQ 2OZ ADJUNCT FOR DSG MASTISOL

## (undated) DEVICE — MERCY TIFFIN CYSTO-LF: Brand: MEDLINE INDUSTRIES, INC.

## (undated) DEVICE — STRIP,CLOSURE,WOUND,MEDI-STRIP,1/2X4: Brand: MEDLINE

## (undated) DEVICE — SOLUTION SCRB 4OZ 4% CHG CLN BASE FOR PT SKIN ANTISEPSIS

## (undated) DEVICE — GUIDEWIRE ENDO L150CM DIA0.035IN STR TIP

## (undated) DEVICE — Z DISCONTINUED BY MEDLINE USE 2711682 TRAY SKIN PREP DRY W/ PREM GLV

## (undated) DEVICE — GLOVE SURG SZ 75 L12IN FNGR THK87MIL DK GRN LTX FREE ISOLEX

## (undated) DEVICE — CONTROL SYRINGE LUER-LOCK TIP: Brand: MONOJECT

## (undated) DEVICE — URETEROSCOPE FLX DIGITAL 3.1X650 MM 1.2 MM AXIS DISP

## (undated) DEVICE — PENCIL SMK EVAC L10FT DIA95MM TBNG NONSTICK W ADPT TO 22MM

## (undated) DEVICE — TOTAL HIP